# Patient Record
Sex: MALE | Race: WHITE | NOT HISPANIC OR LATINO | Employment: OTHER | URBAN - METROPOLITAN AREA
[De-identification: names, ages, dates, MRNs, and addresses within clinical notes are randomized per-mention and may not be internally consistent; named-entity substitution may affect disease eponyms.]

---

## 2017-01-16 ENCOUNTER — GENERIC CONVERSION - ENCOUNTER (OUTPATIENT)
Dept: OTHER | Facility: OTHER | Age: 62
End: 2017-01-16

## 2017-01-19 ENCOUNTER — GENERIC CONVERSION - ENCOUNTER (OUTPATIENT)
Dept: OTHER | Facility: OTHER | Age: 62
End: 2017-01-19

## 2017-01-23 ENCOUNTER — TRANSCRIBE ORDERS (OUTPATIENT)
Dept: ADMINISTRATIVE | Facility: HOSPITAL | Age: 62
End: 2017-01-23

## 2017-01-23 ENCOUNTER — GENERIC CONVERSION - ENCOUNTER (OUTPATIENT)
Dept: OTHER | Facility: OTHER | Age: 62
End: 2017-01-23

## 2017-01-23 ENCOUNTER — APPOINTMENT (OUTPATIENT)
Dept: LAB | Facility: HOSPITAL | Age: 62
End: 2017-01-23
Attending: INTERNAL MEDICINE
Payer: MEDICARE

## 2017-01-23 DIAGNOSIS — B18.2 CHRONIC VIRAL HEPATITIS C (HCC): ICD-10-CM

## 2017-01-23 LAB
ALBUMIN SERPL BCP-MCNC: 3.4 G/DL (ref 3.5–5)
ALP SERPL-CCNC: 59 U/L (ref 46–116)
ALT SERPL W P-5'-P-CCNC: 29 U/L (ref 12–78)
ANION GAP SERPL CALCULATED.3IONS-SCNC: 7 MMOL/L (ref 4–13)
AST SERPL W P-5'-P-CCNC: 35 U/L (ref 5–45)
BILIRUB SERPL-MCNC: 1.07 MG/DL (ref 0.2–1)
BUN SERPL-MCNC: 8 MG/DL (ref 5–25)
CALCIUM SERPL-MCNC: 9 MG/DL (ref 8.3–10.1)
CHLORIDE SERPL-SCNC: 100 MMOL/L (ref 100–108)
CO2 SERPL-SCNC: 28 MMOL/L (ref 21–32)
CREAT SERPL-MCNC: 1.01 MG/DL (ref 0.6–1.3)
ERYTHROCYTE [DISTWIDTH] IN BLOOD BY AUTOMATED COUNT: 14 % (ref 11.6–15.1)
GFR SERPL CREATININE-BSD FRML MDRD: >60 ML/MIN/1.73SQ M
GLUCOSE SERPL-MCNC: 268 MG/DL (ref 65–140)
HCT VFR BLD AUTO: 42.4 % (ref 36.5–49.3)
HGB BLD-MCNC: 14.5 G/DL (ref 12–17)
MCH RBC QN AUTO: 32.4 PG (ref 26.8–34.3)
MCHC RBC AUTO-ENTMCNC: 34.2 G/DL (ref 31.4–37.4)
MCV RBC AUTO: 95 FL (ref 82–98)
PLATELET # BLD AUTO: 110 THOUSANDS/UL (ref 149–390)
PMV BLD AUTO: 10.2 FL (ref 8.9–12.7)
POTASSIUM SERPL-SCNC: 4.1 MMOL/L (ref 3.5–5.3)
PROT SERPL-MCNC: 7.9 G/DL (ref 6.4–8.2)
RBC # BLD AUTO: 4.48 MILLION/UL (ref 3.88–5.62)
SODIUM SERPL-SCNC: 135 MMOL/L (ref 136–145)
WBC # BLD AUTO: 5.17 THOUSAND/UL (ref 4.31–10.16)

## 2017-01-23 PROCEDURE — 85027 COMPLETE CBC AUTOMATED: CPT

## 2017-01-23 PROCEDURE — 87522 HEPATITIS C REVRS TRNSCRPJ: CPT

## 2017-01-23 PROCEDURE — 80053 COMPREHEN METABOLIC PANEL: CPT

## 2017-01-23 PROCEDURE — 36415 COLL VENOUS BLD VENIPUNCTURE: CPT

## 2017-01-25 ENCOUNTER — ALLSCRIPTS OFFICE VISIT (OUTPATIENT)
Dept: OTHER | Facility: OTHER | Age: 62
End: 2017-01-25

## 2017-01-25 LAB
HCV RNA SERPL NAA+PROBE-ACNC: NORMAL IU/ML
TEST INFORMATION: NORMAL

## 2017-02-01 ENCOUNTER — HOSPITAL ENCOUNTER (OUTPATIENT)
Dept: CT IMAGING | Facility: HOSPITAL | Age: 62
Discharge: HOME | End: 2017-02-01
Attending: INTERNAL MEDICINE
Payer: MEDICARE

## 2017-02-01 DIAGNOSIS — K76.9 LIVER DISEASE: ICD-10-CM

## 2017-02-01 PROCEDURE — 74177 CT ABD & PELVIS W/CONTRAST: CPT

## 2017-02-01 RX ADMIN — IOHEXOL 100 ML: 350 INJECTION, SOLUTION INTRAVENOUS at 08:34

## 2017-02-02 ENCOUNTER — GENERIC CONVERSION - ENCOUNTER (OUTPATIENT)
Dept: OTHER | Facility: OTHER | Age: 62
End: 2017-02-02

## 2017-03-22 ENCOUNTER — GENERIC CONVERSION - ENCOUNTER (OUTPATIENT)
Dept: OTHER | Facility: OTHER | Age: 62
End: 2017-03-22

## 2017-04-20 ENCOUNTER — APPOINTMENT (OUTPATIENT)
Dept: LAB | Facility: HOSPITAL | Age: 62
End: 2017-04-20
Attending: INTERNAL MEDICINE
Payer: MEDICARE

## 2017-04-20 DIAGNOSIS — K76.9 LIVER DISEASE: ICD-10-CM

## 2017-04-20 LAB
ALBUMIN SERPL BCP-MCNC: 3.6 G/DL (ref 3.5–5)
ALP SERPL-CCNC: 66 U/L (ref 46–116)
ALT SERPL W P-5'-P-CCNC: 31 U/L (ref 12–78)
ANION GAP SERPL CALCULATED.3IONS-SCNC: 7 MMOL/L (ref 4–13)
AST SERPL W P-5'-P-CCNC: 34 U/L (ref 5–45)
BILIRUB SERPL-MCNC: 0.99 MG/DL (ref 0.2–1)
BUN SERPL-MCNC: 10 MG/DL (ref 5–25)
CALCIUM SERPL-MCNC: 8.9 MG/DL (ref 8.3–10.1)
CHLORIDE SERPL-SCNC: 101 MMOL/L (ref 100–108)
CO2 SERPL-SCNC: 30 MMOL/L (ref 21–32)
CREAT SERPL-MCNC: 1.04 MG/DL (ref 0.6–1.3)
ERYTHROCYTE [DISTWIDTH] IN BLOOD BY AUTOMATED COUNT: 13.1 % (ref 11.6–15.1)
GFR SERPL CREATININE-BSD FRML MDRD: >60 ML/MIN/1.73SQ M
GLUCOSE SERPL-MCNC: 232 MG/DL (ref 65–140)
HCT VFR BLD AUTO: 42.3 % (ref 36.5–49.3)
HGB BLD-MCNC: 15 G/DL (ref 12–17)
INR PPP: 1.15 (ref 0.86–1.16)
MCH RBC QN AUTO: 32.4 PG (ref 26.8–34.3)
MCHC RBC AUTO-ENTMCNC: 35.5 G/DL (ref 31.4–37.4)
MCV RBC AUTO: 91 FL (ref 82–98)
PLATELET # BLD AUTO: 112 THOUSANDS/UL (ref 149–390)
PMV BLD AUTO: 11.6 FL (ref 8.9–12.7)
POTASSIUM SERPL-SCNC: 4.6 MMOL/L (ref 3.5–5.3)
PROT SERPL-MCNC: 7.9 G/DL (ref 6.4–8.2)
PROTHROMBIN TIME: 14.7 SECONDS (ref 12–14.3)
RBC # BLD AUTO: 4.63 MILLION/UL (ref 3.88–5.62)
SODIUM SERPL-SCNC: 138 MMOL/L (ref 136–145)
WBC # BLD AUTO: 5.36 THOUSAND/UL (ref 4.31–10.16)

## 2017-04-20 PROCEDURE — 85027 COMPLETE CBC AUTOMATED: CPT

## 2017-04-20 PROCEDURE — 85610 PROTHROMBIN TIME: CPT

## 2017-04-20 PROCEDURE — 80053 COMPREHEN METABOLIC PANEL: CPT

## 2017-04-20 PROCEDURE — 36415 COLL VENOUS BLD VENIPUNCTURE: CPT

## 2017-04-20 PROCEDURE — 87522 HEPATITIS C REVRS TRNSCRPJ: CPT

## 2017-04-24 LAB
HCV RNA SERPL NAA+PROBE-ACNC: NORMAL IU/ML
TEST INFORMATION: NORMAL

## 2017-04-25 ENCOUNTER — GENERIC CONVERSION - ENCOUNTER (OUTPATIENT)
Dept: OTHER | Facility: OTHER | Age: 62
End: 2017-04-25

## 2017-04-25 DIAGNOSIS — E03.9 HYPOTHYROIDISM: ICD-10-CM

## 2017-04-25 DIAGNOSIS — K76.9 LIVER DISEASE: ICD-10-CM

## 2017-04-25 DIAGNOSIS — I10 ESSENTIAL (PRIMARY) HYPERTENSION: ICD-10-CM

## 2017-04-25 DIAGNOSIS — E11.9 TYPE 2 DIABETES MELLITUS WITHOUT COMPLICATIONS (HCC): ICD-10-CM

## 2017-04-25 DIAGNOSIS — C22.0 LIVER CELL CARCINOMA (HCC): ICD-10-CM

## 2017-04-27 ENCOUNTER — TRANSCRIBE ORDERS (OUTPATIENT)
Dept: ADMINISTRATIVE | Facility: HOSPITAL | Age: 62
End: 2017-04-27

## 2017-04-27 ENCOUNTER — ALLSCRIPTS OFFICE VISIT (OUTPATIENT)
Dept: OTHER | Facility: OTHER | Age: 62
End: 2017-04-27

## 2017-04-27 DIAGNOSIS — C22.0 CARCINOMA OF LIVER (HCC): Primary | ICD-10-CM

## 2017-05-02 ENCOUNTER — APPOINTMENT (OUTPATIENT)
Dept: LAB | Facility: HOSPITAL | Age: 62
End: 2017-05-02
Attending: INTERNAL MEDICINE
Payer: MEDICARE

## 2017-05-02 DIAGNOSIS — C22.0 LIVER CELL CARCINOMA (HCC): ICD-10-CM

## 2017-05-02 PROCEDURE — 82105 ALPHA-FETOPROTEIN SERUM: CPT

## 2017-05-02 PROCEDURE — 36415 COLL VENOUS BLD VENIPUNCTURE: CPT

## 2017-05-03 ENCOUNTER — GENERIC CONVERSION - ENCOUNTER (OUTPATIENT)
Dept: OTHER | Facility: OTHER | Age: 62
End: 2017-05-03

## 2017-05-03 LAB — AFP-TM SERPL-MCNC: 3.6 NG/ML (ref 0–8.3)

## 2017-05-06 ENCOUNTER — HOSPITAL ENCOUNTER (OUTPATIENT)
Dept: MRI IMAGING | Facility: HOSPITAL | Age: 62
Discharge: HOME | End: 2017-05-06
Attending: INTERNAL MEDICINE
Payer: MEDICARE

## 2017-05-06 DIAGNOSIS — C22.0 LIVER CELL CARCINOMA (HCC): ICD-10-CM

## 2017-05-06 PROCEDURE — A9585 GADOBUTROL INJECTION: HCPCS | Performed by: INTERNAL MEDICINE

## 2017-05-06 PROCEDURE — 74183 MRI ABD W/O CNTR FLWD CNTR: CPT

## 2017-05-06 RX ADMIN — GADOBUTROL 8 ML: 604.72 INJECTION INTRAVENOUS at 10:31

## 2017-05-18 ENCOUNTER — GENERIC CONVERSION - ENCOUNTER (OUTPATIENT)
Dept: OTHER | Facility: OTHER | Age: 62
End: 2017-05-18

## 2017-05-23 ENCOUNTER — ALLSCRIPTS OFFICE VISIT (OUTPATIENT)
Dept: OTHER | Facility: OTHER | Age: 62
End: 2017-05-23

## 2017-06-07 ENCOUNTER — TRANSCRIBE ORDERS (OUTPATIENT)
Dept: ADMINISTRATIVE | Facility: HOSPITAL | Age: 62
End: 2017-06-07

## 2017-06-07 ENCOUNTER — APPOINTMENT (OUTPATIENT)
Dept: LAB | Facility: HOSPITAL | Age: 62
End: 2017-06-07
Payer: MEDICARE

## 2017-06-07 DIAGNOSIS — E03.9 UNSPECIFIED HYPOTHYROIDISM: ICD-10-CM

## 2017-06-07 DIAGNOSIS — E13.9 OTHER SPECIFIED DIABETES MELLITUS: ICD-10-CM

## 2017-06-07 DIAGNOSIS — I10 ESSENTIAL HYPERTENSION, MALIGNANT: ICD-10-CM

## 2017-06-07 DIAGNOSIS — I10 ESSENTIAL HYPERTENSION, MALIGNANT: Primary | ICD-10-CM

## 2017-06-07 LAB
CHOLEST SERPL-MCNC: 124 MG/DL (ref 50–200)
CREAT UR-MCNC: 32.9 MG/DL
EST. AVERAGE GLUCOSE BLD GHB EST-MCNC: 134 MG/DL
HBA1C MFR BLD: 6.3 % (ref 4.2–6.3)
HDLC SERPL-MCNC: 44 MG/DL (ref 40–60)
LDLC SERPL CALC-MCNC: 63 MG/DL (ref 0–100)
MICROALBUMIN UR-MCNC: <5 MG/L (ref 0–20)
MICROALBUMIN/CREAT 24H UR: <15 MG/G CREATININE (ref 0–30)
TRIGL SERPL-MCNC: 87 MG/DL
TSH SERPL DL<=0.05 MIU/L-ACNC: 3.7 UIU/ML (ref 0.36–3.74)

## 2017-06-07 PROCEDURE — 82043 UR ALBUMIN QUANTITATIVE: CPT

## 2017-06-07 PROCEDURE — 36415 COLL VENOUS BLD VENIPUNCTURE: CPT

## 2017-06-07 PROCEDURE — 84443 ASSAY THYROID STIM HORMONE: CPT

## 2017-06-07 PROCEDURE — 80061 LIPID PANEL: CPT

## 2017-06-07 PROCEDURE — 83036 HEMOGLOBIN GLYCOSYLATED A1C: CPT

## 2017-06-07 PROCEDURE — 82570 ASSAY OF URINE CREATININE: CPT

## 2017-06-08 ENCOUNTER — GENERIC CONVERSION - ENCOUNTER (OUTPATIENT)
Dept: OTHER | Facility: OTHER | Age: 62
End: 2017-06-08

## 2017-06-28 ENCOUNTER — GENERIC CONVERSION - ENCOUNTER (OUTPATIENT)
Dept: OTHER | Facility: OTHER | Age: 62
End: 2017-06-28

## 2017-07-11 ENCOUNTER — ALLSCRIPTS OFFICE VISIT (OUTPATIENT)
Dept: OTHER | Facility: OTHER | Age: 62
End: 2017-07-11

## 2018-01-09 NOTE — RESULT NOTES
Verified Results  (1) COMPREHENSIVE METABOLIC PANEL [id number] 12:01EM Jj Juan Alberto Order Number: [id number]     Test Name Result Flag Reference   GLUCOSE,RANDM 159 mg/dL H    If the patient is fasting, the ADA then defines impaired fasting glucose as > 100 mg/dL and diabetes as > or equal to 123 mg/dL  SODIUM 138 mmol/L  136-145   POTASSIUM 4 3 mmol/L  3 5-5 3   CHLORIDE 100 mmol/L  100-108   CARBON DIOXIDE 34 mmol/L H 21-32   ANION GAP (CALC) 4 mmol/L  4-13   BLOOD UREA NITROGEN 12 mg/dL  5-25   CREATININE 1 01 mg/dL  0 60-1 30   Standardized to IDMS reference method   CALCIUM 9 1 mg/dL  8 3-10 1   BILI, TOTAL 1 16 mg/dL H 0 20-1 00   ALK PHOSPHATAS 71 U/L     ALT (SGPT) 98 U/L H 12-78   AST(SGOT) 72 U/L H 5-45   ALBUMIN 3 8 g/dL  3 5-5 0   TOTAL PROTEIN 8 8 g/dL H 6 4-8 2   eGFR Non-African American      >60 0 ml/min/1 73sq m   - Patient Instructions: This is a fasting blood test  Water,black tea or black  coffee only after 9:00pm the night before test Drink 2 glasses of water the morning of test - Patient Instructions: This bloodwork is non-fasting  Please drink two glasses of   water morning of bloodwork  National Kidney Disease Education Program recommendations are as follows:  GFR calculation is accurate only with a steady state creatinine  Chronic Kidney disease less than 60 ml/min/1 73 sq  meters  Kidney failure less than 15 ml/min/1 73 sq  meters  (1) LIPID PANEL, FASTING [id number] 08:36AM Jj Avendano Order Number: [id number]     Test Name Result Flag Reference   CHOLESTEROL 137 mg/dL     HDL,DIRECT 59 mg/dL  40-60   Specimen collection should occur prior to Metamizole administration due to the potential for falsely depressed results  LDL CHOLESTEROL CALCULATED 65 mg/dL  0-100   - Patient Instructions:  This is a fasting blood test  Water,black tea or black  coffee only after 9:00pm the night before test   Drink 2 glasses of water the morning of test     - Patient Instructions: This is a fasting blood test  Water,black tea or black  coffee only after 9:00pm the night before test Drink 2 glasses of water the morning of test - Patient Instructions: This bloodwork is non-fasting  Please drink two glasses of   water morning of bloodwork  Triglyceride:         Normal              <150 mg/dl       Borderline High    150-199 mg/dl       High               200-499 mg/dl       Very High          >499 mg/dl  Cholesterol:         Desirable        <200 mg/dl      Borderline High  200-239 mg/dl      High             >239 mg/dl  HDL Cholesterol:        High    >59 mg/dL      Low     <41 mg/dL  LDL CALCULATED:    This screening LDL is a calculated result  It does not have the accuracy of the Direct Measured LDL in the monitoring of patients with hyperlipidemia and/or statin therapy  Direct Measure LDL (NNX120) must be ordered separately in these patients  TRIGLYCERIDES 66 mg/dL  <=150   Specimen collection should occur prior to N-Acetylcysteine or Metamizole administration due to the potential for falsely depressed results  (1) MICROALBUMIN CREATININE RATIO, RANDOM URINE 29Sep2016 08:36AM Pily [place] Order Number: [id number]     Test Name Result Flag Reference   MICROALBUMIN/ CREAT R <3 mg/g creatinine  0-30   MICROALBUMIN,URINE <5 0 mg/L  0 0-20 0   CREATININE URINE 147 0 mg/dL       (1) TSH [id number] 08:36AM Pily Filipe Order Number: [id number]     Test Name Result Flag Reference   TSH 2 351 uIU/mL  0 358-3 740   - Patient Instructions: This bloodwork is non-fasting  Please drink two glasses of water morning of bloodwork  - Patient Instructions: This is a fasting blood test  Water,black tea or black  coffee only after 9:00pm the night before test Drink 2 glasses of water the morning of test - Patient Instructions: This bloodwork is non-fasting  Please drink two glasses of   water morning of bloodwork    Patients undergoing fluorescein dye angiography may retain small amounts of fluorescein in the body for 48-72 hours post procedure  Samples containing fluorescein can produce falsely depressed TSH values  If the patient had this procedure,a specimen should be resubmitted post fluorescein clearance  (1) PSA (SCREEN) (Dx V76 44 Screen for Prostate Cancer) [id number] 08:36AM Bakari Cox Order Number: [id number]     Test Name Result Flag Reference   PROSTATE SPECIFIC ANTIGEN 0 4 ng/mL  0 0-4 0   - Patient Instructions: This test is non-fasting  Please drink two glasses of water morning of bloodwork  - Patient Instructions: This test is non-fasting  Please drink two glasses of water morning of bloodwork

## 2018-01-10 ENCOUNTER — HOSPITAL ENCOUNTER (EMERGENCY)
Facility: HOSPITAL | Age: 63
Discharge: HOME | End: 2018-01-10
Admitting: EMERGENCY MEDICINE
Payer: MEDICARE

## 2018-01-10 VITALS
WEIGHT: 189.38 LBS | HEART RATE: 74 BPM | RESPIRATION RATE: 16 BRPM | DIASTOLIC BLOOD PRESSURE: 92 MMHG | TEMPERATURE: 98.2 F | OXYGEN SATURATION: 100 % | SYSTOLIC BLOOD PRESSURE: 140 MMHG

## 2018-01-10 DIAGNOSIS — H61.20 CERUMEN IMPACTION: Primary | ICD-10-CM

## 2018-01-10 PROCEDURE — 99282 EMERGENCY DEPT VISIT SF MDM: CPT

## 2018-01-10 RX ORDER — OFLOXACIN 3 MG/ML
10 SOLUTION AURICULAR (OTIC) DAILY
Qty: 5 ML | Refills: 0 | Status: SHIPPED | OUTPATIENT
Start: 2018-01-10 | End: 2018-08-21 | Stop reason: ALTCHOICE

## 2018-01-10 RX ORDER — LEVOTHYROXINE SODIUM 112 UG/1
112 TABLET ORAL DAILY
COMMUNITY
End: 2018-08-10 | Stop reason: SDUPTHER

## 2018-01-10 RX ORDER — NADOLOL 20 MG/1
40 TABLET ORAL DAILY
COMMUNITY
End: 2018-08-14

## 2018-01-10 RX ORDER — LISINOPRIL 20 MG/1
20 TABLET ORAL DAILY
COMMUNITY
End: 2018-08-27 | Stop reason: SDUPTHER

## 2018-01-10 RX ORDER — GLIMEPIRIDE 4 MG/1
4 TABLET ORAL DAILY
COMMUNITY
End: 2018-08-10 | Stop reason: SDUPTHER

## 2018-01-10 RX ORDER — SILDENAFIL 100 MG/1
1 TABLET, FILM COATED ORAL ONCE AS NEEDED
COMMUNITY
Start: 2017-05-23 | End: 2018-09-07 | Stop reason: SDUPTHER

## 2018-01-10 NOTE — MISCELLANEOUS
Message  GI Reminder Recall [facility]:   Date: 05/18/2017   Dear Caroline Reno:     Review of our records shows you are due for the following: Follow Up Visit  Please call the following office to schedule your appointment:   [address] [place] [address], [address] [place] [address] [phone]  We look forward to hearing from you!      Sincerely,     [facility]      Signatures   Electronically signed by : Layla Whitaker, ; May 18 2017 11:07AM EST                       (Author)

## 2018-01-10 NOTE — RESULT NOTES
Message  I reviewed Frank's CT of the abdomen done on 2/1/17  There is interval increase in the size of liver lesion highly suggestive of HCC  There are new lesions as well  I discussed the result with Suresh Anderson and recommended that he should see liver transplant team at [address] [place] [address] for further management and possible liver transplant evaluation  I also offered him to go back to oncology and surgical oncology team   We also discussed other tx options including TACE  As you recall he was perviously seen by Liver transplant team at [facility] and they recommended biopsy for definitive diagnosis  He refused liver biopsy and further treatment at that time  Now he wants to see someone else for second opinion  He didn't give me the name or place he would like to go for second opinion  I assured him that I will transfer all his records once he provide us with the name of the doctor he would like to see  I told him that delay in diagnosis and treatment would lead to further progression of his disease and eventually death  He verbalized understanding  He understood that if he has questions he can call my office at anytime        Signatures   Electronically signed by : Octaviano Carrillo MD; Feb 2 2017 11:11AM EST                       (Author)

## 2018-01-10 NOTE — DISCHARGE INSTRUCTIONS
Cerumen Impaction   WHAT YOU NEED TO KNOW:   Cerumen impaction is the blockage of the outer ear canal by tightly packed cerumen (earwax)  It is generally treated with procedures such as flushing or suctioning the ear canal or the use of instruments to remove the impaction  DISCHARGE INSTRUCTIONS:   Medicines:  · Ear drops: These are used to soften the wax in your ear  Wax softening ear drops may be bought without a prescription  Ask your healthcare provider how often you should use this medicine  Read the instructions carefully before you use the ear drops  Do the following when you put in ear drops:     ¨ Warm the drops by holding the bottle in your hands for a few minutes  Cold ear drops may make you dizzy  ¨ Lie down with the affected ear toward the ceiling  You may also stand with your head tilted to one side  ¨ Pull your ear lobe up and back, and place the correct number of drops into the ear  ¨ Keep your ear facing up for 5 to 10 minutes so the drops coat the outer ear canal      ¨ Gently clean the outer part of the ear with a cotton swab  Do not  place the cotton swab or anything inside your ear canal  This increases the risk of damaging your eardrum  · Take your medicine as directed  Contact your healthcare provider if you think your medicine is not helping or if you have side effects  Tell him of her if you are allergic to any medicine  Keep a list of the medicines, vitamins, and herbs you take  Include the amounts, and when and why you take them  Bring the list or the pill bottles to follow-up visits  Carry your medicine list with you in case of an emergency  Follow up with your healthcare provider as directed:  Write down your questions so you remember to ask them during your visits  Contact your healthcare provider if:   · You have a fever  · You have trouble hearing or ringing in your ear  · You have questions about your condition or care    Return to the emergency department if:   · You feel dizzy  · You have discharge or blood coming out of your ear  · Your ear pain does not go away or gets worse  © 2017 [facility] Information is for End User's use only and may not be sold, redistributed or otherwise used for commercial purposes  All illustrations and images included in CareNotes® are the copyrighted property of A D A M , Inc  or [employer]  The above information is an  only  It is not intended as medical advice for individual conditions or treatments  Talk to your doctor, nurse or pharmacist before following any medical regimen to see if it is safe and effective for you  DISCHARGE INSTRUCTIONS:    FOLLOW UP WITH YOUR PRIMARY CARE PROVIDER OR THE [address] [place] [address]  MAKE AN APPOINTMENT TO BE SEEN  APPLY OFLOXACIN DROPS TO THE LEFT EAR AS PRESCRIBED  IF RASH, SHORTNESS OF BREATH OR TROUBLE SWALLOWING, STOP TAKING THE MEDICATION AND BE SEEN  FOLLOW UP WITH THE RECOMMENDED EARS, NOSE AND THROAT SPECIALIST  IF SYMPTOMS WORSEN OR NEW SYMPTOMS ARISE, RETURN TO THE ER TO BE SEEN

## 2018-01-10 NOTE — RESULT NOTES
Message  He completed hepatitis C treatment  He achieved SVR  He is cured  We'll continue to monitor his liver function test       Verified Results  (1) HEP C RNA PCR, QUANTITATIVE 20Apr2017 08:58AM Emilykim Noe    Order Number: [id number]     Test Name Result Flag Reference   HCV PCR QUANTITATIVE      HCV Not Detected IU/mL   TEST INFORMATION Comment     The quantitative range of this assay is 15 IU/mL to 100 million IU/mL      Performed at:  [employer] [address]  [id number]  : Chris Montelongo MD, Phone:  [phone]       Signatures   Electronically signed by : Debra Breen MD; Apr 25 2017 10:16PM EST                       (Author)

## 2018-01-10 NOTE — ED PROVIDER NOTES
History  Chief Complaint   Patient presents with    Ear Problem     Since this am, cannot hear out of the left ear      62y  o male with PMH of DM and throat cancer presents to the ER for decreased hearing of the left ear for 1 day  Patient states when he woke up, it sounded as though there was water in his ear  He put a q-tip in his ear to clean his ear out and since then it feels as though his ear is blocked  He denies pain  He states his symptoms are constant  He denies fever, chills, chest pain, dyspnea, N/V/D, abdominal pain, weakness or paresthesias  History provided by:  Patient   used: No        Prior to Admission Medications   Prescriptions Last Dose Informant Patient Reported? Taking?   glimepiride (AMARYL) 4 mg tablet   Yes Yes   Sig: Take 4 mg by mouth daily   levothyroxine 112 mcg tablet   Yes Yes   Sig: Take 112 mcg by mouth daily   lisinopril (ZESTRIL) 20 mg tablet   Yes Yes   Sig: Take 20 mg by mouth daily   nadolol (CORGARD) 20 mg tablet   Yes Yes   Sig: Take 40 mg by mouth daily   sildenafil (VIAGRA) 100 mg tablet   Yes Yes   Sig: Take 1 tablet by mouth once as needed      Facility-Administered Medications: None       Past Medical History:   Diagnosis Date    [facility] ([facility] )        History reviewed  No pertinent surgical history  History reviewed  No pertinent family history  I have reviewed and agree with the history as documented  Social History   Substance Use Topics    Smoking status: Former Smoker    Smokeless tobacco: Never Used    Alcohol use No        Review of Systems   Constitutional: Negative for activity change, appetite change, chills and fever  HENT: Positive for hearing loss (decreased in left ear)  Negative for congestion, drooling, ear discharge, ear pain, facial swelling, rhinorrhea and sore throat  Respiratory: Negative for shortness of breath  Cardiovascular: Negative for chest pain     Gastrointestinal: Negative for abdominal pain, diarrhea, nausea and vomiting  Musculoskeletal: Negative for neck stiffness  Skin: Negative for rash  Allergic/Immunologic: Negative for food allergies  Neurological: Negative for weakness and numbness  Physical Exam  ED Triage Vitals [01/10/18 0846]   Temperature Pulse Respirations Blood Pressure SpO2   98 2 °F (36 8 °C) 74 16 140/92 100 %      Temp Source Heart Rate Source Patient Position - Orthostatic VS BP Location FiO2 (%)   Oral -- Sitting Right arm --      Pain Score       No Pain           Orthostatic Vital Signs  Vitals:    01/10/18 0846   BP: 140/92   Pulse: 74   Patient Position - Orthostatic VS: Sitting       Physical Exam   Constitutional:  Non-toxic appearance  No distress  HENT:   Head: Normocephalic and atraumatic  Right Ear: Tympanic membrane and external ear normal  No drainage, swelling or tenderness  A foreign body (cerumen impaction) is present  Tympanic membrane is not erythematous  No hemotympanum  Left Ear: Tympanic membrane and external ear normal  No drainage, swelling or tenderness  A foreign body (cerumen impaction) is present  Nose: Nose normal    Mouth/Throat: Uvula is midline, oropharynx is clear and moist and mucous membranes are normal  No uvula swelling  No posterior oropharyngeal edema, posterior oropharyngeal erythema or tonsillar abscesses  No tonsillar exudate  Neck: Normal range of motion and phonation normal  Neck supple  No tracheal deviation present  Cardiovascular: Normal rate, regular rhythm, S1 normal, S2 normal and normal heart sounds  Exam reveals no gallop and no friction rub  No murmur heard  Pulmonary/Chest: Effort normal and breath sounds normal  No respiratory distress  He has no decreased breath sounds  He has no wheezes  He has no rhonchi  He has no rales  He exhibits no tenderness  Neurological: He is alert  GCS eye subscore is 4  GCS verbal subscore is 5  GCS motor subscore is 6  Skin: Skin is warm and dry   No rash noted  Psychiatric: He has a normal mood and affect  Nursing note and vitals reviewed  ED Medications  Medications - No data to display    Diagnostic Studies  Results Reviewed     None                 No orders to display              Procedures  Foreign Body - Orifice  Date/Time: 1/10/2018 10:44 AM  Performed by: Sandi Wilburn by: Uriah Ivy     Patient location:  ED  Other Assisting Provider: Yes (comment) (DMITRI)    Consent:     Consent obtained:  Verbal    Consent given by:  Patient    Risks discussed:  Bleeding, infection, need for surgical removal, pain and TM perforation  Universal protocol:     Procedure explained and questions answered to patient or proxy's satisfaction: yes      Patient identity confirmed:  Arm band  Location:     Location:  Ear    Ear location:  L ear  Pre-procedure details:     Imaging:  None  Anesthesia (see MAR for exact dosages): Topical anesthetic:  None  Procedure details:     Localization method:  Direct visualization    Removal mechanism:  Irrigation    Procedure complexity:  Complex    Foreign bodies recovered:  1    Description:  Some cerumen    Intact foreign body removal: no    Post-procedure details:     Confirmation:  Residual foreign bodies remain    Patient tolerance of procedure: Tolerated well, no immediate complications           Phone Contacts  ED Phone Contact    ED Course  ED Course                                MDM  Number of Diagnoses or Management Options  Cerumen impaction: new and requires workup  Diagnosis management comments: DDX consists of but not limited to: cerumen impaction, foreign body, otitis media, otitis externa    Will flush ear to remove cerumen  A moderate amount of cerumen remains in the ear  Will send patient to ENT      At discharge, I instructed the patient to:  -follow up with pcp  -apply Ofloxacin drops to the left ear as prescribed  -follow up with the recommended ENT specialist  -return to the ER if symptoms worsened or new symptoms arose  Patient agreed to this plan and was stable at time of discharge  Patient Progress  Patient progress: stable    CritCare Time    Disposition  Final diagnoses:   Cerumen impaction     Time reflects when diagnosis was documented in both MDM as applicable and the Disposition within this note     Time User Action Codes Description Comment    1/10/2018 10:17 AM Ousmane LANGLEY Add [H61 20] Cerumen impaction       ED Disposition     ED Disposition Condition Comment    Discharge  Rashaad Brennan discharge to home/self care  Condition at discharge: Stable        Follow-up Information     Follow up With Specialties Details Why Contact Info    Lian Suh PA-C Family Medicine Schedule an appointment as soon as possible for a visit in 1 day  [address] [facility] [address] [place] [address]  Priyank Lucero 668, DO Otolaryngology Schedule an appointment as soon as possible for a visit in 1 day    Emeka Piña [address] [facility] [address]  [phone]          Discharge Medication List as of 1/10/2018 10:18 AM      START taking these medications    Details   ofloxacin (FLOXIN) 0 3 % otic solution Administer 10 drops into the left ear daily, Starting Wed 1/10/2018, Print         CONTINUE these medications which have NOT CHANGED    Details   glimepiride (AMARYL) 4 mg tablet Take 4 mg by mouth daily, Historical Med      levothyroxine 112 mcg tablet Take 112 mcg by mouth daily, Historical Med      lisinopril (ZESTRIL) 20 mg tablet Take 20 mg by mouth daily, Historical Med      nadolol (CORGARD) 20 mg tablet Take 40 mg by mouth daily, Historical Med      sildenafil (VIAGRA) 100 mg tablet Take 1 tablet by mouth once as needed, Starting Tue 5/23/2017, Historical Med           No discharge procedures on file      ED Provider  Electronically Signed by           Channing Garner PA-C  01/10/18 5723

## 2018-01-11 ENCOUNTER — GENERIC CONVERSION - ENCOUNTER (OUTPATIENT)
Dept: OTHER | Facility: OTHER | Age: 63
End: 2018-01-11

## 2018-01-11 DIAGNOSIS — K74.60 CIRRHOSIS OF LIVER (HCC): ICD-10-CM

## 2018-01-11 DIAGNOSIS — Z12.5 ENCOUNTER FOR SCREENING FOR MALIGNANT NEOPLASM OF PROSTATE: ICD-10-CM

## 2018-01-11 DIAGNOSIS — E11.9 TYPE 2 DIABETES MELLITUS WITHOUT COMPLICATIONS (HCC): ICD-10-CM

## 2018-01-11 DIAGNOSIS — C22.0 LIVER CELL CARCINOMA (HCC): ICD-10-CM

## 2018-01-11 DIAGNOSIS — E03.9 HYPOTHYROIDISM: ICD-10-CM

## 2018-01-11 DIAGNOSIS — I10 ESSENTIAL (PRIMARY) HYPERTENSION: ICD-10-CM

## 2018-01-12 NOTE — MISCELLANEOUS
July 27, 2016    Dear Pili Olivera,    As we discussed on the phone, a viral load for Hepatitis C was detected on the lab drawn 6/21/16  Enclosed you will find the script for additional blood work needed to see if you are  resistant to the medication with which you we previously treated  Please get these labs drawn at your earliest convenience so that we may try a new drug to treat your Hepatitis C infection        Please call our office once you have had this completed,  so we may submit to your insurance for approval     Thank you, and please do not hesitate to call us with any questions at [phone] (Monday - Friday 8 AM-4:30 PM)    Sincerely,      SETH Castaneda        Electronically signed by:Wendi Leos   Jul 27 2016 11:11AM EST Author

## 2018-01-12 NOTE — MISCELLANEOUS
Message  GI Reminder Recall Balaji Delatorre:   Date: 03/22/2017   Dear Tita Lujan:     Review of our records shows you are due for the following: Follow Up Visit  Please call the following office to schedule your appointment:   [address], [address], [place], [place], [address] [facility] [phone]  We look forward to hearing from you!      Sincerely,     [facility]      Signatures   Electronically signed by : Bryce Lee, ; Mar 22 2017  3:51PM EST                       (Author)

## 2018-01-12 NOTE — RESULT NOTES
Verified Results  (1) LIPID PANEL, FASTING [id number] 08:23AM Lian Suh     Test Name Result Flag Reference   CHOLESTEROL 124 mg/dL     HDL,DIRECT 44 mg/dL  40-60   Specimen collection should occur prior to Metamizole administration due to the potential for falsely depressed results  LDL CHOLESTEROL CALCULATED 63 mg/dL  0-100   This is a fasting blood test  Water,black tea or black  coffee only after 9:00pm the night before test  Drink 2 glasses of water the morning of test         Triglyceride:         Normal              <150 mg/dl       Borderline High    150-199 mg/dl       High               200-499 mg/dl       Very High          >499 mg/dl  Cholesterol:         Desirable        <200 mg/dl      Borderline High  200-239 mg/dl      High             >239 mg/dl  HDL Cholesterol:        High    >59 mg/dL      Low     <41 mg/dL  LDL CALCULATED:    This screening LDL is a calculated result  It does not have the accuracy of the Direct Measured LDL in the monitoring of patients with hyperlipidemia and/or statin therapy  Direct Measure LDL (QLM314) must be ordered separately in these patients  TRIGLYCERIDES 87 mg/dL  <=150   Specimen collection should occur prior to N-Acetylcysteine or Metamizole administration due to the potential for falsely depressed results  (1) TSH [id number] 08:23AM Lian Suh     Test Name Result Flag Reference   TSH 3 703 uIU/mL  0 358-3 740   This bloodwork is non-fasting  Please drink two glasses of water morning of  bloodwork  Patients undergoing fluorescein dye angiography may retain small amounts of fluorescein in the body for 48-72 hours post procedure  Samples containing fluorescein can produce falsely depressed TSH values  If the patient had this procedure,a specimen should be resubmitted post fluorescein clearance  (1) HEMOGLOBIN A1C [id number] 08:23AM Lian Suh     Test Name Result Flag Reference   HEMOGLOBIN A1C 6 3 %  4 2-6 3   EST  AVG   GLUCOSE 134 mg/dl       (1) MICROALBUMIN CREATININE RATIO, RANDOM URINE [id number] 08:23AM Lian Suh     Test Name Result Flag Reference   MICROALBUMIN/ CREAT R <15 mg/g creatinine  0-30   MICROALBUMIN,URINE <5 0 mg/L  0 0-20 0   CREATININE URINE 32 9 mg/dL

## 2018-01-12 NOTE — RESULT NOTES
Verified Results  (1) AFP, SERUM [id number] 08:43AM Silvia Barrera Order Number: [id number]     Test Name Result Flag Reference   AFP 3 6 ng/mL  0 0 - 8 3   Normal values apply only to males and to nonpregnant females  These results are not interpretable for pregnant females  Roche ECLIA methodology  Values obtained with different assay methods or kits cannot be  used interchangeably  Results cannot be interpreted as absolute  evidence of the presence or absence of malignant disease      Performed at:  [address] [place] [address]  [id number]  : Stephie Messer MD, Phone:  [phone]

## 2018-01-13 VITALS
HEART RATE: 72 BPM | SYSTOLIC BLOOD PRESSURE: 120 MMHG | WEIGHT: 187 LBS | BODY MASS INDEX: 29.35 KG/M2 | OXYGEN SATURATION: 98 % | DIASTOLIC BLOOD PRESSURE: 74 MMHG | HEIGHT: 67 IN | TEMPERATURE: 97.6 F

## 2018-01-13 NOTE — RESULT NOTES
Verified Results  * CT ABDOMEN PELVIS W CONTRAST [id number] 08:11AM Christina Brant Order Number: [id number]   Performing Comments: triple phase CT   - Patient Instructions: To schedule this appointment, please contact Central Scheduling at [phone]  Test Name Result Flag Reference   CT ABDOMEN PELVIS W CONTRAST (Report)     CT ABDOMEN AND PELVIS WITH IV CONTRAST     INDICATION: Follow-up liver lesion, history of hepatitis C     COMPARISON: CT from 6/28/2016 and MRI from 1/29/2015     TECHNIQUE: CT examination of the abdomen and pelvis  Contrast was injected one time intravenously without immediate complication  Scanning through the abdomen was performed in arterial, venous and delayed phases according a protocol spefically    designed to evaluate upper abdominal viscera  Axial, sagittal and coronal reformatted projections were created  This examination, like all CT scans performed in the [facility], was performed utilizing techniques to minimize radiation    dose exposure, including the use of iterative reconstruction and automated exposure control  IV Contrast: iohexol (OMNIPAQUE) 350 MG/ML injection (MULTI-DOSE) 100 mL Note: (SINGLE DOSE/MULTI DOSE) information refers to the container from which the contrast was acquired  Contrast was injected one time intravenously without immediate    complication  Enteric Contrast: Enteric contrast was not administered  FINDINGS:     ABDOMEN     LOWER CHEST: No significant abnormalities identified in the lower chest      LIVER/BILIARY TREE: The liver again demonstrates a cirrhotic configuration  There is an arterially enhancing mass in segment 7 measuring 2 8 x 3 8 cm, previously 2 5 x 3 2 cm  There is subtle washout of this lesion in the portal venous phase  Additional foci of arterial enhancement are identified which were not visible on the previous CT though were present on the MRI from 1/29/2015   Include a lesion in segment 8 on series 2, image 22 measuring 1 2 x 1 4 cm, a lesion in segment 5 on image 57   measuring 1 1 x 1 1 cm, and a lesion in segment 6 on image 53 measuring 0 7 x 1 1 cm  An ill-defined focus of arterial enhancement in segment 7 medially on series 2, image 31 is also seen  Definite washout of these lesions is not seen  GALLBLADDER: No calcified gallstones  No pericholecystic inflammatory change  SPLEEN: The spleen is mildly enlarged measuring 14 cm, stable  PANCREAS: There is a pancreatic cyst arising in the region of the pancreatic head measuring 1 7 x 1 3 cm, previously 1 5 x 1 0 cm  ADRENAL GLANDS: Unremarkable  KIDNEYS/URETERS: There is a stable right renal cyst  There are small nonobstructing left renal calculi  No hydronephrosis  STOMACH AND BOWEL: Unremarkable  APPENDIX: No findings to suggest appendicitis  ABDOMINOPELVIC CAVITY: No ascites or free intraperitoneal air  There is stable daniel hepatis lymphadenopathy consistent with hepatocellular disease  VESSELS: Unremarkable for patient's age  PELVIS     REPRODUCTIVE ORGANS: The prostate is enlarged  URINARY BLADDER: Unremarkable  ABDOMINAL WALL/INGUINAL REGIONS: Unremarkable  OSSEOUS STRUCTURES: No acute fracture or destructive osseous lesion  IMPRESSION:     1  Interval increase in size of arterially enhancing liver lesion with washout  Based on the Liver-Imaging-Reporting and Data System lexicon version 2014, this is a LI-RADS 5b (definite HCC) lesion  2  Additional arterially enhancing lesions without washout, present in 2015  Size comparison is difficult between modalities though there is no gross interval change  These are consistent with LI-RADS 3 lesions (intermediate probability for [address] )  Stability favors arterial portal shunts  3  Cirrhosis with mild splenomegaly and stable daniel hepatis lymphadenopathy       4  Slightly increased size of pancreatic cyst measuring 1 7 cm, previously 1  5 cm  No solid enhancement       ##sigslh##sigslh       Workstation performed: [id number]     Signed by:   Alcon Tavares MD   2/2/17

## 2018-01-13 NOTE — MISCELLANEOUS
Message   Recorded as Task   Date: 06/21/2017 09:38 AM, Created By: Jenna Garcia   Task Name: Follow Up   Assigned To: Duong Espitia   Regarding Patient: Byron Frost, Status: In Progress   Comment:    Duong Espitia - 21 Jun 2017 9:38 AM     TASK CREATED  Mr Monica Gaitan has liver lesions suggestive of [address]  He refused treatment and liver transplant evaluation in the past  I have been calling him to discuss MRI result from last month  During his office visit he agreed to go ahead with TACE or [facility] treatment by IR/radiation oncology  Please try calling him and let me know if you can get hold him  Thanks  Sabino Lepe - 26 Jun 2017 1:56 PM     TASK EDITED  Called patient, reached , left mssg to call back  New Sherley - 26 Jun 2017 1:57 PM     TASK IN PROGRESS   Danette Awan - 27 Jun 2017 10:06 AM     TASK EDITED  Hi Dr Jean Pierre Britton,    I spoke to Ronnie Cummins  He has an appointment with you 7/11 in the office and he prefers to discuss his options with you at that time  He reports feeling well and feels like he is improving  If you wanted to call him in the meantime, he can be reached at [phone]      Thank you,    [facility]   will discuss further plan in the office      Signatures   Electronically signed by : Belgica Meneses MD; Jun 28 2017  8:34AM EST                       (Author)

## 2018-01-14 VITALS
DIASTOLIC BLOOD PRESSURE: 52 MMHG | TEMPERATURE: 98 F | WEIGHT: 187.8 LBS | RESPIRATION RATE: 20 BRPM | OXYGEN SATURATION: 98 % | SYSTOLIC BLOOD PRESSURE: 154 MMHG | HEART RATE: 110 BPM | BODY MASS INDEX: 29.86 KG/M2

## 2018-01-14 VITALS
HEART RATE: 72 BPM | DIASTOLIC BLOOD PRESSURE: 76 MMHG | OXYGEN SATURATION: 97 % | SYSTOLIC BLOOD PRESSURE: 128 MMHG | TEMPERATURE: 97.1 F | WEIGHT: 187.2 LBS | RESPIRATION RATE: 12 BRPM | BODY MASS INDEX: 29.76 KG/M2

## 2018-01-14 VITALS
TEMPERATURE: 98.1 F | BODY MASS INDEX: 29.19 KG/M2 | HEART RATE: 80 BPM | DIASTOLIC BLOOD PRESSURE: 82 MMHG | OXYGEN SATURATION: 98 % | WEIGHT: 186 LBS | SYSTOLIC BLOOD PRESSURE: 124 MMHG | RESPIRATION RATE: 16 BRPM | HEIGHT: 67 IN

## 2018-01-14 NOTE — RESULT NOTES
Message  27-year-old male with chronic hepatitis C with cirrhosis  He has compensated cirrhosis  Failed ribavirin for sovaldi  Resistance test reviewed  Start on Epclusa and RBV x 12 weeks  Thanks      Plan  Recurrent hepatitis C    · Ribavirin 200 MG Oral Tablet; 200 mg tablets    Take 3 tablets twice daily x 28 days    Signatures   Electronically signed by : Antonette Hawkins MD; Oct 17 2016 11:28PM EST                       (Author)

## 2018-01-14 NOTE — RESULT NOTES
Verified Results  (1) HEP C RNA PCR, QUANTITATIVE 20Apr2017 08:58AM Ashley Han    Order Number: [id number]     Test Name Result Flag Reference   HCV PCR QUANTITATIVE      HCV Not Detected IU/mL   TEST INFORMATION Comment     The quantitative range of this assay is 15 IU/mL to 100 million IU/mL      Performed at:  [address] [place] [address]  [id number]  : Kael Minor MD, Phone:  [phone]

## 2018-01-15 NOTE — RESULT NOTES
Verified Results  (1) COMPREHENSIVE METABOLIC PANEL [id number] 60:34EW [facility] Order Number: [id number]      National Kidney Disease Education Program recommendations are as follows:  GFR calculation is accurate only with a steady state creatinine  Chronic Kidney disease less than 60 ml/min/1 73 sq  meters  Kidney failure less than 15 ml/min/1 73 sq  meters  Test Name Result Flag Reference   GLUCOSE,RANDM 138 mg/dL     SODIUM 138 mmol/L  136-145   POTASSIUM 4 3 mmol/L  3 5-5 3   CHLORIDE 101 mmol/L  100-108   CARBON DIOXIDE 29 mmol/L  21-32   ANION GAP (CALC) 8 mmol/L  4-13   BLOOD UREA NITROGEN 14 mg/dL  5-25   CREATININE 0 97 mg/dL  0 60-1 30   Standardized to IDMS reference method   CALCIUM 8 5 mg/dL  8 3-10 1   BILI, TOTAL 1 29 mg/dL H 0 20-1 00   ALK PHOSPHATAS 89 U/L     ALT (SGPT) 40 U/L  12-78   AST(SGOT) 31 U/L  5-45   ALBUMIN 3 6 g/dL  3 5-5 0   TOTAL PROTEIN 8 3 g/dL H 6 4-8 2   eGFR Non-African American      >60 0 ml/min/1 73sq m     (1) LIPID PANEL, FASTING 04Apr2016 08:34AM [employer] Order Number: [id number]      Triglyceride:         Normal              <150 mg/dl       Borderline High    150-199 mg/dl       High               200-499 mg/dl       Very High          >499 mg/dl  Cholesterol:         Desirable        <200 mg/dl      Borderline High  200-239 mg/dl      High             >239 mg/dl  HDL Cholesterol:        High    >59 mg/dL      Low     <41 mg/dL     Test Name Result Flag Reference   CHOLESTEROL 149 mg/dL     HDL,DIRECT 63 mg/dL H 40-60   LDL CHOLESTEROL CALCULATED 72 mg/dL  0-100   TRIGLYCERIDES 68 mg/dL  <=150   Specimen collection should occur prior to N-Acetylcysteine or Metamizole administration due to the potential for falsely depressed results       (1) MICROALBUMIN CREATININE RATIO, RANDOM URINE 04Apr2016 08:34AM [employer] Order Number: [id number]     Test Name Result Flag Reference   MICROALBUMIN/ CREAT R <8 mg/g creatinine 0-30   MICROALBUMIN,URINE <5 0 mg/L  0 0-20 0   CREATININE URINE 60 7 mg/dL       (1) TSH 04Apr2016 08:34AM Mark Suh Order Number: [id number]    Patients undergoing fluorescein dye angiography may retain small amounts of fluorescein in the body for 48-72 hours post procedure  Samples containing fluorescein can produce falsely depressed TSH values  If the patient had this procedure,a specimen should be resubmitted post fluorescein clearance       Test Name Result Flag Reference   TSH 3 050 uIU/mL  0 358-3 740

## 2018-01-15 NOTE — RESULT NOTES
Verified Results  (1) CBC/ PLT (NO DIFF) [id number] 09:04AM [employer] Order Number: [id number]     Test Name Result Flag Reference   HEMATOCRIT 40 8 %  36 5-49 3   HEMOGLOBIN 14 0 g/dL  12 0-17 0   MCHC 34 3 g/dL  31 4-37 4   MCH 31 8 pg  26 8-34 3   MCV 93 fL  82-98   PLATELET COUNT 889 Thousands/uL  149-390   RBC COUNT 4 40 Million/uL  3 88-5 62   RDW 14 2 %  11 6-15 1   WBC COUNT 6 00 Thousand/uL  4 31-10 16   MPV 10 9 fL  8 9-12 7     (1) COMPREHENSIVE METABOLIC PANEL [id number] 68:14HW [employer] Order Number: [id number]     Test Name Result Flag Reference   GLUCOSE,RANDM 234 mg/dL H    If the patient is fasting, the ADA then defines impaired fasting glucose as > 100 mg/dL and diabetes as > or equal to 123 mg/dL  SODIUM 138 mmol/L  136-145   POTASSIUM 4 3 mmol/L  3 5-5 3   CHLORIDE 99 mmol/L L 100-108   CARBON DIOXIDE 32 mmol/L  21-32   ANION GAP (CALC) 7 mmol/L  4-13   BLOOD UREA NITROGEN 11 mg/dL  5-25   CREATININE 0 99 mg/dL  0 60-1 30   Standardized to IDMS reference method   CALCIUM 9 2 mg/dL  8 3-10 1   BILI, TOTAL 1 45 mg/dL H 0 20-1 00   ALK PHOSPHATAS 64 U/L     ALT (SGPT) 24 U/L  12-78   AST(SGOT) 27 U/L  5-45   ALBUMIN 3 3 g/dL L 3 5-5 0   TOTAL PROTEIN 7 7 g/dL  6 4-8 2   eGFR Non-African American      >60 0 ml/min/1 73sq [facility] Disease Education Program recommendations are as follows:  GFR calculation is accurate only with a steady state creatinine  Chronic Kidney disease less than 60 ml/min/1 73 sq  meters  Kidney failure less than 15 ml/min/1 73 sq  meters  (1) HEP C RNA PCR, QUANTITATIVE [id number] 09:04AM [employer] Order Number: [id number]     Test Name Result Flag Reference   HCV PCR QUANTITATIVE      HCV Not Detected IU/mL   TEST INFORMATION Comment     The quantitative range of this assay is 15 IU/mL to 100 million IU/mL      Performed at:  [address] [place] [address]  [id number]  [facility] Director: Nikkie Abreu MD, Phone:  [phone]       Plan  Chronic hepatitis C virus infection    · (1) CBC/ PLT (NO DIFF); Status:Active; Requested [id number];    · (1) COMPREHENSIVE METABOLIC PANEL; Status:Active; Requested [id number];    · (1) HEP C RNA PCR, QUANTITATIVE; Status:Active;  Requested JEV:[id number];

## 2018-01-16 NOTE — RESULT NOTES
Verified Results  (1) CBC/ PLT (NO DIFF) [id number] 08:26AM Page Uribe    Order Number: [id number]   Order Number: [id number]     Test Name Result Flag Reference   HEMATOCRIT 39 0 %  36 5-49 3   HEMOGLOBIN 13 6 g/dL  12 0-17 0   MCHC 34 9 g/dL  31 4-37 4   MCH 32 2 pg  26 8-34 3   MCV 92 fL  82-98   PLATELET COUNT 199 Thousands/uL L 149-390   RBC COUNT 4 23 Million/uL  3 88-5 62   RDW 14 0 %  11 6-15 1   WBC COUNT 5 83 Thousand/uL  4 31-10 16   MPV 11 2 fL  8 9-12 7     (1) COMPREHENSIVE METABOLIC PANEL [id number] 23:55ID [facility] Order Number: [id number]   Order Number: [id number]     Test Name Result Flag Reference   GLUCOSE,RANDM 219 mg/dL H    If the patient is fasting, the ADA then defines impaired fasting glucose as > 100 mg/dL and diabetes as > or equal to 123 mg/dL  SODIUM 138 mmol/L  136-145   POTASSIUM 4 3 mmol/L  3 5-5 3   CHLORIDE 102 mmol/L  100-108   CARBON DIOXIDE 30 mmol/L  21-32   ANION GAP (CALC) 6 mmol/L  4-13   BLOOD UREA NITROGEN 8 mg/dL  5-25   CREATININE 0 93 mg/dL  0 60-1 30   Standardized to IDMS reference method   CALCIUM 8 8 mg/dL  8 3-10 1   BILI, TOTAL 0 99 mg/dL  0 20-1 00   ALK PHOSPHATAS 74 U/L     ALT (SGPT) 32 U/L  12-78   AST(SGOT) 31 U/L  5-45   ALBUMIN 3 3 g/dL L 3 5-5 0   TOTAL PROTEIN 7 5 g/dL  6 4-8 2   eGFR Non-African American      >60 0 ml/min/1 73sq m   [facility] Disease Education Program recommendations are as follows:  GFR calculation is accurate only with a steady state creatinine  Chronic Kidney disease less than 60 ml/min/1 73 sq  meters  Kidney failure less than 15 ml/min/1 73 sq  meters  Plan  Chronic hepatitis C virus infection    · (1) CBC/ PLT (NO DIFF); Status:Active; Requested for:28Nov2016;    · (1) COMPREHENSIVE METABOLIC PANEL; Status:Active; Requested for:28Nov2016;    · (1) HEP C RNA PCR, QUANTITATIVE; Status:Active;  Requested for:28Nov2016;

## 2018-01-18 ENCOUNTER — GENERIC CONVERSION - ENCOUNTER (OUTPATIENT)
Dept: OTHER | Facility: OTHER | Age: 63
End: 2018-01-18

## 2018-01-18 ENCOUNTER — TRANSCRIBE ORDERS (OUTPATIENT)
Dept: LAB | Facility: CLINIC | Age: 63
End: 2018-01-18

## 2018-01-18 ENCOUNTER — ALLSCRIPTS OFFICE VISIT (OUTPATIENT)
Dept: OTHER | Facility: OTHER | Age: 63
End: 2018-01-18

## 2018-01-18 ENCOUNTER — APPOINTMENT (OUTPATIENT)
Dept: LAB | Facility: CLINIC | Age: 63
End: 2018-01-18
Payer: MEDICARE

## 2018-01-18 DIAGNOSIS — Z12.5 ENCOUNTER FOR SCREENING FOR MALIGNANT NEOPLASM OF PROSTATE: ICD-10-CM

## 2018-01-18 DIAGNOSIS — K74.60 CIRRHOSIS OF LIVER (HCC): ICD-10-CM

## 2018-01-18 DIAGNOSIS — E11.9 TYPE 2 DIABETES MELLITUS WITHOUT COMPLICATIONS (HCC): ICD-10-CM

## 2018-01-18 DIAGNOSIS — I10 ESSENTIAL (PRIMARY) HYPERTENSION: ICD-10-CM

## 2018-01-18 DIAGNOSIS — C22.0 LIVER CELL CARCINOMA (HCC): ICD-10-CM

## 2018-01-18 DIAGNOSIS — E03.9 HYPOTHYROIDISM: ICD-10-CM

## 2018-01-18 LAB
AFP-TM SERPL-MCNC: 5.5 NG/ML (ref 0.5–8)
ALBUMIN SERPL BCP-MCNC: 4 G/DL (ref 3.5–5)
ALP SERPL-CCNC: 64 U/L (ref 46–116)
ALT SERPL W P-5'-P-CCNC: 31 U/L (ref 12–78)
ANION GAP SERPL CALCULATED.3IONS-SCNC: 5 MMOL/L (ref 4–13)
AST SERPL W P-5'-P-CCNC: 32 U/L (ref 5–45)
BASOPHILS # BLD AUTO: 0.01 THOUSANDS/ΜL (ref 0–0.1)
BASOPHILS NFR BLD AUTO: 0 % (ref 0–1)
BILIRUB SERPL-MCNC: 1.42 MG/DL (ref 0.2–1)
BUN SERPL-MCNC: 12 MG/DL (ref 5–25)
CALCIUM SERPL-MCNC: 9.7 MG/DL (ref 8.3–10.1)
CHLORIDE SERPL-SCNC: 100 MMOL/L (ref 100–108)
CHOLEST SERPL-MCNC: 134 MG/DL (ref 50–200)
CO2 SERPL-SCNC: 32 MMOL/L (ref 21–32)
CREAT SERPL-MCNC: 0.95 MG/DL (ref 0.6–1.3)
CREAT UR-MCNC: 70.3 MG/DL
EOSINOPHIL # BLD AUTO: 0.16 THOUSAND/ΜL (ref 0–0.61)
EOSINOPHIL NFR BLD AUTO: 2 % (ref 0–6)
ERYTHROCYTE [DISTWIDTH] IN BLOOD BY AUTOMATED COUNT: 13.9 % (ref 11.6–15.1)
EST. AVERAGE GLUCOSE BLD GHB EST-MCNC: 148 MG/DL
GFR SERPL CREATININE-BSD FRML MDRD: 85 ML/MIN/1.73SQ M
GLUCOSE P FAST SERPL-MCNC: 170 MG/DL (ref 65–99)
HBA1C MFR BLD: 6.8 % (ref 4.2–6.3)
HCT VFR BLD AUTO: 44.9 % (ref 36.5–49.3)
HDLC SERPL-MCNC: 57 MG/DL (ref 40–60)
HGB BLD-MCNC: 15.8 G/DL (ref 12–17)
INR PPP: 1.13 (ref 0.86–1.16)
LDLC SERPL CALC-MCNC: 63 MG/DL (ref 0–100)
LYMPHOCYTES # BLD AUTO: 1.13 THOUSANDS/ΜL (ref 0.6–4.47)
LYMPHOCYTES NFR BLD AUTO: 16 % (ref 14–44)
MCH RBC QN AUTO: 31.7 PG (ref 26.8–34.3)
MCHC RBC AUTO-ENTMCNC: 35.2 G/DL (ref 31.4–37.4)
MCV RBC AUTO: 90 FL (ref 82–98)
MICROALBUMIN UR-MCNC: <5 MG/L (ref 0–20)
MICROALBUMIN/CREAT 24H UR: <7 MG/G CREATININE (ref 0–30)
MONOCYTES # BLD AUTO: 0.77 THOUSAND/ΜL (ref 0.17–1.22)
MONOCYTES NFR BLD AUTO: 11 % (ref 4–12)
NEUTROPHILS # BLD AUTO: 4.8 THOUSANDS/ΜL (ref 1.85–7.62)
NEUTS SEG NFR BLD AUTO: 71 % (ref 43–75)
NRBC BLD AUTO-RTO: 0 /100 WBCS
PLATELET # BLD AUTO: 154 THOUSANDS/UL (ref 149–390)
PMV BLD AUTO: 11.9 FL (ref 8.9–12.7)
POTASSIUM SERPL-SCNC: 4.3 MMOL/L (ref 3.5–5.3)
PROT SERPL-MCNC: 8.7 G/DL (ref 6.4–8.2)
PROTHROMBIN TIME: 14.5 SECONDS (ref 12.1–14.4)
PSA SERPL-MCNC: 0.4 NG/ML (ref 0–4)
RBC # BLD AUTO: 4.98 MILLION/UL (ref 3.88–5.62)
SODIUM SERPL-SCNC: 137 MMOL/L (ref 136–145)
TRIGL SERPL-MCNC: 70 MG/DL
TSH SERPL DL<=0.05 MIU/L-ACNC: 4.82 UIU/ML (ref 0.36–3.74)
WBC # BLD AUTO: 6.89 THOUSAND/UL (ref 4.31–10.16)

## 2018-01-18 PROCEDURE — 85610 PROTHROMBIN TIME: CPT

## 2018-01-18 PROCEDURE — 36415 COLL VENOUS BLD VENIPUNCTURE: CPT

## 2018-01-18 PROCEDURE — G0103 PSA SCREENING: HCPCS

## 2018-01-18 PROCEDURE — 80053 COMPREHEN METABOLIC PANEL: CPT

## 2018-01-18 PROCEDURE — 82043 UR ALBUMIN QUANTITATIVE: CPT

## 2018-01-18 PROCEDURE — 82570 ASSAY OF URINE CREATININE: CPT

## 2018-01-18 PROCEDURE — 83036 HEMOGLOBIN GLYCOSYLATED A1C: CPT

## 2018-01-18 PROCEDURE — 84443 ASSAY THYROID STIM HORMONE: CPT

## 2018-01-18 PROCEDURE — 85025 COMPLETE CBC W/AUTO DIFF WBC: CPT

## 2018-01-18 PROCEDURE — 82105 ALPHA-FETOPROTEIN SERUM: CPT

## 2018-01-18 PROCEDURE — 80061 LIPID PANEL: CPT

## 2018-01-18 NOTE — RESULT NOTES
Verified Results  (1) CBC/ PLT (NO DIFF) [id number] 08:31AM Ena Blanton   TW Order Number: [id number]     Test Name Result Flag Reference   HEMATOCRIT 42 4 %  36 5-49 3   HEMOGLOBIN 14 5 g/dL  12 0-17 0   MCHC 34 2 g/dL  31 4-37 4   MCH 32 4 pg  26 8-34 3   MCV 95 fL  82-98   PLATELET COUNT 484 Thousands/uL L 149-390   RBC COUNT 4 48 Million/uL  3 88-5 62   RDW 14 0 %  11 6-15 1   WBC COUNT 5 17 Thousand/uL  4 31-10 16   MPV 10 2 fL  8 9-12 7     (1) COMPREHENSIVE METABOLIC PANEL [id number] 17:97HA Ena Blanton   TW Order Number: [id number]     Test Name Result Flag Reference   GLUCOSE,RANDM 268 mg/dL H    If the patient is fasting, the ADA then defines impaired fasting glucose as > 100 mg/dL and diabetes as > or equal to 123 mg/dL  SODIUM 135 mmol/L L 136-145   POTASSIUM 4 1 mmol/L  3 5-5 3   CHLORIDE 100 mmol/L  100-108   CARBON DIOXIDE 28 mmol/L  21-32   ANION GAP (CALC) 7 mmol/L  4-13   BLOOD UREA NITROGEN 8 mg/dL  5-25   CREATININE 1 01 mg/dL  0 60-1 30   Standardized to IDMS reference method   CALCIUM 9 0 mg/dL  8 3-10 1   BILI, TOTAL 1 07 mg/dL H 0 20-1 00   ALK PHOSPHATAS 59 U/L     ALT (SGPT) 29 U/L  12-78   AST(SGOT) 35 U/L  5-45   ALBUMIN 3 4 g/dL L 3 5-5 0   TOTAL PROTEIN 7 9 g/dL  6 4-8 2   eGFR Non-African American      >60 0 ml/min/1 73sq m   [facility] Disease Education Program recommendations are as follows:  GFR calculation is accurate only with a steady state creatinine  Chronic Kidney disease less than 60 ml/min/1 73 sq  meters  Kidney failure less than 15 ml/min/1 73 sq  meters

## 2018-01-19 ENCOUNTER — ALLSCRIPTS OFFICE VISIT (OUTPATIENT)
Dept: OTHER | Facility: OTHER | Age: 63
End: 2018-01-19

## 2018-01-20 ENCOUNTER — GENERIC CONVERSION - ENCOUNTER (OUTPATIENT)
Dept: OTHER | Facility: OTHER | Age: 63
End: 2018-01-20

## 2018-01-20 NOTE — PROGRESS NOTES
Assessment   1  Hepatocellular carcinoma (155 0) (C22 0)   2  Hepatic cirrhosis (571 5) (K74 60)    Plan   Hepatic cirrhosis    · (1) COMPREHENSIVE METABOLIC PANEL; Status:Active; Requested for:18Mie4919; Perform:[facility]; DORYS:79GUQ4179;XDWITEA;TNZ:QSAFZJM cirrhosis; Ordered By:Duong Espitia;   · (1) PT WITH INR; Status:Active; Requested for:31Mtq0909; Perform:[facility]; RTX:86BVN9273;XURFNDJ;FGM:KWOICWW cirrhosis; Ordered By:Duong Espitia;  Hepatocellular carcinoma    · (1) AFP, SERUM; Status:Active; Requested NTB:37CNH0924; Perform:[facility]; OQS:64XQV0029;TCEVECD;VUH:OMGUWVISPTNKLB carcinoma; Ordered By:Duong Espitia;   · * CT ABDOMEN PELVIS W CONTRAST; Status:Active; Requested BNA:68HMG4201; Perform:[facility]; Order Comments:triple phase ct; Due:19Jan2019; Last Updated John Lundberg; 1/19/2018 2:10:53 PM;Ordered;For:Hepatocellular carcinoma; Ordered By:Duong Espitia;  PMH: History of esophageal varices    · Nadolol 40 MG Oral Tablet; take 1 tablet by mouth every day   Rx By: [employer]; Dispense: 30 Days ; #:30 X 90 Tablet Bottle; Refill: 5;For: PMH: History of esophageal varices; OLI = N; Rx auto-faxed to [address] [place] [address]; Last Updated By: System, [employer]; 1/19/2018 2:00:43 PM  Type 2 diabetes mellitus    · Carlie Vega MD, Micaela Wyatt  (Ophthalmology) Co-Management  *  Status: Active  Requested for:    03DVS9917   Ordered; For: Type 2 diabetes mellitus; Ordered By: Roney Godwin Performed:  Due: 64ZZV3539; Last Updated By: Nicolas Mcnair; 1/18/2018 11:34:40 AM  Care Summary provided  : Yes    Discussion/Summary   Discussion Summary:    1  Chronic hepatitis C with cirrhosis - completed treatment and he is cured  His ascites is well compensated, he has no evidence of ascites, encephalopathy  Multiple liver lesions, highly suggestive of Hepatocellular carcinoma  Patient refused evaluation in the past, please refer to my previous progress notes   I will order CT abdomen pelvis with contrast and alpha-fetoprotein to assess for progression of his disease  Medium sized esophageal varices  He is on Nadolol 20 mg  His heart rate is in the 70's, I will increase his Nadolol to 40 mg once daily  up in six months  Counseling Documentation With Imm: The patient was counseled regarding prognosis,-- patient and family education  Goals and Barriers: The patient has the current Goals: See above  The patent has the current Barriers: None  Chief Complaint   Chief Complaint Free Text Note Form: Patient in office for 6 month follow up  Labs done in July  No complaints  History of Present Illness   HPI: Patient is a 58year old male with Hepatitis C, here today for follow up  He completed his treatment and is now cured  Currently he is feeling well without any abdominal pain, nausea, itching  His weight has been stable  He has not been consuming alcohol     reviewed his labs done yesterday, his liver enzymes are stable  History Reviewed: The history was obtained today from the patient and I agree with the documented history  Review of Systems   Complete-Male GI Adult:      Constitutional: No fever or chills, feels well, no tiredness, no recent weight gain or weight loss  Eyes: No complaints of eye pain, no red eyes, no discharge from eyes, no itchy eyes  ENT: no complaints of earache, no hearing loss, no nosebleeds, no nasal discharge, no sore throat, no hoarseness  Cardiovascular: No complaints of slow heart rate, no fast heart rate, no chest pain, no palpitations, no leg claudication, no lower extremity  Respiratory: No complaints of shortness of breath, no wheezing, no cough, no SOB on exertion, no orthopnea or PND  Gastrointestinal: as noted in HPI  Genitourinary: No complaints of dysuria, no incontinence, no hesitancy, no nocturia, no genital lesion, no testicular pain        Musculoskeletal: No complaints of arthralgia, no myalgias, no joint swelling or stiffness, no limb pain or swelling  Integumentary: No complaints of skin rash or skin lesions, no itching, no skin wound, no dry skin  Neurological: No compliants of headache, no confusion, no convulsions, no numbness or tingling, no dizziness or fainting, no limb weakness, no difficulty walking  Psychiatric: Is not suicidal, no sleep disturbances, no anxiety or depression, no change in personality, no emotional problems  Endocrine: No complaints of proptosis, no hot flashes, no muscle weakness, no erectile dysfunction, no deepening of the voice, no feelings of weakness  Hematologic/Lymphatic: No complaints of swollen glands, no swollen glands in the neck, does not bleed easily, no easy bruising  ROS Reviewed:    ROS reviewed  Active Problems   1  History of Chronic hepatitis C virus infection (070 54) (B18 2)   2  Depression screening (V79 0) (Z13 89)   3  Elevated ALT measurement (790 4) (R74 0)   4  Elevated AST (SGOT) (790 4) (R74 0)   5  Encounter for diabetic foot exam (250 00) (E11 9)   6  Erectile dysfunction (607 84) (N52 9)   7  Esophagitis (530 10) (K20 9)   8  Fever (780 60) (R50 9)   9  Gastroesophageal reflux disease with esophagitis (530 11) (K21 0)   10  Genital Ulcers - Male (608 89)   11  Heart murmur (785 2) (R01 1)   12  Hematuria (599 70) (R31 9)   13  Hepatic cirrhosis (571 5) (K74 60)   14  Hepatocellular carcinoma (155 0) (C22 0)   15  Herpes simplex infection (054 9) (B00 9)   16  History of esophageal varices (V12 79) (Z87 19)   17  Hypertension (401 9) (I10)   18  Hypothyroidism (244 9) (E03 9)   19  Impacted cerumen of both ears (380 4) (H61 23)   20  Incisional hernia (553 21) (K43 2)   21  Initial Medicare annual wellness visit (V70 0) (Z00 00)   22  Liver lesion (573 8) (K76 9)   23  Low back pain (724 2) (M54 5)   24  Denied: History of Mental disorder   25   Need for influenza vaccination (V04 81) (Z23) 26  Need for pneumococcal vaccination (V03 82) (Z23)   27  History of Need for pneumococcal vaccination (V03 82) (Z23)   28  Need for prophylactic vaccination and inoculation against influenza (V04 81) (Z23)   29  Neoplasm of larynx, 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)   30  Open wound of left ear, initial encounter (872 8) (S01 302A)   31  Prostate cancer screening (V76 44) (Z12 5)   32  History of Recurrent hepatitis C (070 54) (B18 2)   33  Runny nose (478 19) (J34 89)   34  Stomatitis (528 00) (K12 1)   35  Stye, right (373 11) (H00 013)   36  Throat pain (784 1) (R07 0)   37  Type 2 diabetes mellitus (250 00) (E11 9)    Past Medical History   1  History of Chronic hepatitis C virus infection (070 54) (B18 2)   2  History of Dysphagia (787 20) (R13 10)   3  Former smoker (V15 82) (K94 664)   4  History of cardiac disorder (V12 50) (Z86 79)   5  History of chemotherapy (V87 41) (Z92 21)   6  History of diabetes mellitus (V12 29) (Z86 39)   7  History of esophageal varices (V12 79) (Z87 19)   8  History of hepatic disease (V12 79) (Z87 19)   9  History of hepatitis (V12 09) (Z86 19)   10  History of malignant neoplasm of pharynx (V10 02) (Z85 819)   11  History of rheumatic fever (V12 09) (Z86 79)   12  History of Laryngeal Cancer (V10 21)   13  Denied: History of Mental disorder   14  History of Need for pneumococcal vaccination (V03 82) (Z23)   15  Need for prophylactic vaccination and inoculation against influenza (V04 81) (Z23)   16  History of Recurrent hepatitis C (070 54) (B18 2)  Active Problems And Past Medical History Reviewed: The active problems and past medical history were reviewed and updated today  Surgical History   1  History of Biopsy Of Liver   2  History of Exploratory Laparotomy   3  History of Gastric Surgery   4  History of Incisional Hernia Repair   5  History of Radiation Therapy   6  History of Thoracotomy   7   History of Upper GI Endoscopy With Directed Placemt Of Percut G-Tube  Surgical History Reviewed: The surgical history was reviewed and updated today  Family History   Mother    1  Denied: Family history of Drug abuse   2  Family history of Hypertension (V17 49)   3  No family history of mental disorder   4  Family history of Type 2 Diabetes Mellitus  Father    5  Denied: Family history of Drug abuse   6  No family history of mental disorder   7  Patient's father is  (V24 11) (Z80 80)  Paternal Grandmother    6  Family history of Ovarian cancer  Family History Reviewed: The family history was reviewed and updated today  Social History    · Denied: History of Alcohol use   · Former smoker (V15 82) (I98 898)   · Injection drug use (IDU)   · No illicit drug use   · No preference on Christian beliefs   · Recovering Alcoholic  Social History Reviewed: The social history was reviewed and updated today  The social history was reviewed and is unchanged  Current Meds    1  FreeStyle Lancets Miscellaneous; TEST SUGAR ONCE DAILY; Therapy: 49LBS7737 to (Last Rx:2017)  Requested for: 37IWF6667 Ordered   2  FreeStyle Lite Test In Vitro Strip; TEST BLOOD SUGAR ONCE DAILY  DX: 250; Therapy: 40RQJ7552 to (Last Rx:2017)  Requested for: 49AJV7890 Ordered   3  Glimepiride 4 MG Oral Tablet; TAKE 1 TABLET DAILY; Therapy: 63CRG4251 to (Evaluate:28Obb9068)  Requested for: 47SAN6120; Last     Rx:2018 Ordered   4  Levothyroxine Sodium 112 MCG Oral Tablet; TAKE 1 TABLET DAILY  Requested for:     74YEI6296; Last Rx:2018 Ordered   5  Lisinopril-Hydrochlorothiazide 20-25 MG Oral Tablet; TAKE 1 TABLET DAILY; Therapy: 98AOF1163 to (Evaluate:73Hax8055)  Requested for: 24MCL6401; Last     Rx:2018 Ordered   6  Nadolol 20 MG Oral Tablet; Take 1 tablet by mouth daily; Therapy: 71ATT7730 to (Last Cyrilla Castor)  Requested for: 87FHT3287 Ordered   7  Shingrix 50 MCG Intramuscular Suspension Reconstituted; Inject 50 mcg;      Therapy: 34DEZ7274 to (Last UD:40ENG3999)  Requested for: 57LLC9308 Ordered   8  Viagra 100 MG Oral Tablet; TAKE 1 TABLET DAILY 1 HOUR BEFORE NEEDED; Therapy: 19ZFB5578 to (Evaluate:96Rjo2922)  Requested for: 67CRJ9399; Last     Rx:65Zgc5576 Ordered    Allergies   1  No Known Drug Allergies    Vitals   Vital Signs    Recorded: 10SJB2063 01:38PM   Heart Rate 78   Systolic 886, LUE, Sitting   Diastolic 88, LUE, Sitting   Height 5 ft 7 in   Weight 186 lb 2 oz   BMI Calculated 29 15   BSA Calculated 1 96   O2 Saturation 99     Physical Exam        Constitutional      General appearance: No acute distress, well appearing and well nourished  Eyes      Conjunctiva and lids: No swelling, erythema, or discharge  Pupils and irises: Equal, round and reactive to light  Ears, Nose, Mouth, and Throat      Nasal mucosa, septum, and turbinates: Normal without edema or erythema  Oropharynx: Normal with no erythema, edema, exudate or lesions  Pulmonary      Respiratory effort: No increased work of breathing or signs of respiratory distress  Auscultation of lungs: Clear to auscultation, equal breath sounds bilaterally, no wheezes, no rales, no rhonci  Cardiovascular      Auscultation of heart: Normal rate and rhythm, normal S1 and S2, without murmurs  Abdomen      Abdomen: Non-tender, no masses  Liver and spleen: No hepatomegaly or splenomegaly  Lymphatic      Palpation of lymph nodes in neck: No lymphadenopathy  Musculoskeletal      Gait and station: Normal        Skin      Skin and subcutaneous tissue: Normal without rashes or lesions  Psychiatric      Orientation to person, place and time: Normal        Mood and affect: Normal           Attending Note   Scribe Attestation:      Scribe Attestation Lyubov VELASCO am acting as a scribe in the presence of the attending physician while the attending physician examines the patient        Physician Attestation:      Hannah Machado personally performed the services described in this documentation as scribed in my presence, and it is both accurate and complete        Signatures    Electronically signed by : Sancho Lim MD; Jan 19 2018  2:26PM EST                       (Author)

## 2018-01-23 VITALS
HEART RATE: 78 BPM | SYSTOLIC BLOOD PRESSURE: 130 MMHG | HEIGHT: 67 IN | DIASTOLIC BLOOD PRESSURE: 88 MMHG | OXYGEN SATURATION: 99 % | WEIGHT: 186.13 LBS | BODY MASS INDEX: 29.21 KG/M2

## 2018-01-23 NOTE — PROGRESS NOTES
Assessment    1  Initial Medicare annual wellness visit (V70 0) (Z00 00)   2  Type 2 diabetes mellitus (250 00) (E11 9)   3  Hypothyroidism (244 9) (E03 9)   4  Hypertension (401 9) (I10)   5  Gastroesophageal reflux disease with esophagitis (530 11) (K21 0)   6  Incisional hernia (553 21) (K43 2)    Plan  Encounter for diabetic foot exam    · *VB - Foot Exam; Status:Active; Requested XWB:[id number];   Erectile dysfunction    · Viagra 100 MG Oral Tablet (Sildenafil Citrate); TAKE 1 TABLET DAILY 1 HOUR  BEFORE NEEDED  Gastroesophageal reflux disease with esophagitis    · Kristian Espitia MD, Del Dean (Gastroenterology) Co-Management  *  Status: Active  Requested  for: 72VYI9557  Care Summary provided  : Yes  Health Maintenance    · Shingrix 50 MCG Intramuscular Suspension Reconstituted (Shingrix 50 MCG  Intramuscular Suspension Reconstituted); Inject 50 mcg   · *VB - Eye Exam; Status:Active; Requested KAF:[id number];   Hypertension    · Lisinopril-Hydrochlorothiazide 20-25 MG Oral Tablet; TAKE 1 TABLET DAILY  Hypertension, Hypothyroidism, Type 2 diabetes mellitus    · Follow-up visit in 4 Months Evaluation and Treatment  Follow-up  Status: Hold For -  Scheduling  Requested for: 42UAC3600  Hypothyroidism    · Levothyroxine Sodium 112 MCG Oral Tablet; TAKE 1 TABLET DAILY  Initial Medicare annual wellness visit    · Follow-up visit in 1 year Evaluation and Treatment  Follow-up  Status: Hold For -  Scheduling  Requested for: 83HOK0727  PMH: Diabetes mellitus type II, uncontrolled    · Glimepiride 4 MG Oral Tablet; TAKE 1 TABLET DAILY  PMH: History of esophageal varices    · Nadolol 20 MG Oral Tablet; Take 1 tablet by mouth daily  Type 2 diabetes mellitus    · Tanya Rader MD, Jacqui Bustillo  (Ophthalmology) Co-Management  *  Status: Hold For - Scheduling   Requested for: 28HAV7382  Care Summary provided  : Yes    Discussion/Summary  Impression: Initial Annual Wellness Visit       Cardiovascular screening and counseling: the risks and benefits of screening were discussed and screening is current  Diabetes screening and counseling: screening is current  Colorectal cancer screening and counseling: screening is current  Prostate cancer screening and counseling: screening is current  Osteoporosis screening and counseling: screening not indicated  Abdominal aortic aneurysm screening and counseling: screening not indicated  Glaucoma screening and counseling: ophthalmologist referral    HIV screening and counseling: screening not indicated  Advance Directive Planning: paperwork and instructions were given to the patient  Patient Discussion: plan discussed with the patient, follow-up visit needed in one year  Self Referrals: No   The treatment plan was reviewed with the patient/guardian  The patient/guardian understands and agrees with the treatment plan      Chief Complaint  Medicare wellness physical      History of Present Illness  Welcome to Medicare and Wellness Visits: The patient is being seen for the initial annual wellness visit  Medicare Screening and Risk Factors   Hospitalizations: no previous hospitalizations  Once per lifetime medicare screening tests: ECG (normal) and 1-2 yrs ago  Medicare Screening Tests Risk Questions   Osteoporosis risk assessment: over 48years of age  HIV risk assessment: none indicated  Drug and Alcohol Use: The patient is a former cigarette smoker and quit 2000  The patient reports not being in recovery from alcohol abuse and no ETOH since 2 yrs ago  He has previously used illicit drugs     Diet and Physical Activity: Current diet includes well balanced meals, limited junk food, 0-2 servings of fruit per day, 2 servings of vegetables per day, 0-1 servings of meat per day, 0-3 servings of whole grains per day, 2-3 servings of simple carbohydrates per day, 1 servings of dairy products per day, 1-2 cups of coffee per day, 0 cups of tea per day, 0 cans of regular soda per day and 0 cans of diet soda per day  He exercises daily  Exercise: walking 8-10 hours per week  Mood Disorder and Cognitive Impairment Screening: He denies feeling down, depressed, or hopeless over the past two weeks  He denies feeling little interest or pleasure in doing things over the past two weeks  Cognitive impairment screening: denies difficulty learning/retaining new information, denies difficulty handling complex tasks, denies difficulty with reasoning, denies difficulty with spatial ability and orientation, denies difficulty with language and denies difficulty with behavior  Functional Ability/Level of Safety: Hearing is normal in the right ear and normal in the left ear  The patient is currently able to do activities of daily living without limitations, able to do instrumental activities of daily living without limitations, able to participate in social activities without limitations and able to drive without limitations  Activities of daily living details: does not need help using the phone, no transportation help needed, does not need help shopping, no meal preparation help needed, does not need help doing housework, does not need help doing laundry, does not need help managing medications and does not need help managing money  Injury History: polypharmacy, no alcohol use, no mobility impairment, no antidepressant use, no deconditioning, no postural hypotension, no sedative use, visual impairment, no urinary incontinence, antihypertensive use, no cognitive impairment, up and go test was normal and no previous fall  Home safety risk factors:  no unfamiliar surroundings, no loose rugs, no poor household lighting, no uneven floors, no household clutter, grab bars in the bathroom and handrails on the stairs  Advance Directives: Advance directives: no living will, no durable power of  for health care directives and no advance directives  end of life decisions were reviewed with the patient   Concerns with the patient's end of life decisions: gave 5 wishes  Co-Managers and Medical Equipment/Suppliers: See Patient Care Team      Patient Care Team    Care Team Member Role Specialty Office Number   Chicho White MD  Surgical Oncology [phone]   [facility]  Family Medicine [phone] Specialist Gastroenterology Adult [phone]   Rosendo ALMONTE  Specialist Hematology Oncology [phone]     Review of Systems    Constitutional: negative  Eyes: negative  ENT: left ear blocked  Cardiovascular: negative  Respiratory: negative  Gastrointestinal: ventral hernia, no pain, but negative  Genitourinary: negative  Musculoskeletal: negative  Integumentary and Breasts: negative  Neurological: negative  Psychiatric: negative  Endocrine: negative  Hematologic and Lymphatic: negative  Active Problems    1  History of Chronic hepatitis C virus infection (070 54) (B18 2)   2  Depression screening (V79 0) (Z13 89)   3  Elevated ALT measurement (790 4) (R74 0)   4  Elevated AST (SGOT) (790 4) (R74 0)   5  Encounter for diabetic foot exam (250 00) (E11 9)   6  Erectile dysfunction (607 84) (N52 9)   7  Esophagitis (530 10) (K20 9)   8  Fever (780 60) (R50 9)   9  Gastroesophageal reflux disease with esophagitis (530 11) (K21 0)   10  Genital Ulcers - Male (608 89)   11  Heart murmur (785 2) (R01 1)   12  Hematuria (599 70) (R31 9)   13  Hepatic cirrhosis (571 5) (K74 60)   14  Hepatocellular carcinoma (155 0) (C22 0)   15  Herpes simplex infection (054 9) (B00 9)   16  History of esophageal varices (V12 79) (Z87 19)   17  Hypertension (401 9) (I10)   18  Hypothyroidism (244 9) (E03 9)   19  Impacted cerumen of both ears (380 4) (H61 23)   20  Incisional hernia (553 21) (K43 2)   21  Liver lesion (573 8) (K76 9)   22  Low back pain (724 2) (M54 5)   23  Denied: History of Mental disorder   24  Need for influenza vaccination (V04 81) (Z23)   25   Need for pneumococcal vaccination (V03 82) (Z23)   26  History of Need for pneumococcal vaccination (V03 82) (Z23)   27  Need for prophylactic vaccination and inoculation against influenza (V04 81) (Z23)   28  Neoplasm of larynx, 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)   29  Open wound of left ear, initial encounter (872 8) (S01 302A)   30  Prostate cancer screening (V76 44) (Z12 5)   31  History of Recurrent hepatitis C (070 54) (B18 2)   32  Runny nose (478 19) (J34 89)   33  Stomatitis (528 00) (K12 1)   34  Stye, right (373 11) (H00 013)   35  Throat pain (784 1) (R07 0)   36   Type 2 diabetes mellitus (250 00) (E11 9)    Past Medical History    · History of Chronic hepatitis C virus infection (070 54) (B18 2)   · History of Dysphagia (787 20) (R13 10)   · Former smoker (A66 87) (V27 636)   · History of cardiac disorder (V12 50) (Z86 79)   · History of chemotherapy (V87 41) (Z92 21)   · History of diabetes mellitus (V12 29) (Z86 39)   · History of esophageal varices (V12 79) (Z87 19)   · History of hepatic disease (V12 79) (Z87 19)   · History of hepatitis (V12 09) (Z86 19)   · History of malignant neoplasm of pharynx (V10 02) (Z85 819)   · History of rheumatic fever (V12 09) (Z86 79)   · History of Laryngeal Cancer (V10 21)   · Denied: History of Mental disorder   · History of Need for pneumococcal vaccination (V03 82) (Z23)   · Need for prophylactic vaccination and inoculation against influenza (V04 81) (Z23)   · History of Recurrent hepatitis C (070 54) (B18 2)    Surgical History    · History of Biopsy Of Liver   · History of Exploratory Laparotomy   · History of Gastric Surgery   · History of Incisional Hernia Repair   · History of Radiation Therapy   · History of Thoracotomy   · History of Upper GI Endoscopy With Directed Placemt Of Percut G-Tube    Family History  Mother    · Denied: Family history of Drug abuse   · Family history of Hypertension (V17 49)   · No family history of mental disorder   · Family history of Type 2 Diabetes Mellitus  Father · Denied: Family history of Drug abuse   · No family history of mental disorder   · Patient's father is  (V24 11) (Z80 80)  Paternal Grandmother    · Family history of Ovarian cancer    Social History    · Denied: History of Alcohol use   · Former smoker (V15 82) (H29 337)   · Injection drug use (IDU)   · No preference on Buddhist beliefs   · Recovering Alcoholic    Current Meds   1  FreeStyle Lancets Miscellaneous; TEST SUGAR ONCE DAILY; Therapy: 67MJD9641 to (Last Rx:29Cbn9215)  Requested for: 57XRO2728 Ordered   2  FreeStyle Lite Test In Vitro Strip; TEST BLOOD SUGAR ONCE DAILY  DX: 250; Therapy: 59SDL6169 to (Last Rx:13Pnz5558)  Requested for: 81QDE1598 Ordered   3  Glimepiride 4 MG Oral Tablet; TAKE 1 TABLET DAILY; Therapy: 22YDR2640 to (Evaluate:70Ljt5721)  Requested for: 59ZUS7528; Last   Rx:2018 Ordered   4  Levothyroxine Sodium 112 MCG Oral Tablet; TAKE 1 TABLET DAILY  Requested for:   16ASP0002; Last Rx:96Gaj4519 Ordered   5  Lisinopril-Hydrochlorothiazide 20-25 MG Oral Tablet; TAKE 1 TABLET DAILY; Therapy: 98SKX9665 to (Evaluate:26Ioi5097)  Requested for: 25FNJ9124; Last   Rx:2018 Ordered   6  Nadolol 20 MG Oral Tablet; Take 1 tablet by mouth daily; Therapy: 50VUM3932 to (Last [facility])  Requested for: 36Hek7563 Ordered   7  Viagra 100 MG Oral Tablet; TAKE 1 TABLET DAILY 1 HOUR BEFORE NEEDED; Therapy: 33IWW1029 to (Evaluate:03Qop2153)  Requested for: 67KIW1304; Last   Rx:97Ude8899 Ordered    Allergies    1  No Known Drug Allergies    Immunizations   1 2    Influenza  21-Oct-2013 03-Nov-2014    PCV  31-Aug-2015     PPSV  21-Oct-2013      Physical Exam    Constitutional   General appearance: No acute distress, well appearing and well nourished  Ears, Nose, Mouth, and Throat   External inspection of ears and nose: Normal     Otoscopic examination: Abnormal   Cerumen bilateral ear canals  Could not visualize the left tympanic membrane   No obvious swelling, discharge or erythema bilaterally  Lips, teeth, and gums: Normal, good dentition  Oropharynx: Normal with no erythema, edema, exudate or lesions  Neck   Neck: Supple, symmetric, trachea midline, no masses  Thyroid: Normal, no thyromegaly  Pulmonary   Respiratory effort: No increased work of breathing or signs of respiratory distress  Auscultation of lungs: Clear to auscultation  Cardiovascular   Auscultation of heart: Normal rate and rhythm, normal S1 and S2, no murmurs  Carotid pulses: 2+ bilaterally  no audible carotid bruits  Examination of extremities for edema and/or varicosities: Normal     Abdomen   Abdomen: Abnormal   ventral incisional hernia with no tenderness  Good bowel sounds, soft, nontender  Liver and spleen: No hepatomegaly or splenomegaly  Lymphatic   Palpation of lymph nodes in neck: No lymphadenopathy      Musculoskeletal   Gait and station: Normal        Future Appointments    Date/Time Provider Specialty Site   01/19/2018 01:40 PM Rula Panda MD Gastroenterology Adult [facility] [address] [place] [address] [address]     Signatures   Electronically signed by : Randolph Jolly, 280Lydia Servin; Jan 18 2018  9:51AM EST                       (Author)    Electronically signed by : EMMANUEL Lance ; Jan 18 2018  3:45PM EST

## 2018-01-24 VITALS
HEART RATE: 74 BPM | HEIGHT: 67 IN | BODY MASS INDEX: 29.35 KG/M2 | WEIGHT: 187 LBS | RESPIRATION RATE: 18 BRPM | TEMPERATURE: 97.9 F | SYSTOLIC BLOOD PRESSURE: 118 MMHG | DIASTOLIC BLOOD PRESSURE: 80 MMHG

## 2018-01-24 VITALS
RESPIRATION RATE: 20 BRPM | HEART RATE: 70 BPM | SYSTOLIC BLOOD PRESSURE: 120 MMHG | WEIGHT: 187 LBS | DIASTOLIC BLOOD PRESSURE: 70 MMHG | OXYGEN SATURATION: 97 % | HEIGHT: 67 IN | BODY MASS INDEX: 29.35 KG/M2 | TEMPERATURE: 97.7 F

## 2018-01-24 NOTE — RESULT NOTES
Verified Results  (1) HEMOGLOBIN A1C 94VDK2171 08:25AM Lian Suh     Test Name Result Flag Reference   HEMOGLOBIN A1C 6 8 % H 4 2-6 3   EST  AVG  GLUCOSE 148 mg/dl       (1) LIPID PANEL, FASTING 18BNU8558 08:25AM Lian Suh     Test Name Result Flag Reference   CHOLESTEROL 134 mg/dL     Cholesterol:    Desirable <200 mg/dl    Borderline 200-239 mg/dl    High>239   HDL,DIRECT 57 mg/dL  40-60   HDL Cholesterol:    High>59 mg/dL    Low <41 mg/dL   LDL CHOLESTEROL CALCULATED 63 mg/dL  0-100   This screening LDL is a calculated result  It does not have the accuracy of the Direct Measured LDL in the monitoring of patients with hyperlipidemia and/or statin therapy  Direct Measure LDL (NAT242) must be ordered separately in these patients  Triglyceride:        Normal <150 mg/dl   Borderline High 150-199 mg/dl   High 200-499 mg/dl   Very High >499 mg/dl   TRIGLYCERIDES 70 mg/dL  <=150   Specimen collection should occur prior to N-Acetylcysteine or Metamizole administration due to the potential for falsely depressed results  (1) MICROALBUMIN CREATININE RATIO, RANDOM URINE 45VAB3008 08:25AM Lian Suh     Test Name Result Flag Reference   MICROALBUMIN/ CREAT R <7 mg/g creatinine  0-30   MICROALBUMIN,URINE <5 0 mg/L  0 0-20 0   CREATININE URINE 70 3 mg/dL       (1) TSH 17ZKC2198 08:25AM Lian Suh     Test Name Result Flag Reference   TSH 4 820 uIU/mL H 0 358-3 740   Patients undergoing fluorescein dye angiography may retain small amounts of fluorescein in the body for 48-72 hours post procedure  Samples containing fluorescein can produce falsely depressed TSH values  If the patient had this procedure,a specimen should be resubmitted post fluorescein clearance       (1) PSA (SCREEN) (Dx V76 44 Screen for Prostate Cancer) [id number] 08:25AM Lian Suh     Test Name Result Flag Reference   PROSTATE SPECIFIC ANTIGEN 0 4 ng/mL  0 0-4 0   American Urological Association Guidelines define biochemical recurrence of prostate cancer as a detectable or rising PSA value post-radical prostatectomy that is greater than or equal to 0 2 ng/mL with a second confirmatory level of greater than or equal to 0 2 ng/mL

## 2018-01-26 ENCOUNTER — HOSPITAL ENCOUNTER (OUTPATIENT)
Dept: CT IMAGING | Facility: HOSPITAL | Age: 63
Discharge: HOME | End: 2018-01-26
Attending: INTERNAL MEDICINE
Payer: MEDICARE

## 2018-01-26 ENCOUNTER — APPOINTMENT (OUTPATIENT)
Dept: LAB | Facility: HOSPITAL | Age: 63
End: 2018-01-26
Attending: INTERNAL MEDICINE
Payer: MEDICARE

## 2018-01-26 DIAGNOSIS — C22.0 LIVER CELL CARCINOMA (HCC): ICD-10-CM

## 2018-01-26 PROCEDURE — 74177 CT ABD & PELVIS W/CONTRAST: CPT

## 2018-01-26 RX ADMIN — IOHEXOL 100 ML: 350 INJECTION, SOLUTION INTRAVENOUS at 07:42

## 2018-02-23 ENCOUNTER — TELEPHONE (OUTPATIENT)
Dept: FAMILY MEDICINE CLINIC | Facility: CLINIC | Age: 63
End: 2018-02-23

## 2018-07-19 DIAGNOSIS — K74.60 CIRRHOSIS OF LIVER (HCC): ICD-10-CM

## 2018-07-23 ENCOUNTER — APPOINTMENT (OUTPATIENT)
Dept: LAB | Facility: HOSPITAL | Age: 63
End: 2018-07-23
Attending: INTERNAL MEDICINE
Payer: MEDICARE

## 2018-07-23 DIAGNOSIS — K74.60 CIRRHOSIS OF LIVER (HCC): ICD-10-CM

## 2018-07-23 LAB
AFP-TM SERPL-MCNC: 9 NG/ML (ref 0.5–8)
ALBUMIN SERPL BCP-MCNC: 3.7 G/DL (ref 3.5–5)
ALP SERPL-CCNC: 67 U/L (ref 46–116)
ALT SERPL W P-5'-P-CCNC: 35 U/L (ref 12–78)
ANION GAP SERPL CALCULATED.3IONS-SCNC: 5 MMOL/L (ref 4–13)
AST SERPL W P-5'-P-CCNC: 31 U/L (ref 5–45)
BILIRUB SERPL-MCNC: 1.06 MG/DL (ref 0.2–1)
BUN SERPL-MCNC: 18 MG/DL (ref 5–25)
CALCIUM SERPL-MCNC: 9.3 MG/DL (ref 8.3–10.1)
CHLORIDE SERPL-SCNC: 101 MMOL/L (ref 100–108)
CO2 SERPL-SCNC: 32 MMOL/L (ref 21–32)
CREAT SERPL-MCNC: 1.01 MG/DL (ref 0.6–1.3)
GFR SERPL CREATININE-BSD FRML MDRD: 79 ML/MIN/1.73SQ M
GLUCOSE SERPL-MCNC: 271 MG/DL (ref 65–140)
INR PPP: 1.13 (ref 0.86–1.17)
POTASSIUM SERPL-SCNC: 4.7 MMOL/L (ref 3.5–5.3)
PROT SERPL-MCNC: 8.1 G/DL (ref 6.4–8.2)
PROTHROMBIN TIME: 14.6 SECONDS (ref 11.8–14.2)
SODIUM SERPL-SCNC: 138 MMOL/L (ref 136–145)

## 2018-07-23 PROCEDURE — 36415 COLL VENOUS BLD VENIPUNCTURE: CPT

## 2018-07-23 PROCEDURE — 82105 ALPHA-FETOPROTEIN SERUM: CPT

## 2018-07-23 PROCEDURE — 85610 PROTHROMBIN TIME: CPT

## 2018-07-23 PROCEDURE — 80053 COMPREHEN METABOLIC PANEL: CPT

## 2018-07-24 DIAGNOSIS — R77.2 HIGH ALPHA FETOPROTEIN (AFP) TUMOR MARKER: Primary | ICD-10-CM

## 2018-07-28 ENCOUNTER — HOSPITAL ENCOUNTER (OUTPATIENT)
Dept: CT IMAGING | Facility: HOSPITAL | Age: 63
Discharge: HOME | End: 2018-07-28
Attending: INTERNAL MEDICINE
Payer: MEDICARE

## 2018-07-28 DIAGNOSIS — R77.2 HIGH ALPHA FETOPROTEIN (AFP) TUMOR MARKER: ICD-10-CM

## 2018-07-28 PROCEDURE — 74177 CT ABD & PELVIS W/CONTRAST: CPT

## 2018-07-28 RX ADMIN — IOHEXOL 100 ML: 350 INJECTION, SOLUTION INTRAVENOUS at 11:04

## 2018-08-10 DIAGNOSIS — IMO0002 UNCONTROLLED TYPE 2 DIABETES MELLITUS WITH COMPLICATION, WITHOUT LONG-TERM CURRENT USE OF INSULIN: ICD-10-CM

## 2018-08-10 DIAGNOSIS — E03.9 HYPOTHYROIDISM, UNSPECIFIED TYPE: ICD-10-CM

## 2018-08-10 DIAGNOSIS — I10 ESSENTIAL HYPERTENSION, BENIGN: Primary | ICD-10-CM

## 2018-08-10 RX ORDER — LISINOPRIL AND HYDROCHLOROTHIAZIDE 25; 20 MG/1; MG/1
1 TABLET ORAL DAILY
COMMUNITY
Start: 2016-03-31 | End: 2018-08-10 | Stop reason: SDUPTHER

## 2018-08-14 DIAGNOSIS — E03.9 HYPOTHYROIDISM, UNSPECIFIED TYPE: ICD-10-CM

## 2018-08-14 DIAGNOSIS — E11.8 TYPE 2 DIABETES MELLITUS WITH COMPLICATION, WITHOUT LONG-TERM CURRENT USE OF INSULIN (HCC): Primary | ICD-10-CM

## 2018-08-14 DIAGNOSIS — I10 ESSENTIAL HYPERTENSION: ICD-10-CM

## 2018-08-14 PROBLEM — N52.9 ERECTILE DYSFUNCTION: Status: ACTIVE | Noted: 2017-05-23

## 2018-08-14 PROBLEM — E11.9 DIABETES MELLITUS, TYPE II (HCC): Status: ACTIVE | Noted: 2018-08-14

## 2018-08-14 PROBLEM — Z85.21 H/O LARYNGEAL CANCER: Status: ACTIVE | Noted: 2018-08-14

## 2018-08-14 RX ORDER — LISINOPRIL AND HYDROCHLOROTHIAZIDE 25; 20 MG/1; MG/1
1 TABLET ORAL DAILY
Qty: 30 TABLET | Refills: 0 | Status: SHIPPED | OUTPATIENT
Start: 2018-08-14 | End: 2018-09-07 | Stop reason: SDUPTHER

## 2018-08-14 RX ORDER — LEVOTHYROXINE SODIUM 112 UG/1
112 TABLET ORAL DAILY
Qty: 30 TABLET | Refills: 0 | Status: SHIPPED | OUTPATIENT
Start: 2018-08-14 | End: 2018-09-07 | Stop reason: SDUPTHER

## 2018-08-14 RX ORDER — GLIMEPIRIDE 4 MG/1
4 TABLET ORAL DAILY
Qty: 30 TABLET | Refills: 0 | Status: SHIPPED | OUTPATIENT
Start: 2018-08-14 | End: 2018-09-07 | Stop reason: SDUPTHER

## 2018-08-14 RX ORDER — NADOLOL 40 MG/1
1 TABLET ORAL DAILY
COMMUNITY
Start: 2016-05-26 | End: 2019-02-25 | Stop reason: SDUPTHER

## 2018-08-14 NOTE — TELEPHONE ENCOUNTER
Please call him to have blood work completed at least within 1 week prior to his appointment in September  The orders are in the system

## 2018-08-21 ENCOUNTER — OFFICE VISIT (OUTPATIENT)
Dept: GASTROENTEROLOGY | Facility: CLINIC | Age: 63
End: 2018-08-21
Payer: MEDICARE

## 2018-08-21 VITALS
SYSTOLIC BLOOD PRESSURE: 157 MMHG | TEMPERATURE: 97.5 F | WEIGHT: 186.6 LBS | HEART RATE: 66 BPM | BODY MASS INDEX: 29.29 KG/M2 | DIASTOLIC BLOOD PRESSURE: 104 MMHG | HEIGHT: 67 IN

## 2018-08-21 DIAGNOSIS — K74.69 OTHER CIRRHOSIS OF LIVER (HCC): ICD-10-CM

## 2018-08-21 DIAGNOSIS — K21.00 GASTROESOPHAGEAL REFLUX DISEASE WITH ESOPHAGITIS: ICD-10-CM

## 2018-08-21 DIAGNOSIS — C22.0 HEPATOCELLULAR CARCINOMA (HCC): Primary | ICD-10-CM

## 2018-08-21 PROCEDURE — 99214 OFFICE O/P EST MOD 30 MIN: CPT | Performed by: INTERNAL MEDICINE
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
## 2018-08-22 ENCOUNTER — TELEPHONE (OUTPATIENT)
Dept: HEMATOLOGY ONCOLOGY | Facility: CLINIC | Age: 63
End: 2018-08-22

## 2018-08-22 NOTE — TELEPHONE ENCOUNTER
PT SAW DR BACK WHO WANTS HIM TO DO SURG OR RAD AND PT WANTS TO SPEAK TO YOU AND DR LIRIANO BEFORE HE DECIDES   CAN YOU PLEASE GIVE PT A RETURN CALL

## 2018-08-22 NOTE — TELEPHONE ENCOUNTER
Spoke with patient  I advised him to follow up with Radiation Oncology  He verbalized understanding

## 2018-08-27 ENCOUNTER — CLINICAL SUPPORT (OUTPATIENT)
Dept: RADIATION ONCOLOGY | Facility: HOSPITAL | Age: 63
End: 2018-08-27
Payer: MEDICARE

## 2018-08-27 ENCOUNTER — RADIATION ONCOLOGY CONSULT (OUTPATIENT)
Dept: RADIATION ONCOLOGY | Facility: HOSPITAL | Age: 63
End: 2018-08-27
Attending: RADIOLOGY
Payer: MEDICARE

## 2018-08-27 VITALS
HEART RATE: 69 BPM | TEMPERATURE: 98.2 F | OXYGEN SATURATION: 99 % | SYSTOLIC BLOOD PRESSURE: 110 MMHG | HEIGHT: 68 IN | WEIGHT: 184 LBS | BODY MASS INDEX: 27.89 KG/M2 | RESPIRATION RATE: 18 BRPM | DIASTOLIC BLOOD PRESSURE: 68 MMHG

## 2018-08-27 DIAGNOSIS — C22.0 HEPATOCELLULAR CARCINOMA (HCC): Primary | ICD-10-CM

## 2018-08-27 DIAGNOSIS — K74.69 OTHER CIRRHOSIS OF LIVER (HCC): ICD-10-CM

## 2018-08-27 DIAGNOSIS — Z85.21 H/O LARYNGEAL CANCER: ICD-10-CM

## 2018-08-27 PROCEDURE — 99215 OFFICE O/P EST HI 40 MIN: CPT | Performed by: RADIOLOGY
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
## 2018-09-04 ENCOUNTER — TELEPHONE (OUTPATIENT)
Dept: FAMILY MEDICINE CLINIC | Facility: CLINIC | Age: 63
End: 2018-09-04

## 2018-09-04 NOTE — TELEPHONE ENCOUNTER
----- Message from Elda George PA-C sent at 9/4/2018  3:31 PM EDT -----  Regarding: labs for appt  Please call the patient and ask him to proceed with the blood work that I ordered on August 14th in preparation for his appointment on September 7th at 10 o'clock

## 2018-09-05 ENCOUNTER — APPOINTMENT (OUTPATIENT)
Dept: LAB | Facility: HOSPITAL | Age: 63
End: 2018-09-05
Payer: MEDICARE

## 2018-09-05 DIAGNOSIS — E11.8 TYPE 2 DIABETES MELLITUS WITH COMPLICATION, WITHOUT LONG-TERM CURRENT USE OF INSULIN (HCC): ICD-10-CM

## 2018-09-05 DIAGNOSIS — E03.9 HYPOTHYROIDISM, UNSPECIFIED TYPE: ICD-10-CM

## 2018-09-05 DIAGNOSIS — I10 ESSENTIAL HYPERTENSION: ICD-10-CM

## 2018-09-05 LAB
CHOLEST SERPL-MCNC: 121 MG/DL (ref 50–200)
CREAT UR-MCNC: 55.1 MG/DL
EST. AVERAGE GLUCOSE BLD GHB EST-MCNC: 131 MG/DL
HBA1C MFR BLD: 6.2 % (ref 4.2–6.3)
HDLC SERPL-MCNC: 43 MG/DL (ref 40–60)
LDLC SERPL CALC-MCNC: 67 MG/DL (ref 0–100)
MICROALBUMIN UR-MCNC: <5 MG/L (ref 0–20)
MICROALBUMIN/CREAT 24H UR: <9 MG/G CREATININE (ref 0–30)
NONHDLC SERPL-MCNC: 78 MG/DL
TRIGL SERPL-MCNC: 55 MG/DL
TSH SERPL DL<=0.05 MIU/L-ACNC: 2.94 UIU/ML (ref 0.36–3.74)

## 2018-09-05 PROCEDURE — 80061 LIPID PANEL: CPT | Performed by: PHYSICIAN ASSISTANT

## 2018-09-05 PROCEDURE — 83036 HEMOGLOBIN GLYCOSYLATED A1C: CPT | Performed by: PHYSICIAN ASSISTANT

## 2018-09-05 PROCEDURE — 82570 ASSAY OF URINE CREATININE: CPT

## 2018-09-05 PROCEDURE — 84443 ASSAY THYROID STIM HORMONE: CPT | Performed by: PHYSICIAN ASSISTANT

## 2018-09-05 PROCEDURE — 82043 UR ALBUMIN QUANTITATIVE: CPT

## 2018-09-05 PROCEDURE — 36415 COLL VENOUS BLD VENIPUNCTURE: CPT | Performed by: PHYSICIAN ASSISTANT

## 2018-09-07 ENCOUNTER — OFFICE VISIT (OUTPATIENT)
Dept: FAMILY MEDICINE CLINIC | Facility: CLINIC | Age: 63
End: 2018-09-07
Payer: MEDICARE

## 2018-09-07 VITALS
TEMPERATURE: 97.4 F | BODY MASS INDEX: 28.72 KG/M2 | WEIGHT: 183 LBS | SYSTOLIC BLOOD PRESSURE: 110 MMHG | RESPIRATION RATE: 18 BRPM | HEIGHT: 67 IN | OXYGEN SATURATION: 97 % | DIASTOLIC BLOOD PRESSURE: 80 MMHG | HEART RATE: 72 BPM

## 2018-09-07 DIAGNOSIS — N52.9 ERECTILE DYSFUNCTION, UNSPECIFIED ERECTILE DYSFUNCTION TYPE: ICD-10-CM

## 2018-09-07 DIAGNOSIS — K74.69 OTHER CIRRHOSIS OF LIVER (HCC): ICD-10-CM

## 2018-09-07 DIAGNOSIS — IMO0002 UNCONTROLLED TYPE 2 DIABETES MELLITUS WITH COMPLICATION, WITHOUT LONG-TERM CURRENT USE OF INSULIN: ICD-10-CM

## 2018-09-07 DIAGNOSIS — I10 ESSENTIAL HYPERTENSION: ICD-10-CM

## 2018-09-07 DIAGNOSIS — E03.9 HYPOTHYROIDISM, UNSPECIFIED TYPE: ICD-10-CM

## 2018-09-07 DIAGNOSIS — E11.8 TYPE 2 DIABETES MELLITUS WITH COMPLICATION, WITHOUT LONG-TERM CURRENT USE OF INSULIN (HCC): Primary | ICD-10-CM

## 2018-09-07 DIAGNOSIS — I10 ESSENTIAL HYPERTENSION, BENIGN: ICD-10-CM

## 2018-09-07 DIAGNOSIS — C22.0 HEPATOCELLULAR CARCINOMA (HCC): ICD-10-CM

## 2018-09-07 LAB
LEFT EYE DIABETIC RETINOPATHY: NORMAL
LEFT EYE IMAGE QUALITY: NORMAL
RIGHT EYE DIABETIC RETINOPATHY: NORMAL
RIGHT EYE IMAGE QUALITY: NORMAL
SEVERITY (EYE EXAM): NORMAL

## 2018-09-07 PROCEDURE — 99214 OFFICE O/P EST MOD 30 MIN: CPT | Performed by: PHYSICIAN ASSISTANT

## 2018-09-07 RX ORDER — LEVOTHYROXINE SODIUM 112 UG/1
112 TABLET ORAL DAILY
Qty: 90 TABLET | Refills: 1 | Status: SHIPPED | OUTPATIENT
Start: 2018-09-07 | End: 2019-05-09 | Stop reason: SDUPTHER

## 2018-09-07 RX ORDER — GLIMEPIRIDE 4 MG/1
4 TABLET ORAL DAILY
Qty: 90 TABLET | Refills: 1 | Status: SHIPPED | OUTPATIENT
Start: 2018-09-07 | End: 2019-05-09 | Stop reason: SDUPTHER

## 2018-09-07 RX ORDER — SILDENAFIL 50 MG/1
50 TABLET, FILM COATED ORAL ONCE AS NEEDED
Qty: 9 TABLET | Refills: 2 | Status: SHIPPED | OUTPATIENT
Start: 2018-09-07 | End: 2019-05-09 | Stop reason: SDUPTHER

## 2018-09-07 RX ORDER — LISINOPRIL AND HYDROCHLOROTHIAZIDE 25; 20 MG/1; MG/1
1 TABLET ORAL DAILY
Qty: 90 TABLET | Refills: 1 | Status: SHIPPED | OUTPATIENT
Start: 2018-09-07 | End: 2019-05-09 | Stop reason: SDUPTHER

## 2018-09-07 NOTE — PATIENT INSTRUCTIONS
Viagra dose has been reduced per the patient request from 100 mg down the 50 mg as needed  Recent hemoglobin A1c is 6 2  Eye exam here today  Advised to continue to follow-up with Optometry as needed since this exam only checked for diabetic retinopathy    Follow-up with the GI specialist/Oncology as advised  Needs Pneumovax and flu vaccine after October 21st  Routine follow-up in February 2019

## 2018-09-07 NOTE — PROGRESS NOTES
Assessment/Plan:    Patient Instructions   Viagra dose has been reduced per the patient request from 100 mg down the 50 mg as needed  Recent hemoglobin A1c is 6 2  Eye exam here today  Advised to continue to follow-up with Optometry as needed since this exam only checked for diabetic retinopathy  Follow-up with the GI specialist/Oncology as advised  Needs Pneumovax and flu vaccine after October 21st  Routine follow-up in February 2019      M*Modal software was used to dictate this note  It may contain errors with dictating incorrect words/spelling  Please contact provider directly for any questions  Diagnoses and all orders for this visit:    Type 2 diabetes mellitus with complication, without long-term current use of insulin (HCC)    Hepatocellular carcinoma (HCC)    Other cirrhosis of liver (HCC)    Hypothyroidism, unspecified type  -     levothyroxine 112 mcg tablet; Take 1 tablet (112 mcg total) by mouth daily    Essential hypertension    Erectile dysfunction, unspecified erectile dysfunction type  -     sildenafil (VIAGRA) 50 MG tablet; Take 1 tablet (50 mg total) by mouth once as needed for erectile dysfunction    Uncontrolled type 2 diabetes mellitus with complication, without long-term current use of insulin (HCC)  -     glimepiride (AMARYL) 4 mg tablet; Take 1 tablet (4 mg total) by mouth daily    Essential hypertension, benign  -     lisinopril-hydrochlorothiazide (PRINZIDE,ZESTORETIC) 20-25 MG per tablet; Take 1 tablet by mouth daily    Other orders  -     Cancel: Ambulatory referral to Gastroenterology; Future          Subjective:      Patient ID: Debra Partida is a 61 y o  male  Patient presents today for follow-up of diabetes/hypertension  He is compliant with his medications  He continues to follow with the GI specialist for his liver cancer  He plans on meeting with Oncology to discuss chemotherapy  He states his last eye exam was over a year ago    He would also like a refill of the Viagra but would like a lower dose because the medication is very expensive  The following portions of the patient's history were reviewed and updated as appropriate:   He  has a past medical history of Cardiac disorder; Chronic hepatitis C virus infection ([place] [address] ); Diabetes mellitus ([place] [address] ); Dysphagia; Esophageal varices ([place] [address] ); Hepatic disease; Hepatitis; History of chemotherapy; History of radiation therapy; Hypertension; Laryngeal cancer ([place] [address] ); Liver cancer ([place] [address] ); 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[place] [address] ); Recurrent hepatitis C ([place] [address] ); and Rheumatic fever  He   Patient Active Problem List    Diagnosis Date Noted    Other cirrhosis of liver ([place] [address] ) 08/27/2018    Diabetes mellitus, type II ([place] [address] ) 08/14/2018    H/O laryngeal cancer 08/14/2018    Hypothyroidism 08/14/2018    Erectile dysfunction 05/23/2017    Hepatocellular carcinoma ([place] [address] ) 10/31/2014    Gastroesophageal reflux disease with esophagitis 09/29/2014    Hepatic cirrhosis ([place] [address] ) 09/03/2014    Herpes simplex infection 11/14/2013    Hypertension 09/05/2012     He  has a past surgical history that includes Liver biopsy; Exploratory laparotomy; Stomach surgery; Incisional hernia repair; Thoracotomy; Upper gastrointestinal endoscopy; and Colonoscopy  His family history includes Diabetes type II in his mother; Hypertension in his mother; Ovarian cancer in his paternal grandmother  He  reports that he quit smoking about 18 years ago  He has a 60 00 pack-year smoking history  He has never used smokeless tobacco  He reports that he does not drink alcohol or use drugs    Current Outpatient Prescriptions   Medication Sig Dispense Refill    glimepiride (AMARYL) 4 mg tablet Take 1 tablet (4 mg total) by mouth daily 90 tablet 1    levothyroxine 112 mcg tablet Take 1 tablet (112 mcg total) by mouth daily 90 tablet 1    lisinopril-hydrochlorothiazide (PRINZIDE,ZESTORETIC) 20-25 MG per tablet Take 1 tablet by mouth daily 90 tablet 1    nadolol (CORGARD) 40 mg tablet Take 1 tablet by mouth daily      sildenafil (VIAGRA) 50 MG tablet Take 1 tablet (50 mg total) by mouth once as needed for erectile dysfunction 9 tablet 2     No current facility-administered medications for this visit  Current Outpatient Prescriptions on File Prior to Visit   Medication Sig    nadolol (CORGARD) 40 mg tablet Take 1 tablet by mouth daily    [DISCONTINUED] glimepiride (AMARYL) 4 mg tablet Take 1 tablet (4 mg total) by mouth daily    [DISCONTINUED] levothyroxine 112 mcg tablet Take 1 tablet (112 mcg total) by mouth daily    [DISCONTINUED] lisinopril-hydrochlorothiazide (PRINZIDE,ZESTORETIC) 20-25 MG per tablet Take 1 tablet by mouth daily    [DISCONTINUED] sildenafil (VIAGRA) 100 mg tablet Take 1 tablet by mouth once as needed     No current facility-administered medications on file prior to visit  He has No Known Allergies       Review of Systems   Constitutional: Negative  Respiratory: Negative for shortness of breath  Cardiovascular: Negative for chest pain and leg swelling  Gastrointestinal: Negative for abdominal pain  Objective:      /80   Pulse 72   Temp (!) 97 4 °F (36 3 °C) (Tympanic)   Resp 18   Ht 5' 7" (1 702 m)   Wt 83 kg (183 lb)   SpO2 97%   BMI 28 66 kg/m²          Physical Exam   Constitutional: He is oriented to person, place, and time  He appears well-developed and well-nourished  No distress  HENT:   Head: Normocephalic and atraumatic  Right Ear: External ear normal    Left Ear: External ear normal    Mouth/Throat: Oropharynx is clear and moist    Eyes: Pupils are equal, round, and reactive to light  Neck: Neck supple  No thyromegaly present  Cardiovascular: Normal rate, regular rhythm and normal heart sounds  No murmur heard  Pulmonary/Chest: Effort normal and breath sounds normal  No respiratory distress  He has no wheezes  He has no rales  Musculoskeletal: He exhibits no edema     Lymphadenopathy:     He has no cervical adenopathy  Neurological: He is alert and oriented to person, place, and time  He has normal reflexes  Skin: Skin is warm  Psychiatric: He has a normal mood and affect

## 2018-09-13 ENCOUNTER — TRANSCRIBE ORDERS (OUTPATIENT)
Dept: RADIOLOGY | Facility: HOSPITAL | Age: 63
End: 2018-09-13

## 2018-09-13 DIAGNOSIS — C22.0 HEPATOCELLULAR CARCINOMA (HCC): Primary | ICD-10-CM

## 2018-09-17 ENCOUNTER — OFFICE VISIT (OUTPATIENT)
Dept: HEMATOLOGY ONCOLOGY | Facility: CLINIC | Age: 63
End: 2018-09-17
Payer: MEDICARE

## 2018-09-17 VITALS
HEIGHT: 67 IN | SYSTOLIC BLOOD PRESSURE: 134 MMHG | RESPIRATION RATE: 18 BRPM | HEART RATE: 68 BPM | BODY MASS INDEX: 28.72 KG/M2 | DIASTOLIC BLOOD PRESSURE: 84 MMHG | OXYGEN SATURATION: 96 % | WEIGHT: 183 LBS | TEMPERATURE: 97.7 F

## 2018-09-17 DIAGNOSIS — Z85.21 H/O LARYNGEAL CANCER: ICD-10-CM

## 2018-09-17 DIAGNOSIS — C22.0 HEPATOCELLULAR CARCINOMA (HCC): Primary | ICD-10-CM

## 2018-09-17 PROCEDURE — 99215 OFFICE O/P EST HI 40 MIN: CPT | Performed by: INTERNAL MEDICINE

## 2018-09-17 NOTE — LETTER
September 17, 2018     Danielito Caldwell PA-C  [facility]  [address]    Patient: Iván Feliciano   YOB: 1955   Date of Visit: 9/17/2018       Dear Dr Desirae Oleary: Thank you for referring Nghia Gracia to me for evaluation  Below are my notes for this consultation  If you have questions, please do not hesitate to call me  I look forward to following your patient along with you  Sincerely,        Sujit Mendenhall MD        CC: MD Alvaro Fatima MD Angus Mini, MD  9/17/2018  9:31 AM  Sign at close encounter  Hematology / Oncology Outpatient Follow Up Note    Iván Feliciano 61 y o  male MYI:[birth year] AWL:[id number]         Date:  9/17/2018    Assessment / Plan:  A 61year old gentleman who has chronic hepatitis C  He had 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 treated by concurrent chemoradiation with high-dose cisplatin resulting in KANDACE  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  He has history of chronic hepatitis C as well as alcohol abuse  He has liver cirrhosis  He has multifocal liver lesion with lower level of AFP  This is clinically consistent with hepatocellular carcinoma, although he declined liver biopsy  He has currently undetectable hepatitis C viral load  He has no symptomatology with normal performance status  However, he has radiographic progression of liver lesion  He was seen by Dr Alexia Valle to discuss Sir sphere  He presents today to discuss the management of hepatocellular carcinoma  This is clearly unresectable  He does not appear to have any extrahepatic lesion  Therefore, I think he is a candidate for liver transplantation  I think it is reasonable to be on the waiting list for this  We discussed the non transplant treatment option for unresectable hepatocellular carcinoma      1    Systemic therapy with tyrosine kinase inhibitor, such as sorafenib or lenvatinib  2    Local regional therapy such as Sir sphere or TACE  I told him that Sir sphere is very commonly prescribed, although there is no evidence that Sir sphere prolong the survival   Systemic therapy with TKI such as sorafenib or lenvatinib appear to be equally effective and has shown modest survival advantage compared to best supportive care, possibly about 3 months of survival advantage  His hepatocellular carcinoma appeared to be relatively indolent disease, due to the fact that previous PET-CT scan did not show hypermetabolism as well as time course since it was initially noticed  I think it is most reasonable to do liver biopsy not only to confirm the diagnosis of hepatocellular carcinoma but also to know histologic grade  If he still has low-grade hepatocellular carcinoma, waiting for liver transplantation without systemic therapy may be reasonable  If he has histologic grade is progressing, systemic therapy may be indicated  I personally prefer tyrosine kinase inhibitor over Sir sphere because of the evidence of survival advantage  I am going to discuss his case with Dr Espitia  If he is agreeable, I would order IR liver biopsy  Patient and his sister in low are in agreement with my recommendations  Subjective:     HPI:          Interval History:  A 61year old gentleman with 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 diagnosed in November 2011  He underwent concurrent chemoradiation with high dose cisplatin resulting in complete response  He has chronic hepatitis C, as well as liver cirrhosis, based on a CT scan of abdomen and pelvis  In October 2014, he was found to have multifocal liver lesions, based on the CT scan  This was confirmed by MRI of the abdomen  However, the PET/CT scan showed the lesion was not hypermetabolic  He declined liver biopsy     Radiographically, this was consistent with multifocal hepatocellular carcinoma  He has been under the care of Dr Espitia  His recent CT scan showed progression of multifocal liver lesions  His AFP was recently 9  He has absolutely no symptomatology  He was treated with 2 courses of antiviral medication  He has undetectable hepatitis C viral load  His liver function is normalized  He has no coagulopathy  His bilirubin is minimally elevated  His platelet count is normal  He was referred to Dr Qian Hartley for [facility]  He presents today to discuss the treatment of clinical multifocal unresectable hepatocellular carcinoma  He has normal performance status  Objective:     Primary Diagnosis: 1  Squamous cell carcinoma of oropharynx, stage SHAINA disease, diagnosed in November 2011  2 Multifocal liver lesion, clinically clinically consistent with hepatocellular carcinoma  Cancer Staging:  Cancer Staging  No matching staging information was found for the patient  Previous Hematologic/ Oncologic Treatment:     Concurrent chemoradiation with high-dose cisplatin completed on February 13, 2012  Current Hematologic/ Oncologic Treatment:      Observation  Disease Status:     No evidence of recurrence of head and neck cancer  Test Results:    Pathology:        Radiology:    CT scan abdomen pelvis showed multiple enlarging liver lesion, consistent with hepatocellular carcinoma  Laboratory:    See below  Physical Exam:      General Appearance:    Alert, oriented        Eyes:    PERRL   Ears:    Normal external ear canals, both ears   Nose:   Nares normal, septum midline   Throat:   Mucosa moist  Pharynx without injection  Neck:   Supple       Lungs:     Clear to auscultation bilaterally   Chest Wall:    No tenderness or deformity    Heart:    Regular rate and rhythm       Abdomen:     Soft, non-tender, bowel sounds +, hepatomegaly  No palpable spleen  Extremities:   Extremities no cyanosis or edema       Skin:   no rash or icterus      Lymph nodes: Cervical, supraclavicular, and axillary nodes normal   Neurologic:   CNII-XII intact, normal strength, sensation and reflexes     Throughout          Breast exam:   Not applicable  ROS: Review of Systems   All other systems reviewed and are negative  Imaging: No results found  Labs:   Lab Results   Component Value Date    WBC 6 89 01/18/2018    HGB 15 8 01/18/2018    HCT 44 9 01/18/2018    MCV 90 01/18/2018     01/18/2018     Lab Results   Component Value Date     07/23/2018    K 4 7 07/23/2018     07/23/2018    CO2 32 07/23/2018    ANIONGAP 5 12/09/2015    BUN 18 07/23/2018    CREATININE 1 01 07/23/2018    GLUCOSE 267 (H) 12/09/2015    GLUF 170 (H) 01/18/2018    CALCIUM 9 3 07/23/2018    AST 31 07/23/2018    ALT 35 07/23/2018    ALKPHOS 67 07/23/2018    PROT 7 4 12/09/2015    BILITOT 0 97 12/09/2015    EGFR 79 07/23/2018         Lab Results   Component Value Date    PSA 0 4 01/18/2018    PSA 0 4 09/29/2016    PSA 0 5 09/08/2015         Current Medications: Reviewed  Allergies: Reviewed  PMH/FH/SH:  Reviewed      Vital Sign:    Body surface area is 1 95 meters squared      Wt Readings from Last 3 Encounters:   09/17/18 83 kg (183 lb)   09/07/18 83 kg (183 lb)   08/27/18 83 5 kg (184 lb)        Temp Readings from Last 3 Encounters:   09/17/18 97 7 °F (36 5 °C) (Tympanic)   09/07/18 (!) 97 4 °F (36 3 °C) (Tympanic)   08/27/18 98 2 °F (36 8 °C) (Temporal)        BP Readings from Last 3 Encounters:   09/17/18 134/84   09/07/18 110/80   08/27/18 110/68         Pulse Readings from Last 3 Encounters:   09/17/18 68   09/07/18 72   08/27/18 69     @LASTSAO2(3)@

## 2018-09-17 NOTE — PROGRESS NOTES
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  He has history of chronic hepatitis C as well as alcohol abuse  He has liver cirrhosis  He has multifocal liver lesion with lower level of AFP  This is clinically consistent with hepatocellular carcinoma, although he declined liver biopsy  He has currently undetectable hepatitis C viral load  He has no symptomatology with normal performance status  However, he has radiographic progression of liver lesion  He was seen by Dr Jose Magana to discuss Sir sphere  He presents today to discuss the management of hepatocellular carcinoma  This is clearly unresectable  He does not appear to have any extrahepatic lesion  Therefore, I think he is a candidate for liver transplantation  I think it is reasonable to be on the waiting list for this  We discussed the non transplant treatment option for unresectable hepatocellular carcinoma  1    Systemic therapy with tyrosine kinase inhibitor, such as sorafenib or lenvatinib  2    Local regional therapy such as Sir sphere or TACE  I told him that Sir sphere is very commonly prescribed, although there is no evidence that Sir sphere prolong the survival   Systemic therapy with TKI such as sorafenib or lenvatinib appear to be equally effective and has shown modest survival advantage compared to best supportive care, possibly about 3 months of survival advantage     His hepatocellular carcinoma appeared to be relatively indolent disease, due to the fact that previous PET-CT scan did not show hypermetabolism as well as time course since it was initially noticed  I think it is most reasonable to do liver biopsy not only to confirm the diagnosis of hepatocellular carcinoma but also to know histologic grade  If he still has low-grade hepatocellular carcinoma, waiting for liver transplantation without systemic therapy may be reasonable  If he has histologic grade is progressing, systemic therapy may be indicated  I personally prefer tyrosine kinase inhibitor over Sir sphere because of the evidence of survival advantage  I am going to discuss his case with Dr Espitia  If he is agreeable, I would order IR liver biopsy  Patient and his sister in low are in agreement with my recommendations  Subjective:     HPI:          Interval History:  A 61year old gentleman with 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 diagnosed in November 2011  He underwent concurrent chemoradiation with high dose cisplatin resulting in complete response  He has chronic hepatitis C, as well as liver cirrhosis, based on a CT scan of abdomen and pelvis  In October 2014, he was found to have multifocal liver lesions, based on the CT scan  This was confirmed by MRI of the abdomen  However, the PET/CT scan showed the lesion was not hypermetabolic  He declined liver biopsy  Radiographically, this was consistent with multifocal hepatocellular carcinoma  He has been under the care of Dr Espitia  His recent CT scan showed progression of multifocal liver lesions  His AFP was recently 9  He has absolutely no symptomatology  He was treated with 2 courses of antiviral medication  He has undetectable hepatitis C viral load  His liver function is normalized  He has no coagulopathy  His bilirubin is minimally elevated  His platelet count is normal  He was referred to Dr Phyllis Garcia for Sir sphere  He presents today to discuss the treatment of clinical multifocal unresectable hepatocellular carcinoma    He has normal performance status  Objective:     Primary Diagnosis: 1  Squamous cell carcinoma of oropharynx, stage SHAINA disease, diagnosed in November 2011  2 Multifocal liver lesion, clinically clinically consistent with hepatocellular carcinoma  Cancer Staging:  Cancer Staging  No matching staging information was found for the patient  Previous Hematologic/ Oncologic Treatment:     Concurrent chemoradiation with high-dose cisplatin completed on February 13, 2012  Current Hematologic/ Oncologic Treatment:      Observation  Disease Status:     No evidence of recurrence of head and neck cancer  Test Results:    Pathology:        Radiology:    CT scan abdomen pelvis showed multiple enlarging liver lesion, consistent with hepatocellular carcinoma  Laboratory:    See below  Physical Exam:      General Appearance:    Alert, oriented        Eyes:    PERRL   Ears:    Normal external ear canals, both ears   Nose:   Nares normal, septum midline   Throat:   Mucosa moist  Pharynx without injection  Neck:   Supple       Lungs:     Clear to auscultation bilaterally   Chest Wall:    No tenderness or deformity    Heart:    Regular rate and rhythm       Abdomen:     Soft, non-tender, bowel sounds +, hepatomegaly  No palpable spleen  Extremities:   Extremities no cyanosis or edema       Skin:   no rash or icterus  Lymph nodes:   Cervical, supraclavicular, and axillary nodes normal   Neurologic:   CNII-XII intact, normal strength, sensation and reflexes     Throughout          Breast exam:   Not applicable  ROS: Review of Systems   All other systems reviewed and are negative  Imaging: No results found        Labs:   Lab Results   Component Value Date    WBC 6 89 01/18/2018    HGB 15 8 01/18/2018    HCT 44 9 01/18/2018    MCV 90 01/18/2018     01/18/2018     Lab Results   Component Value Date     07/23/2018    K 4 7 07/23/2018     07/23/2018    CO2 32 07/23/2018 ANIONGAP 5 12/09/2015    BUN 18 07/23/2018    CREATININE 1 01 07/23/2018    GLUCOSE 267 (H) 12/09/2015    GLUF 170 (H) 01/18/2018    CALCIUM 9 3 07/23/2018    AST 31 07/23/2018    ALT 35 07/23/2018    ALKPHOS 67 07/23/2018    PROT 7 4 12/09/2015    BILITOT 0 97 12/09/2015    EGFR 79 07/23/2018         Lab Results   Component Value Date    PSA 0 4 01/18/2018    PSA 0 4 09/29/2016    PSA 0 5 09/08/2015         Current Medications: Reviewed  Allergies: Reviewed  PMH/FH/SH:  Reviewed      Vital Sign:    Body surface area is 1 95 meters squared      Wt Readings from Last 3 Encounters:   09/17/18 83 kg (183 lb)   09/07/18 83 kg (183 lb)   08/27/18 83 5 kg (184 lb)        Temp Readings from Last 3 Encounters:   09/17/18 97 7 °F (36 5 °C) (Tympanic)   09/07/18 (!) 97 4 °F (36 3 °C) (Tympanic)   08/27/18 98 2 °F (36 8 °C) (Temporal)        BP Readings from Last 3 Encounters:   09/17/18 134/84   09/07/18 110/80   08/27/18 110/68         Pulse Readings from Last 3 Encounters:   09/17/18 68   09/07/18 72   08/27/18 69     @LASTSAO2(3)@

## 2018-10-01 NOTE — NURSING NOTE
Reviewed pre- procedure instructions for procedure on 10/4/18  All questions answered and pt was reassured  NPO status enforced  Pt given IR phone number to call with any questions  Arrival time to be given per SDS

## 2018-10-03 ENCOUNTER — DOCUMENTATION (OUTPATIENT)
Dept: GASTROENTEROLOGY | Facility: CLINIC | Age: 63
End: 2018-10-03

## 2018-10-03 NOTE — PROGRESS NOTES
I got a call from IR attending Dr Tiny Grajeda Ou has multifocal lesions in the liver based on imaging consistent with hepatocellular carcinoma but he has alpha-fetoprotein is low  Dr Claude Junes recommended liver biopsy for definitive diagnosis  We discussed the risks and benefit of the procedure  Per IR attending because of the location of the lesion there is a risk bleeding and puncturing the lung  I did discuss this risk with the patient over the phone today  I gave him options of repeat imaging to assess for progression but patient insisted and wanted to proceed with liver biopsy as scheduled tomorrow  All his questions were answered    I sent a message to [facility] via TT

## 2018-10-04 ENCOUNTER — HOSPITAL ENCOUNTER (OUTPATIENT)
Dept: RADIOLOGY | Facility: HOSPITAL | Age: 63
Discharge: HOME | End: 2018-10-04
Attending: INTERNAL MEDICINE | Admitting: RADIOLOGY
Payer: MEDICARE

## 2018-10-04 VITALS
OXYGEN SATURATION: 98 % | SYSTOLIC BLOOD PRESSURE: 133 MMHG | BODY MASS INDEX: 28.25 KG/M2 | HEIGHT: 67 IN | RESPIRATION RATE: 16 BRPM | WEIGHT: 180 LBS | TEMPERATURE: 97.2 F | DIASTOLIC BLOOD PRESSURE: 84 MMHG | HEART RATE: 62 BPM

## 2018-10-04 DIAGNOSIS — C22.0 HEPATOCELLULAR CARCINOMA (HCC): ICD-10-CM

## 2018-10-04 LAB
ERYTHROCYTE [DISTWIDTH] IN BLOOD BY AUTOMATED COUNT: 13.6 % (ref 11.6–15.1)
HCT VFR BLD AUTO: 41.9 % (ref 36.5–49.3)
HGB BLD-MCNC: 14.2 G/DL (ref 12–17)
INR PPP: 1.11 (ref 0.86–1.17)
MCH RBC QN AUTO: 31.2 PG (ref 26.8–34.3)
MCHC RBC AUTO-ENTMCNC: 33.9 G/DL (ref 31.4–37.4)
MCV RBC AUTO: 92 FL (ref 82–98)
PLATELET # BLD AUTO: 113 THOUSANDS/UL (ref 149–390)
PMV BLD AUTO: 11.2 FL (ref 8.9–12.7)
PROTHROMBIN TIME: 14.4 SECONDS (ref 11.8–14.2)
RBC # BLD AUTO: 4.55 MILLION/UL (ref 3.88–5.62)
WBC # BLD AUTO: 6.66 THOUSAND/UL (ref 4.31–10.16)

## 2018-10-04 PROCEDURE — 99153 MOD SED SAME PHYS/QHP EA: CPT

## 2018-10-04 PROCEDURE — 88307 TISSUE EXAM BY PATHOLOGIST: CPT | Performed by: PATHOLOGY

## 2018-10-04 PROCEDURE — 85027 COMPLETE CBC AUTOMATED: CPT | Performed by: RADIOLOGY

## 2018-10-04 PROCEDURE — 88342 IMHCHEM/IMCYTCHM 1ST ANTB: CPT | Performed by: PATHOLOGY

## 2018-10-04 PROCEDURE — 88341 IMHCHEM/IMCYTCHM EA ADD ANTB: CPT | Performed by: PATHOLOGY

## 2018-10-04 PROCEDURE — 47000 NEEDLE BIOPSY OF LIVER PERQ: CPT | Performed by: RADIOLOGY

## 2018-10-04 PROCEDURE — 77012 CT SCAN FOR NEEDLE BIOPSY: CPT

## 2018-10-04 PROCEDURE — 77012 CT SCAN FOR NEEDLE BIOPSY: CPT | Performed by: RADIOLOGY

## 2018-10-04 PROCEDURE — 99152 MOD SED SAME PHYS/QHP 5/>YRS: CPT | Performed by: RADIOLOGY

## 2018-10-04 PROCEDURE — 99152 MOD SED SAME PHYS/QHP 5/>YRS: CPT

## 2018-10-04 PROCEDURE — 88333 PATH CONSLTJ SURG CYTO XM 1: CPT | Performed by: PATHOLOGY

## 2018-10-04 PROCEDURE — 47000 NEEDLE BIOPSY OF LIVER PERQ: CPT

## 2018-10-04 PROCEDURE — 85610 PROTHROMBIN TIME: CPT | Performed by: RADIOLOGY

## 2018-10-04 RX ORDER — MIDAZOLAM HYDROCHLORIDE 1 MG/ML
INJECTION INTRAMUSCULAR; INTRAVENOUS CODE/TRAUMA/SEDATION MEDICATION
Status: COMPLETED | OUTPATIENT
Start: 2018-10-04 | End: 2018-10-04

## 2018-10-04 RX ORDER — FENTANYL CITRATE 50 UG/ML
INJECTION, SOLUTION INTRAMUSCULAR; INTRAVENOUS CODE/TRAUMA/SEDATION MEDICATION
Status: COMPLETED | OUTPATIENT
Start: 2018-10-04 | End: 2018-10-04

## 2018-10-04 RX ORDER — SODIUM CHLORIDE 9 MG/ML
75 INJECTION, SOLUTION INTRAVENOUS CONTINUOUS
Status: DISCONTINUED | OUTPATIENT
Start: 2018-10-04 | End: 2018-10-04 | Stop reason: HOSPADM

## 2018-10-04 RX ADMIN — SODIUM CHLORIDE 75 ML/HR: 0.9 INJECTION, SOLUTION INTRAVENOUS at 07:17

## 2018-10-04 RX ADMIN — MIDAZOLAM 2 MG: 1 INJECTION INTRAMUSCULAR; INTRAVENOUS at 08:31

## 2018-10-04 RX ADMIN — FENTANYL CITRATE 50 MCG: 50 INJECTION, SOLUTION INTRAMUSCULAR; INTRAVENOUS at 08:31

## 2018-10-04 RX ADMIN — FENTANYL CITRATE 50 MCG: 50 INJECTION, SOLUTION INTRAMUSCULAR; INTRAVENOUS at 09:05

## 2018-10-04 NOTE — INTERVAL H&P NOTE
Update:     Mr Sohail Arauz presents today for liver biopsy  He has a LIRADS 5 lesion in the setting of cirrhosis, but with discordant low AFP  I have discussed his case with Dr Rosana Barnes and Dr Ross Bernheim, and the TMs plan is for systemic therapy  However Oncology feels that tumor histology and great is important for therapeutic planning  I discussed biopsying the lesion with the patient, that the risks include transgressing the pleura and therefore lung collapse requiring a chest tube, as well as bleeding and organ damage  I discussed the benefits of getting further information about this tumor, but that we can only sample 1 of several possible masses  He wishes to proceed in order to receive therapy  No other interval changes  We will proceed with moderate sedation, MP 2 ASA 3    Patient re-evaluated   Accept as history and physical     Sam Ghosh MD/October 4, 2018/8:09 AM

## 2018-10-04 NOTE — BRIEF OP NOTE (RAD/CATH)
CT NEEDLE BIOPSY LIVER  Procedure Note    PATIENT NAME: Shashank Edouard  : 1955  MRN: [id number]     Pre-op Diagnosis:   1  Hepatocellular carcinoma (HCC)      Post-op Diagnosis:   1   Hepatocellular carcinoma [facility])        Surgeon:   Ermelinda Wilcox MD  Assistants:         Estimated Blood Loss: none  Findings: needle in segment 6/7 lesion  No postprocedure hemorrhage or pneumothorax    Specimens:   Touch prep, core biopsies    Complications:  None immediate    Anesthesia: Conscious sedation    Ermelinda Wilcox MD     Date: 10/4/2018  Time: 9:24 AM

## 2018-10-04 NOTE — SEDATION DOCUMENTATION
Liver mass biopsy completed without complications  VSS  Denies pain at present  Will need 3 hour bedrest first 2 hours with right side down

## 2018-10-04 NOTE — H&P (VIEW-ONLY)
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  He has history of chronic hepatitis C as well as alcohol abuse  He has liver cirrhosis  He has multifocal liver lesion with lower level of AFP  This is clinically consistent with hepatocellular carcinoma, although he declined liver biopsy  He has currently undetectable hepatitis C viral load  He has no symptomatology with normal performance status  However, he has radiographic progression of liver lesion  He was seen by Dr Anjana Angeles to discuss Sir sphere  He presents today to discuss the management of hepatocellular carcinoma  This is clearly unresectable  He does not appear to have any extrahepatic lesion  Therefore, I think he is a candidate for liver transplantation  I think it is reasonable to be on the waiting list for this  We discussed the non transplant treatment option for unresectable hepatocellular carcinoma  1    Systemic therapy with tyrosine kinase inhibitor, such as sorafenib or lenvatinib  2    Local regional therapy such as Sir sphere or TACE  I told him that Sir sphere is very commonly prescribed, although there is no evidence that Sir sphere prolong the survival   Systemic therapy with TKI such as sorafenib or lenvatinib appear to be equally effective and has shown modest survival advantage compared to best supportive care, possibly about 3 months of survival advantage     His hepatocellular carcinoma appeared to be relatively indolent disease, due to the fact that previous PET-CT scan did not show hypermetabolism as well as time course since it was initially noticed  I think it is most reasonable to do liver biopsy not only to confirm the diagnosis of hepatocellular carcinoma but also to know histologic grade  If he still has low-grade hepatocellular carcinoma, waiting for liver transplantation without systemic therapy may be reasonable  If he has histologic grade is progressing, systemic therapy may be indicated  I personally prefer tyrosine kinase inhibitor over Sir sphere because of the evidence of survival advantage  I am going to discuss his case with Dr Espitia  If he is agreeable, I would order IR liver biopsy  Patient and his sister in low are in agreement with my recommendations  Subjective:     HPI:          Interval History:  A 61year old gentleman with 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 diagnosed in November 2011  He underwent concurrent chemoradiation with high dose cisplatin resulting in complete response  He has chronic hepatitis C, as well as liver cirrhosis, based on a CT scan of abdomen and pelvis  In October 2014, he was found to have multifocal liver lesions, based on the CT scan  This was confirmed by MRI of the abdomen  However, the PET/CT scan showed the lesion was not hypermetabolic  He declined liver biopsy  Radiographically, this was consistent with multifocal hepatocellular carcinoma  He has been under the care of Dr Espitia  His recent CT scan showed progression of multifocal liver lesions  His AFP was recently 9  He has absolutely no symptomatology  He was treated with 2 courses of antiviral medication  He has undetectable hepatitis C viral load  His liver function is normalized  He has no coagulopathy  His bilirubin is minimally elevated  His platelet count is normal  He was referred to Dr Betty Mast for Sir sphere  He presents today to discuss the treatment of clinical multifocal unresectable hepatocellular carcinoma    He has normal performance status  Objective:     Primary Diagnosis: 1  Squamous cell carcinoma of oropharynx, stage SHAINA disease, diagnosed in November 2011  2 Multifocal liver lesion, clinically clinically consistent with hepatocellular carcinoma  Cancer Staging:  Cancer Staging  No matching staging information was found for the patient  Previous Hematologic/ Oncologic Treatment:     Concurrent chemoradiation with high-dose cisplatin completed on February 13, 2012  Current Hematologic/ Oncologic Treatment:      Observation  Disease Status:     No evidence of recurrence of head and neck cancer  Test Results:    Pathology:        Radiology:    CT scan abdomen pelvis showed multiple enlarging liver lesion, consistent with hepatocellular carcinoma  Laboratory:    See below  Physical Exam:      General Appearance:    Alert, oriented        Eyes:    PERRL   Ears:    Normal external ear canals, both ears   Nose:   Nares normal, septum midline   Throat:   Mucosa moist  Pharynx without injection  Neck:   Supple       Lungs:     Clear to auscultation bilaterally   Chest Wall:    No tenderness or deformity    Heart:    Regular rate and rhythm       Abdomen:     Soft, non-tender, bowel sounds +, hepatomegaly  No palpable spleen  Extremities:   Extremities no cyanosis or edema       Skin:   no rash or icterus  Lymph nodes:   Cervical, supraclavicular, and axillary nodes normal   Neurologic:   CNII-XII intact, normal strength, sensation and reflexes     Throughout          Breast exam:   Not applicable  ROS: Review of Systems   All other systems reviewed and are negative  Imaging: No results found        Labs:   Lab Results   Component Value Date    WBC 6 89 01/18/2018    HGB 15 8 01/18/2018    HCT 44 9 01/18/2018    MCV 90 01/18/2018     01/18/2018     Lab Results   Component Value Date     07/23/2018    K 4 7 07/23/2018     07/23/2018    CO2 32 07/23/2018 ANIONGAP 5 12/09/2015    BUN 18 07/23/2018    CREATININE 1 01 07/23/2018    GLUCOSE 267 (H) 12/09/2015    GLUF 170 (H) 01/18/2018    CALCIUM 9 3 07/23/2018    AST 31 07/23/2018    ALT 35 07/23/2018    ALKPHOS 67 07/23/2018    PROT 7 4 12/09/2015    BILITOT 0 97 12/09/2015    EGFR 79 07/23/2018         Lab Results   Component Value Date    PSA 0 4 01/18/2018    PSA 0 4 09/29/2016    PSA 0 5 09/08/2015         Current Medications: Reviewed  Allergies: Reviewed  PMH/FH/SH:  Reviewed      Vital Sign:    Body surface area is 1 95 meters squared      Wt Readings from Last 3 Encounters:   09/17/18 83 kg (183 lb)   09/07/18 83 kg (183 lb)   08/27/18 83 5 kg (184 lb)        Temp Readings from Last 3 Encounters:   09/17/18 97 7 °F (36 5 °C) (Tympanic)   09/07/18 (!) 97 4 °F (36 3 °C) (Tympanic)   08/27/18 98 2 °F (36 8 °C) (Temporal)        BP Readings from Last 3 Encounters:   09/17/18 134/84   09/07/18 110/80   08/27/18 110/68         Pulse Readings from Last 3 Encounters:   09/17/18 68   09/07/18 72   08/27/18 69     @LASTSAO2(3)@

## 2018-10-04 NOTE — DISCHARGE INSTRUCTIONS
Percutaneous Liver Biopsy   WHAT YOU NEED TO KNOW:   A PLB is a procedure to remove a sample of tissue from your liver  The sample can be sent to a lab and tested for liver disease, cancer, or infection  After the procedure you may have pain and bruising at the biopsy site  You may also have pain in your right shoulder  These symptoms should get better in 48 to 72 hours  DISCHARGE INSTRUCTIONS:     [address] and [facility] [place] patients,  Contact Interventional Radiology at [phone] PATIENTS: Contact Interventional Radiology at [phone]   [facility] PATIENTS: Contact Interventional Radiology at [phone] if:    · Fever greater than 101 or chills  · You have severe pain in your abdomen  · Your abdomen is larger than usual and feels hard  · Your neck is more swollen and you have trouble swallowing  · You feel weak or dizzy  · Your heart is beating faster than usual    · Your pain does not get better after you take pain medicine  · Your wound is red, swollen, or draining pus  · You have nausea or are vomiting  · Your skin is itchy, swollen, or you have a rash  · You have questions or concerns about your condition or care  Medicines:   · Acetaminophen decreases pain and fever  It is available without a doctor's order  Acetaminophen can cause liver damage if not taken correctly  · Take your home medicine as directed  · Resume your normal diet  Small sips of flat soda will help with mild nausea  Self-care:   · Rest as directed  Do not play sports, exercise, or lift anything heavier than 5 pounds for up to 1 week  · Apply firm, steady pressure if bleeding occurs  A small amount of bleeding from your wound is possible  Apply pressure with a clean gauze or towel for 5 to 10 minutes  Call 911 if bleeding becomes heavy or does not stop  · Ask your healthcare provider when to take your blood thinner or antiplatelet medicine   You may need to wait 24 to 72 hours to take your medicine  This will prevent bleeding  Follow up with your healthcare provider as directed: Write down your questions so you remember to ask them during your visits

## 2018-10-08 ENCOUNTER — TELEPHONE (OUTPATIENT)
Dept: HEMATOLOGY ONCOLOGY | Facility: CLINIC | Age: 63
End: 2018-10-08

## 2018-10-08 NOTE — TELEPHONE ENCOUNTER
FYI: DR Hosea Durant CALLED REGARDING A PATHOLOGY REPORT FOR PATIENT: HEPATOCELLULAR CARCINOMA  REPORT WILL BE IN EPIC REVIEW SHORTLY

## 2018-10-12 ENCOUNTER — OFFICE VISIT (OUTPATIENT)
Dept: HEMATOLOGY ONCOLOGY | Facility: CLINIC | Age: 63
End: 2018-10-12
Payer: MEDICARE

## 2018-10-12 ENCOUNTER — APPOINTMENT (OUTPATIENT)
Dept: LAB | Facility: HOSPITAL | Age: 63
End: 2018-10-12
Attending: INTERNAL MEDICINE
Payer: MEDICARE

## 2018-10-12 VITALS
TEMPERATURE: 98 F | BODY MASS INDEX: 28.88 KG/M2 | HEART RATE: 80 BPM | RESPIRATION RATE: 16 BRPM | HEIGHT: 67 IN | SYSTOLIC BLOOD PRESSURE: 148 MMHG | OXYGEN SATURATION: 98 % | DIASTOLIC BLOOD PRESSURE: 82 MMHG | WEIGHT: 184 LBS

## 2018-10-12 DIAGNOSIS — C22.0 HEPATOCELLULAR CARCINOMA (HCC): Primary | ICD-10-CM

## 2018-10-12 DIAGNOSIS — C22.0 HEPATOCELLULAR CARCINOMA (HCC): ICD-10-CM

## 2018-10-12 LAB
AFP-TM SERPL-MCNC: 10.7 NG/ML (ref 0.5–8)
ALBUMIN SERPL BCP-MCNC: 4.1 G/DL (ref 3.5–5)
ALP SERPL-CCNC: 70 U/L (ref 46–116)
ALT SERPL W P-5'-P-CCNC: 31 U/L (ref 12–78)
ANION GAP SERPL CALCULATED.3IONS-SCNC: 2 MMOL/L (ref 4–13)
AST SERPL W P-5'-P-CCNC: 36 U/L (ref 5–45)
BASOPHILS # BLD AUTO: 0.04 THOUSANDS/ΜL (ref 0–0.1)
BASOPHILS NFR BLD AUTO: 1 % (ref 0–1)
BILIRUB SERPL-MCNC: 1.04 MG/DL (ref 0.2–1)
BUN SERPL-MCNC: 14 MG/DL (ref 5–25)
CALCIUM SERPL-MCNC: 9.8 MG/DL (ref 8.3–10.1)
CHLORIDE SERPL-SCNC: 101 MMOL/L (ref 100–108)
CO2 SERPL-SCNC: 32 MMOL/L (ref 21–32)
CREAT SERPL-MCNC: 0.93 MG/DL (ref 0.6–1.3)
EOSINOPHIL # BLD AUTO: 0.2 THOUSAND/ΜL (ref 0–0.61)
EOSINOPHIL NFR BLD AUTO: 3 % (ref 0–6)
ERYTHROCYTE [DISTWIDTH] IN BLOOD BY AUTOMATED COUNT: 13.3 % (ref 11.6–15.1)
GFR SERPL CREATININE-BSD FRML MDRD: 87 ML/MIN/1.73SQ M
GLUCOSE P FAST SERPL-MCNC: 133 MG/DL (ref 65–99)
HCT VFR BLD AUTO: 43.4 % (ref 36.5–49.3)
HGB BLD-MCNC: 14.8 G/DL (ref 12–17)
IMM GRANULOCYTES # BLD AUTO: 0.03 THOUSAND/UL (ref 0–0.2)
IMM GRANULOCYTES NFR BLD AUTO: 0 % (ref 0–2)
LYMPHOCYTES # BLD AUTO: 0.98 THOUSANDS/ΜL (ref 0.6–4.47)
LYMPHOCYTES NFR BLD AUTO: 14 % (ref 14–44)
MCH RBC QN AUTO: 32 PG (ref 26.8–34.3)
MCHC RBC AUTO-ENTMCNC: 34.1 G/DL (ref 31.4–37.4)
MCV RBC AUTO: 94 FL (ref 82–98)
MONOCYTES # BLD AUTO: 0.81 THOUSAND/ΜL (ref 0.17–1.22)
MONOCYTES NFR BLD AUTO: 12 % (ref 4–12)
NEUTROPHILS # BLD AUTO: 4.92 THOUSANDS/ΜL (ref 1.85–7.62)
NEUTS SEG NFR BLD AUTO: 70 % (ref 43–75)
NRBC BLD AUTO-RTO: 0 /100 WBCS
PLATELET # BLD AUTO: 127 THOUSANDS/UL (ref 149–390)
PMV BLD AUTO: 11.5 FL (ref 8.9–12.7)
POTASSIUM SERPL-SCNC: 4.9 MMOL/L (ref 3.5–5.3)
PROT SERPL-MCNC: 8.7 G/DL (ref 6.4–8.2)
RBC # BLD AUTO: 4.63 MILLION/UL (ref 3.88–5.62)
SODIUM SERPL-SCNC: 135 MMOL/L (ref 136–145)
WBC # BLD AUTO: 6.98 THOUSAND/UL (ref 4.31–10.16)

## 2018-10-12 PROCEDURE — 36415 COLL VENOUS BLD VENIPUNCTURE: CPT | Performed by: INTERNAL MEDICINE

## 2018-10-12 PROCEDURE — 80053 COMPREHEN METABOLIC PANEL: CPT | Performed by: INTERNAL MEDICINE

## 2018-10-12 PROCEDURE — 85025 COMPLETE CBC W/AUTO DIFF WBC: CPT | Performed by: INTERNAL MEDICINE

## 2018-10-12 PROCEDURE — 99215 OFFICE O/P EST HI 40 MIN: CPT | Performed by: INTERNAL MEDICINE

## 2018-10-12 PROCEDURE — 82105 ALPHA-FETOPROTEIN SERUM: CPT

## 2018-10-12 RX ORDER — SORAFENIB 200 MG/1
400 TABLET, FILM COATED ORAL 2 TIMES DAILY
Qty: 120 TABLET | Refills: 2 | Status: SHIPPED | OUTPATIENT
Start: 2018-10-12 | End: 2019-01-07 | Stop reason: SDUPTHER

## 2018-10-12 NOTE — LETTER
2018     William Malcolm PA-C  [facility]  [address]    Patient: Carmen Ward   YOB: 1955   Date of Visit: 10/12/2018       Dear Dr Mitra Carbajal: Thank you for referring Rosalva Kirby to me for evaluation  Below are my notes for this consultation  If you have questions, please do not hesitate to call me  I look forward to following your patient along with you  Sincerely,        Merced Price MD        CC: MD Jayleen Edmonds MD Simona Solian, MD  10/12/2018 10:37 AM  Sign at close encounter  Hematology / Oncology Outpatient Follow Up Note    Carmen Ward 61 y o  male VDB:[birth year] JO         Date:  10/12/2018    Assessment / Plan:  A 61year old gentleman who has chronic hepatitis C  He had 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 treated by concurrent chemoradiation with high-dose cisplatin resulting in KANDACE  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  He has history of chronic hepatitis C as well as alcohol abuse  He has liver cirrhosis  He has multifocal liver lesion with low level of AFP  This is clinically consistent with hepatocellular carcinoma, although he declined liver biopsy  He has currently undetectable hepatitis C viral load  He has no symptomatology with normal performance status  However, he has radiographic progression of liver lesion  He was seen by Dr Olya Landeros to discuss Sir sphere  He has strong reservation on NEXGRID  Therefore, he came to see me to discuss further management  He subsequently underwent liver biopsy which established the definitive diagnosis of hepatocellular carcinoma which is low grade  I had extensive discussion with him regarding treatment options  I also discussed his prognosis which is likely to be a few years  Following 3 options were thoroughly discussed  1    No further therapy    2  Sir sphere as Dr Beau Teague recommended  3    Systemic therapy with sorafenib  Process and cons of above 3 options were thoroughly discussed  We discussed the benefit of sorafenib which is modest survival benefit  Survival benefit of Sir sphere has not been proven, although response rate may be higher with Sir sphere then sorafenib  After the lengthy discussion, he wished to be treated with sorafenib  Side effects of sorafenib was thoroughly discussed, including but not limited to hand-foot reaction, hypertension, fatigue, minor GI toxicity  Rare liver toxicity could occur  He understood and wish to proceed  I will obtain baseline CBC, CMP and AFP  I will see him again in repeated CBC and CMP  All the patient questions were answered to his satisfaction                                                                                                                  Subjective:      HPI:             Interval History:  A 61year old gentleman with 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 diagnosed in November 2011  He underwent concurrent chemoradiation with high dose cisplatin resulting in complete response  He has chronic hepatitis C, as well as liver cirrhosis, based on a CT scan of abdomen and pelvis  In October 2014, he was found to have multifocal liver lesions, based on the CT scan  This was confirmed by MRI of the abdomen  However, the PET/CT scan showed the lesion was not hypermetabolic  He declined liver biopsy  Radiographically, this was consistent with multifocal hepatocellular carcinoma  He has been under the care of Dr Espitia  His recent CT scan showed progression of multifocal liver lesions  His AFP was recently 9  He has absolutely no symptomatology  He was treated with 2 courses of antiviral medication  He has undetectable hepatitis C viral load  His liver function is normalized  He has no coagulopathy    His bilirubin is minimally elevated  His platelet count is normal  He was referred to Dr Jean Pierre Loredo for Sir sphere  Subsequently, he underwent liver biopsy which showed well-differentiated hepatocellular carcinoma  He presents today for follow-up  He remain asymptomatic  His weight is stable  He has no complaint of right upper quadrant pain  He has no bleeding symptoms  He has no respiratory symptoms  His performance status is normal       Objective:      Primary Diagnosis:     1  Squamous cell carcinoma of oropharynx, stage SHAINA disease, diagnosed in November 2011  2 Multifocal liver lesion, clinically clinically consistent with hepatocellular carcinoma      Cancer Staging:  Cancer Staging  No matching staging information was found for the patient         Previous Hematologic/ Oncologic Treatment:      Concurrent chemoradiation with high-dose cisplatin completed on February 13, 2012       Current Hematologic/ Oncologic Treatment:       Observation      Disease Status:      No evidence of recurrence of head and neck cancer      Test Results:     Pathology:      Liver biopsy showed well-differentiated hepatocellular carcinoma      Radiology:     CT scan abdomen pelvis showed multiple enlarging liver lesion, consistent with hepatocellular carcinoma      Laboratory:     See below      Physical Exam:        General Appearance:    Alert, oriented          Eyes:    PERRL   Ears:    Normal external ear canals, both ears   Nose:   Nares normal, septum midline   Throat:   Mucosa moist  Pharynx without injection  Neck:   Supple         Lungs:     Clear to auscultation bilaterally   Chest Wall:    No tenderness or deformity    Heart:    Regular rate and rhythm         Abdomen:     Soft, non-tender, bowel sounds +, hepatomegaly  No palpable spleen                Extremities:   Extremities no cyanosis or edema         Skin:   no rash or icterus      Lymph nodes:   Cervical, supraclavicular, and axillary nodes normal   Neurologic:   CNII-XII intact, normal strength, sensation and reflexes     Throughout             Breast exam:   Not applicable  ROS: Review of Systems        Imaging: Ct Needle Biopsy Liver    Result Date: 10/4/2018  Narrative: CT-guided liver biopsy Clinical History: Patient with cirrhosis and a LIRADS 5 enlarging liver lesion  Plan for biopsy to evaluate for histologic grade to direct chemotherapy  Sedation time: 49 minutes Images: 75 Procedure/findings: After explaining the risks and benefits of the procedure to the patient, informed consent was obtained  CT was used to localize the posterior right hepatic mass in segment 7   Radiation dose length product (DLP) for this visit:  2198 98 mGy-cm   This examination, like all CT scans performed in the [facility], was performed utilizing techniques to minimize radiation dose exposure, including the use of iterative reconstruction and automated exposure control  The overlying skin was prepped and draped in the usual sterile fashion  Local anesthesia was obtained with a 1% lidocaine solution  Using CT guidance, a 17-gauge coaxial needle was advance  6 passes with a BioPince gauge core biopsy needle were performed  Intraprocedural images demonstrate needle within the lesion  Postprocedure imaging demonstrates no hemorrhage and no pneumothorax  The tissue was given to pathology, who reported adequacy and atypical hepatocytes  D-Stat was used in the tract for hemostasis  A sterile dressing was applied  The patient tolerated the procedure well and left the department in stable condition  Specimens: Touch prep, core biopsy  Impression: Impression: Core biopsy of a suspicious right hepatic mass   Workstation performed: [id number]         Labs:   Lab Results   Component Value Date    WBC 6 66 10/04/2018    HGB 14 2 10/04/2018    HCT 41 9 10/04/2018    MCV 92 10/04/2018     (L) 10/04/2018     Lab Results   Component Value Date     07/23/2018    K 4 7 07/23/2018     07/23/2018    CO2 32 07/23/2018    ANIONGAP 5 12/09/2015    BUN 18 07/23/2018    CREATININE 1 01 07/23/2018    GLUCOSE 267 (H) 12/09/2015    GLUF 170 (H) 01/18/2018    CALCIUM 9 3 07/23/2018    AST 31 07/23/2018    ALT 35 07/23/2018    ALKPHOS 67 07/23/2018    PROT 7 4 12/09/2015    BILITOT 0 97 12/09/2015    EGFR 79 07/23/2018         Lab Results   Component Value Date    PSA 0 4 01/18/2018    PSA 0 4 09/29/2016    PSA 0 5 09/08/2015         Current Medications: Reviewed  Allergies: Reviewed  PMH/FH/SH:  Reviewed      Vital Sign:    Body surface area is 1 95 meters squared      Wt Readings from Last 3 Encounters:   10/12/18 83 5 kg (184 lb)   10/04/18 81 6 kg (180 lb)   09/17/18 83 kg (183 lb)        Temp Readings from Last 3 Encounters:   10/12/18 98 °F (36 7 °C) (Tympanic)   10/04/18 (!) 97 2 °F (36 2 °C) (Oral)   09/17/18 97 7 °F (36 5 °C) (Tympanic)        BP Readings from Last 3 Encounters:   10/12/18 148/82   10/04/18 133/84   09/17/18 134/84         Pulse Readings from Last 3 Encounters:   10/12/18 80   10/04/18 62   09/17/18 68     @LASTSAO2(3)@

## 2018-10-12 NOTE — PROGRESS NOTES
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  He has history of chronic hepatitis C as well as alcohol abuse  He has liver cirrhosis  He has multifocal liver lesion with low level of AFP  This is clinically consistent with hepatocellular carcinoma, although he declined liver biopsy  He has currently undetectable hepatitis C viral load  He has no symptomatology with normal performance status  However, he has radiographic progression of liver lesion  He was seen by Dr Mila Hyde to discuss Sir sphere  He has strong reservation on [employer]  Therefore, he came to see me to discuss further management  He subsequently underwent liver biopsy which established the definitive diagnosis of hepatocellular carcinoma which is low grade  I had extensive discussion with him regarding treatment options  I also discussed his prognosis which is likely to be a few years  Following 3 options were thoroughly discussed  1    No further therapy  2    Sir sphere as Dr Mila Hyde recommended  3    Systemic therapy with sorafenib  Process and cons of above 3 options were thoroughly discussed  We discussed the benefit of sorafenib which is modest survival benefit  Survival benefit of Sir sphere has not been proven, although response rate may be higher with Sir sphere then sorafenib  After the lengthy discussion, he wished to be treated with sorafenib  Side effects of sorafenib was thoroughly discussed, including but not limited to hand-foot reaction, hypertension, fatigue, minor GI toxicity    Rare liver toxicity could occur  He understood and wish to proceed  I will obtain baseline CBC, CMP and AFP  I will see him again in repeated CBC and CMP  All the patient questions were answered to his satisfaction                                                                                                                  Subjective:      HPI:             Interval History:  A 61year old gentleman with 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 diagnosed in November 2011  He underwent concurrent chemoradiation with high dose cisplatin resulting in complete response  He has chronic hepatitis C, as well as liver cirrhosis, based on a CT scan of abdomen and pelvis  In October 2014, he was found to have multifocal liver lesions, based on the CT scan  This was confirmed by MRI of the abdomen  However, the PET/CT scan showed the lesion was not hypermetabolic  He declined liver biopsy  Radiographically, this was consistent with multifocal hepatocellular carcinoma  He has been under the care of Dr Espitia  His recent CT scan showed progression of multifocal liver lesions  His AFP was recently 9  He has absolutely no symptomatology  He was treated with 2 courses of antiviral medication  He has undetectable hepatitis C viral load  His liver function is normalized  He has no coagulopathy  His bilirubin is minimally elevated  His platelet count is normal  He was referred to Dr Esteban Yanez for Sir sphere  Subsequently, he underwent liver biopsy which showed well-differentiated hepatocellular carcinoma  He presents today for follow-up  He remain asymptomatic  His weight is stable  He has no complaint of right upper quadrant pain  He has no bleeding symptoms  He has no respiratory symptoms  His performance status is normal       Objective:      Primary Diagnosis:     1  Squamous cell carcinoma of oropharynx, stage SHAINA disease, diagnosed in November 2011     2 Multifocal liver lesion, clinically clinically consistent with hepatocellular carcinoma      Cancer Staging:  Cancer Staging  No matching staging information was found for the patient         Previous Hematologic/ Oncologic Treatment:      Concurrent chemoradiation with high-dose cisplatin completed on February 13, 2012       Current Hematologic/ Oncologic Treatment:       Observation      Disease Status:      No evidence of recurrence of head and neck cancer      Test Results:     Pathology:      Liver biopsy showed well-differentiated hepatocellular carcinoma      Radiology:     CT scan abdomen pelvis showed multiple enlarging liver lesion, consistent with hepatocellular carcinoma      Laboratory:     See below      Physical Exam:        General Appearance:    Alert, oriented          Eyes:    PERRL   Ears:    Normal external ear canals, both ears   Nose:   Nares normal, septum midline   Throat:   Mucosa moist  Pharynx without injection  Neck:   Supple         Lungs:     Clear to auscultation bilaterally   Chest Wall:    No tenderness or deformity    Heart:    Regular rate and rhythm         Abdomen:     Soft, non-tender, bowel sounds +, hepatomegaly  No palpable spleen                Extremities:   Extremities no cyanosis or edema         Skin:   no rash or icterus  Lymph nodes:   Cervical, supraclavicular, and axillary nodes normal   Neurologic:   CNII-XII intact, normal strength, sensation and reflexes     Throughout             Breast exam:   Not applicable  ROS: Review of Systems        Imaging: Ct Needle Biopsy Liver    Result Date: 10/4/2018  Narrative: CT-guided liver biopsy Clinical History: Patient with cirrhosis and a LIRADS 5 enlarging liver lesion  Plan for biopsy to evaluate for histologic grade to direct chemotherapy  Sedation time: 49 minutes Images: 75 Procedure/findings: After explaining the risks and benefits of the procedure to the patient, informed consent was obtained   CT was used to localize the posterior right hepatic mass in segment 7   Radiation dose length product (DLP) for this visit:  2198 98 mGy-cm   This examination, like all CT scans performed in the [facility], was performed utilizing techniques to minimize radiation dose exposure, including the use of iterative reconstruction and automated exposure control  The overlying skin was prepped and draped in the usual sterile fashion  Local anesthesia was obtained with a 1% lidocaine solution  Using CT guidance, a 17-gauge coaxial needle was advance  6 passes with a BioPince gauge core biopsy needle were performed  Intraprocedural images demonstrate needle within the lesion  Postprocedure imaging demonstrates no hemorrhage and no pneumothorax  The tissue was given to pathology, who reported adequacy and atypical hepatocytes  D-Stat was used in the tract for hemostasis  A sterile dressing was applied  The patient tolerated the procedure well and left the department in stable condition  Specimens: Touch prep, core biopsy  Impression: Impression: Core biopsy of a suspicious right hepatic mass   Workstation performed: [id number]         Labs:   Lab Results   Component Value Date    WBC 6 66 10/04/2018    HGB 14 2 10/04/2018    HCT 41 9 10/04/2018    MCV 92 10/04/2018     (L) 10/04/2018     Lab Results   Component Value Date     07/23/2018    K 4 7 07/23/2018     07/23/2018    CO2 32 07/23/2018    ANIONGAP 5 12/09/2015    BUN 18 07/23/2018    CREATININE 1 01 07/23/2018    GLUCOSE 267 (H) 12/09/2015    GLUF 170 (H) 01/18/2018    CALCIUM 9 3 07/23/2018    AST 31 07/23/2018    ALT 35 07/23/2018    ALKPHOS 67 07/23/2018    PROT 7 4 12/09/2015    BILITOT 0 97 12/09/2015    EGFR 79 07/23/2018         Lab Results   Component Value Date    PSA 0 4 01/18/2018    PSA 0 4 09/29/2016    PSA 0 5 09/08/2015         Current Medications: Reviewed  Allergies: Reviewed  PMH/FH/SH:  Reviewed      Vital Sign:    Body surface area is 1 95 meters squared      Wt Readings from Last 3 Encounters:   10/12/18 83 5 kg (184 lb)   10/04/18 81 6 kg (180 lb)   09/17/18 83 kg (183 lb)        Temp Readings from Last 3 Encounters:   10/12/18 98 °F (36 7 °C) (Tympanic)   10/04/18 (!) 97 2 °F (36 2 °C) (Oral)   09/17/18 97 7 °F (36 5 °C) (Tympanic)        BP Readings from Last 3 Encounters:   10/12/18 148/82   10/04/18 133/84   09/17/18 134/84         Pulse Readings from Last 3 Encounters:   10/12/18 80   10/04/18 62   09/17/18 68     @LASTSAO2(3)@

## 2018-10-15 ENCOUNTER — DOCUMENTATION (OUTPATIENT)
Dept: HEMATOLOGY ONCOLOGY | Facility: CLINIC | Age: 63
End: 2018-10-15

## 2018-10-15 NOTE — PROGRESS NOTES
10/12/2018 received notification from clinical pt will be starting nexavar  Pt has [id number] [facility] for rx  ID # Kian Sondra # [id number]  PCN # [id number]  GRP # [id number]    Sent auth form to Dr Fidel Newberry for his signature, received it back, and submitted for auth  10/15/2018 received approval letters from Abhinav Olivera Ax # [id number] is valid from 12/30/2017 through 12/31/2018  AUTH # [id number] is valid from 12/30/2018 through 12/31/2019  Notified clinical, financial, and [facility]  Requested [facility] to notify us of copay    Per [facility], they can supply the drug and the copay is $3 90

## 2018-11-12 ENCOUNTER — APPOINTMENT (OUTPATIENT)
Dept: LAB | Facility: HOSPITAL | Age: 63
End: 2018-11-12
Attending: INTERNAL MEDICINE
Payer: MEDICARE

## 2018-11-12 DIAGNOSIS — C22.0 HEPATOCELLULAR CARCINOMA (HCC): ICD-10-CM

## 2018-11-12 LAB
AFP-TM SERPL-MCNC: 4.6 NG/ML (ref 0.5–8)
INR PPP: 1.13 (ref 0.86–1.17)
PROTHROMBIN TIME: 14.6 SECONDS (ref 11.8–14.2)

## 2018-11-12 PROCEDURE — 85610 PROTHROMBIN TIME: CPT

## 2018-11-12 PROCEDURE — 82105 ALPHA-FETOPROTEIN SERUM: CPT

## 2018-11-14 ENCOUNTER — OFFICE VISIT (OUTPATIENT)
Dept: GASTROENTEROLOGY | Facility: CLINIC | Age: 63
End: 2018-11-14
Payer: MEDICARE

## 2018-11-14 VITALS
WEIGHT: 178.4 LBS | HEIGHT: 67 IN | TEMPERATURE: 96.6 F | HEART RATE: 59 BPM | BODY MASS INDEX: 28 KG/M2 | SYSTOLIC BLOOD PRESSURE: 179 MMHG | DIASTOLIC BLOOD PRESSURE: 109 MMHG

## 2018-11-14 DIAGNOSIS — K74.69 OTHER CIRRHOSIS OF LIVER (HCC): ICD-10-CM

## 2018-11-14 DIAGNOSIS — C22.0 HEPATOCELLULAR CARCINOMA (HCC): Primary | ICD-10-CM

## 2018-11-14 PROBLEM — I85.10 SECONDARY ESOPHAGEAL VARICES WITHOUT BLEEDING (HCC): Status: ACTIVE | Noted: 2018-11-14

## 2018-11-14 PROCEDURE — 99214 OFFICE O/P EST MOD 30 MIN: CPT | Performed by: INTERNAL MEDICINE
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
## 2018-11-21 ENCOUNTER — CLINICAL SUPPORT (OUTPATIENT)
Dept: FAMILY MEDICINE CLINIC | Facility: CLINIC | Age: 63
End: 2018-11-21
Payer: MEDICARE

## 2018-11-21 DIAGNOSIS — E11.9 DIABETIC EYE EXAM (HCC): Primary | ICD-10-CM

## 2018-11-21 DIAGNOSIS — Z01.00 DIABETIC EYE EXAM (HCC): Primary | ICD-10-CM

## 2018-11-21 PROCEDURE — 92250 FUNDUS PHOTOGRAPHY W/I&R: CPT

## 2018-12-03 ENCOUNTER — OFFICE VISIT (OUTPATIENT)
Dept: HEMATOLOGY ONCOLOGY | Facility: CLINIC | Age: 63
End: 2018-12-03
Payer: MEDICARE

## 2018-12-03 VITALS
WEIGHT: 182 LBS | HEIGHT: 67 IN | BODY MASS INDEX: 28.56 KG/M2 | DIASTOLIC BLOOD PRESSURE: 78 MMHG | TEMPERATURE: 98 F | OXYGEN SATURATION: 98 % | SYSTOLIC BLOOD PRESSURE: 130 MMHG | HEART RATE: 69 BPM | RESPIRATION RATE: 16 BRPM

## 2018-12-03 DIAGNOSIS — C22.0 HEPATOCELLULAR CARCINOMA (HCC): Primary | ICD-10-CM

## 2018-12-03 PROCEDURE — 99214 OFFICE O/P EST MOD 30 MIN: CPT | Performed by: INTERNAL MEDICINE

## 2018-12-03 NOTE — PROGRESS NOTES
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    He has history of chronic hepatitis C as well as alcohol abuse  Carlos Manuel Shell has liver cirrhosis   He has multifocal liver lesion with low level of AFP  This was histologically confirmed to be well-differentiated hepatocellular carcinoma  He started sorafenib in October 2018  He has very minimal toxicity  He has biochemical response with decreased AFP  Therefore, I recommended him to continue with sorafenib 400 mg daily  He is in agreement with my recommendation  I will see him again in mid January 2019, with CBC, CMP and AFP  He is in agreement with my recommendations                                                                                                                                         Subjective:      HPI:             Interval History:  A 61year old gentleman with 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 diagnosed in November 2011  He underwent concurrent chemoradiation with high dose cisplatin resulting in complete response  He has chronic hepatitis C, as well as liver cirrhosis, based on a CT scan of abdomen and pelvis  In October 2014, he was found to have multifocal liver lesions, based on the CT scan  This was confirmed by MRI of the abdomen  However, the PET/CT scan showed the lesion was not hypermetabolic  He declined liver biopsy    Radiographically, this was consistent with multifocal hepatocellular carcinoma   He has been under the care of Dr Espitia  In summer of 2018, CT scan showed progression  He was suggested to have Sir sphere which he declined  Instead, he accepted sorafenib, which she started in October 2018  He came in today for routine follow-up  He has no complaint of hand-foot reaction  His blood pressure is under control  However, he has some burning sensation in the oral cavity  His weight is stable  He has no respiratory symptoms  He has AFP has significantly decreased  He has loose stool for which he occasionally takes Imodium  His performance status is normal                                                 Objective:      Primary Diagnosis:     1  Squamous cell carcinoma of oropharynx, stage SHAINA disease, diagnosed in November 2011  2 Multifocal liver lesion, clinically clinically consistent with hepatocellular carcinoma      Cancer Staging:  Cancer Staging  No matching staging information was found for the patient         Previous Hematologic/ Oncologic Treatment:      Concurrent chemoradiation with high-dose cisplatin completed on February 13, 2012       Current Hematologic/ Oncologic Treatment:      Sorafenib of since October 2018        Disease Status:      Biochemically responding disease      Test Results:     Pathology:      Liver biopsy showed well-differentiated hepatocellular carcinoma      Radiology:     CT scan abdomen pelvis showed multiple enlarging liver lesion, consistent with hepatocellular carcinoma      Laboratory:     See below  AFP is 4 6 in November 2018 which was 10 6 in early October 2018      Physical Exam:        General Appearance:    Alert, oriented          Eyes:    PERRL   Ears:    Normal external ear canals, both ears   Nose:   Nares normal, septum midline   Throat:   Mucosa moist  Pharynx without injection      Neck:   Supple         Lungs:     Clear to auscultation bilaterally   Chest Wall:    No tenderness or deformity    Heart:    Regular rate and rhythm         Abdomen:     Soft, non-tender, bowel sounds +, hepatomegaly   No palpable spleen              Extremities:   Extremities no cyanosis or edema         Skin:   no rash or icterus  Lymph nodes:   Cervical, supraclavicular, and axillary nodes normal   Neurologic:   CNII-XII intact, normal strength, sensation and reflexes     Throughout             Breast exam:   Not applicable  ROS: Review of Systems   Gastrointestinal:        Loose stool   All other systems reviewed and are negative  Imaging: No results found  Labs:   Lab Results   Component Value Date    WBC 5 72 11/12/2018    HGB 14 3 11/12/2018    HCT 41 5 11/12/2018    MCV 89 11/12/2018     (L) 11/12/2018     Lab Results   Component Value Date     12/09/2015    K 4 2 11/12/2018     11/12/2018    CO2 32 11/12/2018    ANIONGAP 5 12/09/2015    BUN 16 11/12/2018    CREATININE 0 92 11/12/2018    GLUCOSE 267 (H) 12/09/2015    GLUF 226 (H) 11/12/2018    CALCIUM 9 2 11/12/2018    AST 41 11/12/2018    ALT 33 11/12/2018    ALKPHOS 87 11/12/2018    PROT 7 4 12/09/2015    BILITOT 0 97 12/09/2015    EGFR 88 11/12/2018       Lab Results   Component Value Date    PSA 0 4 01/18/2018    PSA 0 4 09/29/2016    PSA 0 5 09/08/2015       Current Medications: Reviewed  Allergies: Reviewed  PMH/FH/SH:  Reviewed      Vital Sign:    Body surface area is 1 94 meters squared      Wt Readings from Last 3 Encounters:   12/03/18 82 6 kg (182 lb)   11/14/18 80 9 kg (178 lb 6 4 oz)   10/12/18 83 5 kg (184 lb)        Temp Readings from Last 3 Encounters:   12/03/18 98 °F (36 7 °C) (Tympanic)   11/14/18 (!) 96 6 °F (35 9 °C) (Tympanic)   10/12/18 98 °F (36 7 °C) (Tympanic)        BP Readings from Last 3 Encounters:   12/03/18 130/78   11/14/18 (!) 179/109   10/12/18 148/82         Pulse Readings from Last 3 Encounters:   12/03/18 69   11/14/18 59   10/12/18 80     @LASTSAO2(3)@

## 2019-01-07 DIAGNOSIS — C22.0 HEPATOCELLULAR CARCINOMA (HCC): ICD-10-CM

## 2019-01-07 RX ORDER — SORAFENIB 200 MG/1
TABLET, FILM COATED ORAL
Qty: 120 TABLET | Refills: 4 | Status: SHIPPED | OUTPATIENT
Start: 2019-01-07 | End: 2019-09-03 | Stop reason: SDUPTHER

## 2019-01-10 ENCOUNTER — APPOINTMENT (OUTPATIENT)
Dept: LAB | Facility: HOSPITAL | Age: 64
End: 2019-01-10
Attending: INTERNAL MEDICINE
Payer: MEDICARE

## 2019-01-10 DIAGNOSIS — C22.0 HEPATOCELLULAR CARCINOMA (HCC): ICD-10-CM

## 2019-01-10 LAB
AFP-TM SERPL-MCNC: 4.2 NG/ML (ref 0.5–8)
ALBUMIN SERPL BCP-MCNC: 3.5 G/DL (ref 3.5–5)
ALP SERPL-CCNC: 75 U/L (ref 46–116)
ALT SERPL W P-5'-P-CCNC: 29 U/L (ref 12–78)
ANION GAP SERPL CALCULATED.3IONS-SCNC: 7 MMOL/L (ref 4–13)
AST SERPL W P-5'-P-CCNC: 35 U/L (ref 5–45)
BASOPHILS # BLD AUTO: 0.02 THOUSANDS/ΜL (ref 0–0.1)
BASOPHILS NFR BLD AUTO: 0 % (ref 0–1)
BILIRUB SERPL-MCNC: 1.16 MG/DL (ref 0.2–1)
BUN SERPL-MCNC: 12 MG/DL (ref 5–25)
CALCIUM SERPL-MCNC: 8.8 MG/DL (ref 8.3–10.1)
CHLORIDE SERPL-SCNC: 98 MMOL/L (ref 100–108)
CO2 SERPL-SCNC: 29 MMOL/L (ref 21–32)
CREAT SERPL-MCNC: 0.9 MG/DL (ref 0.6–1.3)
EOSINOPHIL # BLD AUTO: 0.17 THOUSAND/ΜL (ref 0–0.61)
EOSINOPHIL NFR BLD AUTO: 2 % (ref 0–6)
ERYTHROCYTE [DISTWIDTH] IN BLOOD BY AUTOMATED COUNT: 14 % (ref 11.6–15.1)
GFR SERPL CREATININE-BSD FRML MDRD: 91 ML/MIN/1.73SQ M
GLUCOSE SERPL-MCNC: 274 MG/DL (ref 65–140)
HCT VFR BLD AUTO: 41.3 % (ref 36.5–49.3)
HGB BLD-MCNC: 14 G/DL (ref 12–17)
IMM GRANULOCYTES # BLD AUTO: 0.03 THOUSAND/UL (ref 0–0.2)
IMM GRANULOCYTES NFR BLD AUTO: 0 % (ref 0–2)
LYMPHOCYTES # BLD AUTO: 0.82 THOUSANDS/ΜL (ref 0.6–4.47)
LYMPHOCYTES NFR BLD AUTO: 11 % (ref 14–44)
MCH RBC QN AUTO: 31.9 PG (ref 26.8–34.3)
MCHC RBC AUTO-ENTMCNC: 33.9 G/DL (ref 31.4–37.4)
MCV RBC AUTO: 94 FL (ref 82–98)
MONOCYTES # BLD AUTO: 0.7 THOUSAND/ΜL (ref 0.17–1.22)
MONOCYTES NFR BLD AUTO: 10 % (ref 4–12)
NEUTROPHILS # BLD AUTO: 5.59 THOUSANDS/ΜL (ref 1.85–7.62)
NEUTS SEG NFR BLD AUTO: 77 % (ref 43–75)
NRBC BLD AUTO-RTO: 0 /100 WBCS
PLATELET # BLD AUTO: 218 THOUSANDS/UL (ref 149–390)
PMV BLD AUTO: 10 FL (ref 8.9–12.7)
POTASSIUM SERPL-SCNC: 4 MMOL/L (ref 3.5–5.3)
PROT SERPL-MCNC: 8.4 G/DL (ref 6.4–8.2)
RBC # BLD AUTO: 4.39 MILLION/UL (ref 3.88–5.62)
SODIUM SERPL-SCNC: 134 MMOL/L (ref 136–145)
WBC # BLD AUTO: 7.33 THOUSAND/UL (ref 4.31–10.16)

## 2019-01-10 PROCEDURE — 82105 ALPHA-FETOPROTEIN SERUM: CPT

## 2019-01-10 PROCEDURE — 80053 COMPREHEN METABOLIC PANEL: CPT

## 2019-01-10 PROCEDURE — 36415 COLL VENOUS BLD VENIPUNCTURE: CPT

## 2019-01-10 PROCEDURE — 85025 COMPLETE CBC W/AUTO DIFF WBC: CPT

## 2019-01-14 ENCOUNTER — OFFICE VISIT (OUTPATIENT)
Dept: HEMATOLOGY ONCOLOGY | Facility: CLINIC | Age: 64
End: 2019-01-14
Payer: MEDICARE

## 2019-01-14 VITALS
BODY MASS INDEX: 27.31 KG/M2 | WEIGHT: 174 LBS | HEART RATE: 74 BPM | SYSTOLIC BLOOD PRESSURE: 122 MMHG | HEIGHT: 67 IN | DIASTOLIC BLOOD PRESSURE: 80 MMHG | OXYGEN SATURATION: 98 % | RESPIRATION RATE: 16 BRPM | TEMPERATURE: 97.5 F

## 2019-01-14 DIAGNOSIS — C22.0 HEPATOCELLULAR CARCINOMA (HCC): Primary | ICD-10-CM

## 2019-01-14 PROCEDURE — 99214 OFFICE O/P EST MOD 30 MIN: CPT | Performed by: INTERNAL MEDICINE

## 2019-01-14 NOTE — PROGRESS NOTES
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    He has history of chronic hepatitis C as well as alcohol abuse  Monse Irene has liver cirrhosis   He has multifocal liver lesion with low level of AFP  This was histologically confirmed to be well-differentiated hepatocellular carcinoma  He started sorafenib in October 2018  He has typical side effect with mild hand-foot reaction as well as diarrhea  He has biochemical response  He is tolerating sorafenib well  I recommended him to continue with sorafenib 400 mg twice a day  I will see him again with CT scan abdomen and pelvis as well as AFP in a month  He is in agreement with my recommendation                                                                                                                                                               Subjective:      HPI:             Interval History:  A 61year old gentleman with 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 diagnosed in November 2011  He underwent concurrent chemoradiation with high dose cisplatin resulting in complete response  He has chronic hepatitis C, as well as liver cirrhosis, based on a CT scan of abdomen and pelvis  In October 2014, he was found to have multifocal liver lesions, based on the CT scan  This was confirmed by MRI of the abdomen  However, the PET/CT scan showed the lesion was not hypermetabolic  He declined liver biopsy    Radiographically, this was consistent with multifocal hepatocellular carcinoma   He has been under the care of Dr Espitia  In summer of 2018, CT scan showed progression  He was suggested to have Sir sphere which he declined  Instead, he accepted sorafenib, which she started in October 2018  He came in today for routine follow-up  in the last 4 weeks, he had moderate hand-foot reaction  With conservative management, his skin change has improved  He also had diarrhea which has been resolving  Otherwise, he is doing well  He has no nausea, vomiting  His weight is stable  He has no complaint abdominal pain  He denied any respiratory symptoms  His performance status is normal  His AFP has declined already                                                                                    Objective:      Primary Diagnosis:     1  Squamous cell carcinoma of oropharynx, stage SHAIAN disease, diagnosed in November 2011  2 Multifocal liver lesion, clinically clinically consistent with hepatocellular carcinoma      Cancer Staging:  Cancer Staging  No matching staging information was found for the patient         Previous Hematologic/ Oncologic Treatment:      Concurrent chemoradiation with high-dose cisplatin completed on February 13, 2012       Current Hematologic/ Oncologic Treatment:       Sorafenib of since October 2018         Disease Status:      Biochemically responding disease      Test Results:     Pathology:      Liver biopsy showed well-differentiated hepatocellular carcinoma      Radiology:     CT scan abdomen pelvis showed multiple enlarging liver lesion, consistent with hepatocellular carcinoma      Laboratory:     See below  AFP is 4 2 in January 2019 which was 10 6 in early October 2018      Physical Exam:        General Appearance:    Alert, oriented          Eyes:    PERRL   Ears:    Normal external ear canals, both ears   Nose:   Nares normal, septum midline   Throat:   Mucosa moist  Pharynx without injection      Neck:   Supple         Lungs:     Clear to auscultation bilaterally   Chest Wall:    No tenderness or deformity    Heart:    Regular rate and rhythm         Abdomen:     Soft, non-tender, bowel sounds +, hepatomegaly   No palpable spleen                Extremities:   Extremities no cyanosis or edema         Skin:   no rash or icterus  Lymph nodes:   Cervical, supraclavicular, and axillary nodes normal   Neurologic:   CNII-XII intact, normal strength, sensation and reflexes     Throughout             Breast exam:   Not applicable  ROS: Review of Systems   Gastrointestinal:        Diarrhea   Skin:        Grade 1 hand-foot reaction  All other systems reviewed and are negative  Imaging: No results found  Labs:   Lab Results   Component Value Date    WBC 7 33 01/10/2019    HGB 14 0 01/10/2019    HCT 41 3 01/10/2019    MCV 94 01/10/2019     01/10/2019     Lab Results   Component Value Date     12/09/2015    K 4 0 01/10/2019    CL 98 (L) 01/10/2019    CO2 29 01/10/2019    ANIONGAP 5 12/09/2015    BUN 12 01/10/2019    CREATININE 0 90 01/10/2019    GLUCOSE 267 (H) 12/09/2015    GLUF 226 (H) 11/12/2018    CALCIUM 8 8 01/10/2019    AST 35 01/10/2019    ALT 29 01/10/2019    ALKPHOS 75 01/10/2019    PROT 7 4 12/09/2015    BILITOT 0 97 12/09/2015    EGFR 91 01/10/2019         Lab Results   Component Value Date    PSA 0 4 01/18/2018    PSA 0 4 09/29/2016    PSA 0 5 09/08/2015         Current Medications: Reviewed  Allergies: Reviewed  PMH/FH/SH:  Reviewed      Vital Sign:    Body surface area is 1 91 meters squared      Wt Readings from Last 3 Encounters:   01/14/19 78 9 kg (174 lb)   12/03/18 82 6 kg (182 lb)   11/14/18 80 9 kg (178 lb 6 4 oz)        Temp Readings from Last 3 Encounters:   01/14/19 97 5 °F (36 4 °C) (Tympanic)   12/03/18 98 °F (36 7 °C) (Tympanic)   11/14/18 (!) 96 6 °F (35 9 °C) (Tympanic)        BP Readings from Last 3 Encounters:   01/14/19 122/80   12/03/18 130/78   11/14/18 (!) 179/109         Pulse Readings from Last 3 Encounters:   01/14/19 74   12/03/18 69 11/14/18 59     @LASTSAO2(3)@

## 2019-02-11 ENCOUNTER — APPOINTMENT (OUTPATIENT)
Dept: LAB | Facility: HOSPITAL | Age: 64
End: 2019-02-11
Attending: INTERNAL MEDICINE
Payer: MEDICARE

## 2019-02-11 DIAGNOSIS — C22.0 HEPATOCELLULAR CARCINOMA (HCC): ICD-10-CM

## 2019-02-11 LAB
AFP-TM SERPL-MCNC: 4.5 NG/ML (ref 0.5–8)
ALBUMIN SERPL BCP-MCNC: 3.4 G/DL (ref 3.5–5)
ALP SERPL-CCNC: 92 U/L (ref 46–116)
ALT SERPL W P-5'-P-CCNC: 32 U/L (ref 12–78)
ANION GAP SERPL CALCULATED.3IONS-SCNC: 3 MMOL/L (ref 4–13)
AST SERPL W P-5'-P-CCNC: 38 U/L (ref 5–45)
BASOPHILS # BLD AUTO: 0.04 THOUSANDS/ΜL (ref 0–0.1)
BASOPHILS NFR BLD AUTO: 1 % (ref 0–1)
BILIRUB SERPL-MCNC: 0.97 MG/DL (ref 0.2–1)
BUN SERPL-MCNC: 16 MG/DL (ref 5–25)
CALCIUM SERPL-MCNC: 8.7 MG/DL (ref 8.3–10.1)
CHLORIDE SERPL-SCNC: 103 MMOL/L (ref 100–108)
CO2 SERPL-SCNC: 32 MMOL/L (ref 21–32)
CREAT SERPL-MCNC: 0.95 MG/DL (ref 0.6–1.3)
EOSINOPHIL # BLD AUTO: 0.37 THOUSAND/ΜL (ref 0–0.61)
EOSINOPHIL NFR BLD AUTO: 6 % (ref 0–6)
ERYTHROCYTE [DISTWIDTH] IN BLOOD BY AUTOMATED COUNT: 13.7 % (ref 11.6–15.1)
GFR SERPL CREATININE-BSD FRML MDRD: 85 ML/MIN/1.73SQ M
GLUCOSE SERPL-MCNC: 175 MG/DL (ref 65–140)
HCT VFR BLD AUTO: 41.7 % (ref 36.5–49.3)
HGB BLD-MCNC: 14.1 G/DL (ref 12–17)
IMM GRANULOCYTES # BLD AUTO: 0.02 THOUSAND/UL (ref 0–0.2)
IMM GRANULOCYTES NFR BLD AUTO: 0 % (ref 0–2)
LYMPHOCYTES # BLD AUTO: 0.86 THOUSANDS/ΜL (ref 0.6–4.47)
LYMPHOCYTES NFR BLD AUTO: 15 % (ref 14–44)
MCH RBC QN AUTO: 32 PG (ref 26.8–34.3)
MCHC RBC AUTO-ENTMCNC: 33.8 G/DL (ref 31.4–37.4)
MCV RBC AUTO: 95 FL (ref 82–98)
MONOCYTES # BLD AUTO: 0.75 THOUSAND/ΜL (ref 0.17–1.22)
MONOCYTES NFR BLD AUTO: 13 % (ref 4–12)
NEUTROPHILS # BLD AUTO: 3.88 THOUSANDS/ΜL (ref 1.85–7.62)
NEUTS SEG NFR BLD AUTO: 65 % (ref 43–75)
NRBC BLD AUTO-RTO: 0 /100 WBCS
PLATELET # BLD AUTO: 147 THOUSANDS/UL (ref 149–390)
PMV BLD AUTO: 10.5 FL (ref 8.9–12.7)
POTASSIUM SERPL-SCNC: 4.3 MMOL/L (ref 3.5–5.3)
PROT SERPL-MCNC: 7.7 G/DL (ref 6.4–8.2)
RBC # BLD AUTO: 4.41 MILLION/UL (ref 3.88–5.62)
SODIUM SERPL-SCNC: 138 MMOL/L (ref 136–145)
WBC # BLD AUTO: 5.92 THOUSAND/UL (ref 4.31–10.16)

## 2019-02-11 PROCEDURE — 85025 COMPLETE CBC W/AUTO DIFF WBC: CPT

## 2019-02-11 PROCEDURE — 82105 ALPHA-FETOPROTEIN SERUM: CPT

## 2019-02-11 PROCEDURE — 36415 COLL VENOUS BLD VENIPUNCTURE: CPT

## 2019-02-11 PROCEDURE — 80053 COMPREHEN METABOLIC PANEL: CPT

## 2019-02-14 ENCOUNTER — HOSPITAL ENCOUNTER (OUTPATIENT)
Dept: CT IMAGING | Facility: HOSPITAL | Age: 64
Discharge: HOME | End: 2019-02-14
Attending: INTERNAL MEDICINE
Payer: MEDICARE

## 2019-02-14 DIAGNOSIS — C22.0 HEPATOCELLULAR CARCINOMA (HCC): ICD-10-CM

## 2019-02-14 PROCEDURE — 71260 CT THORAX DX C+: CPT

## 2019-02-14 PROCEDURE — 74177 CT ABD & PELVIS W/CONTRAST: CPT

## 2019-02-14 RX ADMIN — IOHEXOL 100 ML: 350 INJECTION, SOLUTION INTRAVENOUS at 08:48

## 2019-02-22 ENCOUNTER — OFFICE VISIT (OUTPATIENT)
Dept: HEMATOLOGY ONCOLOGY | Facility: CLINIC | Age: 64
End: 2019-02-22
Payer: MEDICARE

## 2019-02-22 VITALS
TEMPERATURE: 98 F | BODY MASS INDEX: 28.09 KG/M2 | WEIGHT: 179 LBS | HEART RATE: 66 BPM | RESPIRATION RATE: 16 BRPM | OXYGEN SATURATION: 98 % | HEIGHT: 67 IN | SYSTOLIC BLOOD PRESSURE: 140 MMHG | DIASTOLIC BLOOD PRESSURE: 100 MMHG

## 2019-02-22 DIAGNOSIS — C22.0 HEPATOCELLULAR CARCINOMA (HCC): Primary | ICD-10-CM

## 2019-02-22 DIAGNOSIS — Z85.21 H/O LARYNGEAL CANCER: ICD-10-CM

## 2019-02-22 PROCEDURE — 99214 OFFICE O/P EST MOD 30 MIN: CPT | Performed by: INTERNAL MEDICINE

## 2019-02-22 NOTE — PROGRESS NOTES
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    He has history of chronic hepatitis C as well as alcohol abuse  [facility] has liver cirrhosis   He has multifocal liver lesion with low level of AFP    This was histologically confirmed to be well-differentiated hepatocellular carcinoma   He started sorafenib in October 2018  Radiographically as well as biochemically, he has responding disease  He has minimal toxicity from sorafenib  I recommended him to continue with sorafenib 400 mg twice a day  He is in agreement with my recommendation  I will see him again in 3 months with CBC, CMP and AFP                                                                                                                                         Subjective:      HPI:             Interval History:  A 61year old gentleman with 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 diagnosed in November 2011  He underwent concurrent chemoradiation with high dose cisplatin resulting in complete response  He has chronic hepatitis C, as well as liver cirrhosis, based on a CT scan of abdomen and pelvis  In October 2014, he was found to have multifocal liver lesions, based on the CT scan  This was confirmed by MRI of the abdomen  However, the PET/CT scan showed the lesion was not hypermetabolic   He declined liver biopsy    Radiographically, this was consistent with multifocal hepatocellular carcinoma   He has been under the care of Clark Grady summer of 2018, CT scan showed progression  [facility] was suggested to have Sir sphere which he declined  Marcelle Chaudhry, he accepted sorafenib, which he started in October 2018  He has no hand-foot reaction  His AFP has declined  He recently underwent CT scan of chest abdomen pelvis which showed decreased visibility of liver mass, suggesting responding disease  He is tolerating thrive finish very well  He has mild diarrhea for which she takes Imodium  Has no nausea vomiting  He is maintaining his weight  His performance status is normal                                                                                                    Objective:      Primary Diagnosis:     1  Squamous cell carcinoma of oropharynx, stage SHAINA disease, diagnosed in November 2011  2 Multifocal liver lesion, clinically clinically consistent with hepatocellular carcinoma      Cancer Staging:  Cancer Staging  No matching staging information was found for the patient         Previous Hematologic/ Oncologic Treatment:      Concurrent chemoradiation with high-dose cisplatin completed on February 13, 2012       Current Hematologic/ Oncologic Treatment:       Sorafenib of since October 2018         Disease Status:      Partial response as well as biochemical response      Test Results:     Pathology:      Liver biopsy showed well-differentiated hepatocellular carcinoma      Radiology:     CT scan of chest abdomen pelvis in February 2019 showed diminished visibility of liver mass  I personally reviewed this films      Laboratory:      AFP is 4 5 in February 2019, see below otherwise        Physical Exam:        General Appearance:    Alert, oriented          Eyes:    PERRL   Ears:    Normal external ear canals, both ears   Nose:   Nares normal, septum midline   Throat:   Mucosa moist  Pharynx without injection      Neck:   Supple         Lungs:     Clear to auscultation bilaterally   Chest Wall:    No tenderness or deformity    Heart:    Regular rate and rhythm         Abdomen:     Soft, non-tender, bowel sounds +, hepatomegaly   No palpable spleen                Extremities:   Extremities no cyanosis or edema         Skin:   no rash or icterus  Lymph nodes:   Cervical, supraclavicular, and axillary nodes normal   Neurologic:   CNII-XII intact, normal strength, sensation and reflexes     Throughout             Breast exam:   Not applicable  ROS: Review of Systems   HENT:        Dry mouth   All other systems reviewed and are negative  Imaging: Ct Chest Abdomen Pelvis W Contrast    Result Date: 2/15/2019  Narrative: CT CHEST, ABDOMEN AND PELVIS WITH IV CONTRAST INDICATION:   C22 0: Liver cell carcinoma  COMPARISON:  10/4/2018 biopsy procedure imaging as well as 7/28/2018 abdominopelvic CT and CT chest abdomen and pelvis from August 4, 2014 TECHNIQUE: CT examination of the chest, abdomen and pelvis was performed  Scanning through the abdomen was performed in arterial, venous and delayed phases according a protocol spefically designed to evaluate upper abdominal viscera  Axial, sagittal, and coronal 2D reformatted images were created from the source data and submitted for interpretation  Radiation dose length product (DLP) for this visit:  1363 mGy-cm   This examination, like all CT scans performed in the [facility], was performed utilizing techniques to minimize radiation dose exposure, including the use of iterative reconstruction and automated exposure control  IV Contrast:  100 mL of iohexol (OMNIPAQUE) Enteric Contrast: Enteric contrast was administered  FINDINGS: CHEST LUNGS:  A tiny subpleural left upper lobe nodule anteriorly on image 32 series 4 is noncalcified and round in shape and measures 4 mm diameter  This is unchanged when compared with the study from 2014 in keeping with a benign finding  No other nodules are appreciated  There is a thin band of scarring or atelectasis at the left base which is stable  No evidence of pneumonia or CHF    The airways are clear  PLEURA:  Unremarkable  HEART/GREAT VESSELS:  Unremarkable for patient's age  MEDIASTINUM AND ERINN:  Unremarkable  CHEST WALL AND LOWER NECK:   No suspicious findings  Questionable postop changes in the left axillary region  ABDOMEN LIVER/BILIARY TREE:  The liver is cirrhotic  The previously biopsied dominant right hepatic lobe mass which apparently represents a hepatic cell carcinoma is less prominently enhanced on the arterial phase portion of today's exam than on the prior study  The lesion is still visible, however  It has a slightly lobulated contour and is somewhat peripherally enhanced and centrally low attenuation  Another small arterial phase enhancing lesion near the dome of the liver on image 20 series 3 is 1 cm  Questionable nearby lesion anteriorly at the dome of the liver on image 21 measuring about 6 mm  This seems slightly smaller than on the prior study  Another lesion more posterior medially within the right lobe which was visible on the prior study  is now barely perceptible is a thin linear density on image 30 series 3  Similarly, the lesion which was visible in the right lobe on the prior study on image 26 is only faintly visible as a patchy density on image 35 series 3 measuring 9 mm  A laterally located peripheral right lobe lesion which was present on the prior study on image 39 is also less well seen today  Overall, there seems to be at least mild improvement of the multiple hepatic lesions since the previous exam   On the portal venous phase portion of the study, the dominant lesion is more uniformly low density with a hazy ill-defined margin without any significant internal or peripheral enhancement appreciated  The other lesions become essentially inapparent  The dominant lesion is estimated to be 4 cm diameter currently  GALLBLADDER:  No calcified gallstones  No pericholecystic inflammatory change   SPLEEN:  The spleen is enlarged, measuring 13 6 x 6 8 x 14 8 cm  PANCREAS:  Small cystic-appearing lesion at the pancreatic neck is stable from prior  It might be a side branch IPMN appearing probably contiguous with the main duct  It is 9 x 14 mm on image 59 series 4  ADRENAL GLANDS:  Unremarkable  KIDNEYS/URETERS:  Right upper pole cyst is stable  No hydronephrosis or solid-appearing kidney masses or kidney stones are seen on either side  STOMACH AND BOWEL:  There is mild sigmoid diverticulosis without diverticulitis  No obstruction of the bowel  No acute findings  APPENDIX:  No findings to suggest appendicitis  ABDOMINOPELVIC CAVITY:  There are stable small lymph nodes adjacent to the pancreas  There is no ascites or free air  VESSELS:  Unremarkable for patient's age  PELVIS REPRODUCTIVE ORGANS:  Unremarkable for patient's age  URINARY BLADDER:  Unremarkable  ABDOMINAL WALL/INGUINAL REGIONS:  Postop changes in the midline of the abdomen  No acute findings  OSSEOUS STRUCTURES:  There are degenerative changes of the lumbar facet joints and also mild degenerative arthritis of the hips  There is disc degeneration at the lumbosacral junction  No destructive bone lesions are seen  Impression: Overall, the visibility of the hepatic lesions has diminished on today's study is compared with the prior study  The dominant lesion is estimated to be 4 cm size  No new lesions are appreciated at this time  Otherwise, stable exam   Small benign left lung nodule  Cystic-appearing lesion of the pancreatic neck  Small lymph nodes in the pancreas  Cirrhotic liver  Splenomegaly  Right renal cyst   Mild colonic diverticulosis   Workstation performed: [id number]         Labs:   Lab Results   Component Value Date    WBC 5 92 02/11/2019    HGB 14 1 02/11/2019    HCT 41 7 02/11/2019    MCV 95 02/11/2019     (L) 02/11/2019     Lab Results   Component Value Date     12/09/2015    K 4 3 02/11/2019     02/11/2019    CO2 32 02/11/2019    ANIONGAP 5 12/09/2015 BUN 16 02/11/2019    CREATININE 0 95 02/11/2019    GLUCOSE 267 (H) 12/09/2015    GLUF 226 (H) 11/12/2018    CALCIUM 8 7 02/11/2019    AST 38 02/11/2019    ALT 32 02/11/2019    ALKPHOS 92 02/11/2019    PROT 7 4 12/09/2015    BILITOT 0 97 12/09/2015    EGFR 85 02/11/2019         Lab Results   Component Value Date    PSA 0 4 01/18/2018    PSA 0 4 09/29/2016    PSA 0 5 09/08/2015       Current Medications: Reviewed  Allergies: Reviewed  PMH/FH/SH:  Reviewed      Vital Sign:    Body surface area is 1 93 meters squared      Wt Readings from Last 3 Encounters:   02/22/19 81 2 kg (179 lb)   01/14/19 78 9 kg (174 lb)   12/03/18 82 6 kg (182 lb)        Temp Readings from Last 3 Encounters:   02/22/19 98 °F (36 7 °C) (Tympanic)   01/14/19 97 5 °F (36 4 °C) (Tympanic)   12/03/18 98 °F (36 7 °C) (Tympanic)        BP Readings from Last 3 Encounters:   02/22/19 140/100   01/14/19 122/80   12/03/18 130/78         Pulse Readings from Last 3 Encounters:   02/22/19 66   01/14/19 74   12/03/18 69     @LASTSAO2(3)@

## 2019-02-25 ENCOUNTER — OFFICE VISIT (OUTPATIENT)
Dept: GASTROENTEROLOGY | Facility: CLINIC | Age: 64
End: 2019-02-25
Payer: MEDICARE

## 2019-02-25 VITALS
WEIGHT: 178.4 LBS | SYSTOLIC BLOOD PRESSURE: 164 MMHG | HEART RATE: 72 BPM | TEMPERATURE: 96.3 F | DIASTOLIC BLOOD PRESSURE: 100 MMHG | HEIGHT: 67 IN | BODY MASS INDEX: 28 KG/M2

## 2019-02-25 DIAGNOSIS — C22.0 HEPATOCELLULAR CARCINOMA (HCC): ICD-10-CM

## 2019-02-25 DIAGNOSIS — I85.10 SECONDARY ESOPHAGEAL VARICES WITHOUT BLEEDING (HCC): ICD-10-CM

## 2019-02-25 DIAGNOSIS — K74.69 OTHER CIRRHOSIS OF LIVER (HCC): Primary | ICD-10-CM

## 2019-02-25 PROCEDURE — 99214 OFFICE O/P EST MOD 30 MIN: CPT | Performed by: PHYSICIAN ASSISTANT

## 2019-02-25 RX ORDER — NADOLOL 20 MG/1
40 TABLET ORAL DAILY
COMMUNITY
End: 2019-02-25

## 2019-02-25 RX ORDER — NADOLOL 40 MG/1
40 TABLET ORAL DAILY
Qty: 90 TABLET | Refills: 3 | Status: SHIPPED | OUTPATIENT
Start: 2019-02-25 | End: 2020-05-05 | Stop reason: SDUPTHER
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
## 2019-03-19 ENCOUNTER — TELEPHONE (OUTPATIENT)
Dept: HEMATOLOGY ONCOLOGY | Facility: CLINIC | Age: 64
End: 2019-03-19

## 2019-03-19 NOTE — TELEPHONE ENCOUNTER
Called the patient to make him aware of his new appt time  Please give him the new time of his appt

## 2019-05-08 ENCOUNTER — TELEPHONE (OUTPATIENT)
Dept: FAMILY MEDICINE CLINIC | Facility: CLINIC | Age: 64
End: 2019-05-08

## 2019-05-09 DIAGNOSIS — IMO0002 UNCONTROLLED TYPE 2 DIABETES MELLITUS WITH COMPLICATION, WITHOUT LONG-TERM CURRENT USE OF INSULIN: ICD-10-CM

## 2019-05-09 DIAGNOSIS — N52.9 ERECTILE DYSFUNCTION, UNSPECIFIED ERECTILE DYSFUNCTION TYPE: ICD-10-CM

## 2019-05-09 DIAGNOSIS — I10 ESSENTIAL HYPERTENSION, BENIGN: ICD-10-CM

## 2019-05-09 DIAGNOSIS — E03.9 HYPOTHYROIDISM, UNSPECIFIED TYPE: ICD-10-CM

## 2019-05-09 RX ORDER — LISINOPRIL AND HYDROCHLOROTHIAZIDE 25; 20 MG/1; MG/1
1 TABLET ORAL DAILY
Qty: 30 TABLET | Refills: 0 | Status: SHIPPED | OUTPATIENT
Start: 2019-05-09 | End: 2019-05-15 | Stop reason: SDUPTHER

## 2019-05-09 RX ORDER — LEVOTHYROXINE SODIUM 112 UG/1
112 TABLET ORAL DAILY
Qty: 30 TABLET | Refills: 0 | Status: SHIPPED | OUTPATIENT
Start: 2019-05-09 | End: 2019-05-15 | Stop reason: SDUPTHER

## 2019-05-09 RX ORDER — SILDENAFIL 50 MG/1
50 TABLET, FILM COATED ORAL ONCE AS NEEDED
Qty: 9 TABLET | Refills: 0 | Status: SHIPPED | OUTPATIENT
Start: 2019-05-09 | End: 2019-07-30 | Stop reason: SDUPTHER

## 2019-05-09 RX ORDER — GLIMEPIRIDE 4 MG/1
4 TABLET ORAL DAILY
Qty: 30 TABLET | Refills: 0 | Status: SHIPPED | OUTPATIENT
Start: 2019-05-09 | End: 2019-05-15 | Stop reason: SDUPTHER

## 2019-05-15 ENCOUNTER — OFFICE VISIT (OUTPATIENT)
Dept: FAMILY MEDICINE CLINIC | Facility: CLINIC | Age: 64
End: 2019-05-15

## 2019-05-15 VITALS
BODY MASS INDEX: 27.31 KG/M2 | HEIGHT: 67 IN | SYSTOLIC BLOOD PRESSURE: 120 MMHG | RESPIRATION RATE: 18 BRPM | TEMPERATURE: 96.4 F | HEART RATE: 88 BPM | OXYGEN SATURATION: 98 % | WEIGHT: 174 LBS | DIASTOLIC BLOOD PRESSURE: 80 MMHG

## 2019-05-15 DIAGNOSIS — I10 ESSENTIAL HYPERTENSION, BENIGN: ICD-10-CM

## 2019-05-15 DIAGNOSIS — E11.8 TYPE 2 DIABETES MELLITUS WITH COMPLICATION, WITHOUT LONG-TERM CURRENT USE OF INSULIN (HCC): ICD-10-CM

## 2019-05-15 DIAGNOSIS — I10 ESSENTIAL HYPERTENSION: ICD-10-CM

## 2019-05-15 DIAGNOSIS — IMO0002 UNCONTROLLED TYPE 2 DIABETES MELLITUS WITH COMPLICATION, WITHOUT LONG-TERM CURRENT USE OF INSULIN: ICD-10-CM

## 2019-05-15 DIAGNOSIS — E03.9 HYPOTHYROIDISM, UNSPECIFIED TYPE: Primary | ICD-10-CM

## 2019-05-15 DIAGNOSIS — Z12.5 PROSTATE CANCER SCREENING: ICD-10-CM

## 2019-05-15 PROCEDURE — 3074F SYST BP LT 130 MM HG: CPT | Performed by: PHYSICIAN ASSISTANT

## 2019-05-15 PROCEDURE — 99214 OFFICE O/P EST MOD 30 MIN: CPT | Performed by: PHYSICIAN ASSISTANT

## 2019-05-15 PROCEDURE — 3079F DIAST BP 80-89 MM HG: CPT | Performed by: PHYSICIAN ASSISTANT

## 2019-05-15 RX ORDER — LISINOPRIL AND HYDROCHLOROTHIAZIDE 25; 20 MG/1; MG/1
1 TABLET ORAL DAILY
Qty: 90 TABLET | Refills: 1 | Status: SHIPPED | OUTPATIENT
Start: 2019-05-15 | End: 2019-11-15 | Stop reason: SDUPTHER

## 2019-05-15 RX ORDER — LEVOTHYROXINE SODIUM 112 UG/1
112 TABLET ORAL DAILY
Qty: 90 TABLET | Refills: 1 | Status: SHIPPED | OUTPATIENT
Start: 2019-05-15 | End: 2019-11-15 | Stop reason: SDUPTHER

## 2019-05-15 RX ORDER — GLIMEPIRIDE 4 MG/1
4 TABLET ORAL DAILY
Qty: 90 TABLET | Refills: 1 | Status: SHIPPED | OUTPATIENT
Start: 2019-05-15 | End: 2019-11-15 | Stop reason: SDUPTHER

## 2019-05-22 ENCOUNTER — APPOINTMENT (OUTPATIENT)
Dept: LAB | Facility: HOSPITAL | Age: 64
End: 2019-05-22
Attending: INTERNAL MEDICINE
Payer: COMMERCIAL

## 2019-05-22 DIAGNOSIS — E11.8 TYPE 2 DIABETES MELLITUS WITH COMPLICATION, WITHOUT LONG-TERM CURRENT USE OF INSULIN (HCC): ICD-10-CM

## 2019-05-22 DIAGNOSIS — C22.0 HEPATOCELLULAR CARCINOMA (HCC): ICD-10-CM

## 2019-05-22 DIAGNOSIS — Z85.21 H/O LARYNGEAL CANCER: ICD-10-CM

## 2019-05-22 DIAGNOSIS — E03.9 HYPOTHYROIDISM, UNSPECIFIED TYPE: ICD-10-CM

## 2019-05-22 DIAGNOSIS — K74.69 OTHER CIRRHOSIS OF LIVER (HCC): ICD-10-CM

## 2019-05-22 LAB
AFP-TM SERPL-MCNC: 4.9 NG/ML (ref 0.5–8)
ALBUMIN SERPL BCP-MCNC: 3.7 G/DL (ref 3.5–5)
ALP SERPL-CCNC: 78 U/L (ref 46–116)
ALT SERPL W P-5'-P-CCNC: 35 U/L (ref 12–78)
ANION GAP SERPL CALCULATED.3IONS-SCNC: 7 MMOL/L (ref 4–13)
AST SERPL W P-5'-P-CCNC: 40 U/L (ref 5–45)
BASOPHILS # BLD AUTO: 0.02 THOUSANDS/ΜL (ref 0–0.1)
BASOPHILS NFR BLD AUTO: 0 % (ref 0–1)
BILIRUB SERPL-MCNC: 1.27 MG/DL (ref 0.2–1)
BUN SERPL-MCNC: 14 MG/DL (ref 5–25)
CALCIUM SERPL-MCNC: 9.9 MG/DL (ref 8.3–10.1)
CHLORIDE SERPL-SCNC: 101 MMOL/L (ref 100–108)
CHOLEST SERPL-MCNC: 139 MG/DL (ref 50–200)
CO2 SERPL-SCNC: 30 MMOL/L (ref 21–32)
CREAT SERPL-MCNC: 0.92 MG/DL (ref 0.6–1.3)
EOSINOPHIL # BLD AUTO: 0.21 THOUSAND/ΜL (ref 0–0.61)
EOSINOPHIL NFR BLD AUTO: 3 % (ref 0–6)
ERYTHROCYTE [DISTWIDTH] IN BLOOD BY AUTOMATED COUNT: 13.9 % (ref 11.6–15.1)
GFR SERPL CREATININE-BSD FRML MDRD: 88 ML/MIN/1.73SQ M
GLUCOSE SERPL-MCNC: 157 MG/DL (ref 65–140)
HCT VFR BLD AUTO: 42.6 % (ref 36.5–49.3)
HDLC SERPL-MCNC: 41 MG/DL (ref 40–60)
HGB BLD-MCNC: 14.3 G/DL (ref 12–17)
IMM GRANULOCYTES # BLD AUTO: 0.01 THOUSAND/UL (ref 0–0.2)
IMM GRANULOCYTES NFR BLD AUTO: 0 % (ref 0–2)
INR PPP: 1.11 (ref 0.86–1.17)
LDLC SERPL CALC-MCNC: 82 MG/DL (ref 0–100)
LYMPHOCYTES # BLD AUTO: 0.85 THOUSANDS/ΜL (ref 0.6–4.47)
LYMPHOCYTES NFR BLD AUTO: 13 % (ref 14–44)
MCH RBC QN AUTO: 31.6 PG (ref 26.8–34.3)
MCHC RBC AUTO-ENTMCNC: 33.6 G/DL (ref 31.4–37.4)
MCV RBC AUTO: 94 FL (ref 82–98)
MONOCYTES # BLD AUTO: 0.67 THOUSAND/ΜL (ref 0.17–1.22)
MONOCYTES NFR BLD AUTO: 11 % (ref 4–12)
NEUTROPHILS # BLD AUTO: 4.62 THOUSANDS/ΜL (ref 1.85–7.62)
NEUTS SEG NFR BLD AUTO: 73 % (ref 43–75)
NONHDLC SERPL-MCNC: 98 MG/DL
NRBC BLD AUTO-RTO: 0 /100 WBCS
PLATELET # BLD AUTO: 155 THOUSANDS/UL (ref 149–390)
PMV BLD AUTO: 11.3 FL (ref 8.9–12.7)
POTASSIUM SERPL-SCNC: 3.8 MMOL/L (ref 3.5–5.3)
PROT SERPL-MCNC: 8.4 G/DL (ref 6.4–8.2)
PROTHROMBIN TIME: 14.4 SECONDS (ref 11.8–14.2)
RBC # BLD AUTO: 4.53 MILLION/UL (ref 3.88–5.62)
SODIUM SERPL-SCNC: 138 MMOL/L (ref 136–145)
T4 FREE SERPL-MCNC: 1.6 NG/DL (ref 0.76–1.46)
TRIGL SERPL-MCNC: 81 MG/DL
TSH SERPL DL<=0.05 MIU/L-ACNC: 7.34 UIU/ML (ref 0.36–3.74)
WBC # BLD AUTO: 6.38 THOUSAND/UL (ref 4.31–10.16)

## 2019-05-22 PROCEDURE — 80061 LIPID PANEL: CPT

## 2019-05-22 PROCEDURE — 80053 COMPREHEN METABOLIC PANEL: CPT

## 2019-05-22 PROCEDURE — 85025 COMPLETE CBC W/AUTO DIFF WBC: CPT

## 2019-05-22 PROCEDURE — 84439 ASSAY OF FREE THYROXINE: CPT

## 2019-05-22 PROCEDURE — 84443 ASSAY THYROID STIM HORMONE: CPT

## 2019-05-22 PROCEDURE — 82105 ALPHA-FETOPROTEIN SERUM: CPT

## 2019-05-22 PROCEDURE — 36415 COLL VENOUS BLD VENIPUNCTURE: CPT

## 2019-05-22 PROCEDURE — 85610 PROTHROMBIN TIME: CPT

## 2019-05-24 ENCOUNTER — OFFICE VISIT (OUTPATIENT)
Dept: HEMATOLOGY ONCOLOGY | Facility: CLINIC | Age: 64
End: 2019-05-24
Payer: COMMERCIAL

## 2019-05-24 VITALS
WEIGHT: 174.2 LBS | SYSTOLIC BLOOD PRESSURE: 122 MMHG | RESPIRATION RATE: 18 BRPM | TEMPERATURE: 97.6 F | BODY MASS INDEX: 27.34 KG/M2 | HEART RATE: 75 BPM | HEIGHT: 67 IN | DIASTOLIC BLOOD PRESSURE: 70 MMHG | OXYGEN SATURATION: 96 %

## 2019-05-24 DIAGNOSIS — C22.0 HEPATOCELLULAR CARCINOMA (HCC): Primary | ICD-10-CM

## 2019-05-24 PROCEDURE — 99214 OFFICE O/P EST MOD 30 MIN: CPT | Performed by: INTERNAL MEDICINE

## 2019-05-29 ENCOUNTER — OFFICE VISIT (OUTPATIENT)
Dept: GASTROENTEROLOGY | Facility: CLINIC | Age: 64
End: 2019-05-29
Payer: COMMERCIAL

## 2019-05-29 VITALS
TEMPERATURE: 97.3 F | SYSTOLIC BLOOD PRESSURE: 152 MMHG | HEIGHT: 67 IN | HEART RATE: 67 BPM | DIASTOLIC BLOOD PRESSURE: 97 MMHG | BODY MASS INDEX: 26.78 KG/M2 | WEIGHT: 170.6 LBS

## 2019-05-29 DIAGNOSIS — I85.10 SECONDARY ESOPHAGEAL VARICES WITHOUT BLEEDING (HCC): ICD-10-CM

## 2019-05-29 DIAGNOSIS — C22.0 HEPATOCELLULAR CARCINOMA (HCC): ICD-10-CM

## 2019-05-29 DIAGNOSIS — K70.30 ALCOHOLIC CIRRHOSIS OF LIVER WITHOUT ASCITES (HCC): Primary | ICD-10-CM

## 2019-05-29 PROCEDURE — 99214 OFFICE O/P EST MOD 30 MIN: CPT | Performed by: INTERNAL MEDICINE

## 2019-07-30 ENCOUNTER — TELEPHONE (OUTPATIENT)
Dept: FAMILY MEDICINE CLINIC | Facility: CLINIC | Age: 64
End: 2019-07-30

## 2019-07-30 DIAGNOSIS — N52.9 ERECTILE DYSFUNCTION, UNSPECIFIED ERECTILE DYSFUNCTION TYPE: ICD-10-CM

## 2019-07-30 RX ORDER — SILDENAFIL 50 MG/1
50 TABLET, FILM COATED ORAL ONCE AS NEEDED
Qty: 9 TABLET | Refills: 0 | Status: SHIPPED | OUTPATIENT
Start: 2019-07-30 | End: 2019-10-10 | Stop reason: SDUPTHER

## 2019-07-30 NOTE — TELEPHONE ENCOUNTER
Pt called requesting refill on sildenafil (VIAGRA)  Stated he wanted a call back if medication was approved or not   Was just calling pt to inform him medication was sent to the pharmacy

## 2019-09-03 DIAGNOSIS — C22.0 HEPATOCELLULAR CARCINOMA (HCC): ICD-10-CM

## 2019-09-03 RX ORDER — SORAFENIB 200 MG/1
TABLET, FILM COATED ORAL
Qty: 120 TABLET | Refills: 11 | Status: SHIPPED | OUTPATIENT
Start: 2019-09-03 | End: 2020-09-10

## 2019-09-13 ENCOUNTER — APPOINTMENT (OUTPATIENT)
Dept: LAB | Facility: HOSPITAL | Age: 64
End: 2019-09-13
Attending: INTERNAL MEDICINE
Payer: COMMERCIAL

## 2019-09-13 DIAGNOSIS — C22.0 HEPATOCELLULAR CARCINOMA (HCC): ICD-10-CM

## 2019-09-13 LAB
AFP-TM SERPL-MCNC: 6.6 NG/ML (ref 0.5–8)
ALBUMIN SERPL BCP-MCNC: 3.5 G/DL (ref 3.5–5)
ALP SERPL-CCNC: 73 U/L (ref 46–116)
ALT SERPL W P-5'-P-CCNC: 23 U/L (ref 12–78)
ANION GAP SERPL CALCULATED.3IONS-SCNC: 5 MMOL/L (ref 4–13)
AST SERPL W P-5'-P-CCNC: 31 U/L (ref 5–45)
BASOPHILS # BLD AUTO: 0.03 THOUSANDS/ΜL (ref 0–0.1)
BASOPHILS NFR BLD AUTO: 1 % (ref 0–1)
BILIRUB SERPL-MCNC: 0.86 MG/DL (ref 0.2–1)
BUN SERPL-MCNC: 8 MG/DL (ref 5–25)
CALCIUM SERPL-MCNC: 9.3 MG/DL (ref 8.3–10.1)
CHLORIDE SERPL-SCNC: 102 MMOL/L (ref 100–108)
CO2 SERPL-SCNC: 31 MMOL/L (ref 21–32)
CREAT SERPL-MCNC: 1.01 MG/DL (ref 0.6–1.3)
EOSINOPHIL # BLD AUTO: 0.14 THOUSAND/ΜL (ref 0–0.61)
EOSINOPHIL NFR BLD AUTO: 3 % (ref 0–6)
ERYTHROCYTE [DISTWIDTH] IN BLOOD BY AUTOMATED COUNT: 13.2 % (ref 11.6–15.1)
GFR SERPL CREATININE-BSD FRML MDRD: 78 ML/MIN/1.73SQ M
GLUCOSE SERPL-MCNC: 178 MG/DL (ref 65–140)
HCT VFR BLD AUTO: 40 % (ref 36.5–49.3)
HGB BLD-MCNC: 13.3 G/DL (ref 12–17)
IMM GRANULOCYTES # BLD AUTO: 0.02 THOUSAND/UL (ref 0–0.2)
IMM GRANULOCYTES NFR BLD AUTO: 1 % (ref 0–2)
LYMPHOCYTES # BLD AUTO: 0.66 THOUSANDS/ΜL (ref 0.6–4.47)
LYMPHOCYTES NFR BLD AUTO: 15 % (ref 14–44)
MCH RBC QN AUTO: 31.4 PG (ref 26.8–34.3)
MCHC RBC AUTO-ENTMCNC: 33.3 G/DL (ref 31.4–37.4)
MCV RBC AUTO: 95 FL (ref 82–98)
MONOCYTES # BLD AUTO: 0.64 THOUSAND/ΜL (ref 0.17–1.22)
MONOCYTES NFR BLD AUTO: 15 % (ref 4–12)
NEUTROPHILS # BLD AUTO: 2.85 THOUSANDS/ΜL (ref 1.85–7.62)
NEUTS SEG NFR BLD AUTO: 65 % (ref 43–75)
NRBC BLD AUTO-RTO: 0 /100 WBCS
PLATELET # BLD AUTO: 116 THOUSANDS/UL (ref 149–390)
PMV BLD AUTO: 11.2 FL (ref 8.9–12.7)
POTASSIUM SERPL-SCNC: 4.1 MMOL/L (ref 3.5–5.3)
PROT SERPL-MCNC: 7.9 G/DL (ref 6.4–8.2)
RBC # BLD AUTO: 4.23 MILLION/UL (ref 3.88–5.62)
SODIUM SERPL-SCNC: 138 MMOL/L (ref 136–145)
WBC # BLD AUTO: 4.34 THOUSAND/UL (ref 4.31–10.16)

## 2019-09-13 PROCEDURE — 85025 COMPLETE CBC W/AUTO DIFF WBC: CPT

## 2019-09-13 PROCEDURE — 82105 ALPHA-FETOPROTEIN SERUM: CPT

## 2019-09-13 PROCEDURE — 36415 COLL VENOUS BLD VENIPUNCTURE: CPT

## 2019-09-13 PROCEDURE — 80053 COMPREHEN METABOLIC PANEL: CPT

## 2019-09-18 ENCOUNTER — HOSPITAL ENCOUNTER (OUTPATIENT)
Dept: CT IMAGING | Facility: HOSPITAL | Age: 64
Discharge: HOME | End: 2019-09-18
Attending: INTERNAL MEDICINE
Payer: COMMERCIAL

## 2019-09-18 DIAGNOSIS — C22.0 HEPATOCELLULAR CARCINOMA (HCC): ICD-10-CM

## 2019-09-18 PROCEDURE — 74177 CT ABD & PELVIS W/CONTRAST: CPT

## 2019-09-18 PROCEDURE — 71260 CT THORAX DX C+: CPT

## 2019-09-18 RX ADMIN — IOHEXOL 100 ML: 350 INJECTION, SOLUTION INTRAVENOUS at 07:34

## 2019-09-27 ENCOUNTER — OFFICE VISIT (OUTPATIENT)
Dept: HEMATOLOGY ONCOLOGY | Facility: CLINIC | Age: 64
End: 2019-09-27
Payer: COMMERCIAL

## 2019-09-27 VITALS
HEART RATE: 70 BPM | SYSTOLIC BLOOD PRESSURE: 124 MMHG | BODY MASS INDEX: 27.31 KG/M2 | DIASTOLIC BLOOD PRESSURE: 82 MMHG | OXYGEN SATURATION: 98 % | TEMPERATURE: 98.6 F | HEIGHT: 67 IN | WEIGHT: 174 LBS | RESPIRATION RATE: 18 BRPM

## 2019-09-27 DIAGNOSIS — C22.0 HEPATOCELLULAR CARCINOMA (HCC): Primary | ICD-10-CM

## 2019-09-27 PROCEDURE — 99214 OFFICE O/P EST MOD 30 MIN: CPT | Performed by: INTERNAL MEDICINE

## 2019-09-27 NOTE — PROGRESS NOTES
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    He has history of chronic hepatitis C as well as alcohol abuse  Marie Bill has liver cirrhosis   He has multifocal liver lesion with low level of AFP    This was histologically confirmed to be well-differentiated hepatocellular carcinoma   He started sorafenib in October 2018   Radiographically as well as biochemically, he has partial response  Clinically, as well as radiographically, he has no evidence of progressive disease  I recommended him to continue with sorafenib  He is in agreement with my recommendation  I will see him again in 4 months with CBC, CMP and AFP                                                                                             Subjective:      HPI:             Interval History:  A 62 year old gentleman with 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 diagnosed in November 2011  He underwent concurrent chemoradiation with high dose cisplatin resulting in complete response  He has chronic hepatitis C, as well as liver cirrhosis, based on a CT scan of abdomen and pelvis  In October 2014, he was found to have multifocal liver lesions, based on the CT scan  This was confirmed by MRI of the abdomen  However, the PET/CT scan showed the lesion was not hypermetabolic   He declined liver biopsy    Radiographically, this was consistent with multifocal hepatocellular carcinoma   He has been under the care of Larry Law summer of 2018, CT scan showed progression   He was suggested to have Sir sphere which he declined  Georgi Paredes, he accepted sorafenib, which he started in October 2018  He had partial response on sorafenib, based on CT scan as well as AFP decline  He presents today for routine follow-up  He continued to do well  He has no complaint of pain  He has stable weight  He denied diarrhea  His hand-foot reaction is minimal   His performance status is normal                   Objective:      Primary Diagnosis:     1  Squamous cell carcinoma of oropharynx, stage SHAINA disease, diagnosed in November 2011  2 Multifocal liver lesion, clinically clinically consistent with hepatocellular carcinoma      Cancer Staging:  Cancer Staging  No matching staging information was found for the patient         Previous Hematologic/ Oncologic Treatment:      Concurrent chemoradiation with high-dose cisplatin completed on February 13, 2012       Current Hematologic/ Oncologic Treatment:       Sorafenib of since October 2018         Disease Status:      Partial response as well as biochemical response      Test Results:     Pathology:      Liver biopsy showed well-differentiated hepatocellular carcinoma      Radiology:     CT scan of chest abdomen pelvis in September 2019 shows stable arterial enhancement in liver  No new metastatic disease       Laboratory:      AFP is 6 6 in   September 2019, see below otherwise        Physical Exam:        General Appearance:    Alert, oriented          Eyes:    PERRL   Ears:    Normal external ear canals, both ears   Nose:   Nares normal, septum midline   Throat:   Mucosa moist  Pharynx without injection  Neck:   Supple         Lungs:     Clear to auscultation bilaterally   Chest Wall:    No tenderness or deformity    Heart:    Regular rate and rhythm         Abdomen:     Soft, non-tender, bowel sounds +, hepatomegaly   No palpable spleen                Extremities:   Extremities no cyanosis or edema         Skin:   no rash or icterus      Lymph nodes:   Cervical, supraclavicular, and axillary nodes normal Neurologic:   CNII-XII intact, normal strength, sensation and reflexes     Throughout             Breast exam:   Not applicable  ROS: Review of Systems   All other systems reviewed and are negative  Imaging: Ct Chest Abdomen Pelvis W Contrast    Result Date: 9/20/2019  Narrative: CT CHEST, ABDOMEN AND PELVIS WITH IV CONTRAST INDICATION:   C22 0: Liver cell carcinoma  COMPARISON:  2/14/2019, 10/4/2018 TECHNIQUE: CT examination of the chest, abdomen and pelvis was performed  Axial, sagittal, and coronal 2D reformatted images were created from the source data and submitted for interpretation  Radiation dose length product (DLP) for this visit:  329 3803 mGy-cm   This examination, like all CT scans performed in the [facility], was performed utilizing techniques to minimize radiation dose exposure, including the use of iterative reconstruction and automated exposure control  IV Contrast:  100 mL of iohexol (OMNIPAQUE) Enteric Contrast: Enteric contrast was administered  FINDINGS: CHEST LUNGS:  There is a stable 4 mm nodule in the lingula on series 5, image 61  There is a new groundglass nodule in the left apex measuring 3 mm on series 5, image 17  There is no tracheal or endobronchial lesion  PLEURA:  Unremarkable  HEART/GREAT VESSELS:  Unremarkable for patient's age  MEDIASTINUM AND ERINN:  Unremarkable  CHEST WALL AND LOWER NECK:   Unremarkable  ABDOMEN LIVER/BILIARY TREE:  Again seen is a cirrhotic liver with multiple lesions  The largest lesion is best seen on arterial phase images within segment 7 measuring 2 9 x 4 4 cm  This was less well visualized on the previous study where it measured approximately 2 8 x 4 1 cm, likely unchanged allowing for differences in measurement technique  There is mild washout   Multiple additional arterially enhancing lesions are seen including a 1 0 cm lesion at the dome on series 3, image 16, previously 1 cm,  a 6 mm lesion in segment 8/4a on image 20, previously 6 mm, an 8 mm nodule in segment 8 on image 36, previously 9 mm  GALLBLADDER:  No calcified gallstones  No pericholecystic inflammatory change  SPLEEN:  There is splenomegaly  PANCREAS:  There is a 1 1 x 1 5 cm pancreatic neck cyst, previously 1 0 x 1 5 cm  ADRENAL GLANDS:  Unremarkable  KIDNEYS/URETERS:  There is a small right renal cyst  There are small nonobstructing left renal calculi  No hydronephrosis  STOMACH AND BOWEL:  Unremarkable  APPENDIX:  No findings to suggest appendicitis  ABDOMINOPELVIC CAVITY:  No ascites or free intraperitoneal air  There is stable daniel hepatis lymphadenopathy related to liver disease  VESSELS:  Unremarkable for patient's age  PELVIS REPRODUCTIVE ORGANS:  Unremarkable for patient's age  URINARY BLADDER:  Unremarkable  ABDOMINAL WALL/INGUINAL REGIONS:  Unremarkable  OSSEOUS STRUCTURES:  No acute fracture or destructive osseous lesion  Impression: 1  Stable arterially enhancing liver lesion measuring approximately 4 cm with washout  Based on the Liver-Imaging-Reporting and Data System lexicon version 2018, this is a LI-RADS 4 (probably [address] ) lesion  Additional small arterially enhancing lesions without additional suspicious features are also stable and consistent with LIRADS 3 lesions  2   New small groundglass nodule in the left apex, nonspecific  Three-month follow-up chest CT recommended to exclude early metastasis  Additional lingular lung nodule is stable  3   No evidence of metastatic disease in the abdomen or pelvis  4   Cirrhosis with splenomegaly and daniel hepatis lymphadenopathy  5   Stable pancreatic cyst  The study was marked in EPIC for significant notification   Workstation performed: [id number]         Labs:   Lab Results   Component Value Date    WBC 4 34 09/13/2019    HGB 13 3 09/13/2019    HCT 40 0 09/13/2019    MCV 95 09/13/2019     (L) 09/13/2019     Lab Results   Component Value Date     12/09/2015    K 4 1 09/13/2019     09/13/2019    CO2 31 09/13/2019    ANIONGAP 5 12/09/2015    BUN 8 09/13/2019    CREATININE 1 01 09/13/2019    GLUCOSE 267 (H) 12/09/2015    GLUF 226 (H) 11/12/2018    CALCIUM 9 3 09/13/2019    AST 31 09/13/2019    ALT 23 09/13/2019    ALKPHOS 73 09/13/2019    PROT 7 4 12/09/2015    BILITOT 0 97 12/09/2015    EGFR 78 09/13/2019         Lab Results   Component Value Date    PSA 0 4 01/18/2018    PSA 0 4 09/29/2016    PSA 0 5 09/08/2015         Current Medications: Reviewed  Allergies: Reviewed  PMH/FH/SH:  Reviewed      Vital Sign:    Body surface area is 1 91 meters squared      Wt Readings from Last 3 Encounters:   09/27/19 78 9 kg (174 lb)   05/29/19 77 4 kg (170 lb 9 6 oz)   05/24/19 79 kg (174 lb 3 2 oz)        Temp Readings from Last 3 Encounters:   09/27/19 98 6 °F (37 °C) (Tympanic Core)   05/29/19 (!) 97 3 °F (36 3 °C) (Tympanic)   05/24/19 97 6 °F (36 4 °C) (Tympanic Core)        BP Readings from Last 3 Encounters:   09/27/19 124/82   05/29/19 152/97   05/24/19 122/70         Pulse Readings from Last 3 Encounters:   09/27/19 70   05/29/19 67   05/24/19 75     @LASTSAO2(3)@

## 2019-10-10 DIAGNOSIS — N52.9 ERECTILE DYSFUNCTION, UNSPECIFIED ERECTILE DYSFUNCTION TYPE: ICD-10-CM

## 2019-10-10 RX ORDER — SILDENAFIL 50 MG/1
TABLET, FILM COATED ORAL
Qty: 9 TABLET | Refills: 0 | Status: SHIPPED | OUTPATIENT
Start: 2019-10-10 | End: 2019-11-15 | Stop reason: SDUPTHER

## 2019-11-15 ENCOUNTER — OFFICE VISIT (OUTPATIENT)
Dept: FAMILY MEDICINE CLINIC | Facility: CLINIC | Age: 64
End: 2019-11-15

## 2019-11-15 VITALS
SYSTOLIC BLOOD PRESSURE: 146 MMHG | HEART RATE: 86 BPM | RESPIRATION RATE: 16 BRPM | DIASTOLIC BLOOD PRESSURE: 86 MMHG | BODY MASS INDEX: 27.41 KG/M2 | TEMPERATURE: 98 F | WEIGHT: 175 LBS | OXYGEN SATURATION: 98 %

## 2019-11-15 DIAGNOSIS — E11.9 TYPE 2 DIABETES MELLITUS WITHOUT COMPLICATION, WITHOUT LONG-TERM CURRENT USE OF INSULIN (HCC): ICD-10-CM

## 2019-11-15 DIAGNOSIS — I10 ESSENTIAL HYPERTENSION, BENIGN: ICD-10-CM

## 2019-11-15 DIAGNOSIS — C22.0 HEPATOCELLULAR CARCINOMA (HCC): ICD-10-CM

## 2019-11-15 DIAGNOSIS — Z12.5 SCREENING FOR PROSTATE CANCER: ICD-10-CM

## 2019-11-15 DIAGNOSIS — K70.30 ALCOHOLIC CIRRHOSIS OF LIVER WITHOUT ASCITES (HCC): ICD-10-CM

## 2019-11-15 DIAGNOSIS — I10 ESSENTIAL HYPERTENSION: Primary | ICD-10-CM

## 2019-11-15 DIAGNOSIS — E03.9 HYPOTHYROIDISM, UNSPECIFIED TYPE: ICD-10-CM

## 2019-11-15 DIAGNOSIS — IMO0002 UNCONTROLLED TYPE 2 DIABETES MELLITUS WITH COMPLICATION, WITHOUT LONG-TERM CURRENT USE OF INSULIN: ICD-10-CM

## 2019-11-15 DIAGNOSIS — N52.9 ERECTILE DYSFUNCTION, UNSPECIFIED ERECTILE DYSFUNCTION TYPE: ICD-10-CM

## 2019-11-15 LAB — SL AMB POCT HEMOGLOBIN AIC: 5.7 (ref ?–6.5)

## 2019-11-15 PROCEDURE — 3044F HG A1C LEVEL LT 7.0%: CPT | Performed by: PHYSICIAN ASSISTANT

## 2019-11-15 PROCEDURE — 83036 HEMOGLOBIN GLYCOSYLATED A1C: CPT | Performed by: PHYSICIAN ASSISTANT

## 2019-11-15 PROCEDURE — 3725F SCREEN DEPRESSION PERFORMED: CPT | Performed by: PHYSICIAN ASSISTANT

## 2019-11-15 PROCEDURE — 99214 OFFICE O/P EST MOD 30 MIN: CPT | Performed by: PHYSICIAN ASSISTANT

## 2019-11-15 RX ORDER — LEVOTHYROXINE SODIUM 112 UG/1
112 TABLET ORAL DAILY
Qty: 90 TABLET | Refills: 1 | Status: SHIPPED | OUTPATIENT
Start: 2019-11-15 | End: 2020-05-19 | Stop reason: SDUPTHER

## 2019-11-15 RX ORDER — SILDENAFIL 50 MG/1
50 TABLET, FILM COATED ORAL AS NEEDED
Qty: 9 TABLET | Refills: 5 | Status: SHIPPED | OUTPATIENT
Start: 2019-11-15 | End: 2020-03-12 | Stop reason: SDUPTHER

## 2019-11-15 RX ORDER — LISINOPRIL AND HYDROCHLOROTHIAZIDE 25; 20 MG/1; MG/1
1 TABLET ORAL DAILY
Qty: 90 TABLET | Refills: 1 | Status: SHIPPED | OUTPATIENT
Start: 2019-11-15 | End: 2020-05-19 | Stop reason: SDUPTHER

## 2019-11-15 RX ORDER — GLIMEPIRIDE 4 MG/1
4 TABLET ORAL DAILY
Qty: 90 TABLET | Refills: 1 | Status: SHIPPED | OUTPATIENT
Start: 2019-11-15 | End: 2020-05-19 | Stop reason: SDUPTHER

## 2019-11-15 NOTE — ASSESSMENT & PLAN NOTE
Lab Results   Component Value Date    HGBA1C 6 2 09/05/2018    A1c today was at 5 7  Will continue with glimepiride, however if it does appear that he is having any hypoglycemia symptoms, contact the office will make adjustments to the medication  Continue monitoring blood sugar

## 2019-11-15 NOTE — PROGRESS NOTES
Assessment/Plan:    Hypertension  Stable  Continue with lisinopril-HCTZ 20-25 mg daily  Hypothyroidism  Currently on levothyroxine 112 mcg daily  Denies any symptoms  Will get TSH checked  Diabetes mellitus, type II ([facility] )    Lab Results   Component Value Date    HGBA1C 6 2 09/05/2018    A1c today was at 5 7  Will continue with glimepiride, however if it does appear that he is having any hypoglycemia symptoms, contact the office will make adjustments to the medication  Continue monitoring blood sugar  Continue follow-up with heme/Onc and GI for hepatocellular carcinoma  BMI Counseling: Body mass index is 27 41 kg/m²  The BMI is above normal  Nutrition recommendations include reducing portion sizes, 3-5 servings of fruits/vegetables daily, moderation in carbohydrate intake, increasing intake of lean protein, reducing intake of saturated fat and trans fat and reducing intake of cholesterol  Exercise recommendations include moderate aerobic physical activity for 150 minutes/week and exercising 3-5 times per week  Diagnoses and all orders for this visit:    Essential hypertension  -     CBC and differential; Future  -     Lipid panel; Future  -     Microalbumin / creatinine urine ratio; Future    Hypothyroidism, unspecified type  -     TSH, 3rd generation with Free T4 reflex; Future  -     levothyroxine 112 mcg tablet; Take 1 tablet (112 mcg total) by mouth daily    Screening for prostate cancer  -     PSA Total, Diagnostic; Future    Uncontrolled type 2 diabetes mellitus with complication, without long-term current use of insulin (HCC)  -     glimepiride (AMARYL) 4 mg tablet; Take 1 tablet (4 mg total) by mouth daily    Essential hypertension, benign  -     lisinopril-hydrochlorothiazide (PRINZIDE,ZESTORETIC) 20-25 MG per tablet; Take 1 tablet by mouth daily    Erectile dysfunction, unspecified erectile dysfunction type  -     sildenafil (VIAGRA) 50 MG tablet;  Take 1 tablet (50 mg total) by mouth as needed for erectile dysfunction    Alcoholic cirrhosis of liver without ascites ([address] )    Hepatocellular carcinoma (HCC)    Type 2 diabetes mellitus without complication, without long-term current use of insulin (HCC)          Subjective:      Patient ID: Darren Roberts is a 59 y o  male  70-year-old male presenting for routine follow-up of hypertension and diabetes  Patient is currently on glimepiride 4 mg daily for his diabetes  He is on lisinopril-HCTZ 20-25 mg daily for his blood pressure  Patient states that he has not taken his medications this morning  Does check his blood sugar on a routine basis  States that typically fasting his blood sugar is elevated, but after taking his medication becomes within normal range  Denies any headaches, dizziness, chest pain, palpitations, shortness of breath, abdominal pain, numbness tingling hands or feet  Patient is currently following up with heme Onc and GI for hepatocellular carcinoma  He is currently on Nexavar it has not had any complications  Has appointment with GI in December, and heme Onc in January  Is scheduled for lab work in January  The following portions of the patient's history were reviewed and updated as appropriate:   He  has a past medical history of Cardiac disorder, Chronic hepatitis C virus infection ([address] ), Diabetes mellitus ([address] ), Dysphagia, Esophageal varices ([address] ), Hepatic disease, Hepatitis, History of chemotherapy, History of radiation therapy, Hypertension, Laryngeal cancer ([address] ), Liver cancer ([address] ), 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[facility] ), Recurrent hepatitis C ([facility] ), and Rheumatic fever    He   Patient Active Problem List    Diagnosis Date Noted    Secondary esophageal varices without bleeding ([facility] ) 11/14/2018    Diabetes mellitus, type II ([address] ) 08/14/2018    H/O laryngeal cancer 08/14/2018    Hypothyroidism 08/14/2018    Erectile dysfunction 05/23/2017    Hepatocellular carcinoma ([address] ) 10/31/2014    Hepatic cirrhosis [facility] ) 09/03/2014    Herpes simplex infection 11/14/2013    Hypertension 09/05/2012     He  has a past surgical history that includes Liver biopsy; Exploratory laparotomy; Stomach surgery; Incisional hernia repair; Thoracotomy; Upper gastrointestinal endoscopy; Colonoscopy; and CT needle biopsy liver (10/4/2018)  His family history includes Diabetes type II in his mother; Hypertension in his mother; Ovarian cancer in his paternal grandmother  He  reports that he quit smoking about 19 years ago  He has a 60 00 pack-year smoking history  He has never used smokeless tobacco  He reports that he does not drink alcohol or use drugs  Current Outpatient Medications   Medication Sig Dispense Refill    glimepiride (AMARYL) 4 mg tablet Take 1 tablet (4 mg total) by mouth daily 90 tablet 1    levothyroxine 112 mcg tablet Take 1 tablet (112 mcg total) by mouth daily 90 tablet 1    lisinopril-hydrochlorothiazide (PRINZIDE,ZESTORETIC) 20-25 MG per tablet Take 1 tablet by mouth daily 90 tablet 1    nadolol (CORGARD) 40 mg tablet Take 1 tablet (40 mg total) by mouth daily 90 tablet 3    NEXAVAR 200 MG tablet Take 2 tablets (400 mg total) by mouth 2 times a day  120 tablet 11    sildenafil (VIAGRA) 50 MG tablet Take 1 tablet (50 mg total) by mouth as needed for erectile dysfunction 9 tablet 5     No current facility-administered medications for this visit  He has No Known Allergies       Review of Systems   Constitutional: Negative for activity change, appetite change, chills, diaphoresis, fatigue, fever and unexpected weight change  Eyes: Negative for pain and visual disturbance  Respiratory: Negative for cough, chest tightness and shortness of breath  Cardiovascular: Negative for chest pain, palpitations and leg swelling  Gastrointestinal: Negative for abdominal pain, blood in stool, constipation, diarrhea, nausea and vomiting  Endocrine: Negative for polydipsia, polyphagia and polyuria     Genitourinary: Negative for frequency, hematuria and urgency  Skin: Negative for rash and wound  Neurological: Negative for dizziness, syncope, speech difficulty, weakness, numbness and headaches  Psychiatric/Behavioral: Negative for dysphoric mood and sleep disturbance  The patient is not nervous/anxious  Objective:      /86 (BP Location: Right arm, Patient Position: Sitting, Cuff Size: Standard)   Pulse 86   Temp 98 °F (36 7 °C) (Temporal)   Resp 16   Wt 79 4 kg (175 lb)   SpO2 98%   BMI 27 41 kg/m²          Physical Exam   Constitutional: He is oriented to person, place, and time  He appears well-developed and well-nourished  No distress  Eyes: Pupils are equal, round, and reactive to light  Conjunctivae and EOM are normal    Neck: Normal range of motion  Neck supple  No JVD present  No thyromegaly present  Cardiovascular: Normal rate, regular rhythm and normal heart sounds  Exam reveals no gallop and no friction rub  Pulses are no weak pulses  No murmur heard  Pulses:       Dorsalis pedis pulses are 2+ on the right side, and 2+ on the left side  Posterior tibial pulses are 2+ on the right side, and 2+ on the left side  Pulmonary/Chest: Effort normal and breath sounds normal  No respiratory distress  He has no wheezes  He has no rales  Abdominal: Soft  Bowel sounds are normal  He exhibits no distension  There is no tenderness  There is no rebound and no guarding  Musculoskeletal: Normal range of motion  He exhibits no edema  Feet:   Right Foot:   Skin Integrity: Negative for ulcer, skin breakdown, erythema, warmth, callus or dry skin  Left Foot:   Skin Integrity: Negative for ulcer, skin breakdown, erythema, warmth, callus or dry skin  Lymphadenopathy:     He has no cervical adenopathy  Neurological: He is alert and oriented to person, place, and time  He displays normal reflexes  No cranial nerve deficit  He exhibits normal muscle tone   Coordination normal    Skin: He is not diaphoretic  Psychiatric: He has a normal mood and affect  His behavior is normal  Thought content normal    Nursing note and vitals reviewed  Patient's shoes and socks removed  Right Foot/Ankle   Right Foot Inspection  Skin Exam: skin normal and skin intact no dry skin, no warmth, no callus, no erythema, no maceration, no abnormal color, no pre-ulcer, no ulcer and no callus                          Toe Exam: ROM and strength within normal limitsno tenderness, erythema and  no right toe deformity  Sensory     Proprioception: intact   Monofilament testing: intact  Vascular  Capillary refills: < 3 seconds  The right DP pulse is 2+  The right PT pulse is 2+  Left Foot/Ankle  Left Foot Inspection  Skin Exam: skin normal and skin intactno dry skin, no warmth, no erythema, no maceration, normal color, no pre-ulcer, no ulcer and no callus                         Toe Exam: ROM and strength within normal limitsno tenderness, no erythema and no left toe deformity                   Sensory     Proprioception: intact  Monofilament: intact  Vascular  Capillary refills: < 3 seconds  The left DP pulse is 2+  The left PT pulse is 2+  Assign Risk Category:  No deformity present; No loss of protective sensation;  No weak pulses       Risk: 0

## 2019-11-21 ENCOUNTER — OFFICE VISIT (OUTPATIENT)
Dept: FAMILY MEDICINE CLINIC | Facility: CLINIC | Age: 64
End: 2019-11-21

## 2019-11-21 VITALS
DIASTOLIC BLOOD PRESSURE: 82 MMHG | WEIGHT: 174 LBS | TEMPERATURE: 97.2 F | HEIGHT: 67 IN | SYSTOLIC BLOOD PRESSURE: 128 MMHG | OXYGEN SATURATION: 99 % | BODY MASS INDEX: 27.31 KG/M2 | HEART RATE: 69 BPM | RESPIRATION RATE: 18 BRPM

## 2019-11-21 DIAGNOSIS — H61.22 HEARING LOSS OF LEFT EAR DUE TO CERUMEN IMPACTION: Primary | ICD-10-CM

## 2019-11-21 PROCEDURE — 1036F TOBACCO NON-USER: CPT | Performed by: PHYSICIAN ASSISTANT

## 2019-11-21 PROCEDURE — 99051 MED SERV EVE/WKEND/HOLIDAY: CPT | Performed by: PHYSICIAN ASSISTANT

## 2019-11-21 PROCEDURE — 99214 OFFICE O/P EST MOD 30 MIN: CPT | Performed by: PHYSICIAN ASSISTANT

## 2019-11-21 PROCEDURE — 69210 REMOVE IMPACTED EAR WAX UNI: CPT | Performed by: PHYSICIAN ASSISTANT

## 2019-11-21 PROCEDURE — 3008F BODY MASS INDEX DOCD: CPT | Performed by: PHYSICIAN ASSISTANT

## 2019-11-22 NOTE — PROGRESS NOTES
Assessment/Plan:    Irrigation and forceps were used to remove cerumen from the ear  Would advise against using Q-tips  If [place] hearing protection in the casino, would recommend also using hydrogen peroxide or over-the-counter Debrox to help minimize cerumen collection in the ear  Diagnoses and all orders for this visit:    Hearing loss of left ear due to cerumen impaction    Other orders  -     Ear cerumen removal          Subjective:      Patient ID: Adrienne Fothergill is a 59 y o  male  57-year-old male presenting with hearing loss in left ear pain x2 days  Patient states that he had a similar problem 1 year ago and had a cerumen impaction in his left ear  Followed up with ENT and had the cerumen removed  Denies any fevers, chills, nasal congestion, rhinorrhea, sore throat  Patient states that he does stick cotton in his ear when he goes to casino  Occasionally uses Q-tips  The following portions of the patient's history were reviewed and updated as appropriate:   He  has a past medical history of Cardiac disorder, Chronic hepatitis C virus infection ([address] ), Diabetes mellitus ([address] ), Dysphagia, Esophageal varices ([address] ), Hepatic disease, Hepatitis, History of chemotherapy, History of radiation therapy, Hypertension, Laryngeal cancer ([address] ), Liver cancer ([address] ), 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[address] ), Recurrent hepatitis C ([address] ), and Rheumatic fever  He   Patient Active Problem List    Diagnosis Date Noted    Secondary esophageal varices without bleeding ([facility] ) 11/14/2018    Diabetes mellitus, type II ([address] ) 08/14/2018    H/O laryngeal cancer 08/14/2018    Hypothyroidism 08/14/2018    Erectile dysfunction 05/23/2017    Hepatocellular carcinoma ([address] ) 10/31/2014    Hepatic cirrhosis ([facility] ) 09/03/2014    Herpes simplex infection 11/14/2013    Hypertension 09/05/2012     He  has a past surgical history that includes Liver biopsy; Exploratory laparotomy; Stomach surgery;  Incisional hernia repair; Thoracotomy; Upper gastrointestinal endoscopy; Colonoscopy; and CT needle biopsy liver (10/4/2018)  His family history includes Diabetes type II in his mother; Hypertension in his mother; Ovarian cancer in his paternal grandmother  He  reports that he quit smoking about 19 years ago  He has a 60 00 pack-year smoking history  He has never used smokeless tobacco  He reports that he does not drink alcohol or use drugs  Current Outpatient Medications   Medication Sig Dispense Refill    glimepiride (AMARYL) 4 mg tablet Take 1 tablet (4 mg total) by mouth daily 90 tablet 1    levothyroxine 112 mcg tablet Take 1 tablet (112 mcg total) by mouth daily 90 tablet 1    lisinopril-hydrochlorothiazide (PRINZIDE,ZESTORETIC) 20-25 MG per tablet Take 1 tablet by mouth daily 90 tablet 1    nadolol (CORGARD) 40 mg tablet Take 1 tablet (40 mg total) by mouth daily 90 tablet 3    NEXAVAR 200 MG tablet Take 2 tablets (400 mg total) by mouth 2 times a day  120 tablet 11    sildenafil (VIAGRA) 50 MG tablet Take 1 tablet (50 mg total) by mouth as needed for erectile dysfunction 9 tablet 5     No current facility-administered medications for this visit  He has No Known Allergies       Review of Systems   Constitutional: Negative for chills and fever  HENT: Positive for ear pain and hearing loss  Negative for congestion, ear discharge, postnasal drip, rhinorrhea and sore throat  Eyes: Negative for visual disturbance  Respiratory: Negative for cough, chest tightness, shortness of breath and wheezing  Neurological: Negative for dizziness, light-headedness and headaches  Objective:      /82 (BP Location: Left arm, Patient Position: Sitting, Cuff Size: Standard)   Pulse 69   Temp (!) 97 2 °F (36 2 °C) (Temporal)   Resp 18   Ht 5' 7" (1 702 m)   Wt 78 9 kg (174 lb)   SpO2 99%   BMI 27 25 kg/m²          Physical Exam   Constitutional: He appears well-developed and well-nourished     HENT:   Right Ear: Tympanic membrane, external ear and ear canal normal    Left Ear: External ear normal    Nose: Nose normal    Mouth/Throat: Oropharynx is clear and moist and mucous membranes are normal    Cerumen impaction left ear   Neck: Normal range of motion  Neck supple  Lymphadenopathy:     He has no cervical adenopathy  Ear cerumen removal  Date/Time: 11/21/2019 7:05 PM  Performed by: Dianna Novoa PA-C  Authorized by: Dianna Novoa PA-C     Patient location:  Clinic  Indications / Diagnosis:  Left ear cerumen impaction  Consent:     Consent obtained:  Verbal    Consent given by:  Patient    Risks discussed:  Bleeding, infection, pain, incomplete removal, TM perforation and dizziness    Alternatives discussed:  No treatment and referral  Universal protocol:     Procedure explained and questions answered to patient or proxy's satisfaction: yes      Patient identity confirmed:  Verbally with patient  Procedure details:     Local anesthetic:  None    Location:  L ear    Procedure type: irrigation with insturmentation      Insturmentation: forceps      Approach:  External  Post-procedure details:     Complication:  None    Hearing quality:  Improved    Patient tolerance of procedure:   Tolerated well, no immediate complications

## 2019-12-24 ENCOUNTER — OFFICE VISIT (OUTPATIENT)
Dept: GASTROENTEROLOGY | Facility: CLINIC | Age: 64
End: 2019-12-24
Payer: COMMERCIAL

## 2019-12-24 VITALS
WEIGHT: 172 LBS | HEIGHT: 67 IN | TEMPERATURE: 97.5 F | HEART RATE: 70 BPM | DIASTOLIC BLOOD PRESSURE: 80 MMHG | BODY MASS INDEX: 27 KG/M2 | SYSTOLIC BLOOD PRESSURE: 118 MMHG

## 2019-12-24 DIAGNOSIS — I85.10 SECONDARY ESOPHAGEAL VARICES WITHOUT BLEEDING (HCC): ICD-10-CM

## 2019-12-24 DIAGNOSIS — C22.0 HEPATOCELLULAR CARCINOMA (HCC): ICD-10-CM

## 2019-12-24 DIAGNOSIS — K70.30 ALCOHOLIC CIRRHOSIS OF LIVER WITHOUT ASCITES (HCC): Primary | ICD-10-CM

## 2019-12-24 PROCEDURE — 99214 OFFICE O/P EST MOD 30 MIN: CPT | Performed by: INTERNAL MEDICINE
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
## 2020-01-20 ENCOUNTER — APPOINTMENT (OUTPATIENT)
Dept: LAB | Facility: HOSPITAL | Age: 65
End: 2020-01-20
Attending: INTERNAL MEDICINE
Payer: COMMERCIAL

## 2020-01-20 DIAGNOSIS — E03.9 HYPOTHYROIDISM, UNSPECIFIED TYPE: ICD-10-CM

## 2020-01-20 DIAGNOSIS — Z12.5 SCREENING FOR PROSTATE CANCER: ICD-10-CM

## 2020-01-20 DIAGNOSIS — I10 ESSENTIAL HYPERTENSION: ICD-10-CM

## 2020-01-20 DIAGNOSIS — C22.0 HEPATOCELLULAR CARCINOMA (HCC): ICD-10-CM

## 2020-01-20 LAB
AFP-TM SERPL-MCNC: 9.9 NG/ML (ref 0.5–8)
ALBUMIN SERPL BCP-MCNC: 4 G/DL (ref 3.5–5)
ALP SERPL-CCNC: 75 U/L (ref 46–116)
ALT SERPL W P-5'-P-CCNC: 41 U/L (ref 12–78)
ANION GAP SERPL CALCULATED.3IONS-SCNC: 7 MMOL/L (ref 4–13)
AST SERPL W P-5'-P-CCNC: 36 U/L (ref 5–45)
BASOPHILS # BLD AUTO: 0.04 THOUSANDS/ΜL (ref 0–0.1)
BASOPHILS NFR BLD AUTO: 1 % (ref 0–1)
BILIRUB SERPL-MCNC: 1.33 MG/DL (ref 0.2–1)
BUN SERPL-MCNC: 12 MG/DL (ref 5–25)
CALCIUM SERPL-MCNC: 9.1 MG/DL (ref 8.3–10.1)
CHLORIDE SERPL-SCNC: 100 MMOL/L (ref 100–108)
CHOLEST SERPL-MCNC: 153 MG/DL (ref 50–200)
CO2 SERPL-SCNC: 31 MMOL/L (ref 21–32)
CREAT SERPL-MCNC: 0.91 MG/DL (ref 0.6–1.3)
CREAT UR-MCNC: 221 MG/DL
EOSINOPHIL # BLD AUTO: 0.16 THOUSAND/ΜL (ref 0–0.61)
EOSINOPHIL NFR BLD AUTO: 3 % (ref 0–6)
ERYTHROCYTE [DISTWIDTH] IN BLOOD BY AUTOMATED COUNT: 13.8 % (ref 11.6–15.1)
GFR SERPL CREATININE-BSD FRML MDRD: 89 ML/MIN/1.73SQ M
GLUCOSE P FAST SERPL-MCNC: 150 MG/DL (ref 65–99)
HCT VFR BLD AUTO: 45.1 % (ref 36.5–49.3)
HDLC SERPL-MCNC: 51 MG/DL
HGB BLD-MCNC: 14.9 G/DL (ref 12–17)
IMM GRANULOCYTES # BLD AUTO: 0.03 THOUSAND/UL (ref 0–0.2)
IMM GRANULOCYTES NFR BLD AUTO: 1 % (ref 0–2)
LDLC SERPL CALC-MCNC: 86 MG/DL (ref 0–100)
LYMPHOCYTES # BLD AUTO: 0.75 THOUSANDS/ΜL (ref 0.6–4.47)
LYMPHOCYTES NFR BLD AUTO: 13 % (ref 14–44)
MCH RBC QN AUTO: 30.8 PG (ref 26.8–34.3)
MCHC RBC AUTO-ENTMCNC: 33 G/DL (ref 31.4–37.4)
MCV RBC AUTO: 93 FL (ref 82–98)
MICROALBUMIN UR-MCNC: 32.7 MG/L (ref 0–20)
MICROALBUMIN/CREAT 24H UR: 15 MG/G CREATININE (ref 0–30)
MONOCYTES # BLD AUTO: 0.63 THOUSAND/ΜL (ref 0.17–1.22)
MONOCYTES NFR BLD AUTO: 11 % (ref 4–12)
NEUTROPHILS # BLD AUTO: 4.1 THOUSANDS/ΜL (ref 1.85–7.62)
NEUTS SEG NFR BLD AUTO: 71 % (ref 43–75)
NONHDLC SERPL-MCNC: 102 MG/DL
NRBC BLD AUTO-RTO: 0 /100 WBCS
PLATELET # BLD AUTO: 141 THOUSANDS/UL (ref 149–390)
PMV BLD AUTO: 11.3 FL (ref 8.9–12.7)
POTASSIUM SERPL-SCNC: 4.8 MMOL/L (ref 3.5–5.3)
PROT SERPL-MCNC: 8.7 G/DL (ref 6.4–8.2)
PSA SERPL-MCNC: 0.6 NG/ML (ref 0–4)
RBC # BLD AUTO: 4.83 MILLION/UL (ref 3.88–5.62)
SODIUM SERPL-SCNC: 138 MMOL/L (ref 136–145)
T4 FREE SERPL-MCNC: 1.34 NG/DL (ref 0.76–1.46)
TRIGL SERPL-MCNC: 79 MG/DL
TSH SERPL DL<=0.05 MIU/L-ACNC: 5.88 UIU/ML (ref 0.36–3.74)
WBC # BLD AUTO: 5.71 THOUSAND/UL (ref 4.31–10.16)

## 2020-01-20 PROCEDURE — 80053 COMPREHEN METABOLIC PANEL: CPT

## 2020-01-20 PROCEDURE — 84443 ASSAY THYROID STIM HORMONE: CPT

## 2020-01-20 PROCEDURE — 84153 ASSAY OF PSA TOTAL: CPT

## 2020-01-20 PROCEDURE — 82043 UR ALBUMIN QUANTITATIVE: CPT

## 2020-01-20 PROCEDURE — 80061 LIPID PANEL: CPT

## 2020-01-20 PROCEDURE — 85025 COMPLETE CBC W/AUTO DIFF WBC: CPT

## 2020-01-20 PROCEDURE — 84439 ASSAY OF FREE THYROXINE: CPT

## 2020-01-20 PROCEDURE — 36415 COLL VENOUS BLD VENIPUNCTURE: CPT

## 2020-01-20 PROCEDURE — 82570 ASSAY OF URINE CREATININE: CPT

## 2020-01-20 PROCEDURE — 82105 ALPHA-FETOPROTEIN SERUM: CPT

## 2020-01-21 ENCOUNTER — TELEPHONE (OUTPATIENT)
Dept: FAMILY MEDICINE CLINIC | Facility: CLINIC | Age: 65
End: 2020-01-21

## 2020-01-27 ENCOUNTER — OFFICE VISIT (OUTPATIENT)
Dept: HEMATOLOGY ONCOLOGY | Facility: CLINIC | Age: 65
End: 2020-01-27
Payer: COMMERCIAL

## 2020-01-27 VITALS
RESPIRATION RATE: 18 BRPM | WEIGHT: 179 LBS | SYSTOLIC BLOOD PRESSURE: 128 MMHG | OXYGEN SATURATION: 96 % | DIASTOLIC BLOOD PRESSURE: 84 MMHG | HEART RATE: 81 BPM | BODY MASS INDEX: 28.09 KG/M2 | TEMPERATURE: 97.7 F | HEIGHT: 67 IN

## 2020-01-27 DIAGNOSIS — C22.0 HEPATOCELLULAR CARCINOMA (HCC): Primary | ICD-10-CM

## 2020-01-27 PROCEDURE — 99214 OFFICE O/P EST MOD 30 MIN: CPT | Performed by: INTERNAL MEDICINE

## 2020-01-27 NOTE — PROGRESS NOTES
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 treated by concurrent chemoradiation with high-dose cisplatin resulting in KANDACE in 2012  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    He has history of chronic hepatitis C as well as alcohol abuse  [facility] has liver cirrhosis   He has multifocal liver lesion with low level of AFP    This was histologically confirmed to be well-differentiated hepatocellular carcinoma   He started sorafenib in October 2018  He had radiographical as well as biochemical response  He is tolerating sorafenib very well with minimal toxicity  He has minor elevation of AFP recently  However, he is absolutely stable, clinically  Therefore, I recommended him to continue with sorafenib  I recommended him to have CT scan of chest abdomen pelvis, CBC, CMP and AFP in 3 months before his next visit  He is in agreement with my recommendations                           Subjective:      HPI:             Interval History:  A 62 year old gentleman with 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 diagnosed in November 2011  He underwent concurrent chemoradiation with high dose cisplatin resulting in complete response  He has chronic hepatitis C, as well as liver cirrhosis, based on a CT scan of abdomen and pelvis  In October 2014, he was found to have multifocal liver lesions, based on the CT scan  This was confirmed by MRI of the abdomen  However, the PET/CT scan showed the lesion was not hypermetabolic   He declined liver biopsy    Radiographically, this was consistent with multifocal hepatocellular carcinoma   He has been under the care of Catherine Gates summer of 2018, CT scan showed progression   He was suggested to have Siro sphere which he declined  Linh Wyman, he accepted sorafenib, which he started in October 2018   He had partial response on sorafenib, based on CT scan as well as AFP decline  Vishal Villarreal presents today for routine follow-up  He has absolutely no new complaint  He denied any pain  He has a few lb of interval weight gain  He has no respiratory symptoms  He has dryness in his hand  However, he does not have skin redness or peeling  He has no nausea, vomiting or diarrhea  His performance status is normal         Objective:      Primary Diagnosis:     1  Squamous cell carcinoma of oropharynx, stage SHAINA disease, diagnosed in November 2011  2 Multifocal liver lesion, clinically clinically consistent with hepatocellular carcinoma      Cancer Staging:  Cancer Staging  No matching staging information was found for the patient         Previous Hematologic/ Oncologic Treatment:      Concurrent chemoradiation with high-dose cisplatin completed on February 13, 2012       Current Hematologic/ Oncologic Treatment:       Sorafenib of since October 2018         Disease Status:      Partial response as well as biochemical response      Test Results:     Pathology:      Liver biopsy showed well-differentiated hepatocellular carcinoma      Radiology:     CT scan of chest abdomen pelvis in September 2019 shows stable arterial enhancement in liver  No new metastatic disease       Laboratory:      AFP is 9 0 in January 2020, see below otherwise        Physical Exam:        General Appearance:    Alert, oriented          Eyes:    PERRL   Ears:    Normal external ear canals, both ears   Nose:   Nares normal, septum midline   Throat:   Mucosa moist  Pharynx without injection      Neck:   Supple         Lungs:     Clear to auscultation bilaterally   Chest Wall:    No tenderness or deformity    Heart:    Regular rate and rhythm         Abdomen:     Soft, non-tender, bowel sounds +, hepatomegaly   No palpable spleen              Extremities:   Extremities no cyanosis or edema         Skin:   no rash or icterus  Lymph nodes:   Cervical, supraclavicular, and axillary nodes normal   Neurologic:   CNII-XII intact, normal strength, sensation and reflexes     Throughout             Breast exam:   Not applicable  ROS: Review of Systems   All other systems reviewed and are negative  Imaging: No results found  Labs:   Lab Results   Component Value Date    WBC 5 71 01/20/2020    HGB 14 9 01/20/2020    HCT 45 1 01/20/2020    MCV 93 01/20/2020     (L) 01/20/2020     Lab Results   Component Value Date     12/09/2015    K 4 8 01/20/2020     01/20/2020    CO2 31 01/20/2020    ANIONGAP 5 12/09/2015    BUN 12 01/20/2020    CREATININE 0 91 01/20/2020    GLUCOSE 267 (H) 12/09/2015    GLUF 150 (H) 01/20/2020    CALCIUM 9 1 01/20/2020    AST 36 01/20/2020    ALT 41 01/20/2020    ALKPHOS 75 01/20/2020    PROT 7 4 12/09/2015    BILITOT 0 97 12/09/2015    EGFR 89 01/20/2020         Lab Results   Component Value Date    PSA 0 6 01/20/2020    PSA 0 4 01/18/2018    PSA 0 4 09/29/2016         Current Medications: Reviewed  Allergies: Reviewed  PMH/FH/SH:  Reviewed      Vital Sign:    Body surface area is 1 93 meters squared      Wt Readings from Last 3 Encounters:   01/27/20 81 2 kg (179 lb)   12/24/19 78 kg (172 lb)   11/21/19 78 9 kg (174 lb)        Temp Readings from Last 3 Encounters:   01/27/20 97 7 °F (36 5 °C) (Tympanic Core)   12/24/19 97 5 °F (36 4 °C) (Tympanic)   11/21/19 (!) 97 2 °F (36 2 °C) (Temporal)        BP Readings from Last 3 Encounters:   01/27/20 128/84   12/24/19 118/80   11/21/19 128/82         Pulse Readings from Last 3 Encounters:   01/27/20 81   12/24/19 70   11/21/19 69     @LASTSAO2(3)@

## 2020-03-05 ENCOUNTER — TELEPHONE (OUTPATIENT)
Dept: FAMILY MEDICINE CLINIC | Facility: CLINIC | Age: 65
End: 2020-03-05

## 2020-03-12 ENCOUNTER — OFFICE VISIT (OUTPATIENT)
Dept: FAMILY MEDICINE CLINIC | Facility: CLINIC | Age: 65
End: 2020-03-12

## 2020-03-12 VITALS
DIASTOLIC BLOOD PRESSURE: 94 MMHG | SYSTOLIC BLOOD PRESSURE: 148 MMHG | WEIGHT: 179 LBS | TEMPERATURE: 97.1 F | BODY MASS INDEX: 28.09 KG/M2 | OXYGEN SATURATION: 99 % | HEIGHT: 67 IN | HEART RATE: 80 BPM | RESPIRATION RATE: 16 BRPM

## 2020-03-12 DIAGNOSIS — N52.9 ERECTILE DYSFUNCTION, UNSPECIFIED ERECTILE DYSFUNCTION TYPE: ICD-10-CM

## 2020-03-12 DIAGNOSIS — I10 ESSENTIAL HYPERTENSION: ICD-10-CM

## 2020-03-12 DIAGNOSIS — R30.0 DYSURIA: Primary | ICD-10-CM

## 2020-03-12 LAB
SL AMB  POCT GLUCOSE, UA: NEGATIVE
SL AMB LEUKOCYTE ESTERASE,UA: NEGATIVE
SL AMB POCT BILIRUBIN,UA: NEGATIVE
SL AMB POCT BLOOD,UA: ABNORMAL
SL AMB POCT CLARITY,UA: CLEAR
SL AMB POCT COLOR,UA: YELLOW
SL AMB POCT KETONES,UA: NEGATIVE
SL AMB POCT NITRITE,UA: NEGATIVE
SL AMB POCT PH,UA: 5
SL AMB POCT SPECIFIC GRAVITY,UA: 1.01
SL AMB POCT URINE PROTEIN: NEGATIVE
SL AMB POCT UROBILINOGEN: 0.2

## 2020-03-12 PROCEDURE — 99213 OFFICE O/P EST LOW 20 MIN: CPT | Performed by: PHYSICIAN ASSISTANT

## 2020-03-12 PROCEDURE — 87491 CHLMYD TRACH DNA AMP PROBE: CPT | Performed by: PHYSICIAN ASSISTANT

## 2020-03-12 PROCEDURE — 1036F TOBACCO NON-USER: CPT | Performed by: PHYSICIAN ASSISTANT

## 2020-03-12 PROCEDURE — 81002 URINALYSIS NONAUTO W/O SCOPE: CPT | Performed by: PHYSICIAN ASSISTANT

## 2020-03-12 PROCEDURE — 3060F POS MICROALBUMINURIA REV: CPT | Performed by: PHYSICIAN ASSISTANT

## 2020-03-12 PROCEDURE — 87086 URINE CULTURE/COLONY COUNT: CPT | Performed by: PHYSICIAN ASSISTANT

## 2020-03-12 PROCEDURE — 3061F NEG MICROALBUMINURIA REV: CPT | Performed by: PHYSICIAN ASSISTANT

## 2020-03-12 PROCEDURE — 99051 MED SERV EVE/WKEND/HOLIDAY: CPT | Performed by: PHYSICIAN ASSISTANT

## 2020-03-12 PROCEDURE — 3008F BODY MASS INDEX DOCD: CPT | Performed by: PHYSICIAN ASSISTANT

## 2020-03-12 PROCEDURE — 87591 N.GONORRHOEAE DNA AMP PROB: CPT | Performed by: PHYSICIAN ASSISTANT

## 2020-03-12 PROCEDURE — 3061F NEG MICROALBUMINURIA REV: CPT | Performed by: INTERNAL MEDICINE

## 2020-03-12 PROCEDURE — 3080F DIAST BP >= 90 MM HG: CPT | Performed by: PHYSICIAN ASSISTANT

## 2020-03-12 PROCEDURE — 3077F SYST BP >= 140 MM HG: CPT | Performed by: PHYSICIAN ASSISTANT

## 2020-03-12 RX ORDER — SILDENAFIL 100 MG/1
100 TABLET, FILM COATED ORAL AS NEEDED
Qty: 9 TABLET | Refills: 5 | Status: SHIPPED | OUTPATIENT
Start: 2020-03-12 | End: 2020-12-08 | Stop reason: SDUPTHER

## 2020-03-12 NOTE — PROGRESS NOTES
Assessment/Plan:    Informed patient that there was trace amount of blood in his urine  Due to this concern will send urine out for culture, and also check for GC and chlamydia  Would also recommend checking for syphilis  Did discuss other possibilities of hematuria to patient  Pain still could be due to trauma to the penis  Would recommend against any intercourse for the next week  Pending results of lab work, and if symptoms do not resolve, may refer to Urology for further evaluation  Blood pressure was normal at last 3 office visits  Did start to trend down words while in the office  At this time will continue with current dose of lisinopril-HCTZ  If it does appear that blood pressure is still elevated at next office visit, may have to make adjustments to medication  Diagnoses and all orders for this visit:    Dysuria  -     POCT urine dip  -     Urine culture  -     Chlamydia/GC amplified DNA by PCR  -     RPR; Future    Erectile dysfunction, unspecified erectile dysfunction type  -     sildenafil (VIAGRA) 100 mg tablet; Take 1 tablet (100 mg total) by mouth as needed for erectile dysfunction    Essential hypertension          Subjective:      Patient ID: Laura Hitchcock is a 59 y o  male  31-year-old male presenting with concerns of dysuria, and pain around the meatus of the penis  Patient states that he was trying to have intercourse about a week ago, took a Viagra 50 mg tablet, but was unable to achieve an erection, so took a 2nd tablet  States during intercourse that the condom broke  He did sustain a small abrasion on the left side of the penis  States that the dysuria at did not start until about 2 days ago  Only occurs when he initiates voiding  Has not noticed any abnormal discharge, hematuria, flank pain, abdominal pain         The following portions of the patient's history were reviewed and updated as appropriate:   He  has a past medical history of Cardiac disorder, Chronic hepatitis C virus infection ([place] [address] ), Diabetes mellitus ([place] [address] ), Dysphagia, Esophageal varices ([place] [address] ), Hepatic disease, Hepatitis, History of chemotherapy, History of radiation therapy, Hypertension, Laryngeal cancer ([place] [address] ), Liver cancer ([place] [address] ), 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[place] [address] ), Recurrent hepatitis C ([place] [address] ), and Rheumatic fever  He   Patient Active Problem List    Diagnosis Date Noted    Secondary esophageal varices without bleeding ([place] [address] ) 11/14/2018    Diabetes mellitus, type II ([place] [address] ) 08/14/2018    H/O laryngeal cancer 08/14/2018    Hypothyroidism 08/14/2018    Erectile dysfunction 05/23/2017    Hepatocellular carcinoma ([place] [address] ) 10/31/2014    Hepatic cirrhosis ([place] [address] ) 09/03/2014    Herpes simplex infection 11/14/2013    Hypertension 09/05/2012     He  has a past surgical history that includes Liver biopsy; Exploratory laparotomy; Stomach surgery; Incisional hernia repair; Thoracotomy; Upper gastrointestinal endoscopy; Colonoscopy; and CT needle biopsy liver (10/4/2018)  His family history includes Diabetes type II in his mother; Hypertension in his mother; Ovarian cancer in his paternal grandmother  He  reports that he quit smoking about 19 years ago  He has a 60 00 pack-year smoking history  He has never used smokeless tobacco  He reports that he does not drink alcohol or use drugs  Current Outpatient Medications   Medication Sig Dispense Refill    glimepiride (AMARYL) 4 mg tablet Take 1 tablet (4 mg total) by mouth daily 90 tablet 1    levothyroxine 112 mcg tablet Take 1 tablet (112 mcg total) by mouth daily 90 tablet 1    lisinopril-hydrochlorothiazide (PRINZIDE,ZESTORETIC) 20-25 MG per tablet Take 1 tablet by mouth daily 90 tablet 1    nadolol (CORGARD) 40 mg tablet Take 1 tablet (40 mg total) by mouth daily 90 tablet 3    NEXAVAR 200 MG tablet Take 2 tablets (400 mg total) by mouth 2 times a day   120 tablet 11    sildenafil (VIAGRA) 100 mg tablet Take 1 tablet (100 mg total) by mouth as needed for erectile dysfunction 9 tablet 5     No current facility-administered medications for this visit  He has No Known Allergies       Review of Systems   Constitutional: Negative for chills, diaphoresis, fatigue and fever  Respiratory: Negative for chest tightness and shortness of breath  Cardiovascular: Negative for chest pain and palpitations  Gastrointestinal: Negative for abdominal pain, constipation, diarrhea, nausea and vomiting  Genitourinary: Positive for dysuria and penile pain  Negative for difficulty urinating, discharge, flank pain, frequency, genital sores, hematuria, penile swelling, scrotal swelling, testicular pain and urgency  Objective:      /94 (BP Location: Right arm, Patient Position: Sitting, Cuff Size: Standard)   Pulse 80   Temp (!) 97 1 °F (36 2 °C) (Temporal)   Resp 16   Ht 5' 7" (1 702 m)   Wt 81 2 kg (179 lb)   SpO2 99%   BMI 28 04 kg/m²          Physical Exam   Constitutional: He is oriented to person, place, and time  He appears well-developed and well-nourished  No distress  Cardiovascular: Normal rate, regular rhythm and normal heart sounds  Exam reveals no gallop and no friction rub  No murmur heard  Pulmonary/Chest: Effort normal and breath sounds normal  No respiratory distress  He has no wheezes  Genitourinary: Testes normal  Circumcised  No penile erythema  No discharge found  Neurological: He is alert and oriented to person, place, and time  Skin: He is not diaphoretic  Psychiatric: He has a normal mood and affect  His behavior is normal    Nursing note and vitals reviewed

## 2020-03-12 NOTE — ASSESSMENT & PLAN NOTE
Blood pressure was normal at last 3 office visits  Did start to trend down words while in the office  At this time will continue with current dose of lisinopril-HCTZ  If it does appear that blood pressure is still elevated at next office visit, may have to make adjustments to medication

## 2020-03-13 ENCOUNTER — HOSPITAL ENCOUNTER (EMERGENCY)
Facility: HOSPITAL | Age: 65
Discharge: HOME | End: 2020-03-13
Admitting: EMERGENCY MEDICINE
Payer: COMMERCIAL

## 2020-03-13 VITALS
BODY MASS INDEX: 27.97 KG/M2 | RESPIRATION RATE: 16 BRPM | HEART RATE: 63 BPM | DIASTOLIC BLOOD PRESSURE: 108 MMHG | SYSTOLIC BLOOD PRESSURE: 180 MMHG | OXYGEN SATURATION: 97 % | WEIGHT: 178.57 LBS | TEMPERATURE: 97.5 F

## 2020-03-13 DIAGNOSIS — A60.01 HERPES SIMPLEX INFECTION OF PENIS: Primary | ICD-10-CM

## 2020-03-13 PROCEDURE — 99283 EMERGENCY DEPT VISIT LOW MDM: CPT

## 2020-03-13 PROCEDURE — 99283 EMERGENCY DEPT VISIT LOW MDM: CPT | Performed by: EMERGENCY MEDICINE

## 2020-03-13 RX ORDER — ACYCLOVIR 400 MG/1
400 TABLET ORAL 3 TIMES DAILY
Qty: 30 TABLET | Refills: 0 | Status: ON HOLD | OUTPATIENT
Start: 2020-03-13 | End: 2022-01-01 | Stop reason: CLARIF
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
## 2020-03-13 NOTE — DISCHARGE INSTRUCTIONS
Close follow up care with PCP is needed to discuss urine culture and recent labs drawn with PCP yesterday  Blood Pressure was elevated in ER today, but there was no need for further testing based on evaluation  Continue to take medication as directed and follow-up with PCP to optimize blood pressure management  Take all medication as directed and complete the entire course of mediation  Return to ER if symptoms worsen  You begin to feel any shortness of breath, chest pain, headaches, vision changes, or urinary changes

## 2020-03-14 LAB — BACTERIA UR CULT: NORMAL

## 2020-03-15 LAB
C TRACH DNA SPEC QL NAA+PROBE: NEGATIVE
N GONORRHOEA DNA SPEC QL NAA+PROBE: NEGATIVE

## 2020-03-16 ENCOUNTER — TELEPHONE (OUTPATIENT)
Dept: FAMILY MEDICINE CLINIC | Facility: CLINIC | Age: 65
End: 2020-03-16

## 2020-04-06 ENCOUNTER — TELEMEDICINE (OUTPATIENT)
Dept: FAMILY MEDICINE CLINIC | Facility: CLINIC | Age: 65
End: 2020-04-06

## 2020-04-06 DIAGNOSIS — B00.9 HERPES SIMPLEX INFECTION: Primary | ICD-10-CM

## 2020-04-06 PROCEDURE — 99213 OFFICE O/P EST LOW 20 MIN: CPT | Performed by: PHYSICIAN ASSISTANT

## 2020-04-06 RX ORDER — VALACYCLOVIR HYDROCHLORIDE 500 MG/1
500 TABLET, FILM COATED ORAL 2 TIMES DAILY
Qty: 6 TABLET | Refills: 2 | Status: ON HOLD | OUTPATIENT
Start: 2020-04-06 | End: 2022-01-01 | Stop reason: CLARIF

## 2020-04-13 ENCOUNTER — TELEPHONE (OUTPATIENT)
Dept: SURGICAL ONCOLOGY | Facility: CLINIC | Age: 65
End: 2020-04-13

## 2020-05-05 DIAGNOSIS — K74.69 OTHER CIRRHOSIS OF LIVER (HCC): ICD-10-CM

## 2020-05-05 RX ORDER — NADOLOL 40 MG/1
40 TABLET ORAL DAILY
Qty: 90 TABLET | Refills: 3 | Status: SHIPPED | OUTPATIENT
Start: 2020-05-05 | End: 2021-01-01 | Stop reason: SDUPTHER

## 2020-05-19 ENCOUNTER — OFFICE VISIT (OUTPATIENT)
Dept: FAMILY MEDICINE CLINIC | Facility: CLINIC | Age: 65
End: 2020-05-19

## 2020-05-19 VITALS
HEIGHT: 67 IN | SYSTOLIC BLOOD PRESSURE: 136 MMHG | RESPIRATION RATE: 18 BRPM | OXYGEN SATURATION: 98 % | WEIGHT: 180 LBS | BODY MASS INDEX: 28.25 KG/M2 | DIASTOLIC BLOOD PRESSURE: 78 MMHG | TEMPERATURE: 97.5 F | HEART RATE: 79 BPM

## 2020-05-19 DIAGNOSIS — I10 ESSENTIAL HYPERTENSION, BENIGN: ICD-10-CM

## 2020-05-19 DIAGNOSIS — Z00.00 MEDICARE ANNUAL WELLNESS VISIT, SUBSEQUENT: Primary | ICD-10-CM

## 2020-05-19 DIAGNOSIS — E11.9 TYPE 2 DIABETES MELLITUS WITHOUT COMPLICATION, WITHOUT LONG-TERM CURRENT USE OF INSULIN (HCC): ICD-10-CM

## 2020-05-19 DIAGNOSIS — E03.9 HYPOTHYROIDISM, UNSPECIFIED TYPE: ICD-10-CM

## 2020-05-19 PROCEDURE — 3078F DIAST BP <80 MM HG: CPT | Performed by: PHYSICIAN ASSISTANT

## 2020-05-19 PROCEDURE — 3008F BODY MASS INDEX DOCD: CPT | Performed by: PHYSICIAN ASSISTANT

## 2020-05-19 PROCEDURE — 3075F SYST BP GE 130 - 139MM HG: CPT | Performed by: PHYSICIAN ASSISTANT

## 2020-05-19 PROCEDURE — 4040F PNEUMOC VAC/ADMIN/RCVD: CPT | Performed by: PHYSICIAN ASSISTANT

## 2020-05-19 PROCEDURE — 3008F BODY MASS INDEX DOCD: CPT | Performed by: INTERNAL MEDICINE

## 2020-05-19 PROCEDURE — 3288F FALL RISK ASSESSMENT DOCD: CPT | Performed by: PHYSICIAN ASSISTANT

## 2020-05-19 PROCEDURE — 3060F POS MICROALBUMINURIA REV: CPT | Performed by: PHYSICIAN ASSISTANT

## 2020-05-19 PROCEDURE — 1036F TOBACCO NON-USER: CPT | Performed by: PHYSICIAN ASSISTANT

## 2020-05-19 PROCEDURE — 4010F ACE/ARB THERAPY RXD/TAKEN: CPT | Performed by: PHYSICIAN ASSISTANT

## 2020-05-19 PROCEDURE — G0439 PPPS, SUBSEQ VISIT: HCPCS | Performed by: PHYSICIAN ASSISTANT

## 2020-05-19 PROCEDURE — 1101F PT FALLS ASSESS-DOCD LE1/YR: CPT | Performed by: PHYSICIAN ASSISTANT

## 2020-05-19 RX ORDER — LEVOTHYROXINE SODIUM 112 UG/1
112 TABLET ORAL DAILY
Qty: 90 TABLET | Refills: 1 | Status: SHIPPED | OUTPATIENT
Start: 2020-05-19 | End: 2020-12-08 | Stop reason: SDUPTHER

## 2020-05-19 RX ORDER — LISINOPRIL AND HYDROCHLOROTHIAZIDE 25; 20 MG/1; MG/1
1 TABLET ORAL DAILY
Qty: 90 TABLET | Refills: 1 | Status: SHIPPED | OUTPATIENT
Start: 2020-05-19 | End: 2020-12-08 | Stop reason: SDUPTHER

## 2020-05-19 RX ORDER — GLIMEPIRIDE 4 MG/1
4 TABLET ORAL DAILY
Qty: 90 TABLET | Refills: 1 | Status: SHIPPED | OUTPATIENT
Start: 2020-05-19 | End: 2020-12-08 | Stop reason: SDUPTHER

## 2020-05-28 ENCOUNTER — APPOINTMENT (OUTPATIENT)
Dept: LAB | Facility: HOSPITAL | Age: 65
End: 2020-05-28
Attending: INTERNAL MEDICINE
Payer: COMMERCIAL

## 2020-05-28 DIAGNOSIS — E11.9 TYPE 2 DIABETES MELLITUS WITHOUT COMPLICATION, WITHOUT LONG-TERM CURRENT USE OF INSULIN (HCC): ICD-10-CM

## 2020-05-28 DIAGNOSIS — R30.0 DYSURIA: ICD-10-CM

## 2020-05-28 DIAGNOSIS — C22.0 HEPATOCELLULAR CARCINOMA (HCC): ICD-10-CM

## 2020-05-28 LAB
AFP-TM SERPL-MCNC: 7.7 NG/ML (ref 0.5–8)
ALBUMIN SERPL BCP-MCNC: 3.7 G/DL (ref 3.5–5)
ALP SERPL-CCNC: 76 U/L (ref 46–116)
ALT SERPL W P-5'-P-CCNC: 52 U/L (ref 12–78)
ANION GAP SERPL CALCULATED.3IONS-SCNC: 5 MMOL/L (ref 4–13)
AST SERPL W P-5'-P-CCNC: 46 U/L (ref 5–45)
BASOPHILS # BLD AUTO: 0.02 THOUSANDS/ΜL (ref 0–0.1)
BASOPHILS NFR BLD AUTO: 0 % (ref 0–1)
BILIRUB SERPL-MCNC: 0.97 MG/DL (ref 0.2–1)
BUN SERPL-MCNC: 12 MG/DL (ref 5–25)
CALCIUM SERPL-MCNC: 9.1 MG/DL (ref 8.3–10.1)
CHLORIDE SERPL-SCNC: 101 MMOL/L (ref 100–108)
CO2 SERPL-SCNC: 31 MMOL/L (ref 21–32)
CREAT SERPL-MCNC: 1.08 MG/DL (ref 0.6–1.3)
EOSINOPHIL # BLD AUTO: 0.16 THOUSAND/ΜL (ref 0–0.61)
EOSINOPHIL NFR BLD AUTO: 3 % (ref 0–6)
ERYTHROCYTE [DISTWIDTH] IN BLOOD BY AUTOMATED COUNT: 13.7 % (ref 11.6–15.1)
EST. AVERAGE GLUCOSE BLD GHB EST-MCNC: 134 MG/DL
GFR SERPL CREATININE-BSD FRML MDRD: 72 ML/MIN/1.73SQ M
GLUCOSE SERPL-MCNC: 251 MG/DL (ref 65–140)
HBA1C MFR BLD: 6.3 %
HCT VFR BLD AUTO: 41.9 % (ref 36.5–49.3)
HGB BLD-MCNC: 13.6 G/DL (ref 12–17)
IMM GRANULOCYTES # BLD AUTO: 0.01 THOUSAND/UL (ref 0–0.2)
IMM GRANULOCYTES NFR BLD AUTO: 0 % (ref 0–2)
LYMPHOCYTES # BLD AUTO: 0.71 THOUSANDS/ΜL (ref 0.6–4.47)
LYMPHOCYTES NFR BLD AUTO: 15 % (ref 14–44)
MCH RBC QN AUTO: 30.4 PG (ref 26.8–34.3)
MCHC RBC AUTO-ENTMCNC: 32.5 G/DL (ref 31.4–37.4)
MCV RBC AUTO: 94 FL (ref 82–98)
MONOCYTES # BLD AUTO: 0.6 THOUSAND/ΜL (ref 0.17–1.22)
MONOCYTES NFR BLD AUTO: 12 % (ref 4–12)
NEUTROPHILS # BLD AUTO: 3.38 THOUSANDS/ΜL (ref 1.85–7.62)
NEUTS SEG NFR BLD AUTO: 70 % (ref 43–75)
NRBC BLD AUTO-RTO: 0 /100 WBCS
PLATELET # BLD AUTO: 124 THOUSANDS/UL (ref 149–390)
PMV BLD AUTO: 11.2 FL (ref 8.9–12.7)
POTASSIUM SERPL-SCNC: 4.5 MMOL/L (ref 3.5–5.3)
PROT SERPL-MCNC: 7.9 G/DL (ref 6.4–8.2)
RBC # BLD AUTO: 4.47 MILLION/UL (ref 3.88–5.62)
SODIUM SERPL-SCNC: 137 MMOL/L (ref 136–145)
WBC # BLD AUTO: 4.88 THOUSAND/UL (ref 4.31–10.16)

## 2020-05-28 PROCEDURE — 3044F HG A1C LEVEL LT 7.0%: CPT | Performed by: PHYSICIAN ASSISTANT

## 2020-05-28 PROCEDURE — 82105 ALPHA-FETOPROTEIN SERUM: CPT

## 2020-05-28 PROCEDURE — 85025 COMPLETE CBC W/AUTO DIFF WBC: CPT

## 2020-05-28 PROCEDURE — 83036 HEMOGLOBIN GLYCOSYLATED A1C: CPT

## 2020-05-28 PROCEDURE — 86592 SYPHILIS TEST NON-TREP QUAL: CPT

## 2020-05-28 PROCEDURE — 3044F HG A1C LEVEL LT 7.0%: CPT | Performed by: INTERNAL MEDICINE

## 2020-05-28 PROCEDURE — 80053 COMPREHEN METABOLIC PANEL: CPT

## 2020-05-28 PROCEDURE — 36415 COLL VENOUS BLD VENIPUNCTURE: CPT

## 2020-05-29 LAB — RPR SER QL: NORMAL

## 2020-06-06 ENCOUNTER — HOSPITAL ENCOUNTER (OUTPATIENT)
Dept: CT IMAGING | Facility: HOSPITAL | Age: 65
Discharge: HOME | End: 2020-06-06
Attending: INTERNAL MEDICINE
Payer: COMMERCIAL

## 2020-06-06 DIAGNOSIS — C22.0 HEPATOCELLULAR CARCINOMA (HCC): ICD-10-CM

## 2020-06-06 PROCEDURE — 71260 CT THORAX DX C+: CPT

## 2020-06-06 PROCEDURE — 74177 CT ABD & PELVIS W/CONTRAST: CPT

## 2020-06-06 RX ADMIN — IOHEXOL 100 ML: 350 INJECTION, SOLUTION INTRAVENOUS at 08:35

## 2020-06-12 ENCOUNTER — OFFICE VISIT (OUTPATIENT)
Dept: HEMATOLOGY ONCOLOGY | Facility: CLINIC | Age: 65
End: 2020-06-12
Payer: COMMERCIAL

## 2020-06-12 VITALS
WEIGHT: 180 LBS | SYSTOLIC BLOOD PRESSURE: 142 MMHG | RESPIRATION RATE: 18 BRPM | HEART RATE: 80 BPM | TEMPERATURE: 98.8 F | DIASTOLIC BLOOD PRESSURE: 94 MMHG | BODY MASS INDEX: 28.19 KG/M2 | OXYGEN SATURATION: 98 %

## 2020-06-12 DIAGNOSIS — C22.0 HEPATOCELLULAR CARCINOMA (HCC): Primary | ICD-10-CM

## 2020-06-12 PROCEDURE — 3060F POS MICROALBUMINURIA REV: CPT | Performed by: INTERNAL MEDICINE

## 2020-06-12 PROCEDURE — 3077F SYST BP >= 140 MM HG: CPT | Performed by: INTERNAL MEDICINE

## 2020-06-12 PROCEDURE — 4040F PNEUMOC VAC/ADMIN/RCVD: CPT | Performed by: INTERNAL MEDICINE

## 2020-06-12 PROCEDURE — 99214 OFFICE O/P EST MOD 30 MIN: CPT | Performed by: INTERNAL MEDICINE

## 2020-06-12 PROCEDURE — 3080F DIAST BP >= 90 MM HG: CPT | Performed by: INTERNAL MEDICINE

## 2020-06-12 PROCEDURE — 1036F TOBACCO NON-USER: CPT | Performed by: INTERNAL MEDICINE

## 2020-06-12 PROCEDURE — 3044F HG A1C LEVEL LT 7.0%: CPT | Performed by: INTERNAL MEDICINE

## 2020-08-18 ENCOUNTER — TELEPHONE (OUTPATIENT)
Dept: FAMILY MEDICINE CLINIC | Facility: CLINIC | Age: 65
End: 2020-08-18

## 2020-08-18 NOTE — TELEPHONE ENCOUNTER
SIGNATURES NEEDED FOR [facility] CARE FORM RECEIVED VIA FAX  WILL BE PLACED IN YOUR BIN AT ASSIGNED DELIVERY TIMES

## 2020-09-10 DIAGNOSIS — C22.0 HEPATOCELLULAR CARCINOMA (HCC): ICD-10-CM

## 2020-09-10 RX ORDER — SORAFENIB 200 MG/1
TABLET, FILM COATED ORAL
Qty: 120 TABLET | Refills: 10 | Status: SHIPPED | OUTPATIENT
Start: 2020-09-10 | End: 2021-01-01

## 2020-12-04 ENCOUNTER — LAB (OUTPATIENT)
Dept: LAB | Facility: HOSPITAL | Age: 65
End: 2020-12-04
Attending: INTERNAL MEDICINE
Payer: COMMERCIAL

## 2020-12-04 DIAGNOSIS — C22.0 HEPATOCELLULAR CARCINOMA (HCC): ICD-10-CM

## 2020-12-04 LAB
AFP-TM SERPL-MCNC: 10.5 NG/ML (ref 0.5–8)
ALBUMIN SERPL BCP-MCNC: 3.9 G/DL (ref 3.5–5)
ALP SERPL-CCNC: 76 U/L (ref 46–116)
ALT SERPL W P-5'-P-CCNC: 35 U/L (ref 12–78)
ANION GAP SERPL CALCULATED.3IONS-SCNC: 4 MMOL/L (ref 4–13)
AST SERPL W P-5'-P-CCNC: 31 U/L (ref 5–45)
BASOPHILS # BLD AUTO: 0.03 THOUSANDS/ΜL (ref 0–0.1)
BASOPHILS NFR BLD AUTO: 1 % (ref 0–1)
BILIRUB SERPL-MCNC: 1.28 MG/DL (ref 0.2–1)
BUN SERPL-MCNC: 13 MG/DL (ref 5–25)
CALCIUM SERPL-MCNC: 9.3 MG/DL (ref 8.3–10.1)
CHLORIDE SERPL-SCNC: 100 MMOL/L (ref 100–108)
CO2 SERPL-SCNC: 33 MMOL/L (ref 21–32)
CREAT SERPL-MCNC: 1 MG/DL (ref 0.6–1.3)
EOSINOPHIL # BLD AUTO: 0.18 THOUSAND/ΜL (ref 0–0.61)
EOSINOPHIL NFR BLD AUTO: 4 % (ref 0–6)
ERYTHROCYTE [DISTWIDTH] IN BLOOD BY AUTOMATED COUNT: 14.1 % (ref 11.6–15.1)
GFR SERPL CREATININE-BSD FRML MDRD: 79 ML/MIN/1.73SQ M
GLUCOSE SERPL-MCNC: 147 MG/DL (ref 65–140)
HCT VFR BLD AUTO: 40.8 % (ref 36.5–49.3)
HGB BLD-MCNC: 13.4 G/DL (ref 12–17)
IMM GRANULOCYTES # BLD AUTO: 0.02 THOUSAND/UL (ref 0–0.2)
IMM GRANULOCYTES NFR BLD AUTO: 0 % (ref 0–2)
LYMPHOCYTES # BLD AUTO: 0.66 THOUSANDS/ΜL (ref 0.6–4.47)
LYMPHOCYTES NFR BLD AUTO: 13 % (ref 14–44)
MCH RBC QN AUTO: 30 PG (ref 26.8–34.3)
MCHC RBC AUTO-ENTMCNC: 32.8 G/DL (ref 31.4–37.4)
MCV RBC AUTO: 91 FL (ref 82–98)
MONOCYTES # BLD AUTO: 0.71 THOUSAND/ΜL (ref 0.17–1.22)
MONOCYTES NFR BLD AUTO: 14 % (ref 4–12)
NEUTROPHILS # BLD AUTO: 3.39 THOUSANDS/ΜL (ref 1.85–7.62)
NEUTS SEG NFR BLD AUTO: 68 % (ref 43–75)
NRBC BLD AUTO-RTO: 0 /100 WBCS
PLATELET # BLD AUTO: 120 THOUSANDS/UL (ref 149–390)
PMV BLD AUTO: 11.1 FL (ref 8.9–12.7)
POTASSIUM SERPL-SCNC: 4.1 MMOL/L (ref 3.5–5.3)
PROT SERPL-MCNC: 8.4 G/DL (ref 6.4–8.2)
RBC # BLD AUTO: 4.47 MILLION/UL (ref 3.88–5.62)
SODIUM SERPL-SCNC: 137 MMOL/L (ref 136–145)
WBC # BLD AUTO: 4.99 THOUSAND/UL (ref 4.31–10.16)

## 2020-12-04 PROCEDURE — 36415 COLL VENOUS BLD VENIPUNCTURE: CPT

## 2020-12-04 PROCEDURE — 82105 ALPHA-FETOPROTEIN SERUM: CPT

## 2020-12-04 PROCEDURE — 80053 COMPREHEN METABOLIC PANEL: CPT

## 2020-12-04 PROCEDURE — 85025 COMPLETE CBC W/AUTO DIFF WBC: CPT

## 2020-12-08 ENCOUNTER — OFFICE VISIT (OUTPATIENT)
Dept: FAMILY MEDICINE CLINIC | Facility: CLINIC | Age: 65
End: 2020-12-08

## 2020-12-08 VITALS
SYSTOLIC BLOOD PRESSURE: 168 MMHG | HEART RATE: 78 BPM | RESPIRATION RATE: 17 BRPM | OXYGEN SATURATION: 97 % | TEMPERATURE: 97.8 F | DIASTOLIC BLOOD PRESSURE: 96 MMHG | BODY MASS INDEX: 28.51 KG/M2 | WEIGHT: 182 LBS

## 2020-12-08 DIAGNOSIS — I10 ESSENTIAL HYPERTENSION, BENIGN: ICD-10-CM

## 2020-12-08 DIAGNOSIS — E03.9 HYPOTHYROIDISM, UNSPECIFIED TYPE: ICD-10-CM

## 2020-12-08 DIAGNOSIS — N52.9 ERECTILE DYSFUNCTION, UNSPECIFIED ERECTILE DYSFUNCTION TYPE: ICD-10-CM

## 2020-12-08 DIAGNOSIS — Z23 ENCOUNTER FOR IMMUNIZATION: ICD-10-CM

## 2020-12-08 DIAGNOSIS — E11.9 TYPE 2 DIABETES MELLITUS WITHOUT COMPLICATION, WITHOUT LONG-TERM CURRENT USE OF INSULIN (HCC): Primary | ICD-10-CM

## 2020-12-08 DIAGNOSIS — Z28.21 INFLUENZA VACCINATION DECLINED: ICD-10-CM

## 2020-12-08 DIAGNOSIS — I10 ESSENTIAL HYPERTENSION: ICD-10-CM

## 2020-12-08 DIAGNOSIS — Z12.5 PROSTATE CANCER SCREENING: ICD-10-CM

## 2020-12-08 LAB — SL AMB POCT HEMOGLOBIN AIC: 6.3 (ref ?–6.5)

## 2020-12-08 PROCEDURE — 1160F RVW MEDS BY RX/DR IN RCRD: CPT | Performed by: NURSE PRACTITIONER

## 2020-12-08 PROCEDURE — 3077F SYST BP >= 140 MM HG: CPT | Performed by: NURSE PRACTITIONER

## 2020-12-08 PROCEDURE — 3080F DIAST BP >= 90 MM HG: CPT | Performed by: NURSE PRACTITIONER

## 2020-12-08 PROCEDURE — 3044F HG A1C LEVEL LT 7.0%: CPT | Performed by: NURSE PRACTITIONER

## 2020-12-08 PROCEDURE — 83036 HEMOGLOBIN GLYCOSYLATED A1C: CPT | Performed by: NURSE PRACTITIONER

## 2020-12-08 PROCEDURE — 1036F TOBACCO NON-USER: CPT | Performed by: NURSE PRACTITIONER

## 2020-12-08 PROCEDURE — 99214 OFFICE O/P EST MOD 30 MIN: CPT | Performed by: NURSE PRACTITIONER

## 2020-12-08 RX ORDER — GLIMEPIRIDE 4 MG/1
4 TABLET ORAL DAILY
Qty: 90 TABLET | Refills: 1 | Status: SHIPPED | OUTPATIENT
Start: 2020-12-08 | End: 2021-03-08

## 2020-12-08 RX ORDER — LEVOTHYROXINE SODIUM 112 UG/1
112 TABLET ORAL DAILY
Qty: 90 TABLET | Refills: 1 | Status: SHIPPED | OUTPATIENT
Start: 2020-12-08 | End: 2021-01-01 | Stop reason: SDUPTHER

## 2020-12-08 RX ORDER — SILDENAFIL 100 MG/1
100 TABLET, FILM COATED ORAL AS NEEDED
Qty: 9 TABLET | Refills: 5 | Status: SHIPPED | OUTPATIENT
Start: 2020-12-08 | End: 2022-01-01

## 2020-12-08 RX ORDER — LISINOPRIL AND HYDROCHLOROTHIAZIDE 25; 20 MG/1; MG/1
1 TABLET ORAL DAILY
Qty: 90 TABLET | Refills: 1 | Status: SHIPPED | OUTPATIENT
Start: 2020-12-08 | End: 2021-01-01

## 2020-12-15 ENCOUNTER — TELEPHONE (OUTPATIENT)
Dept: FAMILY MEDICINE CLINIC | Facility: CLINIC | Age: 65
End: 2020-12-15

## 2020-12-15 ENCOUNTER — CLINICAL SUPPORT (OUTPATIENT)
Dept: FAMILY MEDICINE CLINIC | Facility: CLINIC | Age: 65
End: 2020-12-15

## 2020-12-15 ENCOUNTER — TELEPHONE (OUTPATIENT)
Dept: HEMATOLOGY ONCOLOGY | Facility: CLINIC | Age: 65
End: 2020-12-15

## 2020-12-15 VITALS
OXYGEN SATURATION: 97 % | DIASTOLIC BLOOD PRESSURE: 80 MMHG | HEART RATE: 62 BPM | TEMPERATURE: 97.8 F | SYSTOLIC BLOOD PRESSURE: 138 MMHG

## 2020-12-15 DIAGNOSIS — E11.9 TYPE 2 DIABETES MELLITUS WITHOUT COMPLICATION, WITHOUT LONG-TERM CURRENT USE OF INSULIN (HCC): Primary | ICD-10-CM

## 2020-12-16 LAB
LEFT EYE DIABETIC RETINOPATHY: NORMAL
LEFT EYE IMAGE QUALITY: NORMAL
LEFT EYE MACULAR EDEMA: NORMAL
LEFT EYE OTHER RETINOPATHY: NORMAL
RIGHT EYE DIABETIC RETINOPATHY: NORMAL
RIGHT EYE IMAGE QUALITY: NORMAL
RIGHT EYE MACULAR EDEMA: NORMAL
RIGHT EYE OTHER RETINOPATHY: NORMAL
SEVERITY (EYE EXAM): NORMAL

## 2020-12-16 PROCEDURE — 2025F 7 FLD RTA PHOTO W/O RTNOPTHY: CPT | Performed by: NURSE PRACTITIONER

## 2020-12-23 ENCOUNTER — TELEPHONE (OUTPATIENT)
Dept: FAMILY MEDICINE CLINIC | Facility: CLINIC | Age: 65
End: 2020-12-23

## 2021-01-01 ENCOUNTER — HOSPITAL ENCOUNTER (OUTPATIENT)
Dept: RADIOLOGY | Facility: HOSPITAL | Age: 66
Discharge: HOME | End: 2021-10-14
Payer: COMMERCIAL

## 2021-01-01 ENCOUNTER — TELEPHONE (OUTPATIENT)
Dept: PHYSICAL THERAPY | Facility: OTHER | Age: 66
End: 2021-01-01

## 2021-01-01 ENCOUNTER — OFFICE VISIT (OUTPATIENT)
Dept: FAMILY MEDICINE CLINIC | Facility: CLINIC | Age: 66
End: 2021-01-01

## 2021-01-01 ENCOUNTER — OFFICE VISIT (OUTPATIENT)
Dept: PHYSICAL THERAPY | Facility: REHABILITATION | Age: 66
End: 2021-01-01
Payer: COMMERCIAL

## 2021-01-01 ENCOUNTER — CLINICAL SUPPORT (OUTPATIENT)
Dept: FAMILY MEDICINE CLINIC | Facility: CLINIC | Age: 66
End: 2021-01-01

## 2021-01-01 ENCOUNTER — HOSPITAL ENCOUNTER (OUTPATIENT)
Dept: CT IMAGING | Facility: HOSPITAL | Age: 66
Discharge: HOME | End: 2021-07-15
Attending: INTERNAL MEDICINE
Payer: COMMERCIAL

## 2021-01-01 ENCOUNTER — EVALUATION (OUTPATIENT)
Dept: PHYSICAL THERAPY | Facility: REHABILITATION | Age: 66
End: 2021-01-01
Payer: COMMERCIAL

## 2021-01-01 ENCOUNTER — TELEMEDICINE (OUTPATIENT)
Dept: FAMILY MEDICINE CLINIC | Facility: CLINIC | Age: 66
End: 2021-01-01

## 2021-01-01 ENCOUNTER — APPOINTMENT (EMERGENCY)
Dept: CT IMAGING | Facility: HOSPITAL | Age: 66
End: 2021-01-01
Payer: COMMERCIAL

## 2021-01-01 ENCOUNTER — TELEPHONE (OUTPATIENT)
Dept: FAMILY MEDICINE CLINIC | Facility: CLINIC | Age: 66
End: 2021-01-01

## 2021-01-01 ENCOUNTER — HOSPITAL ENCOUNTER (EMERGENCY)
Facility: HOSPITAL | Age: 66
Discharge: HOME | End: 2021-09-25
Attending: EMERGENCY MEDICINE | Admitting: EMERGENCY MEDICINE
Payer: COMMERCIAL

## 2021-01-01 ENCOUNTER — NURSE TRIAGE (OUTPATIENT)
Dept: OTHER | Facility: OTHER | Age: 66
End: 2021-01-01

## 2021-01-01 ENCOUNTER — APPOINTMENT (OUTPATIENT)
Dept: LAB | Facility: HOSPITAL | Age: 66
End: 2021-01-01
Payer: COMMERCIAL

## 2021-01-01 ENCOUNTER — APPOINTMENT (EMERGENCY)
Dept: RADIOLOGY | Facility: HOSPITAL | Age: 66
End: 2021-01-01
Payer: COMMERCIAL

## 2021-01-01 ENCOUNTER — HOSPITAL ENCOUNTER (EMERGENCY)
Facility: HOSPITAL | Age: 66
Discharge: HOME | End: 2021-10-27
Attending: EMERGENCY MEDICINE
Payer: COMMERCIAL

## 2021-01-01 ENCOUNTER — OFFICE VISIT (OUTPATIENT)
Dept: HEMATOLOGY ONCOLOGY | Facility: CLINIC | Age: 66
End: 2021-01-01
Payer: COMMERCIAL

## 2021-01-01 VITALS
BODY MASS INDEX: 27.1 KG/M2 | RESPIRATION RATE: 20 BRPM | DIASTOLIC BLOOD PRESSURE: 91 MMHG | OXYGEN SATURATION: 97 % | HEART RATE: 107 BPM | TEMPERATURE: 99.1 F | WEIGHT: 173 LBS | SYSTOLIC BLOOD PRESSURE: 174 MMHG

## 2021-01-01 VITALS
SYSTOLIC BLOOD PRESSURE: 132 MMHG | WEIGHT: 174 LBS | TEMPERATURE: 98.1 F | HEART RATE: 77 BPM | RESPIRATION RATE: 18 BRPM | DIASTOLIC BLOOD PRESSURE: 80 MMHG | BODY MASS INDEX: 27.31 KG/M2 | HEIGHT: 67 IN | OXYGEN SATURATION: 99 %

## 2021-01-01 VITALS
SYSTOLIC BLOOD PRESSURE: 164 MMHG | WEIGHT: 173.8 LBS | BODY MASS INDEX: 27.28 KG/M2 | TEMPERATURE: 97.8 F | DIASTOLIC BLOOD PRESSURE: 92 MMHG | RESPIRATION RATE: 18 BRPM | OXYGEN SATURATION: 98 % | HEART RATE: 71 BPM | HEIGHT: 67 IN

## 2021-01-01 VITALS
RESPIRATION RATE: 17 BRPM | DIASTOLIC BLOOD PRESSURE: 82 MMHG | WEIGHT: 179 LBS | BODY MASS INDEX: 28.04 KG/M2 | SYSTOLIC BLOOD PRESSURE: 148 MMHG | OXYGEN SATURATION: 98 % | TEMPERATURE: 97.7 F | HEART RATE: 67 BPM

## 2021-01-01 VITALS
RESPIRATION RATE: 18 BRPM | HEART RATE: 87 BPM | WEIGHT: 170 LBS | SYSTOLIC BLOOD PRESSURE: 148 MMHG | BODY MASS INDEX: 26.68 KG/M2 | DIASTOLIC BLOOD PRESSURE: 90 MMHG | HEIGHT: 67 IN | TEMPERATURE: 97.7 F | OXYGEN SATURATION: 98 %

## 2021-01-01 VITALS
DIASTOLIC BLOOD PRESSURE: 104 MMHG | RESPIRATION RATE: 22 BRPM | SYSTOLIC BLOOD PRESSURE: 190 MMHG | OXYGEN SATURATION: 97 % | BODY MASS INDEX: 27.35 KG/M2 | HEART RATE: 86 BPM | TEMPERATURE: 99.3 F | WEIGHT: 174.6 LBS

## 2021-01-01 VITALS — SYSTOLIC BLOOD PRESSURE: 102 MMHG | DIASTOLIC BLOOD PRESSURE: 60 MMHG | HEART RATE: 65 BPM

## 2021-01-01 VITALS
HEIGHT: 67 IN | HEART RATE: 68 BPM | SYSTOLIC BLOOD PRESSURE: 128 MMHG | RESPIRATION RATE: 18 BRPM | OXYGEN SATURATION: 97 % | WEIGHT: 173.2 LBS | TEMPERATURE: 97.4 F | DIASTOLIC BLOOD PRESSURE: 88 MMHG | BODY MASS INDEX: 27.18 KG/M2

## 2021-01-01 VITALS — SYSTOLIC BLOOD PRESSURE: 136 MMHG | DIASTOLIC BLOOD PRESSURE: 80 MMHG

## 2021-01-01 VITALS — DIASTOLIC BLOOD PRESSURE: 108 MMHG | SYSTOLIC BLOOD PRESSURE: 162 MMHG

## 2021-01-01 DIAGNOSIS — Z20.822 CLOSE EXPOSURE TO COVID-19 VIRUS: Primary | ICD-10-CM

## 2021-01-01 DIAGNOSIS — Z53.20 COLON CANCER SCREENING DECLINED: ICD-10-CM

## 2021-01-01 DIAGNOSIS — M54.41 ACUTE BILATERAL LOW BACK PAIN WITH BILATERAL SCIATICA: Primary | ICD-10-CM

## 2021-01-01 DIAGNOSIS — I10 ESSENTIAL HYPERTENSION: ICD-10-CM

## 2021-01-01 DIAGNOSIS — I10 ESSENTIAL HYPERTENSION: Primary | ICD-10-CM

## 2021-01-01 DIAGNOSIS — C22.0 HEPATOCELLULAR CARCINOMA (HCC): ICD-10-CM

## 2021-01-01 DIAGNOSIS — I10 PRIMARY HYPERTENSION: ICD-10-CM

## 2021-01-01 DIAGNOSIS — I10 ESSENTIAL HYPERTENSION, BENIGN: ICD-10-CM

## 2021-01-01 DIAGNOSIS — E11.9 TYPE 2 DIABETES MELLITUS WITHOUT COMPLICATION, WITHOUT LONG-TERM CURRENT USE OF INSULIN (HCC): ICD-10-CM

## 2021-01-01 DIAGNOSIS — U07.1 COVID-19: Primary | ICD-10-CM

## 2021-01-01 DIAGNOSIS — J18.9 PNEUMONIA: Primary | ICD-10-CM

## 2021-01-01 DIAGNOSIS — C22.0 HEPATOCELLULAR CARCINOMA (HCC): Primary | ICD-10-CM

## 2021-01-01 DIAGNOSIS — I10 ESSENTIAL HYPERTENSION, BENIGN: Primary | ICD-10-CM

## 2021-01-01 DIAGNOSIS — IMO0002 UNCONTROLLED TYPE 2 DIABETES MELLITUS WITH COMPLICATION, WITHOUT LONG-TERM CURRENT USE OF INSULIN: ICD-10-CM

## 2021-01-01 DIAGNOSIS — I85.10 SECONDARY ESOPHAGEAL VARICES WITHOUT BLEEDING (HCC): ICD-10-CM

## 2021-01-01 DIAGNOSIS — E11.9 TYPE 2 DIABETES MELLITUS WITHOUT COMPLICATION, WITHOUT LONG-TERM CURRENT USE OF INSULIN (HCC): Primary | ICD-10-CM

## 2021-01-01 DIAGNOSIS — M54.32 SCIATICA OF LEFT SIDE: ICD-10-CM

## 2021-01-01 DIAGNOSIS — J18.9 PNEUMONIA OF RIGHT LOWER LOBE DUE TO INFECTIOUS ORGANISM: Primary | ICD-10-CM

## 2021-01-01 DIAGNOSIS — M54.42 ACUTE BILATERAL LOW BACK PAIN WITH BILATERAL SCIATICA: Primary | ICD-10-CM

## 2021-01-01 DIAGNOSIS — Z85.21 H/O LARYNGEAL CANCER: ICD-10-CM

## 2021-01-01 DIAGNOSIS — M54.32 SCIATICA OF LEFT SIDE: Primary | ICD-10-CM

## 2021-01-01 DIAGNOSIS — E03.9 HYPOTHYROIDISM, UNSPECIFIED TYPE: ICD-10-CM

## 2021-01-01 DIAGNOSIS — Z12.5 PROSTATE CANCER SCREENING: ICD-10-CM

## 2021-01-01 DIAGNOSIS — K74.69 OTHER CIRRHOSIS OF LIVER (HCC): ICD-10-CM

## 2021-01-01 DIAGNOSIS — D69.6 PLATELETS DECREASED (HCC): ICD-10-CM

## 2021-01-01 DIAGNOSIS — U07.1 COVID-19 VIRUS INFECTION: Primary | ICD-10-CM

## 2021-01-01 LAB
AFP-TM SERPL-MCNC: 11.1 NG/ML (ref 0.5–8)
ALBUMIN SERPL BCP-MCNC: 3.6 G/DL (ref 3.5–5)
ALBUMIN SERPL BCP-MCNC: 4.2 G/DL (ref 3.5–5)
ALP SERPL-CCNC: 67 U/L (ref 46–116)
ALP SERPL-CCNC: 88 U/L (ref 46–116)
ALT SERPL W P-5'-P-CCNC: 25 U/L (ref 12–78)
ALT SERPL W P-5'-P-CCNC: 52 U/L (ref 12–78)
ANION GAP SERPL CALCULATED.3IONS-SCNC: 6 MMOL/L (ref 4–13)
ANION GAP SERPL CALCULATED.3IONS-SCNC: 7 MMOL/L (ref 4–13)
AST SERPL W P-5'-P-CCNC: 19 U/L (ref 5–45)
AST SERPL W P-5'-P-CCNC: 41 U/L (ref 5–45)
BASOPHILS # BLD AUTO: 0.03 THOUSANDS/ΜL (ref 0–0.1)
BASOPHILS # BLD AUTO: 0.03 THOUSANDS/ΜL (ref 0–0.1)
BASOPHILS NFR BLD AUTO: 0 % (ref 0–1)
BASOPHILS NFR BLD AUTO: 1 % (ref 0–1)
BILIRUB SERPL-MCNC: 0.86 MG/DL (ref 0.2–1)
BILIRUB SERPL-MCNC: 0.97 MG/DL (ref 0.2–1)
BUN SERPL-MCNC: 12 MG/DL (ref 5–25)
BUN SERPL-MCNC: 23 MG/DL (ref 5–25)
CALCIUM SERPL-MCNC: 9.6 MG/DL (ref 8.3–10.1)
CALCIUM SERPL-MCNC: 9.7 MG/DL (ref 8.3–10.1)
CHLORIDE SERPL-SCNC: 102 MMOL/L (ref 100–108)
CHLORIDE SERPL-SCNC: 105 MMOL/L (ref 100–108)
CHOLEST SERPL-MCNC: 154 MG/DL (ref 50–200)
CO2 SERPL-SCNC: 31 MMOL/L (ref 21–32)
CO2 SERPL-SCNC: 32 MMOL/L (ref 21–32)
CREAT SERPL-MCNC: 1 MG/DL (ref 0.6–1.3)
CREAT SERPL-MCNC: 1.02 MG/DL (ref 0.6–1.3)
CREAT UR-MCNC: 35.8 MG/DL
D DIMER PPP FEU-MCNC: <0.27 UG/ML FEU
EOSINOPHIL # BLD AUTO: 0.13 THOUSAND/ΜL (ref 0–0.61)
EOSINOPHIL # BLD AUTO: 0.21 THOUSAND/ΜL (ref 0–0.61)
EOSINOPHIL NFR BLD AUTO: 2 % (ref 0–6)
EOSINOPHIL NFR BLD AUTO: 4 % (ref 0–6)
ERYTHROCYTE [DISTWIDTH] IN BLOOD BY AUTOMATED COUNT: 13.7 % (ref 11.6–15.1)
ERYTHROCYTE [DISTWIDTH] IN BLOOD BY AUTOMATED COUNT: 14 % (ref 11.6–15.1)
GFR SERPL CREATININE-BSD FRML MDRD: 76 ML/MIN/1.73SQ M
GFR SERPL CREATININE-BSD FRML MDRD: 78 ML/MIN/1.73SQ M
GLUCOSE P FAST SERPL-MCNC: 133 MG/DL (ref 65–99)
GLUCOSE SERPL-MCNC: 178 MG/DL (ref 65–140)
HCT VFR BLD AUTO: 39.4 % (ref 36.5–49.3)
HCT VFR BLD AUTO: 41.3 % (ref 36.5–49.3)
HDLC SERPL-MCNC: 52 MG/DL
HGB BLD-MCNC: 13.3 G/DL (ref 12–17)
HGB BLD-MCNC: 13.8 G/DL (ref 12–17)
IMM GRANULOCYTES # BLD AUTO: 0.04 THOUSAND/UL (ref 0–0.2)
IMM GRANULOCYTES # BLD AUTO: 0.05 THOUSAND/UL (ref 0–0.2)
IMM GRANULOCYTES NFR BLD AUTO: 1 % (ref 0–2)
IMM GRANULOCYTES NFR BLD AUTO: 1 % (ref 0–2)
LDLC SERPL CALC-MCNC: 88 MG/DL (ref 0–100)
LYMPHOCYTES # BLD AUTO: 0.82 THOUSANDS/ΜL (ref 0.6–4.47)
LYMPHOCYTES # BLD AUTO: 0.83 THOUSANDS/ΜL (ref 0.6–4.47)
LYMPHOCYTES NFR BLD AUTO: 12 % (ref 14–44)
LYMPHOCYTES NFR BLD AUTO: 14 % (ref 14–44)
MCH RBC QN AUTO: 30.7 PG (ref 26.8–34.3)
MCH RBC QN AUTO: 30.8 PG (ref 26.8–34.3)
MCHC RBC AUTO-ENTMCNC: 33.4 G/DL (ref 31.4–37.4)
MCHC RBC AUTO-ENTMCNC: 33.8 G/DL (ref 31.4–37.4)
MCV RBC AUTO: 91 FL (ref 82–98)
MCV RBC AUTO: 92 FL (ref 82–98)
MICROALBUMIN UR-MCNC: <5 MG/L (ref 0–20)
MICROALBUMIN/CREAT 24H UR: <14 MG/G CREATININE (ref 0–30)
MONOCYTES # BLD AUTO: 0.69 THOUSAND/ΜL (ref 0.17–1.22)
MONOCYTES # BLD AUTO: 0.85 THOUSAND/ΜL (ref 0.17–1.22)
MONOCYTES NFR BLD AUTO: 12 % (ref 4–12)
MONOCYTES NFR BLD AUTO: 12 % (ref 4–12)
NEUTROPHILS # BLD AUTO: 4.04 THOUSANDS/ΜL (ref 1.85–7.62)
NEUTROPHILS # BLD AUTO: 5.13 THOUSANDS/ΜL (ref 1.85–7.62)
NEUTS SEG NFR BLD AUTO: 68 % (ref 43–75)
NEUTS SEG NFR BLD AUTO: 73 % (ref 43–75)
NONHDLC SERPL-MCNC: 102 MG/DL
NRBC BLD AUTO-RTO: 0 /100 WBCS
NRBC BLD AUTO-RTO: 0 /100 WBCS
PLATELET # BLD AUTO: 141 THOUSANDS/UL (ref 149–390)
PLATELET # BLD AUTO: 146 THOUSANDS/UL (ref 149–390)
PMV BLD AUTO: 10.6 FL (ref 8.9–12.7)
PMV BLD AUTO: 11.6 FL (ref 8.9–12.7)
POTASSIUM SERPL-SCNC: 3.6 MMOL/L (ref 3.5–5.3)
POTASSIUM SERPL-SCNC: 4.2 MMOL/L (ref 3.5–5.3)
PROT SERPL-MCNC: 8.2 G/DL (ref 6.4–8.2)
PROT SERPL-MCNC: 8.5 G/DL (ref 6.4–8.2)
PSA SERPL-MCNC: 0.6 NG/ML (ref 0–4)
RBC # BLD AUTO: 4.32 MILLION/UL (ref 3.88–5.62)
RBC # BLD AUTO: 4.49 MILLION/UL (ref 3.88–5.62)
SARS-COV-2 RNA RESP QL NAA+PROBE: NEGATIVE
SARS-COV-2 RNA RESP QL NAA+PROBE: POSITIVE
SL AMB  POCT GLUCOSE, UA: NEGATIVE
SL AMB LEUKOCYTE ESTERASE,UA: NEGATIVE
SL AMB POCT BILIRUBIN,UA: NEGATIVE
SL AMB POCT BLOOD,UA: NEGATIVE
SL AMB POCT CLARITY,UA: CLEAR
SL AMB POCT COLOR,UA: YELLOW
SL AMB POCT HEMOGLOBIN AIC: 6.3 (ref ?–6.5)
SL AMB POCT HEMOGLOBIN AIC: 6.4 (ref ?–6.5)
SL AMB POCT KETONES,UA: NEGATIVE
SL AMB POCT NITRITE,UA: NEGATIVE
SL AMB POCT PH,UA: 5
SL AMB POCT SPECIFIC GRAVITY,UA: 1.02
SL AMB POCT URINE PROTEIN: NEGATIVE
SL AMB POCT UROBILINOGEN: NEGATIVE
SODIUM SERPL-SCNC: 140 MMOL/L (ref 136–145)
SODIUM SERPL-SCNC: 143 MMOL/L (ref 136–145)
TRIGL SERPL-MCNC: 68 MG/DL
TSH SERPL DL<=0.05 MIU/L-ACNC: 1.97 UIU/ML (ref 0.36–3.74)
WBC # BLD AUTO: 5.84 THOUSAND/UL (ref 4.31–10.16)
WBC # BLD AUTO: 7.01 THOUSAND/UL (ref 4.31–10.16)

## 2021-01-01 PROCEDURE — 99285 EMERGENCY DEPT VISIT HI MDM: CPT | Performed by: EMERGENCY MEDICINE

## 2021-01-01 PROCEDURE — 97110 THERAPEUTIC EXERCISES: CPT

## 2021-01-01 PROCEDURE — 1036F TOBACCO NON-USER: CPT | Performed by: NURSE PRACTITIONER

## 2021-01-01 PROCEDURE — 1036F TOBACCO NON-USER: CPT | Performed by: INTERNAL MEDICINE

## 2021-01-01 PROCEDURE — 84443 ASSAY THYROID STIM HORMONE: CPT

## 2021-01-01 PROCEDURE — 99214 OFFICE O/P EST MOD 30 MIN: CPT | Performed by: NURSE PRACTITIONER

## 2021-01-01 PROCEDURE — 1160F RVW MEDS BY RX/DR IN RCRD: CPT | Performed by: INTERNAL MEDICINE

## 2021-01-01 PROCEDURE — 3061F NEG MICROALBUMINURIA REV: CPT | Performed by: INTERNAL MEDICINE

## 2021-01-01 PROCEDURE — 72110 X-RAY EXAM L-2 SPINE 4/>VWS: CPT

## 2021-01-01 PROCEDURE — 3077F SYST BP >= 140 MM HG: CPT | Performed by: NURSE PRACTITIONER

## 2021-01-01 PROCEDURE — 85379 FIBRIN DEGRADATION QUANT: CPT

## 2021-01-01 PROCEDURE — 3074F SYST BP LT 130 MM HG: CPT | Performed by: INTERNAL MEDICINE

## 2021-01-01 PROCEDURE — 82105 ALPHA-FETOPROTEIN SERUM: CPT

## 2021-01-01 PROCEDURE — G0103 PSA SCREENING: HCPCS

## 2021-01-01 PROCEDURE — 80053 COMPREHEN METABOLIC PANEL: CPT

## 2021-01-01 PROCEDURE — U0003 INFECTIOUS AGENT DETECTION BY NUCLEIC ACID (DNA OR RNA); SEVERE ACUTE RESPIRATORY SYNDROME CORONAVIRUS 2 (SARS-COV-2) (CORONAVIRUS DISEASE [COVID-19]), AMPLIFIED PROBE TECHNIQUE, MAKING USE OF HIGH THROUGHPUT TECHNOLOGIES AS DESCRIBED BY CMS-2020-01-R: HCPCS | Performed by: STUDENT IN AN ORGANIZED HEALTH CARE EDUCATION/TRAINING PROGRAM

## 2021-01-01 PROCEDURE — 1125F AMNT PAIN NOTED PAIN PRSNT: CPT | Performed by: NURSE PRACTITIONER

## 2021-01-01 PROCEDURE — 3008F BODY MASS INDEX DOCD: CPT | Performed by: NURSE PRACTITIONER

## 2021-01-01 PROCEDURE — 71260 CT THORAX DX C+: CPT

## 2021-01-01 PROCEDURE — 3725F SCREEN DEPRESSION PERFORMED: CPT | Performed by: NURSE PRACTITIONER

## 2021-01-01 PROCEDURE — 3079F DIAST BP 80-89 MM HG: CPT | Performed by: NURSE PRACTITIONER

## 2021-01-01 PROCEDURE — 3288F FALL RISK ASSESSMENT DOCD: CPT | Performed by: NURSE PRACTITIONER

## 2021-01-01 PROCEDURE — 3008F BODY MASS INDEX DOCD: CPT | Performed by: INTERNAL MEDICINE

## 2021-01-01 PROCEDURE — 82570 ASSAY OF URINE CREATININE: CPT | Performed by: NURSE PRACTITIONER

## 2021-01-01 PROCEDURE — 3075F SYST BP GE 130 - 139MM HG: CPT | Performed by: NURSE PRACTITIONER

## 2021-01-01 PROCEDURE — 36415 COLL VENOUS BLD VENIPUNCTURE: CPT

## 2021-01-01 PROCEDURE — 4010F ACE/ARB THERAPY RXD/TAKEN: CPT | Performed by: NURSE PRACTITIONER

## 2021-01-01 PROCEDURE — 99213 OFFICE O/P EST LOW 20 MIN: CPT | Performed by: NURSE PRACTITIONER

## 2021-01-01 PROCEDURE — 99284 EMERGENCY DEPT VISIT MOD MDM: CPT

## 2021-01-01 PROCEDURE — 3044F HG A1C LEVEL LT 7.0%: CPT | Performed by: NURSE PRACTITIONER

## 2021-01-01 PROCEDURE — 97162 PT EVAL MOD COMPLEX 30 MIN: CPT

## 2021-01-01 PROCEDURE — 1160F RVW MEDS BY RX/DR IN RCRD: CPT | Performed by: NURSE PRACTITIONER

## 2021-01-01 PROCEDURE — 85025 COMPLETE CBC W/AUTO DIFF WBC: CPT

## 2021-01-01 PROCEDURE — 99213 OFFICE O/P EST LOW 20 MIN: CPT | Performed by: FAMILY MEDICINE

## 2021-01-01 PROCEDURE — 99283 EMERGENCY DEPT VISIT LOW MDM: CPT

## 2021-01-01 PROCEDURE — 83036 HEMOGLOBIN GLYCOSYLATED A1C: CPT | Performed by: NURSE PRACTITIONER

## 2021-01-01 PROCEDURE — 97112 NEUROMUSCULAR REEDUCATION: CPT

## 2021-01-01 PROCEDURE — G0071 COMM SVCS BY RHC/FQHC 5 MIN: HCPCS | Performed by: FAMILY MEDICINE

## 2021-01-01 PROCEDURE — 99214 OFFICE O/P EST MOD 30 MIN: CPT | Performed by: INTERNAL MEDICINE

## 2021-01-01 PROCEDURE — U0005 INFEC AGEN DETEC AMPLI PROBE: HCPCS | Performed by: STUDENT IN AN ORGANIZED HEALTH CARE EDUCATION/TRAINING PROGRAM

## 2021-01-01 PROCEDURE — 74177 CT ABD & PELVIS W/CONTRAST: CPT

## 2021-01-01 PROCEDURE — U0005 INFEC AGEN DETEC AMPLI PROBE: HCPCS | Performed by: EMERGENCY MEDICINE

## 2021-01-01 PROCEDURE — U0003 INFECTIOUS AGENT DETECTION BY NUCLEIC ACID (DNA OR RNA); SEVERE ACUTE RESPIRATORY SYNDROME CORONAVIRUS 2 (SARS-COV-2) (CORONAVIRUS DISEASE [COVID-19]), AMPLIFIED PROBE TECHNIQUE, MAKING USE OF HIGH THROUGHPUT TECHNOLOGIES AS DESCRIBED BY CMS-2020-01-R: HCPCS | Performed by: EMERGENCY MEDICINE

## 2021-01-01 PROCEDURE — 1101F PT FALLS ASSESS-DOCD LE1/YR: CPT | Performed by: NURSE PRACTITIONER

## 2021-01-01 PROCEDURE — 96374 THER/PROPH/DIAG INJ IV PUSH: CPT

## 2021-01-01 PROCEDURE — 82043 UR ALBUMIN QUANTITATIVE: CPT | Performed by: NURSE PRACTITIONER

## 2021-01-01 PROCEDURE — 3080F DIAST BP >= 90 MM HG: CPT | Performed by: NURSE PRACTITIONER

## 2021-01-01 PROCEDURE — 1170F FXNL STATUS ASSESSED: CPT | Performed by: NURSE PRACTITIONER

## 2021-01-01 PROCEDURE — 71045 X-RAY EXAM CHEST 1 VIEW: CPT

## 2021-01-01 PROCEDURE — 72131 CT LUMBAR SPINE W/O DYE: CPT

## 2021-01-01 PROCEDURE — G1004 CDSM NDSC: HCPCS

## 2021-01-01 PROCEDURE — 3061F NEG MICROALBUMINURIA REV: CPT | Performed by: NURSE PRACTITIONER

## 2021-01-01 PROCEDURE — G0439 PPPS, SUBSEQ VISIT: HCPCS | Performed by: NURSE PRACTITIONER

## 2021-01-01 PROCEDURE — 81002 URINALYSIS NONAUTO W/O SCOPE: CPT | Performed by: NURSE PRACTITIONER

## 2021-01-01 PROCEDURE — 96372 THER/PROPH/DIAG INJ SC/IM: CPT | Performed by: NURSE PRACTITIONER

## 2021-01-01 PROCEDURE — 3079F DIAST BP 80-89 MM HG: CPT | Performed by: INTERNAL MEDICINE

## 2021-01-01 PROCEDURE — 80061 LIPID PANEL: CPT

## 2021-01-01 RX ORDER — KETOROLAC TROMETHAMINE 30 MG/ML
15 INJECTION, SOLUTION INTRAMUSCULAR; INTRAVENOUS ONCE
Status: COMPLETED | OUTPATIENT
Start: 2021-01-01 | End: 2021-01-01

## 2021-01-01 RX ORDER — TIZANIDINE 4 MG/1
4 TABLET ORAL EVERY 8 HOURS PRN
Qty: 30 TABLET | Refills: 0 | Status: SHIPPED | OUTPATIENT
Start: 2021-01-01 | End: 2021-01-01

## 2021-01-01 RX ORDER — DOXYCYCLINE HYCLATE 100 MG/1
100 CAPSULE ORAL 2 TIMES DAILY
Qty: 14 CAPSULE | Refills: 0 | Status: SHIPPED | OUTPATIENT
Start: 2021-01-01 | End: 2021-01-01

## 2021-01-01 RX ORDER — IBUPROFEN 600 MG/1
600 TABLET ORAL EVERY 6 HOURS PRN
Qty: 30 TABLET | Refills: 0 | Status: ON HOLD | OUTPATIENT
Start: 2021-01-01 | End: 2022-01-01 | Stop reason: CLARIF

## 2021-01-01 RX ORDER — NADOLOL 40 MG/1
40 TABLET ORAL DAILY
Qty: 90 TABLET | Refills: 3 | Status: SHIPPED | OUTPATIENT
Start: 2021-01-01 | End: 2021-01-01 | Stop reason: SDUPTHER

## 2021-01-01 RX ORDER — GLIMEPIRIDE 2 MG/1
2 TABLET ORAL DAILY
Qty: 90 TABLET | Refills: 1 | Status: SHIPPED | OUTPATIENT
Start: 2021-01-01 | End: 2022-01-01 | Stop reason: SDUPTHER

## 2021-01-01 RX ORDER — METHYLPREDNISOLONE 4 MG/1
TABLET ORAL
Qty: 21 EACH | Refills: 0 | Status: SHIPPED | OUTPATIENT
Start: 2021-01-01 | End: 2021-01-01 | Stop reason: SDUPTHER

## 2021-01-01 RX ORDER — SORAFENIB 200 MG/1
TABLET, FILM COATED ORAL
Qty: 120 TABLET | Refills: 10 | Status: SHIPPED | OUTPATIENT
Start: 2021-01-01 | End: 2022-01-01 | Stop reason: ALTCHOICE

## 2021-01-01 RX ORDER — LISINOPRIL 20 MG/1
20 TABLET ORAL DAILY
Qty: 90 TABLET | Refills: 1 | Status: SHIPPED | OUTPATIENT
Start: 2021-01-01 | End: 2021-01-01

## 2021-01-01 RX ORDER — LISINOPRIL 20 MG/1
20 TABLET ORAL DAILY
COMMUNITY
End: 2021-01-01 | Stop reason: SDUPTHER

## 2021-01-01 RX ORDER — LIDOCAINE 50 MG/G
1 PATCH TOPICAL DAILY
Qty: 15 PATCH | Refills: 0 | Status: ON HOLD | OUTPATIENT
Start: 2021-01-01 | End: 2022-01-01 | Stop reason: CLARIF

## 2021-01-01 RX ORDER — METHYLPREDNISOLONE 4 MG/1
TABLET ORAL
Qty: 21 EACH | Refills: 0 | Status: SHIPPED | OUTPATIENT
Start: 2021-01-01 | End: 2022-01-01

## 2021-01-01 RX ORDER — TIZANIDINE 4 MG/1
4 TABLET ORAL EVERY 8 HOURS PRN
Qty: 30 TABLET | Refills: 0 | Status: SHIPPED | OUTPATIENT
Start: 2021-01-01 | End: 2021-01-01 | Stop reason: SDUPTHER

## 2021-01-01 RX ORDER — KETOROLAC TROMETHAMINE 30 MG/ML
30 INJECTION, SOLUTION INTRAMUSCULAR; INTRAVENOUS ONCE
Status: COMPLETED | OUTPATIENT
Start: 2021-01-01 | End: 2021-01-01

## 2021-01-01 RX ORDER — LIDOCAINE 50 MG/G
2 PATCH TOPICAL ONCE
Status: DISCONTINUED | OUTPATIENT
Start: 2021-01-01 | End: 2021-01-01 | Stop reason: HOSPADM

## 2021-01-01 RX ORDER — DOXYCYCLINE HYCLATE 100 MG/1
100 CAPSULE ORAL ONCE
Status: COMPLETED | OUTPATIENT
Start: 2021-01-01 | End: 2021-01-01

## 2021-01-01 RX ORDER — LEVOTHYROXINE SODIUM 112 UG/1
112 TABLET ORAL DAILY
Qty: 90 TABLET | Refills: 1 | Status: SHIPPED | OUTPATIENT
Start: 2021-01-01 | End: 2022-01-01 | Stop reason: SDUPTHER

## 2021-01-01 RX ORDER — LISINOPRIL AND HYDROCHLOROTHIAZIDE 25; 20 MG/1; MG/1
1 TABLET ORAL DAILY
Qty: 90 TABLET | Refills: 1 | Status: CANCELLED | OUTPATIENT
Start: 2021-01-01

## 2021-01-01 RX ORDER — NADOLOL 40 MG/1
40 TABLET ORAL DAILY
Qty: 90 TABLET | Refills: 3 | Status: SHIPPED | OUTPATIENT
Start: 2021-01-01 | End: 2022-01-01 | Stop reason: SDUPTHER

## 2021-01-01 RX ORDER — AMOXICILLIN AND CLAVULANATE POTASSIUM 875; 125 MG/1; MG/1
1 TABLET, FILM COATED ORAL EVERY 12 HOURS SCHEDULED
Qty: 14 TABLET | Refills: 0 | Status: SHIPPED | OUTPATIENT
Start: 2021-01-01 | End: 2021-01-01

## 2021-01-01 RX ADMIN — LIDOCAINE 2 PATCH: 50 PATCH CUTANEOUS at 21:59

## 2021-01-01 RX ADMIN — KETOROLAC TROMETHAMINE 15 MG: 30 INJECTION, SOLUTION INTRAMUSCULAR at 21:59

## 2021-01-01 RX ADMIN — DOXYCYCLINE 100 MG: 100 CAPSULE ORAL at 10:36

## 2021-01-01 RX ADMIN — KETOROLAC TROMETHAMINE 30 MG: 30 INJECTION, SOLUTION INTRAMUSCULAR; INTRAVENOUS at 15:12

## 2021-01-01 RX ADMIN — IOHEXOL 100 ML: 350 INJECTION, SOLUTION INTRAVENOUS at 09:31

## 2021-01-01 RX ADMIN — KETOROLAC TROMETHAMINE 30 MG: 30 INJECTION, SOLUTION INTRAMUSCULAR; INTRAVENOUS at 15:07

## 2021-01-08 ENCOUNTER — TELEPHONE (OUTPATIENT)
Dept: HEMATOLOGY ONCOLOGY | Facility: CLINIC | Age: 66
End: 2021-01-08

## 2021-01-11 ENCOUNTER — OFFICE VISIT (OUTPATIENT)
Dept: HEMATOLOGY ONCOLOGY | Facility: CLINIC | Age: 66
End: 2021-01-11
Payer: COMMERCIAL

## 2021-01-11 VITALS
SYSTOLIC BLOOD PRESSURE: 126 MMHG | RESPIRATION RATE: 18 BRPM | HEART RATE: 70 BPM | OXYGEN SATURATION: 98 % | TEMPERATURE: 97.2 F | DIASTOLIC BLOOD PRESSURE: 78 MMHG | BODY MASS INDEX: 28.51 KG/M2 | WEIGHT: 182 LBS

## 2021-01-11 DIAGNOSIS — C22.0 HEPATOCELLULAR CARCINOMA (HCC): Primary | ICD-10-CM

## 2021-01-11 PROCEDURE — 3074F SYST BP LT 130 MM HG: CPT | Performed by: INTERNAL MEDICINE

## 2021-01-11 PROCEDURE — 3078F DIAST BP <80 MM HG: CPT | Performed by: INTERNAL MEDICINE

## 2021-01-11 PROCEDURE — 99214 OFFICE O/P EST MOD 30 MIN: CPT | Performed by: INTERNAL MEDICINE

## 2021-01-11 PROCEDURE — 1160F RVW MEDS BY RX/DR IN RCRD: CPT | Performed by: INTERNAL MEDICINE

## 2021-01-11 NOTE — PROGRESS NOTES
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    He has history of chronic hepatitis C as well as alcohol abuse  Alena Hameed has liver cirrhosis   He has multifocal liver lesion with low level of AFP    This was histologically confirmed to be well-differentiated hepatocellular carcinoma   He started sorafenib in October 2018  He had radiographical as well as biochemical response  This time, he had minor biochemical progression with AFP 10 5  However, he is asymptomatic  I recommended him to continue with sorafenib  He is in agreement with my recommendation  I am going to obtain CT scan of chest abdomen pelvis and AFP in 6 months followed by office visit  He is in agreement with my recommendations           Subjective:      HPI:             Interval History:  A 72year old gentleman with 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 diagnosed in November 2011  He underwent concurrent chemoradiation with high dose cisplatin resulting in complete response  He has chronic hepatitis C, as well as liver cirrhosis, based on a CT scan of abdomen and pelvis  In October 2014, he was found to have multifocal liver lesions, based on the CT scan  This was confirmed by MRI of the abdomen  However, the PET/CT scan showed the lesion was not hypermetabolic  He declined liver biopsy    Radiographically, this was consistent with multifocal hepatocellular carcinoma   He has been under the care of Jennifer Gonzales summer of 2018, CT scan showed progression   He was suggested to have Siro sphere which he declined   Instead, he accepted sorafenib, which he started in October 2018   He had partial response on sorafenib, based on CT scan as well as AFP decline  Yoon Dallas presents today for routine follow-up  He has absolutely no new complaints  He denied any abdominal pain  He has no nausea, vomiting, diarrhea constipation  He has very minimal hand-foot reaction  He denied any skin rash  His weight is stable  His performance status is normal        Objective:      Primary Diagnosis:     1  Squamous cell carcinoma of oropharynx, stage SHAINA disease, diagnosed in November 2011  2 Multifocal liver lesion, clinically clinically consistent with hepatocellular carcinoma      Cancer Staging:  Cancer Staging  No matching staging information was found for the patient         Previous Hematologic/ Oncologic Treatment:      Concurrent chemoradiation with high-dose cisplatin completed on February 13, 2012       Current Hematologic/ Oncologic Treatment:       Sorafenib of since October 2018         Disease Status:      Partial response as well as biochemical response      Test Results:     Pathology:      Liver biopsy showed well-differentiated hepatocellular carcinoma      Radiology:     CT scan of chest abdomen pelvis in June 2020 showed stable enhanced liver mass with no evidence of progression      Laboratory:      AFP is 10 5 in December 2020  See below otherwise  v       Physical Exam:        General Appearance:    Alert, oriented          Eyes:    PERRL   Ears:    Normal external ear canals, both ears   Nose:   Nares normal, septum midline   Throat:   Mucosa moist  Pharynx without injection  Neck:   Supple         Lungs:     Clear to auscultation bilaterally   Chest Wall:    No tenderness or deformity    Heart:    Regular rate and rhythm         Abdomen:     Soft, non-tender, bowel sounds +, hepatomegaly   No palpable spleen                Extremities:   Extremities no cyanosis or edema         Skin:   no rash or icterus      Lymph nodes:   Cervical, supraclavicular, and axillary nodes normal   Neurologic:   CNII-XII intact, normal strength, sensation and reflexes     Throughout             Breast exam:   Not applicable             ROS: Review of Systems   All other systems reviewed and are negative  Imaging: No results found  Labs:   Lab Results   Component Value Date    WBC 4 99 12/04/2020    HGB 13 4 12/04/2020    HCT 40 8 12/04/2020    MCV 91 12/04/2020     (L) 12/04/2020     Lab Results   Component Value Date     12/09/2015    K 4 1 12/04/2020     12/04/2020    CO2 33 (H) 12/04/2020    ANIONGAP 5 12/09/2015    BUN 13 12/04/2020    CREATININE 1 00 12/04/2020    GLUCOSE 267 (H) 12/09/2015    GLUF 150 (H) 01/20/2020    CALCIUM 9 3 12/04/2020    AST 31 12/04/2020    ALT 35 12/04/2020    ALKPHOS 76 12/04/2020    PROT 7 4 12/09/2015    BILITOT 0 97 12/09/2015    EGFR 79 12/04/2020         Lab Results   Component Value Date    PSA 0 6 01/20/2020    PSA 0 4 01/18/2018    PSA 0 4 09/29/2016         Current Medications: Reviewed  Allergies: Reviewed  PMH/FH/SH:  Reviewed      Vital Sign:    Body surface area is 1 94 meters squared      Wt Readings from Last 3 Encounters:   01/11/21 82 6 kg (182 lb)   12/08/20 82 6 kg (182 lb)   06/12/20 81 6 kg (180 lb)        Temp Readings from Last 3 Encounters:   01/11/21 (!) 97 2 °F (36 2 °C) (Tympanic Core)   12/15/20 97 8 °F (36 6 °C) (Temporal)   12/08/20 97 8 °F (36 6 °C) (Temporal)        BP Readings from Last 3 Encounters:   01/11/21 126/78   12/15/20 138/80   12/08/20 168/96         Pulse Readings from Last 3 Encounters:   01/11/21 70   12/15/20 62   12/08/20 78     @LASTSAO2(3)@

## 2021-03-08 ENCOUNTER — OFFICE VISIT (OUTPATIENT)
Dept: FAMILY MEDICINE CLINIC | Facility: CLINIC | Age: 66
End: 2021-03-08

## 2021-03-08 VITALS
TEMPERATURE: 97.1 F | OXYGEN SATURATION: 96 % | BODY MASS INDEX: 28.19 KG/M2 | RESPIRATION RATE: 18 BRPM | SYSTOLIC BLOOD PRESSURE: 134 MMHG | WEIGHT: 179.6 LBS | HEIGHT: 67 IN | HEART RATE: 72 BPM | DIASTOLIC BLOOD PRESSURE: 82 MMHG

## 2021-03-08 DIAGNOSIS — E11.9 TYPE 2 DIABETES MELLITUS WITHOUT COMPLICATION, WITHOUT LONG-TERM CURRENT USE OF INSULIN (HCC): Primary | ICD-10-CM

## 2021-03-08 DIAGNOSIS — E03.9 HYPOTHYROIDISM, UNSPECIFIED TYPE: ICD-10-CM

## 2021-03-08 DIAGNOSIS — I10 ESSENTIAL HYPERTENSION, BENIGN: ICD-10-CM

## 2021-03-08 LAB — SL AMB POCT HEMOGLOBIN AIC: 6.1 (ref ?–6.5)

## 2021-03-08 PROCEDURE — 83036 HEMOGLOBIN GLYCOSYLATED A1C: CPT | Performed by: NURSE PRACTITIONER

## 2021-03-08 PROCEDURE — 3008F BODY MASS INDEX DOCD: CPT | Performed by: INTERNAL MEDICINE

## 2021-03-08 PROCEDURE — 3044F HG A1C LEVEL LT 7.0%: CPT | Performed by: NURSE PRACTITIONER

## 2021-03-08 PROCEDURE — 99214 OFFICE O/P EST MOD 30 MIN: CPT | Performed by: NURSE PRACTITIONER

## 2021-03-08 PROCEDURE — 1036F TOBACCO NON-USER: CPT | Performed by: NURSE PRACTITIONER

## 2021-03-08 PROCEDURE — 92250 FUNDUS PHOTOGRAPHY W/I&R: CPT | Performed by: NURSE PRACTITIONER

## 2021-03-08 PROCEDURE — 3044F HG A1C LEVEL LT 7.0%: CPT | Performed by: INTERNAL MEDICINE

## 2021-03-08 PROCEDURE — 3008F BODY MASS INDEX DOCD: CPT | Performed by: NURSE PRACTITIONER

## 2021-03-08 PROCEDURE — 3075F SYST BP GE 130 - 139MM HG: CPT | Performed by: NURSE PRACTITIONER

## 2021-03-08 PROCEDURE — 3079F DIAST BP 80-89 MM HG: CPT | Performed by: NURSE PRACTITIONER

## 2021-03-08 RX ORDER — GLIMEPIRIDE 2 MG/1
2 TABLET ORAL DAILY
Qty: 90 TABLET | Refills: 1 | Status: SHIPPED | OUTPATIENT
Start: 2021-03-08 | End: 2021-01-01 | Stop reason: SDUPTHER

## 2021-03-08 NOTE — ASSESSMENT & PLAN NOTE
Dr Deepthi Parkinson is primary oncologist   Squamous cell carcinoma of oropharynx, stage SHAINA disease, diagnosed in November 2011  Received chemoradiation with high dose cisplatin resulting in complete response

## 2021-03-08 NOTE — PROGRESS NOTES
Assessment/Plan:    Hepatocellular carcinoma ([facility] )  Dr Tammy Erickson is primary oncologist   started sorafenib in October 2018 and continues with 400 mg bid   Has been noted to have a partial response (stable CT and declining AFP level) 12/17/2020  Next FU with Dr Tammy Erickson 6/2021    Hepatic cirrhosis ([facility] )  Continue with nadolol daily     Secondary esophageal varices without bleeding ([place] [address] )  Discussed that he is due for GI follow up   Continue with nadolol as prescribed by GI    Diabetes mellitus, type II ([facility] )    Lab Results   Component Value Date    HGBA1C 6 1 03/08/2021     Will decrease Amaryl to 2 mg daily as patient expressed interest at last visit in discontinuing medication   Continue with diet and lifestyle changes     Hypothyroidism  Continue with levothyroxine 112 mcg daily   Encouraged to complete previously ordered TSH level     H/O laryngeal cancer  Dr Tammy Erickson is primary oncologist   Squamous cell carcinoma of oropharynx, stage SHAINA disease, diagnosed in November 2011  Received chemoradiation with high dose cisplatin resulting in complete response    Hypertension  BP controlled in the office today   Continue with current regimen lisinopril/ hctz 20-25 mg daily     Erectile dysfunction  Continue with sildenafil as needed     Tonio Esteves was seen today for follow-up  Diagnoses and all orders for this visit:    Type 2 diabetes mellitus without complication, without long-term current use of insulin (HCC)  -     IRIS Diabetic eye exam  -     Cancel: Hemoglobin A1C; Future  -     POCT hemoglobin A1c  -     glimepiride (AMARYL) 2 mg tablet; Take 1 tablet (2 mg total) by mouth daily    Essential hypertension, benign    Hypothyroidism, unspecified type          Return in about 3 months (around 6/8/2021) for Recheck        Patient Instructions     [id number] option 7 to schedule COVID vaccine         Heart Healthy Diet   WHAT YOU NEED TO KNOW:   A heart healthy diet is an eating plan low in unhealthy fats and sodium (salt)  The plan is high in healthy fats and fiber  A heart healthy diet helps improve your cholesterol levels and lowers your risk for heart disease and stroke  A dietitian will teach you how to read and understand food labels  DISCHARGE INSTRUCTIONS:   Heart healthy diet guidelines to follow:   · Choose foods that contain healthy fats  ? Unsaturated fats  include monounsaturated and polyunsaturated fats  Unsaturated fat is found in foods such as soybean, canola, olive, corn, and safflower oils  It is also found in soft tub margarine that is made with liquid vegetable oil  ? Omega-3 fat  is found in certain fish, such as salmon, tuna, and trout, and in walnuts and flaxseed  Eat fish high in omega-3 fats at least 2 times a week  · Get 20 to 30 grams of fiber each day  Fruits, vegetables, whole-grain foods, and legumes (cooked beans) are good sources of fiber  · Limit or do not have unhealthy fats  ? Cholesterol  is found in animal foods, such as eggs and lobster, and in dairy products made from whole milk  Limit cholesterol to less than 200 mg each day  ? Saturated fat  is found in meats, such as browning and hamburger  It is also found in chicken or turkey skin, whole milk, and butter  Limit saturated fat to less than 7% of your total daily calories  ? Trans fat  is found in packaged foods, such as potato chips and cookies  It is also in hard margarine, some fried foods, and shortening  Do not eat foods that contain trans fats  · Limit sodium as directed  You may be told to limit sodium to 2,000 to 2,300 mg each day  Choose low-sodium or no-salt-added foods  Add little or no salt to food you prepare  Use herbs and spices in place of salt  Include the following in your heart healthy plan:  Ask your dietitian or healthcare provider how many servings to have from each of the following food groups:  · Grains:      ?  Whole-wheat breads, cereals, and pastas, and Vincent Palmira rice    ? Low-fat, low-sodium crackers and chips    · Vegetables:      ? Broccoli, green beans, green peas, and spinach    ? Collards, kale, and lima beans    ? Carrots, sweet potatoes, tomatoes, and peppers    ? Canned vegetables with no salt added    · Fruits:      ? Bananas, peaches, pears, and pineapple    ? Grapes, raisins, and dates    ? Oranges, tangerines, grapefruit, orange juice, and grapefruit juice    ? Apricots, mangoes, melons, and papaya    ? Raspberries and strawberries    ? Canned fruit with no added sugar    · Low-fat dairy:      ? Nonfat (skim) milk, 1% milk, and low-fat almond, cashew, or soy milks fortified with calcium    ? Low-fat cheese, regular or frozen yogurt, and cottage cheese    · Meats and proteins:      ? Lean cuts of beef and pork (loin, leg, round), skinless chicken and turkey    ? Legumes, soy products, egg whites, or nuts    Limit or do not include the following in your heart healthy plan:   · Unhealthy fats and oils:      ? Whole or 2% milk, cream cheese, sour cream, or cheese    ? High-fat cuts of beef (T-bone steaks, ribs), chicken or turkey with skin, and organ meats such as liver    ? Butter, stick margarine, shortening, and cooking oils such as coconut or palm oil    · Foods and liquids high in sodium:      ? Packaged foods, such as frozen dinners, cookies, macaroni and cheese, and cereals with more than 300 mg of sodium per serving    ? Vegetables with added sodium, such as instant potatoes, vegetables with added sauces, or regular canned vegetables    ? Cured or smoked meats, such as hot dogs, browning, and sausage    ? High-sodium ketchup, barbecue sauce, salad dressing, pickles, olives, soy sauce, or miso    · Foods and liquids high in sugar:      ? Candy, cake, cookies, pies, or doughnuts    ? Soft drinks (soda), sports drinks, or sweetened tea    ? Canned or dry mixes for cakes, soups, sauces, or gravies    Other healthy heart guidelines:   · Do not smoke    Nicotine and other chemicals in cigarettes and cigars can cause lung and heart damage  Ask your healthcare provider for information if you currently smoke and need help to quit  E-cigarettes or smokeless tobacco still contain nicotine  Talk to your healthcare provider before you use these products  · Limit or do not drink alcohol as directed  Alcohol can damage your heart and raise your blood pressure  Your healthcare provider may give you specific daily and weekly limits  The general recommended limit is 1 drink a day for women 21 or older and for men 72 or older  Do not have more than 3 drinks in a day or 7 in a week  The recommended limit is 2 drinks a day for men 24to 59years of age  Do not have more than 4 drinks in a day or 14 in a week  A drink of alcohol is 12 ounces of beer, 5 ounces of wine, or 1½ ounces of liquor  · Exercise regularly  Exercise can help you maintain a healthy weight and improve your blood pressure and cholesterol levels  Regular exercise can also decrease your risk for heart problems  Ask your healthcare provider about the best exercise plan for you  Do not start an exercise program without asking your healthcare provider  Follow up with your doctor or cardiologist as directed:  Write down your questions so you remember to ask them during your visits  © Copyright [address] Information is for End User's use only and may not be sold, redistributed or otherwise used for commercial purposes  All illustrations and images included in CareNotes® are the copyrighted property of [employer]  or [facility] Nimisha Rucker   The above information is an  only  It is not intended as medical advice for individual conditions or treatments  Talk to your doctor, nurse or pharmacist before following any medical regimen to see if it is safe and effective for you          Subjective:     Luis Enrique Martini is a 72 y o  male who  has a past medical history of Cardiac disorder, Chronic hepatitis C virus infection ([facility] ), Diabetes mellitus ([facility] ), Dysphagia, Esophageal varices ([facility] ), Hepatic disease, Hepatitis, History of chemotherapy, History of radiation therapy, Hypertension, Laryngeal cancer ([facility] ), Liver cancer ([facility] ), 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[facility] ), Recurrent hepatitis C ([facility] ), and Rheumatic fever  He also has no past medical history of Mental disorder  who presented to the office today for follow up  Patient reports that he feels well  He is in the process of buying a home with his significant other  He is following with Dr Zeinab Ghosh, last visit was 1/2021  He will follow up with heme/ onc in 6 months and have labs/ CT repeated  The following portions of the patient's history were reviewed and updated as appropriate: allergies, current medications, past family history, past medical history, past social history, past surgical history and problem list     Current Outpatient Medications on File Prior to Visit   Medication Sig Dispense Refill    levothyroxine 112 mcg tablet Take 1 tablet (112 mcg total) by mouth daily 90 tablet 1    lisinopril-hydrochlorothiazide (PRINZIDE,ZESTORETIC) 20-25 MG per tablet Take 1 tablet by mouth daily 90 tablet 1    nadolol (CORGARD) 40 mg tablet Take 1 tablet (40 mg total) by mouth daily 90 tablet 3    NexAVAR 200 MG tablet Take 2 tablets (400 mg total) by mouth 2 times a day  120 tablet 10    [DISCONTINUED] glimepiride (AMARYL) 4 mg tablet Take 1 tablet (4 mg total) by mouth daily 90 tablet 1    acyclovir (ZOVIRAX) 400 MG tablet Take 1 tablet (400 mg total) by mouth 3 (three) times a day for 10 days 30 tablet 0    sildenafil (VIAGRA) 100 mg tablet Take 1 tablet (100 mg total) by mouth as needed for erectile dysfunction 9 tablet 5    valACYclovir (VALTREX) 500 mg tablet Take 1 tablet (500 mg total) by mouth 2 (two) times a day for 3 days 6 tablet 2     No current facility-administered medications on file prior to visit          Review of Systems Constitutional: Negative for chills and fever  HENT: Negative for ear pain and sore throat  Eyes: Negative for pain and visual disturbance  Respiratory: Negative for apnea, cough and shortness of breath  Cardiovascular: Negative for chest pain and palpitations  Gastrointestinal: Negative for abdominal pain and vomiting  Genitourinary: Negative for difficulty urinating, dysuria and hematuria  Musculoskeletal: Negative for arthralgias and back pain  Skin: Negative for color change and rash  Neurological: Negative for dizziness, seizures, syncope and headaches  All other systems reviewed and are negative  Objective:    /82 (BP Location: Left arm, Patient Position: Sitting, Cuff Size: Standard)   Pulse 72   Temp (!) 97 1 °F (36 2 °C) (Temporal)   Resp 18   Ht 5' 7" (1 702 m)   Wt 81 5 kg (179 lb 9 6 oz)   SpO2 96%   BMI 28 13 kg/m²     Physical Exam  Vitals signs and nursing note reviewed  Constitutional:       General: He is not in acute distress  Appearance: He is well-developed  He is not diaphoretic  HENT:      Head: Normocephalic and atraumatic  Right Ear: External ear normal       Left Ear: External ear normal    Eyes:      General:         Right eye: No discharge  Left eye: No discharge  Conjunctiva/sclera: Conjunctivae normal       Pupils: Pupils are equal, round, and reactive to light  Neck:      Musculoskeletal: Normal range of motion and neck supple  Cardiovascular:      Rate and Rhythm: Normal rate and regular rhythm  Pulses: Normal pulses  Heart sounds: Normal heart sounds  Pulmonary:      Effort: Pulmonary effort is normal  No respiratory distress  Breath sounds: Normal breath sounds  No wheezing  Abdominal:      General: Bowel sounds are normal  There is no distension  Palpations: Abdomen is soft  Tenderness: There is no abdominal tenderness  There is no guarding or rebound     Musculoskeletal: Normal range of motion  General: No deformity  Right lower leg: No edema  Left lower leg: No edema  Lymphadenopathy:      Cervical: No cervical adenopathy  Skin:     General: Skin is warm and dry  Capillary Refill: Capillary refill takes less than 2 seconds  Findings: No rash  Neurological:      General: No focal deficit present  Mental Status: He is alert and oriented to person, place, and time        Gait: Gait normal    Psychiatric:         Behavior: Behavior normal          PRINCE Gudino  03/08/21  11:16 AM

## 2021-03-08 NOTE — PATIENT INSTRUCTIONS
[id number] option 7 to schedule COVID vaccine         Heart Healthy Diet   WHAT YOU NEED TO KNOW:   A heart healthy diet is an eating plan low in unhealthy fats and sodium (salt)  The plan is high in healthy fats and fiber  A heart healthy diet helps improve your cholesterol levels and lowers your risk for heart disease and stroke  A dietitian will teach you how to read and understand food labels  DISCHARGE INSTRUCTIONS:   Heart healthy diet guidelines to follow:   · Choose foods that contain healthy fats  ? Unsaturated fats  include monounsaturated and polyunsaturated fats  Unsaturated fat is found in foods such as soybean, canola, olive, corn, and safflower oils  It is also found in soft tub margarine that is made with liquid vegetable oil  ? Omega-3 fat  is found in certain fish, such as salmon, tuna, and trout, and in walnuts and flaxseed  Eat fish high in omega-3 fats at least 2 times a week  · Get 20 to 30 grams of fiber each day  Fruits, vegetables, whole-grain foods, and legumes (cooked beans) are good sources of fiber  · Limit or do not have unhealthy fats  ? Cholesterol  is found in animal foods, such as eggs and lobster, and in dairy products made from whole milk  Limit cholesterol to less than 200 mg each day  ? Saturated fat  is found in meats, such as browning and hamburger  It is also found in chicken or turkey skin, whole milk, and butter  Limit saturated fat to less than 7% of your total daily calories  ? Trans fat  is found in packaged foods, such as potato chips and cookies  It is also in hard margarine, some fried foods, and shortening  Do not eat foods that contain trans fats  · Limit sodium as directed  You may be told to limit sodium to 2,000 to 2,300 mg each day  Choose low-sodium or no-salt-added foods  Add little or no salt to food you prepare  Use herbs and spices in place of salt         Include the following in your heart healthy plan:  Ask your dietitian or healthcare provider how many servings to have from each of the following food groups:  · Grains:      ? Whole-wheat breads, cereals, and pastas, and brown rice    ? Low-fat, low-sodium crackers and chips    · Vegetables:      ? Broccoli, green beans, green peas, and spinach    ? Collards, kale, and lima beans    ? Carrots, sweet potatoes, tomatoes, and peppers    ? Canned vegetables with no salt added    · Fruits:      ? Bananas, peaches, pears, and pineapple    ? Grapes, raisins, and dates    ? Oranges, tangerines, grapefruit, orange juice, and grapefruit juice    ? Apricots, mangoes, melons, and papaya    ? Raspberries and strawberries    ? Canned fruit with no added sugar    · Low-fat dairy:      ? Nonfat (skim) milk, 1% milk, and low-fat almond, cashew, or soy milks fortified with calcium    ? Low-fat cheese, regular or frozen yogurt, and cottage cheese    · Meats and proteins:      ? Lean cuts of beef and pork (loin, leg, round), skinless chicken and turkey    ? Legumes, soy products, egg whites, or nuts    Limit or do not include the following in your heart healthy plan:   · Unhealthy fats and oils:      ? Whole or 2% milk, cream cheese, sour cream, or cheese    ? High-fat cuts of beef (T-bone steaks, ribs), chicken or turkey with skin, and organ meats such as liver    ? Butter, stick margarine, shortening, and cooking oils such as coconut or palm oil    · Foods and liquids high in sodium:      ? Packaged foods, such as frozen dinners, cookies, macaroni and cheese, and cereals with more than 300 mg of sodium per serving    ? Vegetables with added sodium, such as instant potatoes, vegetables with added sauces, or regular canned vegetables    ? Cured or smoked meats, such as hot dogs, browning, and sausage    ? High-sodium ketchup, barbecue sauce, salad dressing, pickles, olives, soy sauce, or miso    · Foods and liquids high in sugar:      ? Candy, cake, cookies, pies, or doughnuts    ?  Soft drinks (soda), sports drinks, or sweetened tea    ? Canned or dry mixes for cakes, soups, sauces, or gravies    Other healthy heart guidelines:   · Do not smoke  Nicotine and other chemicals in cigarettes and cigars can cause lung and heart damage  Ask your healthcare provider for information if you currently smoke and need help to quit  E-cigarettes or smokeless tobacco still contain nicotine  Talk to your healthcare provider before you use these products  · Limit or do not drink alcohol as directed  Alcohol can damage your heart and raise your blood pressure  Your healthcare provider may give you specific daily and weekly limits  The general recommended limit is 1 drink a day for women 21 or older and for men 72 or older  Do not have more than 3 drinks in a day or 7 in a week  The recommended limit is 2 drinks a day for men 24to 59years of age  Do not have more than 4 drinks in a day or 14 in a week  A drink of alcohol is 12 ounces of beer, 5 ounces of wine, or 1½ ounces of liquor  · Exercise regularly  Exercise can help you maintain a healthy weight and improve your blood pressure and cholesterol levels  Regular exercise can also decrease your risk for heart problems  Ask your healthcare provider about the best exercise plan for you  Do not start an exercise program without asking your healthcare provider  Follow up with your doctor or cardiologist as directed:  Write down your questions so you remember to ask them during your visits  © Copyright [address] Information is for End User's use only and may not be sold, redistributed or otherwise used for commercial purposes  All illustrations and images included in CareNotes® are the copyrighted property of [employer]  or [address] [place], [place] [address]   The above information is an  only  It is not intended as medical advice for individual conditions or treatments   Talk to your doctor, nurse or pharmacist before following any medical regimen to see if it is safe and effective for you

## 2021-03-08 NOTE — ASSESSMENT & PLAN NOTE
Dr Rashel Chandra is primary oncologist   started sorafenib in October 2018 and continues with 400 mg bid   Has been noted to have a partial response (stable CT and declining AFP level) 12/17/2020  Next FU with Dr Rashel Chandra 6/2021

## 2021-03-08 NOTE — ASSESSMENT & PLAN NOTE
Lab Results   Component Value Date    HGBA1C 6 1 03/08/2021     Will decrease Amaryl to 2 mg daily as patient expressed interest at last visit in discontinuing medication   Continue with diet and lifestyle changes

## 2021-03-09 PROCEDURE — 2025F 7 FLD RTA PHOTO W/O RTNOPTHY: CPT | Performed by: NURSE PRACTITIONER

## 2021-03-09 PROCEDURE — 2025F 7 FLD RTA PHOTO W/O RTNOPTHY: CPT | Performed by: INTERNAL MEDICINE

## 2021-04-21 ENCOUNTER — IMMUNIZATIONS (OUTPATIENT)
Dept: FAMILY MEDICINE CLINIC | Facility: HOSPITAL | Age: 66
End: 2021-04-21

## 2021-04-21 DIAGNOSIS — Z23 ENCOUNTER FOR IMMUNIZATION: Primary | ICD-10-CM

## 2021-04-21 PROCEDURE — 91301 SARS-COV-2 / COVID-19 MRNA VACCINE (MODERNA) 100 MCG: CPT

## 2021-04-21 PROCEDURE — 0011A SARS-COV-2 / COVID-19 MRNA VACCINE (MODERNA) 100 MCG: CPT

## 2021-05-20 ENCOUNTER — IMMUNIZATIONS (OUTPATIENT)
Dept: FAMILY MEDICINE CLINIC | Facility: HOSPITAL | Age: 66
End: 2021-05-20

## 2021-05-20 DIAGNOSIS — Z23 ENCOUNTER FOR IMMUNIZATION: Primary | ICD-10-CM

## 2021-05-20 PROCEDURE — 91301 SARS-COV-2 / COVID-19 MRNA VACCINE (MODERNA) 100 MCG: CPT

## 2021-05-20 PROCEDURE — 0012A SARS-COV-2 / COVID-19 MRNA VACCINE (MODERNA) 100 MCG: CPT

## 2021-06-07 NOTE — PROGRESS NOTES
Assessment/Plan:    Diabetes mellitus, type II ([facility] [address] )    Lab Results   Component Value Date    HGBA1C 6 4 06/08/2021     A1c slightly increased today   Discussed diabetic diet, will continue with current regimen of Amaryl 2 mg daily   Discussed if at next visit A1c is the same or better we will d/c medication     Hypothyroidism  Continue with levothyroxine 112 mcg daily   Encouraged to complete previously ordered TSH level     Hypertension  BP initially 160/100 on recheck 148/82   He will continue with current regimen, discussed decreased sodium, heart healthy diet  He will rtc in 2 weeks for BP recheck, if BP not at goal patient is agreeable to medication adjustment     Colon cancer screening declined  Risks and benefits of testing and refusing testing discussed - patient states that he understands, does not want testing at this time     Andre Valdivia was seen today for diabetes and hypertension  Diagnoses and all orders for this visit:    Type 2 diabetes mellitus without complication, without long-term current use of insulin (HCC)  -     POCT hemoglobin A1c    Essential hypertension, benign    Colon cancer screening declined      Return for RTC in 1-2 weeks for BP check and in 3 months w/ me for AWV  Patient Instructions     Hypertension and Diabetes   AMBULATORY CARE:   Hypertension  is high blood pressure (BP)  Hypertension is common in persons with diabetes  This type of hypertension is called secondary hypertension  A normal BP is 119/79 or lower  You can control hypertension and diabetes with a healthy lifestyle, or a combination of lifestyle and medicine  Controlled BP and blood sugar levels help prevent certain complications from diabetes  Examples include retinopathy (eye damage) and kidney damage    Common signs and symptoms include the following:   · Headache     · Blurred vision     · Chest pain     · Dizziness or weakness     · Trouble breathing    · Nosebleeds    Call or have someone call your local emergency number (911 in the [address]) for any of the following: You have any of the following signs of a heart attack:   · Squeezing, pressure, or pain in your chest    · You may  also have any of the following:     ? Discomfort or pain in your back, neck, jaw, stomach, or arm    ? Shortness of breath    ? Nausea or vomiting    ? Lightheadedness or a sudden cold sweat  You have any of the following signs of a stroke:   · Numbness or drooping on one side of your face     · Weakness in an arm or leg    · Confusion or difficulty speaking    · Dizziness, a severe headache, or vision loss    Call your doctor or diabetes care team if:   · You feel faint, dizzy, confused, or drowsy  · You have been taking your BP medicine and your BP is still higher than your healthcare provider says it should be  · You have questions or concerns about your condition or care  Treatment for hypertension and diabetes  may include lifestyle changes  You may also need medicines to lower your BP, blood sugar, and cholesterol levels  A low cholesterol level helps prevent heart disease and makes it easier to control your BP  Take your medicine exactly as directed  Manage hypertension and diabetes:  Talk with your healthcare provider about these and other ways to manage hypertension and diabetes:  · Check your BP at home  Avoid smoking, caffeine, and exercise at least 30 minutes before checking your BP  Sit and rest for 5 minutes before you take your blood pressure  Extend your arm and support it on a flat surface  Your arm should be at the same level as your heart  Follow the directions that came with your BP monitor  Check your BP 2 times, 1 minute apart, before you take your medicine in the morning  Also check your BP before your evening meal  Keep a record of your readings and bring it to your follow-up visits  Ask your healthcare provider what your BP should be  · Check your blood sugar level at home    Follow your healthcare provider's instructions and check your blood sugar level as directed  Keep a record of your blood sugar level readings and bring it to your follow-up visits  Ask your healthcare provider what your blood sugar levels should be  · Manage any other health conditions you have  Health conditions such as kidney disease, thyroid disease, or adrenal gland disorder can increase your BP and blood sugar levels  Follow your healthcare provider's instructions and take all your medicines as directed  Lifestyle changes you can make:  Talk with your healthcare provider about these and other lifestyle changes for hypertension and diabetes:  · Limit sodium (salt) as directed  Too much sodium can affect your fluid balance  Check labels to find low-sodium or no-salt-added foods  Some low-sodium foods use potassium salts for flavor  Too much potassium can also cause health problems  Your healthcare provider will tell you how much sodium and potassium are safe for you to have in a day  He or she may recommend that you limit sodium to 2,300 mg a day  · Follow the meal plan recommended by your healthcare provider  A dietitian or your provider can help you create healthy meal plans  The plans will help you control sodium, carbohydrates, and fats in your meals  This can help you control both your blood sugar and BP levels  The plans usually include eating more fruits, vegetables, and low-fat dairy products  Your provider may talk to you about a Mediterranean style and Dietary Approaches to Stop Hypertension (DASH) eating plans  These eating plans can help you with weight loss and lowering your cholesterol  · Get regular physical activity  Physical activity can help decrease your blood sugar level  It can also help to decrease your risk for heart disease and help you lose weight  Adults should have moderate intensity physical activity for at least 150 minutes every week   Spread the amount of activity over at least 3 days a week  Do not skip more than 2 days in a row  Children should get at least 60 minutes of moderate physical activity on most days of the week  Examples of moderate physical activity include brisk walking, running, and swimming  Do not sit for longer than 30 minutes  Work with your healthcare provider to create a plan for physical activity  · Decrease stress  This may help lower your BP  Learn ways to relax, such as deep breathing or listening to music  Yoga and meditation may also help  Talk to your healthcare provider about ways to decrease stress  · Know the risks if you choose to drink alcohol  Alcohol can cause your blood sugar levels to be low if you use insulin  Alcohol can cause high blood sugar and BP levels, and weight gain if you drink too much  Women 21 years or older and men 72 years or older should limit alcohol to 1 drink a day  Men aged 24 to 59 years should limit alcohol to 2 drinks a day  A drink of alcohol is 12 ounces of beer, 5 ounces of wine, or 1½ ounces of liquor  · Do not smoke  Nicotine and other chemicals in cigarettes and cigars can increase your BP and make your blood sugar levels harder to control  Ask your healthcare provider for information if you currently smoke and need help to quit  E-cigarettes or smokeless tobacco still contain nicotine  Talk to your healthcare provider before you use these products  Follow up with your healthcare provider as directed: You will need to return to have your BP checked  You will also need other lab tests done, including an A1C to monitor your overall blood sugar control  Write down your questions so you remember to ask them during your visits  © Copyright [address] Information is for End User's use only and may not be sold, redistributed or otherwise used for commercial purposes   All illustrations and images included in CareNotes® are the copyrighted property of A D A M [employer]  or [employer] Sahni  The above information is an  only  It is not intended as medical advice for individual conditions or treatments  Talk to your doctor, nurse or pharmacist before following any medical regimen to see if it is safe and effective for you  Subjective:     Lindie Eisenmenger is a 77 y o  male who  has a past medical history of Cardiac disorder, Chronic hepatitis C virus infection ([facility] [address] ), Diabetes mellitus ([facility] [address] ), Dysphagia, Esophageal varices ([facility] [address] ), Hepatic disease, Hepatitis, History of chemotherapy, History of radiation therapy, Hypertension, Laryngeal cancer ([facility] [address] ), Liver cancer ([facility] [address] ), 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[facility] [address] ), Recurrent hepatitis C ([facility] [address] ), and Rheumatic fever  He also has no past medical history of Mental disorder  who presented to the office today for follow up  He reports that he is feeling well, no new issues or concerns  He is following up with oncology next month and will have labs and CT done prior to visit  The following portions of the patient's history were reviewed and updated as appropriate: allergies, current medications, past family history, past medical history, past social history, past surgical history and problem list     Current Outpatient Medications on File Prior to Visit   Medication Sig Dispense Refill    glimepiride (AMARYL) 2 mg tablet Take 1 tablet (2 mg total) by mouth daily 90 tablet 1    levothyroxine 112 mcg tablet Take 1 tablet (112 mcg total) by mouth daily 90 tablet 1    nadolol (CORGARD) 40 mg tablet Take 1 tablet (40 mg total) by mouth daily 90 tablet 3    NexAVAR 200 MG tablet Take 2 tablets (400 mg total) by mouth 2 times a day   120 tablet 10    sildenafil (VIAGRA) 100 mg tablet Take 1 tablet (100 mg total) by mouth as needed for erectile dysfunction 9 tablet 5    [DISCONTINUED] lisinopril-hydrochlorothiazide (PRINZIDE,ZESTORETIC) 20-25 MG per tablet Take 1 tablet by mouth daily 90 tablet 1    acyclovir (ZOVIRAX) 400 MG tablet Take 1 tablet (400 mg total) by mouth 3 (three) times a day for 10 days 30 tablet 0    valACYclovir (VALTREX) 500 mg tablet Take 1 tablet (500 mg total) by mouth 2 (two) times a day for 3 days 6 tablet 2     No current facility-administered medications on file prior to visit  Review of Systems   Constitutional: Negative for chills and fever  HENT: Negative for ear pain and sore throat  Eyes: Negative for pain and visual disturbance  Respiratory: Negative for cough and shortness of breath  Cardiovascular: Negative for chest pain and palpitations  Gastrointestinal: Negative for abdominal pain and vomiting  Genitourinary: Negative for dysuria and hematuria  Musculoskeletal: Negative for arthralgias and back pain  Skin: Negative for color change and rash  Neurological: Negative for seizures and syncope  All other systems reviewed and are negative  BMI Counseling: Body mass index is 28 04 kg/m²  The BMI is above normal  Nutrition recommendations include decreasing portion sizes and limiting drinks that contain sugar  Exercise recommendations include exercising 3-5 times per week  Objective:    /82 (BP Location: Left arm, Patient Position: Sitting, Cuff Size: Adult)   Pulse 67   Temp 97 7 °F (36 5 °C) (Temporal)   Resp 17   Wt 81 2 kg (179 lb)   SpO2 98%   BMI 28 04 kg/m²     Physical Exam  Vitals signs and nursing note reviewed  Constitutional:       General: He is not in acute distress  Appearance: He is well-developed  He is not diaphoretic  HENT:      Head: Normocephalic and atraumatic  Right Ear: External ear normal       Left Ear: External ear normal    Eyes:      Pupils: Pupils are equal, round, and reactive to light  Neck:      Musculoskeletal: Normal range of motion and neck supple  Cardiovascular:      Rate and Rhythm: Normal rate and regular rhythm  Pulses: Normal pulses   no weak pulses          Dorsalis pedis pulses are 2+ on the right side and 2+ on the left side  Posterior tibial pulses are 2+ on the right side and 2+ on the left side  Heart sounds: Normal heart sounds  Pulmonary:      Effort: Pulmonary effort is normal  No respiratory distress  Breath sounds: Normal breath sounds  No wheezing  Abdominal:      General: Bowel sounds are normal  There is distension  Palpations: Abdomen is soft  Tenderness: There is no abdominal tenderness  Comments: Mid-abdominal post-surgical scar  Abdomen is firm, no TTP    Musculoskeletal: Normal range of motion  General: No deformity  Feet:      Right foot:      Skin integrity: No ulcer, skin breakdown, erythema, warmth, callus or dry skin  Left foot:      Skin integrity: No ulcer, skin breakdown, erythema, warmth, callus or dry skin  Lymphadenopathy:      Cervical: No cervical adenopathy  Skin:     General: Skin is warm and dry  Capillary Refill: Capillary refill takes less than 2 seconds  Findings: No rash  Neurological:      Mental Status: He is alert and oriented to person, place, and time  Sensory: No sensory deficit  Coordination: Coordination normal       Deep Tendon Reflexes: Reflexes normal    Psychiatric:         Behavior: Behavior normal        Patient's shoes and socks removed  Right Foot/Ankle   Right Foot Inspection  Skin Exam: skin normal and skin intact no dry skin, no warmth, no callus, no erythema, no maceration, no abnormal color, no pre-ulcer, no ulcer and no callus                          Toe Exam: ROM and strength within normal limits  Sensory   Vibration: intact  Proprioception: intact   Monofilament testing: intact  Vascular  Capillary refills: < 3 seconds  The right DP pulse is 2+  The right PT pulse is 2+       Left Foot/Ankle  Left Foot Inspection  Skin Exam: skin normal and skin intactno dry skin, no warmth, no erythema, no maceration, normal color, no pre-ulcer, no ulcer and no callus                         Toe Exam: ROM and strength within normal limits                   Sensory   Vibration: intact  Proprioception: intact  Monofilament: intact  Vascular  Capillary refills: < 3 seconds  The left DP pulse is 2+  The left PT pulse is 2+  Assign Risk Category:  No deformity present; No loss of protective sensation;  No weak pulses       Risk: 0        PRINCE Childress  06/08/21  11:36 AM

## 2021-06-08 PROBLEM — Z53.20 COLON CANCER SCREENING DECLINED: Status: ACTIVE | Noted: 2021-01-01

## 2021-06-08 NOTE — ASSESSMENT & PLAN NOTE
BP initially 160/100 on recheck 148/82   He will continue with current regimen, discussed decreased sodium, heart healthy diet  He will rtc in 2 weeks for BP recheck, if BP not at goal patient is agreeable to medication adjustment

## 2021-06-08 NOTE — PATIENT INSTRUCTIONS
Hypertension and Diabetes   AMBULATORY CARE:   Hypertension  is high blood pressure (BP)  Hypertension is common in persons with diabetes  This type of hypertension is called secondary hypertension  A normal BP is 119/79 or lower  You can control hypertension and diabetes with a healthy lifestyle, or a combination of lifestyle and medicine  Controlled BP and blood sugar levels help prevent certain complications from diabetes  Examples include retinopathy (eye damage) and kidney damage  Common signs and symptoms include the following:   · Headache     · Blurred vision     · Chest pain     · Dizziness or weakness     · Trouble breathing    · Nosebleeds    Call or have someone call your local emergency number (911 in the [address]) for any of the following: You have any of the following signs of a heart attack:   · Squeezing, pressure, or pain in your chest    · You may  also have any of the following:     ? Discomfort or pain in your back, neck, jaw, stomach, or arm    ? Shortness of breath    ? Nausea or vomiting    ? Lightheadedness or a sudden cold sweat  You have any of the following signs of a stroke:   · Numbness or drooping on one side of your face     · Weakness in an arm or leg    · Confusion or difficulty speaking    · Dizziness, a severe headache, or vision loss    Call your doctor or diabetes care team if:   · You feel faint, dizzy, confused, or drowsy  · You have been taking your BP medicine and your BP is still higher than your healthcare provider says it should be  · You have questions or concerns about your condition or care  Treatment for hypertension and diabetes  may include lifestyle changes  You may also need medicines to lower your BP, blood sugar, and cholesterol levels  A low cholesterol level helps prevent heart disease and makes it easier to control your BP  Take your medicine exactly as directed     Manage hypertension and diabetes:  Talk with your healthcare provider about these and other ways to manage hypertension and diabetes:  · Check your BP at home  Avoid smoking, caffeine, and exercise at least 30 minutes before checking your BP  Sit and rest for 5 minutes before you take your blood pressure  Extend your arm and support it on a flat surface  Your arm should be at the same level as your heart  Follow the directions that came with your BP monitor  Check your BP 2 times, 1 minute apart, before you take your medicine in the morning  Also check your BP before your evening meal  Keep a record of your readings and bring it to your follow-up visits  Ask your healthcare provider what your BP should be  · Check your blood sugar level at home  Follow your healthcare provider's instructions and check your blood sugar level as directed  Keep a record of your blood sugar level readings and bring it to your follow-up visits  Ask your healthcare provider what your blood sugar levels should be  · Manage any other health conditions you have  Health conditions such as kidney disease, thyroid disease, or adrenal gland disorder can increase your BP and blood sugar levels  Follow your healthcare provider's instructions and take all your medicines as directed  Lifestyle changes you can make:  Talk with your healthcare provider about these and other lifestyle changes for hypertension and diabetes:  · Limit sodium (salt) as directed  Too much sodium can affect your fluid balance  Check labels to find low-sodium or no-salt-added foods  Some low-sodium foods use potassium salts for flavor  Too much potassium can also cause health problems  Your healthcare provider will tell you how much sodium and potassium are safe for you to have in a day  He or she may recommend that you limit sodium to 2,300 mg a day  · Follow the meal plan recommended by your healthcare provider  A dietitian or your provider can help you create healthy meal plans   The plans will help you control sodium, carbohydrates, and fats in your meals  This can help you control both your blood sugar and BP levels  The plans usually include eating more fruits, vegetables, and low-fat dairy products  Your provider may talk to you about a Mediterranean style and Dietary Approaches to Stop Hypertension (DASH) eating plans  These eating plans can help you with weight loss and lowering your cholesterol  · Get regular physical activity  Physical activity can help decrease your blood sugar level  It can also help to decrease your risk for heart disease and help you lose weight  Adults should have moderate intensity physical activity for at least 150 minutes every week  Spread the amount of activity over at least 3 days a week  Do not skip more than 2 days in a row  Children should get at least 60 minutes of moderate physical activity on most days of the week  Examples of moderate physical activity include brisk walking, running, and swimming  Do not sit for longer than 30 minutes  Work with your healthcare provider to create a plan for physical activity  · Decrease stress  This may help lower your BP  Learn ways to relax, such as deep breathing or listening to music  Yoga and meditation may also help  Talk to your healthcare provider about ways to decrease stress  · Know the risks if you choose to drink alcohol  Alcohol can cause your blood sugar levels to be low if you use insulin  Alcohol can cause high blood sugar and BP levels, and weight gain if you drink too much  Women 21 years or older and men 72 years or older should limit alcohol to 1 drink a day  Men aged 24 to 59 years should limit alcohol to 2 drinks a day  A drink of alcohol is 12 ounces of beer, 5 ounces of wine, or 1½ ounces of liquor  · Do not smoke  Nicotine and other chemicals in cigarettes and cigars can increase your BP and make your blood sugar levels harder to control   Ask your healthcare provider for information if you currently smoke and need help to quit  E-cigarettes or smokeless tobacco still contain nicotine  Talk to your healthcare provider before you use these products  Follow up with your healthcare provider as directed: You will need to return to have your BP checked  You will also need other lab tests done, including an A1C to monitor your overall blood sugar control  Write down your questions so you remember to ask them during your visits  © Copyright [address] Information is for End User's use only and may not be sold, redistributed or otherwise used for commercial purposes  All illustrations and images included in CareNotes® are the copyrighted property of A D A M , Inc  or [address] [place] [address]  The above information is an  only  It is not intended as medical advice for individual conditions or treatments  Talk to your doctor, nurse or pharmacist before following any medical regimen to see if it is safe and effective for you

## 2021-06-08 NOTE — ASSESSMENT & PLAN NOTE
Lab Results   Component Value Date    HGBA1C 6 4 06/08/2021     A1c slightly increased today   Discussed diabetic diet, will continue with current regimen of Amaryl 2 mg daily   Discussed if at next visit A1c is the same or better we will d/c medication

## 2021-06-08 NOTE — ASSESSMENT & PLAN NOTE
Risks and benefits of testing and refusing testing discussed - patient states that he understands, does not want testing at this time

## 2021-07-22 NOTE — PROGRESS NOTES
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    He has history of chronic hepatitis C as well as alcohol abuse  Richard Lamb has liver cirrhosis   He has multifocal liver lesion with low level of AFP    This was histologically confirmed to be well-differentiated hepatocellular carcinoma   He started sorafenib in October 2018  He had radiographical as well as biochemical response  He is tolerating sorafenib very well  Clinically as well as radiographically, he has no evidence of progressive disease  I recommended him to continue with sorafenib  He is in agreement with my recommendation  I will see him again in 6 months with CBC, CMP and AFP        Subjective:      HPI:             Interval History:  A 77year old gentleman with 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 diagnosed in November 2011  He underwent concurrent chemoradiation with high dose cisplatin resulting in complete response  He has chronic hepatitis C, as well as liver cirrhosis, based on a CT scan of abdomen and pelvis  In October 2014, he was found to have multifocal liver lesions, based on the CT scan  This was confirmed by MRI of the abdomen  However, the PET/CT scan showed the lesion was not hypermetabolic   He declined liver biopsy    Radiographically, this was consistent with multifocal hepatocellular carcinoma   He has been under the care of Bryant Choe summer of 2018, CT scan showed progression   He was suggested to have Siro sphere which he declined  Orvil Sauger, he accepted sorafenib, which he started in October 2018   He had partial response on sorafenib, based on CT scan as well as AFP decline  Fatmata Silvestre presents today for routine follow-up  He continued to do well with no new complaint  He has no hand-foot reaction  He denied any pain  He is maintaining his weight  He has no respiratory symptoms  His performance status is normal        Objective:      Primary Diagnosis:     1  Squamous cell carcinoma of oropharynx, stage SHAINA disease, diagnosed in November 2011  2 Multifocal liver lesion, clinically clinically consistent with hepatocellular carcinoma      Cancer Staging:  Cancer Staging  No matching staging information was found for the patient         Previous Hematologic/ Oncologic Treatment:      Concurrent chemoradiation with high-dose cisplatin completed on February 13, 2012       Current Hematologic/ Oncologic Treatment:       Sorafenib of since October 2018         Disease Status:      Partial response as well as biochemical response      Test Results:     Pathology:      Liver biopsy showed well-differentiated hepatocellular carcinoma      Radiology:     CT scan of chest abdomen pelvis in  July 2021 showed stable enhanced liver mass with no evidence of progression      Laboratory:      AFP is 11 1 in  July 2021  See below otherwise        Physical Exam:        General Appearance:    Alert, oriented          Eyes:    PERRL   Ears:    Normal external ear canals, both ears   Nose:   Nares normal, septum midline   Throat:   Mucosa moist  Pharynx without injection  Neck:   Supple         Lungs:     Clear to auscultation bilaterally   Chest Wall:    No tenderness or deformity    Heart:    Regular rate and rhythm         Abdomen:     Soft, non-tender, bowel sounds +, hepatomegaly   No palpable spleen                Extremities:   Extremities no cyanosis or edema         Skin:   no rash or icterus      Lymph nodes:   Cervical, supraclavicular, and axillary nodes normal   Neurologic:   CNII-XII intact, normal strength, sensation and reflexes     Throughout             Breast exam:   Not applicable  ROS: Review of Systems   All other systems reviewed and are negative  Imaging: CT chest abdomen pelvis w contrast    Result Date: 7/19/2021  Narrative: CT CHEST, ABDOMEN AND PELVIS WITH IV CONTRAST INDICATION:   C22 0: Liver cell carcinoma  COMPARISON:  6/6/2020 TECHNIQUE: CT examination of the chest, abdomen and pelvis was performed  Scanning through the abdomen was performed in arterial, venous and delayed phases according a protocol spefically designed to evaluate upper abdominal viscera  Axial, sagittal, and coronal 2D reformatted images were created from the source data and submitted for interpretation  Radiation dose length product (DLP) for this visit:  1250 95 mGy-cm   This examination, like all CT scans performed in the [facility], was performed utilizing techniques to minimize radiation dose exposure, including the use of iterative reconstruction and automated exposure control  IV Contrast:  100 mL of iohexol (OMNIPAQUE) Enteric Contrast: Enteric contrast was administered  FINDINGS: CHEST LUNGS:  Stable 4 mm nodule in the lingula (series 5 image 62)  No tracheal or endobronchial lesion  No new suspicious appearing pulmonary nodule  PLEURA:  Unremarkable  HEART/GREAT VESSELS:  Atherosclerotic changes are noted in thoracic aorta and coronary arteries  MEDIASTINUM AND ERINN:  Unremarkable  CHEST WALL AND LOWER NECK:   Unremarkable  ABDOMEN LIVER/BILIARY TREE:  The appearance of the cirrhotic liver is not significantly changed  There is redemonstration of an irregularly enhancing mass within the posterior segment of the right lobe of the liver  Today this measures 5 0 x 3 4 cm, not significantly changed  Small hypodensity in the right lobe (series 4 image 44) continues to measure 7 mm in size    On the arterial weighted images, there is a stable 1 1 cm enhancing lesion near the diaphragm (series 3 image 11)  More inferiorly, there  is a subtle hyperenhancing lesion subcapsular in location in the right lobe (series 3 image 39)  This measures 8 mm in size  In retrospect this was present previously and is not significantly changed  GALLBLADDER:  No calcified gallstones  No pericholecystic inflammatory change  SPLEEN:  Unremarkable  PANCREAS:  Stable 1 5 x 1 0 cm cystic focus at the pancreatic (series 4 image 58)  No pancreatic duct dilatation  ADRENAL GLANDS:  Unremarkable  KIDNEYS/URETERS:  3 mm nonobstructing calculus left kidney (series 4 image 72)  Subcentimeter hypodensities likely representing tiny cysts  No hydronephrosis  STOMACH AND BOWEL:  There is colonic diverticulosis without evidence of acute diverticulitis  APPENDIX:  No findings to suggest appendicitis  ABDOMINOPELVIC CAVITY:  No ascites  No pneumoperitoneum  No lymphadenopathy  VESSELS:  Unremarkable for patient's age  PELVIS REPRODUCTIVE ORGANS:  Unremarkable for patient's age  URINARY BLADDER:  Unremarkable  ABDOMINAL WALL/INGUINAL REGIONS:  Unremarkable  OSSEOUS STRUCTURES:  Grade 1 anterolisthesis of L4 upon L5  Impression: The CT scan of the chest, abdomen and pelvis is not significantly changed since last year  The appearance of the liver is unchanged with cirrhotic findings and multiple enhancing lesions as described above  No new enhancing hepatic lesion is seen   Workstation performed: [id number]         Labs:   Lab Results   Component Value Date    WBC 5 84 07/06/2021    HGB 13 8 07/06/2021    HCT 41 3 07/06/2021    MCV 92 07/06/2021     (L) 07/06/2021     Lab Results   Component Value Date     12/09/2015    K 4 2 07/06/2021     07/06/2021    CO2 32 07/06/2021    ANIONGAP 5 12/09/2015    BUN 23 07/06/2021    CREATININE 1 00 07/06/2021    GLUCOSE 267 (H) 12/09/2015    GLUF 133 (H) 07/06/2021    CALCIUM 9 7 07/06/2021    AST 41 07/06/2021    ALT 52 07/06/2021    ALKPHOS 67 07/06/2021    PROT 7 4 12/09/2015    BILITOT 0 97 12/09/2015    EGFR 78 07/06/2021         Lab Results   Component Value Date    PSA 0 6 07/06/2021    PSA 0 6 01/20/2020    PSA 0 4 01/18/2018         Current Medications: Reviewed  Allergies: Reviewed  PMH/FH/SH:  Reviewed      Vital Sign:    Body surface area is 1 9 meters squared      Wt Readings from Last 3 Encounters:   07/22/21 78 6 kg (173 lb 3 2 oz)   06/08/21 81 2 kg (179 lb)   03/08/21 81 5 kg (179 lb 9 6 oz)        Temp Readings from Last 3 Encounters:   07/22/21 (!) 97 4 °F (36 3 °C) (Tympanic)   06/08/21 97 7 °F (36 5 °C) (Temporal)   03/08/21 (!) 97 1 °F (36 2 °C) (Temporal)        BP Readings from Last 3 Encounters:   07/22/21 128/88   06/21/21 136/80   06/08/21 148/82         Pulse Readings from Last 3 Encounters:   07/22/21 68   06/08/21 67   03/08/21 72     @LASTSAO2(3)@

## 2021-08-09 NOTE — PROGRESS NOTES
Pt taking both BP meds on his current med list     Continue both unless directed otherwise by PCP   Keep appt in Sept

## 2021-08-26 NOTE — PROGRESS NOTES
Assessment/Plan:    Chiara Rooney was seen today for pinch nerve  Diagnoses and all orders for this visit:    Sciatica of left side  -     ketorolac (TORADOL) injection 30 mg  -     methylPREDNISolone 4 MG tablet therapy pack; Use as directed on package  -     XR spine lumbar minimum 4 views non injury; Future  -     tiZANidine (ZANAFLEX) 4 mg tablet; Take 1 tablet (4 mg total) by mouth every 8 (eight) hours as needed for muscle spasms  -     Diclofenac Sodium (VOLTAREN) 1 %; Apply 2 g topically 4 (four) times a day  -     POCT urine dip        Return for Keep previously elkin appt   Patient Instructions   Sciatica   WHAT YOU NEED TO KNOW:   What is sciatica? Sciatica is a condition that causes pain along your sciatic nerve  The sciatic nerve runs from your spine through both sides of your buttocks  It then runs down the back of your thigh, into your lower leg and foot  Any place along your sciatic nerve may be compressed, inflamed, irritated, or stretched and cause symptoms  What causes sciatica? Sciatica may be related to certain activities, poor posture, and physical or psychological stress  Any of the following may cause or increase your risk of sciatica:  · Disc problems:  A slipped disc (soft cushion in between the bones of the spine) is the most common cause of sciatica  The disc may press on the sciatic nerve  One bone in your spine may slip over another, or you may have narrowing of the spinal column  · Muscle injury: This may happen after you twist or lift a heavy object  Swelling from sprained or irritated muscles in the buttocks, thighs, or legs press on the sciatic nerve  · Obesity or pregnancy:  Extra weight increases pressure on your back and legs  · Trauma:  Direct blows on the buttocks, thighs, or legs, car accidents, or falls may injure the sciatic nerve  · Diseases of the spine:  Arthritis, osteoporosis, cancer, or infection of the spine may also affect the sciatic nerve      What are the signs and symptoms of sciatica? The symptoms of sciatic may be short-term or long-term:  · Pain that goes from the lower back into your buttocks and down the back of your thigh    · Numbness or tingling in your buttocks and legs    · Muscle weakness, difficulty moving or controlling your leg or foot    · Leg pain that increases with standing, sitting, or squatting    How is sciatica diagnosed? Your healthcare provider will ask about other health conditions you may have  He may ask you about your job, history of back pain, diseases, or surgeries you have had  He will examine you and move your legs to see what increases pain  You may also need any of the following:  · X-rays: This is a picture of the bones and tissues in your back, hip, thigh, or leg  This test may show other problems, such as fractures (broken bones)  · CT scan: This test is also called a CAT scan  An x-ray machine uses a computer to take pictures of your hips, thighs, and legs  The pictures may show your sciatic nerve, muscles, and blood vessels  You may be given a dye before the pictures are taken to help healthcare providers see the pictures better  Tell the healthcare provider if you have ever had an allergic reaction to contrast dye  · MRI:  This scan uses powerful magnets and a computer to take pictures of your hips, thighs, and legs  An MRI may show damaged nerves, muscles, bones, and blood vessels  You may be given dye to help the pictures show up better  Tell the healthcare provider if you have ever had an allergic reaction to contrast dye  Do not enter the MRI room with anything metal  Metal can cause serious injury  Tell the healthcare provider if you have any metal in or on your body  · An electromyography (EMG)  test measures the electrical activity of your muscles at rest and with movement  · Nerve conduction tests: These tests check how surface nerves and related muscles respond to stimulation   Electrodes with wires or tiny needles are placed on certain areas, such as the buttocks and legs  How is sciatica treated? · NSAIDs:  These medicines decrease swelling and pain  NSAIDs are available without a doctor's order  Ask your healthcare provider which medicine is right for you  Ask how much to take and when to take it  Take as directed  NSAIDs can cause stomach bleeding or kidney problems if not taken correctly  · Acetaminophen: This medicine decreases pain  Acetaminophen is available without a doctor's order  Ask how much to take and how often to take it  Follow directions  Acetaminophen can cause liver damage if not taken correctly  · Muscle relaxers  help decrease pain and muscle spasms  · Epidural steroid medicine: This may include both an anesthetic (numbing medicine) and a steroid, which may decrease swelling and relieve pain  It is given as a shot close to the spine in the area where you have pain  · Chemonucleolysis: This is an injection given into the damaged disc to soften or shrink the disc  · Surgery: This may be done to correct problems such as a damaged disc, or a tumor in your spine  It may be done to decrease the pressure on the sciatic nerve  Healthcare providers may also release the muscle that may be pressing into your sciatic nerve  How can I help manage sciatica? · Ultrasound therapy: This is a machine that uses sound waves to decrease pain  Topical medicines may be added to help decrease pain and inflammation  · Physical therapy:  A physical therapist teaches you exercises to help improve movement and strength, and to decrease pain  An occupational therapist teaches you skills to help with your daily activities  · Assistive devices: You may need to wear back support, such as a back brace  You may need crutches, a cane, or a walker to decrease stress on your lower back and leg muscles   Ask your healthcare provider for more information about assistive devices and how to use them correctly  How can sciatica be prevented? · Avoid pressure on your back and legs:  Do not  lift heavy objects, or stand or sit for long periods of time  · Lift objects safely:  Keep your back straight and bend your knees when you  an object  Do not bend or twist your back when you lift  · Maintain a healthy weight:  Ask your healthcare provider how much you should weigh  Ask him to help you create a weight loss plan if you are overweight  · Exercise:  Ask your healthcare provider about the best stretching, warmup, and exercise plan for you  What are the risks of sciatica? An epidural steroid injection can lead to pain disorders or paralysis if it is placed incorrectly  It may also cause headaches, leg pain, and blockage of blood flow to the spinal cord  Surgery may cause you to bleed or get an infection  If not treated, your muscles and nerves may become damaged permanently  You may have decreased strength  You may not be able to move your leg or control when you urinate or have bowel movements  When should I contact my healthcare provider? · You have pain in your lower back at night or when resting  · You have pain in your lower back with numbness below the knee  · You have weakness in one leg only  · You have questions or concerns about your condition or care  When should I seek immediate care or call 911? · You have trouble holding back your urine or bowel movements  · You have weakness in both legs  · You have numbness in your groin or buttocks  CARE AGREEMENT:   You have the right to help plan your care  Learn about your health condition and how it may be treated  Discuss treatment options with your healthcare providers to decide what care you want to receive  You always have the right to refuse treatment  The above information is an  only  It is not intended as medical advice for individual conditions or treatments   Talk to your doctor, nurse or pharmacist before following any medical regimen to see if it is safe and effective for you  © Copyright [employer] 2021 Information is for End User's use only and may not be sold, redistributed or otherwise used for commercial purposes  All illustrations and images included in CareNotes® are the copyrighted property of [employer] M [employer]  or [employer]            Subjective:     Wally Mojica is a 77 y o  male who  has a past medical history of Cardiac disorder, Chronic hepatitis C virus infection ([address] ), Diabetes mellitus ([address] ), Dysphagia, Esophageal varices ([address] ), Hepatic disease, Hepatitis, History of chemotherapy, History of radiation therapy, Hypertension, Laryngeal cancer ([address] ), Liver cancer ([address] ), 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[address] ), Recurrent hepatitis C ([address] ), and Rheumatic fever  He also has no past medical history of Mental disorder  who presented to the office today for same day acute visit  Back Pain  Patient presents for evaluation of low back problems  Symptoms have been present for 1 week and include pain in low back with radiation to the left leg (sharp in character; 6/10 in severity)  Initial inciting event: moving boxes from the basement to the 3rd floor   Symptoms are worse in the evening  Alleviating factors identifiable by the patient are recumbency  Aggravating factors identifiable by the patient are bending backwards, bending forwards, bending sideways, sitting, standing and walking  Treatments initiated by the patient: Aleve, ineffective  Previous lower back problems: has had similar sx intermittently in the past   Previous work up: none  Previous treatments: none  Denies any fever, bowel or bladder issues, numbness, weakness, falls or saddle anesthesia       The following portions of the patient's history were reviewed and updated as appropriate: allergies, current medications, past family history, past medical history, past social history, past surgical history and problem list     Current Outpatient Medications on File Prior to Visit   Medication Sig Dispense Refill    acyclovir (ZOVIRAX) 400 MG tablet Take 1 tablet (400 mg total) by mouth 3 (three) times a day for 10 days 30 tablet 0    glimepiride (AMARYL) 2 mg tablet Take 1 tablet (2 mg total) by mouth daily 90 tablet 1    levothyroxine 112 mcg tablet Take 1 tablet (112 mcg total) by mouth daily 90 tablet 1    lisinopril (ZESTRIL) 20 mg tablet Take 20 mg by mouth daily      nadolol (CORGARD) 40 mg tablet Take 1 tablet (40 mg total) by mouth daily 90 tablet 3    NexAVAR 200 MG tablet Take 2 tablets (400 mg total) by mouth 2 times a day  (Patient taking differently: As needed) 120 tablet 10    sildenafil (VIAGRA) 100 mg tablet Take 1 tablet (100 mg total) by mouth as needed for erectile dysfunction (Patient not taking: Reported on 7/22/2021) 9 tablet 5    valACYclovir (VALTREX) 500 mg tablet Take 1 tablet (500 mg total) by mouth 2 (two) times a day for 3 days 6 tablet 2     No current facility-administered medications on file prior to visit  Review of Systems   Constitutional: Negative for chills and fever  HENT: Negative for ear pain and sore throat  Eyes: Negative for pain and visual disturbance  Respiratory: Negative for cough and shortness of breath  Cardiovascular: Negative for chest pain and palpitations  Gastrointestinal: Negative for abdominal pain and vomiting  Genitourinary: Negative for dysuria and hematuria  Musculoskeletal: Positive for arthralgias, back pain and myalgias  Skin: Negative for color change and rash  Neurological: Negative for seizures and syncope  All other systems reviewed and are negative              Objective:    /80 (BP Location: Left arm, Patient Position: Sitting, Cuff Size: Standard)   Pulse 77   Temp 98 1 °F (36 7 °C) (Temporal)   Resp 18   Ht 5' 7" (1 702 m)   Wt 78 9 kg (174 lb)   SpO2 99%   BMI 27 25 kg/m²     Physical Exam  Vitals and nursing note reviewed  Constitutional:       General: He is not in acute distress  Appearance: He is well-developed  He is not diaphoretic  HENT:      Head: Normocephalic and atraumatic  Right Ear: External ear normal       Left Ear: External ear normal    Eyes:      General:         Right eye: No discharge  Left eye: No discharge  Conjunctiva/sclera: Conjunctivae normal    Cardiovascular:      Rate and Rhythm: Normal rate and regular rhythm  Heart sounds: Normal heart sounds  Pulmonary:      Effort: Pulmonary effort is normal  No respiratory distress  Breath sounds: Normal breath sounds  No wheezing  Abdominal:      General: Bowel sounds are normal  There is no distension  Palpations: Abdomen is soft  Tenderness: There is no abdominal tenderness  Musculoskeletal:         General: No deformity  Cervical back: Normal range of motion and neck supple  Lumbar back: Tenderness present  Positive left straight leg raise test         Back:       Right lower leg: No edema  Left lower leg: No edema  Comments: +TTP with +SLR test   Strength, sensation intact to blle    Lymphadenopathy:      Cervical: No cervical adenopathy  Skin:     General: Skin is warm and dry  Capillary Refill: Capillary refill takes less than 2 seconds  Findings: No rash  Neurological:      Mental Status: He is alert and oriented to person, place, and time  Cranial Nerves: No cranial nerve deficit  Sensory: No sensory deficit  Motor: No weakness        Coordination: Coordination normal       Gait: Gait normal       Deep Tendon Reflexes: Reflexes normal    Psychiatric:         Mood and Affect: Mood normal          Behavior: Behavior normal          PRINCE Cantrell  08/26/21  3:33 PM

## 2021-08-26 NOTE — PATIENT INSTRUCTIONS
Sciatica   WHAT YOU NEED TO KNOW:   What is sciatica? Sciatica is a condition that causes pain along your sciatic nerve  The sciatic nerve runs from your spine through both sides of your buttocks  It then runs down the back of your thigh, into your lower leg and foot  Any place along your sciatic nerve may be compressed, inflamed, irritated, or stretched and cause symptoms  What causes sciatica? Sciatica may be related to certain activities, poor posture, and physical or psychological stress  Any of the following may cause or increase your risk of sciatica:  · Disc problems:  A slipped disc (soft cushion in between the bones of the spine) is the most common cause of sciatica  The disc may press on the sciatic nerve  One bone in your spine may slip over another, or you may have narrowing of the spinal column  · Muscle injury: This may happen after you twist or lift a heavy object  Swelling from sprained or irritated muscles in the buttocks, thighs, or legs press on the sciatic nerve  · Obesity or pregnancy:  Extra weight increases pressure on your back and legs  · Trauma:  Direct blows on the buttocks, thighs, or legs, car accidents, or falls may injure the sciatic nerve  · Diseases of the spine:  Arthritis, osteoporosis, cancer, or infection of the spine may also affect the sciatic nerve  What are the signs and symptoms of sciatica? The symptoms of sciatic may be short-term or long-term:  · Pain that goes from the lower back into your buttocks and down the back of your thigh    · Numbness or tingling in your buttocks and legs    · Muscle weakness, difficulty moving or controlling your leg or foot    · Leg pain that increases with standing, sitting, or squatting    How is sciatica diagnosed? Your healthcare provider will ask about other health conditions you may have  He may ask you about your job, history of back pain, diseases, or surgeries you have had   He will examine you and move your legs to see what increases pain  You may also need any of the following:  · X-rays: This is a picture of the bones and tissues in your back, hip, thigh, or leg  This test may show other problems, such as fractures (broken bones)  · CT scan: This test is also called a CAT scan  An x-ray machine uses a computer to take pictures of your hips, thighs, and legs  The pictures may show your sciatic nerve, muscles, and blood vessels  You may be given a dye before the pictures are taken to help healthcare providers see the pictures better  Tell the healthcare provider if you have ever had an allergic reaction to contrast dye  · MRI:  This scan uses powerful magnets and a computer to take pictures of your hips, thighs, and legs  An MRI may show damaged nerves, muscles, bones, and blood vessels  You may be given dye to help the pictures show up better  Tell the healthcare provider if you have ever had an allergic reaction to contrast dye  Do not enter the MRI room with anything metal  Metal can cause serious injury  Tell the healthcare provider if you have any metal in or on your body  · An electromyography (EMG)  test measures the electrical activity of your muscles at rest and with movement  · Nerve conduction tests: These tests check how surface nerves and related muscles respond to stimulation  Electrodes with wires or tiny needles are placed on certain areas, such as the buttocks and legs  How is sciatica treated? · NSAIDs:  These medicines decrease swelling and pain  NSAIDs are available without a doctor's order  Ask your healthcare provider which medicine is right for you  Ask how much to take and when to take it  Take as directed  NSAIDs can cause stomach bleeding or kidney problems if not taken correctly  · Acetaminophen: This medicine decreases pain  Acetaminophen is available without a doctor's order  Ask how much to take and how often to take it  Follow directions   Acetaminophen can cause liver damage if not taken correctly  · Muscle relaxers  help decrease pain and muscle spasms  · Epidural steroid medicine: This may include both an anesthetic (numbing medicine) and a steroid, which may decrease swelling and relieve pain  It is given as a shot close to the spine in the area where you have pain  · Chemonucleolysis: This is an injection given into the damaged disc to soften or shrink the disc  · Surgery: This may be done to correct problems such as a damaged disc, or a tumor in your spine  It may be done to decrease the pressure on the sciatic nerve  Healthcare providers may also release the muscle that may be pressing into your sciatic nerve  How can I help manage sciatica? · Ultrasound therapy: This is a machine that uses sound waves to decrease pain  Topical medicines may be added to help decrease pain and inflammation  · Physical therapy:  A physical therapist teaches you exercises to help improve movement and strength, and to decrease pain  An occupational therapist teaches you skills to help with your daily activities  · Assistive devices: You may need to wear back support, such as a back brace  You may need crutches, a cane, or a walker to decrease stress on your lower back and leg muscles  Ask your healthcare provider for more information about assistive devices and how to use them correctly  How can sciatica be prevented? · Avoid pressure on your back and legs:  Do not  lift heavy objects, or stand or sit for long periods of time  · Lift objects safely:  Keep your back straight and bend your knees when you  an object  Do not bend or twist your back when you lift  · Maintain a healthy weight:  Ask your healthcare provider how much you should weigh  Ask him to help you create a weight loss plan if you are overweight  · Exercise:  Ask your healthcare provider about the best stretching, warmup, and exercise plan for you      What are the risks of sciatica? An epidural steroid injection can lead to pain disorders or paralysis if it is placed incorrectly  It may also cause headaches, leg pain, and blockage of blood flow to the spinal cord  Surgery may cause you to bleed or get an infection  If not treated, your muscles and nerves may become damaged permanently  You may have decreased strength  You may not be able to move your leg or control when you urinate or have bowel movements  When should I contact my healthcare provider? · You have pain in your lower back at night or when resting  · You have pain in your lower back with numbness below the knee  · You have weakness in one leg only  · You have questions or concerns about your condition or care  When should I seek immediate care or call 911? · You have trouble holding back your urine or bowel movements  · You have weakness in both legs  · You have numbness in your groin or buttocks  CARE AGREEMENT:   You have the right to help plan your care  Learn about your health condition and how it may be treated  Discuss treatment options with your healthcare providers to decide what care you want to receive  You always have the right to refuse treatment  The above information is an  only  It is not intended as medical advice for individual conditions or treatments  Talk to your doctor, nurse or pharmacist before following any medical regimen to see if it is safe and effective for you  © Copyright [address] 2021 Information is for End User's use only and may not be sold, redistributed or otherwise used for commercial purposes   All illustrations and images included in CareNotes® are the copyrighted property of [employer]  or [address] [place] [address]

## 2021-09-10 PROBLEM — D69.6 PLATELETS DECREASED (HCC): Status: ACTIVE | Noted: 2021-01-01

## 2021-09-10 PROBLEM — IMO0002 UNCONTROLLED TYPE 2 DIABETES MELLITUS WITH COMPLICATION, WITHOUT LONG-TERM CURRENT USE OF INSULIN: Status: ACTIVE | Noted: 2021-01-01

## 2021-09-10 PROBLEM — IMO0002 UNCONTROLLED TYPE 2 DIABETES MELLITUS WITH COMPLICATION, WITHOUT LONG-TERM CURRENT USE OF INSULIN: Status: RESOLVED | Noted: 2021-01-01 | Resolved: 2021-01-01
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
## 2021-09-10 NOTE — ASSESSMENT & PLAN NOTE
Lab Results   Component Value Date    HGBA1C 6 3 09/10/2021     Currently well controlled on current regimen- continue with Amaryl 2 mg daily

## 2021-09-10 NOTE — ASSESSMENT & PLAN NOTE
BP is elevated today in the office, patient is asymptomatic   He reports that he has been out of medication x several days   Restart medications, RTC in one week for BP check   ED precautions discussed

## 2021-09-10 NOTE — PATIENT INSTRUCTIONS
Medicare Preventive Visit Patient Instructions  Thank you for completing your Welcome to Medicare Visit or Medicare Annual Wellness Visit today  Your next wellness visit will be due in one year (9/11/2022)  The screening/preventive services that you may require over the next 5-10 years are detailed below  Some tests may not apply to you based off risk factors and/or age  Screening tests ordered at today's visit but not completed yet may show as past due  Also, please note that scanned in results may not display below  Preventive Screenings:  Service Recommendations Previous Testing/Comments   Colorectal Cancer Screening  · Colonoscopy    · Fecal Occult Blood Test (FOBT)/Fecal Immunochemical Test (FIT)  · Fecal DNA/Cologuard Test  · Flexible Sigmoidoscopy Age: 54-65 years old   Colonoscopy: every 10 years (May be performed more frequently if at higher risk)  OR  FOBT/FIT: every 1 year  OR  Cologuard: every 3 years  OR  Sigmoidoscopy: every 5 years  Screening may be recommended earlier than age 48 if at higher risk for colorectal cancer  Also, an individualized decision between you and your healthcare provider will decide whether screening between the ages of 74-80 would be appropriate   Colonoscopy: 09/29/2014  FOBT/FIT: Not on file  Cologuard: Not on file  Sigmoidoscopy: Not on file    Screening Current     Prostate Cancer Screening Individualized decision between patient and health care provider in men between ages of 53-78   Medicare will cover every 12 months beginning on the day after your 50th birthday PSA: 0 6 ng/mL     Screening Current     Hepatitis C Screening Once for adults born between 1945 and 1965  More frequently in patients at high risk for Hepatitis C Hep C Antibody: 04/20/2017    Screening Not Indicated  History Hepatitis C   Diabetes Screening 1-2 times per year if you're at risk for diabetes or have pre-diabetes Fasting glucose: 133 mg/dL   A1C: 6 4    Screening Not Indicated  History Diabetes Cholesterol Screening Once every 5 years if you don't have a lipid disorder  May order more often based on risk factors  Lipid panel: 07/06/2021    Screening Current      Other Preventive Screenings Covered by Medicare:  1  Abdominal Aortic Aneurysm (AAA) Screening: covered once if your at risk  You're considered to be at risk if you have a family history of AAA or a male between the age of 73-68 who smoking at least 100 cigarettes in your lifetime  2  Lung Cancer Screening: covers low dose CT scan once per year if you meet all of the following conditions: (1) Age 50-69; (2) No signs or symptoms of lung cancer; (3) Current smoker or have quit smoking within the last 15 years; (4) You have a tobacco smoking history of at least 30 pack years (packs per day x number of years you smoked); (5) You get a written order from a healthcare provider  3  Glaucoma Screening: covered annually if you're considered high risk: (1) You have diabetes OR (2) Family history of glaucoma OR (3)  aged 48 and older OR (3)  American aged 72 and older  3  Osteoporosis Screening: covered every 2 years if you meet one of the following conditions: (1) Have a vertebral abnormality; (2) On glucocorticoid therapy for more than 3 months; (3) Have primary hyperparathyroidism; (4) On osteoporosis medications and need to assess response to drug therapy  5  HIV Screening: covered annually if you're between the age of 12-76  Also covered annually if you are younger than 13 and older than 72 with risk factors for HIV infection  For pregnant patients, it is covered up to 3 times per pregnancy      Immunizations:  Immunization Recommendations   Influenza Vaccine Annual influenza vaccination during flu season is recommended for all persons aged >= 6 months who do not have contraindications   Pneumococcal Vaccine (Prevnar and Pneumovax)  * Prevnar = PCV13  * Pneumovax = PPSV23 Adults 25-60 years old: 1-3 doses may be recommended based on certain risk factors  Adults 72 years old: Prevnar (PCV13) vaccine recommended followed by Pneumovax (PPSV23) vaccine  If already received PPSV23 since turning 65, then PCV13 recommended at least one year after PPSV23 dose  Hepatitis B Vaccine 3 dose series if at intermediate or high risk (ex: diabetes, end stage renal disease, liver disease)   Tetanus (Td) Vaccine - COST NOT COVERED BY MEDICARE PART B Following completion of primary series, a booster dose should be given every 10 years to maintain immunity against tetanus  Td may also be given as tetanus wound prophylaxis  Tdap Vaccine - COST NOT COVERED BY MEDICARE PART B Recommended at least once for all adults  For pregnant patients, recommended with each pregnancy  Shingles Vaccine (Shingrix) - COST NOT COVERED BY MEDICARE PART B  2 shot series recommended in those aged 48 and above     Health Maintenance Due:      Topic Date Due    Colorectal Cancer Screening  09/29/2019    Hepatitis C Screening  Completed     Immunizations Due:      Topic Date Due    Hepatitis A Vaccine (1 of 2 - Risk 2-dose series) Never done    Hepatitis B Vaccine (1 of 3 - Risk 3-dose series) Never done    DTaP,Tdap,and Td Vaccines (1 - Tdap) Never done    Pneumococcal Vaccine: 65+ Years (2 of 2 - PPSV23) 08/31/2020    Influenza Vaccine (1) 09/01/2021     Advance Directives   What are advance directives? Advance directives are legal documents that state your wishes and plans for medical care  These plans are made ahead of time in case you lose your ability to make decisions for yourself  Advance directives can apply to any medical decision, such as the treatments you want, and if you want to donate organs  What are the types of advance directives? There are many types of advance directives, and each state has rules about how to use them  You may choose a combination of any of the following:  · Living will: This is a written record of the treatment you want   You can also choose which treatments you do not want, which to limit, and which to stop at a certain time  This includes surgery, medicine, IV fluid, and tube feedings  · Durable power of  for healthcare [facility]): This is a written record that states who you want to make healthcare choices for you when you are unable to make them for yourself  This person, called a proxy, is usually a family member or a friend  You may choose more than 1 proxy  · Do not resuscitate (DNR) order:  A DNR order is used in case your heart stops beating or you stop breathing  It is a request not to have certain forms of treatment, such as CPR  A DNR order may be included in other types of advance directives  · Medical directive: This covers the care that you want if you are in a coma, near death, or unable to make decisions for yourself  You can list the treatments you want for each condition  Treatment may include pain medicine, surgery, blood transfusions, dialysis, IV or tube feedings, and a ventilator (breathing machine)  · Values history: This document has questions about your views, beliefs, and how you feel and think about life  This information can help others choose the care that you would choose  Why are advance directives important? An advance directive helps you control your care  Although spoken wishes may be used, it is better to have your wishes written down  Spoken wishes can be misunderstood, or not followed  Treatments may be given even if you do not want them  An advance directive may make it easier for your family to make difficult choices about your care  Weight Management   Why it is important to manage your weight:  Being overweight increases your risk of health conditions such as heart disease, high blood pressure, type 2 diabetes, and certain types of cancer  It can also increase your risk for osteoarthritis, sleep apnea, and other respiratory problems  Aim for a slow, steady weight loss   Even a small amount of weight loss can lower your risk of health problems  How to lose weight safely:  A safe and healthy way to lose weight is to eat fewer calories and get regular exercise  You can lose up about 1 pound a week by decreasing the number of calories you eat by 500 calories each day  Healthy meal plan for weight management:  A healthy meal plan includes a variety of foods, contains fewer calories, and helps you stay healthy  A healthy meal plan includes the following:  · Eat whole-grain foods more often  A healthy meal plan should contain fiber  Fiber is the part of grains, fruits, and vegetables that is not broken down by your body  Whole-grain foods are healthy and provide extra fiber in your diet  Some examples of whole-grain foods are whole-wheat breads and pastas, oatmeal, brown rice, and bulgur  · Eat a variety of vegetables every day  Include dark, leafy greens such as spinach, kale, kae greens, and mustard greens  Eat yellow and orange vegetables such as carrots, sweet potatoes, and winter squash  · Eat a variety of fruits every day  Choose fresh or canned fruit (canned in its own juice or light syrup) instead of juice  Fruit juice has very little or no fiber  · Eat low-fat dairy foods  Drink fat-free (skim) milk or 1% milk  Eat fat-free yogurt and low-fat cottage cheese  Try low-fat cheeses such as mozzarella and other reduced-fat cheeses  · Choose meat and other protein foods that are low in fat  Choose beans or other legumes such as split peas or lentils  Choose fish, skinless poultry (chicken or turkey), or lean cuts of red meat (beef or pork)  Before you cook meat or poultry, cut off any visible fat  · Use less fat and oil  Try baking foods instead of frying them  Add less fat, such as margarine, sour cream, regular salad dressing and mayonnaise to foods  Eat fewer high-fat foods  Some examples of high-fat foods include french fries, doughnuts, ice cream, and cakes    · Eat fewer sweets  Limit foods and drinks that are high in sugar  This includes candy, cookies, regular soda, and sweetened drinks  Exercise:  Exercise at least 30 minutes per day on most days of the week  Some examples of exercise include walking, biking, dancing, and swimming  You can also fit in more physical activity by taking the stairs instead of the elevator or parking farther away from stores  Ask your healthcare provider about the best exercise plan for you  © Copyright [employer] 2018 Information is for End User's use only and may not be sold, redistributed or otherwise used for commercial purposes   All illustrations and images included in CareNotes® are the copyrighted property of [employer]  or [address] [place] [address]

## 2021-09-17 NOTE — Clinical Note
Pt was here today for a BP check, BP was 162/108   Pt didn't report any headaches, dizziness, chest pain or blurred vision  Pt reports taking BP medication around 7:30 AM     I spoke to Win Breen which advise she will increased lisinopril to 40mg  Pt was informed of the changes

## 2021-09-17 NOTE — PROGRESS NOTES
Pt was here today for a BP check, BP was 162/108   Pt didn't report any headaches, dizziness, chest pain or blurred vision  Pt reports taking BP medication around 7:30 AM     I spoke to Kylee Verdugo which advise she will increased lisinopril to 40mg  Pt was informed of the changes

## 2021-10-13 PROBLEM — M54.32 SCIATICA OF LEFT SIDE: Status: ACTIVE | Noted: 2021-01-01

## 2021-12-03 PROBLEM — Z20.822 CLOSE EXPOSURE TO COVID-19 VIRUS: Status: ACTIVE | Noted: 2021-01-01

## 2021-12-10 PROBLEM — U07.1 COVID-19: Status: ACTIVE | Noted: 2021-01-01

## 2022-01-01 ENCOUNTER — APPOINTMENT (INPATIENT)
Dept: CT IMAGING | Facility: HOSPITAL | Age: 67
DRG: 435 | End: 2022-01-01
Payer: COMMERCIAL

## 2022-01-01 ENCOUNTER — TELEPHONE (OUTPATIENT)
Dept: PALLIATIVE MEDICINE | Facility: CLINIC | Age: 67
End: 2022-01-01

## 2022-01-01 ENCOUNTER — APPOINTMENT (INPATIENT)
Dept: RADIOLOGY | Facility: HOSPITAL | Age: 67
DRG: 435 | End: 2022-01-01
Payer: COMMERCIAL

## 2022-01-01 ENCOUNTER — TELEPHONE (OUTPATIENT)
Dept: INTERVENTIONAL RADIOLOGY/VASCULAR | Facility: HOSPITAL | Age: 67
End: 2022-01-01

## 2022-01-01 ENCOUNTER — TELEPHONE (OUTPATIENT)
Dept: HEMATOLOGY ONCOLOGY | Facility: CLINIC | Age: 67
End: 2022-01-01

## 2022-01-01 ENCOUNTER — OFFICE VISIT (OUTPATIENT)
Dept: PALLIATIVE MEDICINE | Facility: CLINIC | Age: 67
End: 2022-01-01
Payer: COMMERCIAL

## 2022-01-01 ENCOUNTER — CONSULT (OUTPATIENT)
Dept: PALLIATIVE MEDICINE | Facility: CLINIC | Age: 67
End: 2022-01-01
Payer: COMMERCIAL

## 2022-01-01 ENCOUNTER — ANESTHESIA (INPATIENT)
Dept: RADIOLOGY | Facility: HOSPITAL | Age: 67
DRG: 435 | End: 2022-01-01
Payer: COMMERCIAL

## 2022-01-01 ENCOUNTER — OFFICE VISIT (OUTPATIENT)
Dept: HEMATOLOGY ONCOLOGY | Facility: CLINIC | Age: 67
End: 2022-01-01
Payer: COMMERCIAL

## 2022-01-01 ENCOUNTER — HOSPITAL ENCOUNTER (OUTPATIENT)
Dept: CT IMAGING | Facility: HOSPITAL | Age: 67
Discharge: HOME | End: 2022-03-01
Attending: RADIOLOGY
Payer: COMMERCIAL

## 2022-01-01 ENCOUNTER — HOSPITAL ENCOUNTER (OUTPATIENT)
Dept: INFUSION CENTER | Facility: CLINIC | Age: 67
Discharge: HOME | End: 2022-04-25
Payer: COMMERCIAL

## 2022-01-01 ENCOUNTER — HOSPITAL ENCOUNTER (OUTPATIENT)
Dept: INFUSION CENTER | Facility: CLINIC | Age: 67
Discharge: HOME | End: 2022-05-10
Payer: COMMERCIAL

## 2022-01-01 ENCOUNTER — HOSPITAL ENCOUNTER (OUTPATIENT)
Dept: ULTRASOUND IMAGING | Facility: HOSPITAL | Age: 67
Discharge: HOME | End: 2022-01-14
Payer: COMMERCIAL

## 2022-01-01 ENCOUNTER — PREP FOR PROCEDURE (OUTPATIENT)
Dept: INTERVENTIONAL RADIOLOGY/VASCULAR | Facility: CLINIC | Age: 67
End: 2022-01-01

## 2022-01-01 ENCOUNTER — OFFICE VISIT (OUTPATIENT)
Dept: FAMILY MEDICINE CLINIC | Facility: CLINIC | Age: 67
End: 2022-01-01

## 2022-01-01 ENCOUNTER — HOSPITAL ENCOUNTER (OUTPATIENT)
Dept: INFUSION CENTER | Facility: CLINIC | Age: 67
Discharge: HOME | End: 2022-03-28
Payer: COMMERCIAL

## 2022-01-01 ENCOUNTER — HOSPITAL ENCOUNTER (OUTPATIENT)
Dept: INFUSION CENTER | Facility: CLINIC | Age: 67
End: 2022-01-01

## 2022-01-01 ENCOUNTER — CLINICAL SUPPORT (OUTPATIENT)
Dept: FAMILY MEDICINE CLINIC | Facility: CLINIC | Age: 67
End: 2022-01-01

## 2022-01-01 ENCOUNTER — HOSPITAL ENCOUNTER (OUTPATIENT)
Dept: INFUSION CENTER | Facility: CLINIC | Age: 67
Discharge: HOME | End: 2022-04-28
Payer: COMMERCIAL

## 2022-01-01 ENCOUNTER — APPOINTMENT (INPATIENT)
Dept: RADIOLOGY | Facility: HOSPITAL | Age: 67
DRG: 435 | End: 2022-01-01
Attending: RADIOLOGY
Payer: COMMERCIAL

## 2022-01-01 ENCOUNTER — TELEPHONE (OUTPATIENT)
Dept: NUTRITION | Facility: CLINIC | Age: 67
End: 2022-01-01

## 2022-01-01 ENCOUNTER — HOSPITAL ENCOUNTER (INPATIENT)
Facility: HOSPITAL | Age: 67
LOS: 2 days | Discharge: HOME | DRG: 392 | End: 2022-02-25
Attending: EMERGENCY MEDICINE | Admitting: INTERNAL MEDICINE
Payer: COMMERCIAL

## 2022-01-01 ENCOUNTER — HOSPITAL ENCOUNTER (OUTPATIENT)
Dept: INFUSION CENTER | Facility: CLINIC | Age: 67
Discharge: HOME | End: 2022-04-26
Payer: COMMERCIAL

## 2022-01-01 ENCOUNTER — APPOINTMENT (OUTPATIENT)
Dept: LAB | Facility: HOSPITAL | Age: 67
End: 2022-01-01
Attending: INTERNAL MEDICINE
Payer: COMMERCIAL

## 2022-01-01 ENCOUNTER — HOSPITAL ENCOUNTER (OUTPATIENT)
Dept: INFUSION CENTER | Facility: CLINIC | Age: 67
Discharge: HOME | End: 2022-04-14
Payer: COMMERCIAL

## 2022-01-01 ENCOUNTER — TRANSITIONAL CARE MANAGEMENT (OUTPATIENT)
Dept: FAMILY MEDICINE CLINIC | Facility: CLINIC | Age: 67
End: 2022-01-01

## 2022-01-01 ENCOUNTER — ANESTHESIA EVENT (INPATIENT)
Dept: RADIOLOGY | Facility: HOSPITAL | Age: 67
DRG: 435 | End: 2022-01-01
Payer: COMMERCIAL

## 2022-01-01 ENCOUNTER — HOSPITAL ENCOUNTER (EMERGENCY)
Facility: HOSPITAL | Age: 67
Discharge: HOME | End: 2022-05-07
Attending: EMERGENCY MEDICINE | Admitting: EMERGENCY MEDICINE
Payer: COMMERCIAL

## 2022-01-01 ENCOUNTER — SOCIAL WORK (OUTPATIENT)
Dept: PALLIATIVE MEDICINE | Facility: CLINIC | Age: 67
End: 2022-01-01
Payer: COMMERCIAL

## 2022-01-01 ENCOUNTER — HOSPITAL ENCOUNTER (OUTPATIENT)
Dept: INFUSION CENTER | Facility: CLINIC | Age: 67
Discharge: HOME | End: 2022-04-12
Payer: COMMERCIAL

## 2022-01-01 ENCOUNTER — HOSPITAL ENCOUNTER (OUTPATIENT)
Dept: INFUSION CENTER | Facility: CLINIC | Age: 67
Discharge: HOME | End: 2022-04-11
Payer: COMMERCIAL

## 2022-01-01 ENCOUNTER — TELEPHONE (OUTPATIENT)
Dept: OTHER | Facility: OTHER | Age: 67
End: 2022-01-01

## 2022-01-01 ENCOUNTER — APPOINTMENT (INPATIENT)
Dept: MRI IMAGING | Facility: HOSPITAL | Age: 67
DRG: 435 | End: 2022-01-01
Payer: COMMERCIAL

## 2022-01-01 ENCOUNTER — SOCIAL WORK (OUTPATIENT)
Dept: PALLIATIVE MEDICINE | Facility: CLINIC | Age: 67
End: 2022-01-01

## 2022-01-01 ENCOUNTER — HOSPITAL ENCOUNTER (INPATIENT)
Facility: HOSPITAL | Age: 67
LOS: 9 days | DRG: 435 | End: 2022-05-29
Attending: EMERGENCY MEDICINE | Admitting: STUDENT IN AN ORGANIZED HEALTH CARE EDUCATION/TRAINING PROGRAM
Payer: COMMERCIAL

## 2022-01-01 ENCOUNTER — APPOINTMENT (EMERGENCY)
Dept: CT IMAGING | Facility: HOSPITAL | Age: 67
DRG: 392 | End: 2022-01-01
Payer: COMMERCIAL

## 2022-01-01 ENCOUNTER — APPOINTMENT (EMERGENCY)
Dept: CT IMAGING | Facility: HOSPITAL | Age: 67
End: 2022-01-01
Payer: COMMERCIAL

## 2022-01-01 ENCOUNTER — HOSPITAL ENCOUNTER (OUTPATIENT)
Dept: INFUSION CENTER | Facility: CLINIC | Age: 67
Discharge: HOME | End: 2022-05-12
Payer: COMMERCIAL

## 2022-01-01 ENCOUNTER — NUTRITION (OUTPATIENT)
Dept: NUTRITION | Facility: CLINIC | Age: 67
End: 2022-01-01

## 2022-01-01 ENCOUNTER — CONSULT (OUTPATIENT)
Dept: SURGERY | Facility: CLINIC | Age: 67
End: 2022-01-01
Payer: COMMERCIAL

## 2022-01-01 ENCOUNTER — HOSPITAL ENCOUNTER (OUTPATIENT)
Dept: NON INVASIVE DIAGNOSTICS | Facility: HOSPITAL | Age: 67
Discharge: HOME | End: 2022-03-08
Payer: COMMERCIAL

## 2022-01-01 ENCOUNTER — HOSPITAL ENCOUNTER (OUTPATIENT)
Dept: INFUSION CENTER | Facility: CLINIC | Age: 67
Discharge: HOME | End: 2022-03-30
Payer: COMMERCIAL

## 2022-01-01 ENCOUNTER — HOSPITAL ENCOUNTER (OUTPATIENT)
Dept: INTERVENTIONAL RADIOLOGY/VASCULAR | Facility: HOSPITAL | Age: 67
Discharge: HOME | End: 2022-03-25
Attending: INTERNAL MEDICINE | Admitting: RADIOLOGY
Payer: COMMERCIAL

## 2022-01-01 VITALS
TEMPERATURE: 98.2 F | HEIGHT: 67 IN | WEIGHT: 146 LBS | BODY MASS INDEX: 22.91 KG/M2 | SYSTOLIC BLOOD PRESSURE: 148 MMHG | HEART RATE: 80 BPM | OXYGEN SATURATION: 98 % | DIASTOLIC BLOOD PRESSURE: 98 MMHG

## 2022-01-01 VITALS
RESPIRATION RATE: 18 BRPM | SYSTOLIC BLOOD PRESSURE: 111 MMHG | DIASTOLIC BLOOD PRESSURE: 70 MMHG | TEMPERATURE: 97.6 F | HEART RATE: 49 BPM

## 2022-01-01 VITALS
WEIGHT: 142.2 LBS | HEART RATE: 79 BPM | RESPIRATION RATE: 18 BRPM | SYSTOLIC BLOOD PRESSURE: 111 MMHG | BODY MASS INDEX: 22.32 KG/M2 | DIASTOLIC BLOOD PRESSURE: 67 MMHG | TEMPERATURE: 97.1 F | HEIGHT: 67 IN

## 2022-01-01 VITALS
DIASTOLIC BLOOD PRESSURE: 82 MMHG | HEART RATE: 64 BPM | SYSTOLIC BLOOD PRESSURE: 143 MMHG | RESPIRATION RATE: 18 BRPM | WEIGHT: 150 LBS | BODY MASS INDEX: 23.54 KG/M2 | OXYGEN SATURATION: 99 % | HEIGHT: 67 IN | TEMPERATURE: 97.6 F

## 2022-01-01 VITALS
OXYGEN SATURATION: 98 % | TEMPERATURE: 97.6 F | BODY MASS INDEX: 25.22 KG/M2 | HEART RATE: 81 BPM | RESPIRATION RATE: 17 BRPM | DIASTOLIC BLOOD PRESSURE: 102 MMHG | WEIGHT: 161 LBS | SYSTOLIC BLOOD PRESSURE: 180 MMHG

## 2022-01-01 VITALS
RESPIRATION RATE: 16 BRPM | OXYGEN SATURATION: 97 % | DIASTOLIC BLOOD PRESSURE: 62 MMHG | BODY MASS INDEX: 22.74 KG/M2 | WEIGHT: 145.2 LBS | TEMPERATURE: 95.7 F | SYSTOLIC BLOOD PRESSURE: 110 MMHG | HEART RATE: 64 BPM

## 2022-01-01 VITALS
OXYGEN SATURATION: 99 % | WEIGHT: 146.4 LBS | SYSTOLIC BLOOD PRESSURE: 142 MMHG | TEMPERATURE: 97.8 F | HEART RATE: 79 BPM | HEIGHT: 67 IN | RESPIRATION RATE: 18 BRPM | BODY MASS INDEX: 22.98 KG/M2 | DIASTOLIC BLOOD PRESSURE: 98 MMHG

## 2022-01-01 VITALS
TEMPERATURE: 97 F | SYSTOLIC BLOOD PRESSURE: 120 MMHG | BODY MASS INDEX: 19.87 KG/M2 | OXYGEN SATURATION: 98 % | HEART RATE: 75 BPM | HEIGHT: 67 IN | WEIGHT: 126.6 LBS | RESPIRATION RATE: 16 BRPM | DIASTOLIC BLOOD PRESSURE: 62 MMHG

## 2022-01-01 VITALS
HEIGHT: 67 IN | BODY MASS INDEX: 23.56 KG/M2 | DIASTOLIC BLOOD PRESSURE: 84 MMHG | SYSTOLIC BLOOD PRESSURE: 144 MMHG | TEMPERATURE: 96.8 F | HEART RATE: 59 BPM | RESPIRATION RATE: 18 BRPM | WEIGHT: 150.13 LBS

## 2022-01-01 VITALS
DIASTOLIC BLOOD PRESSURE: 62 MMHG | SYSTOLIC BLOOD PRESSURE: 102 MMHG | WEIGHT: 125.5 LBS | BODY MASS INDEX: 19.7 KG/M2 | OXYGEN SATURATION: 99 % | TEMPERATURE: 95.2 F | HEIGHT: 67 IN | RESPIRATION RATE: 18 BRPM | HEART RATE: 63 BPM

## 2022-01-01 VITALS
RESPIRATION RATE: 18 BRPM | TEMPERATURE: 96.5 F | OXYGEN SATURATION: 97 % | HEART RATE: 69 BPM | BODY MASS INDEX: 23.54 KG/M2 | WEIGHT: 150 LBS | DIASTOLIC BLOOD PRESSURE: 64 MMHG | HEIGHT: 67 IN | SYSTOLIC BLOOD PRESSURE: 118 MMHG

## 2022-01-01 VITALS — WEIGHT: 124.89 LBS | HEIGHT: 67 IN | BODY MASS INDEX: 19.6 KG/M2

## 2022-01-01 VITALS
DIASTOLIC BLOOD PRESSURE: 83 MMHG | HEIGHT: 67 IN | SYSTOLIC BLOOD PRESSURE: 117 MMHG | HEART RATE: 65 BPM | BODY MASS INDEX: 25.27 KG/M2 | WEIGHT: 161 LBS

## 2022-01-01 VITALS
OXYGEN SATURATION: 98 % | TEMPERATURE: 97.6 F | HEIGHT: 67 IN | HEART RATE: 52 BPM | DIASTOLIC BLOOD PRESSURE: 61 MMHG | WEIGHT: 126.98 LBS | BODY MASS INDEX: 19.93 KG/M2 | SYSTOLIC BLOOD PRESSURE: 98 MMHG | RESPIRATION RATE: 20 BRPM

## 2022-01-01 VITALS — SYSTOLIC BLOOD PRESSURE: 158 MMHG | DIASTOLIC BLOOD PRESSURE: 90 MMHG

## 2022-01-01 VITALS — TEMPERATURE: 97.2 F

## 2022-01-01 VITALS
RESPIRATION RATE: 18 BRPM | DIASTOLIC BLOOD PRESSURE: 90 MMHG | HEART RATE: 100 BPM | SYSTOLIC BLOOD PRESSURE: 140 MMHG | WEIGHT: 159.6 LBS | HEIGHT: 67 IN | OXYGEN SATURATION: 97 % | TEMPERATURE: 98 F | BODY MASS INDEX: 25.05 KG/M2

## 2022-01-01 VITALS
BODY MASS INDEX: 22.05 KG/M2 | DIASTOLIC BLOOD PRESSURE: 64 MMHG | HEART RATE: 59 BPM | TEMPERATURE: 98.4 F | WEIGHT: 140.5 LBS | HEIGHT: 67 IN | RESPIRATION RATE: 18 BRPM | OXYGEN SATURATION: 98 % | SYSTOLIC BLOOD PRESSURE: 106 MMHG

## 2022-01-01 VITALS — SYSTOLIC BLOOD PRESSURE: 100 MMHG | HEART RATE: 71 BPM | DIASTOLIC BLOOD PRESSURE: 70 MMHG

## 2022-01-01 VITALS
OXYGEN SATURATION: 94 % | RESPIRATION RATE: 18 BRPM | DIASTOLIC BLOOD PRESSURE: 69 MMHG | TEMPERATURE: 98 F | SYSTOLIC BLOOD PRESSURE: 117 MMHG | HEART RATE: 69 BPM

## 2022-01-01 VITALS
HEIGHT: 67 IN | BODY MASS INDEX: 21.3 KG/M2 | DIASTOLIC BLOOD PRESSURE: 76 MMHG | HEART RATE: 60 BPM | SYSTOLIC BLOOD PRESSURE: 106 MMHG | WEIGHT: 135.69 LBS | TEMPERATURE: 98.1 F | RESPIRATION RATE: 16 BRPM

## 2022-01-01 VITALS
RESPIRATION RATE: 18 BRPM | OXYGEN SATURATION: 98 % | BODY MASS INDEX: 23.59 KG/M2 | SYSTOLIC BLOOD PRESSURE: 154 MMHG | WEIGHT: 150.3 LBS | HEART RATE: 67 BPM | DIASTOLIC BLOOD PRESSURE: 88 MMHG | HEIGHT: 67 IN | TEMPERATURE: 97.1 F

## 2022-01-01 VITALS
SYSTOLIC BLOOD PRESSURE: 124 MMHG | OXYGEN SATURATION: 98 % | DIASTOLIC BLOOD PRESSURE: 82 MMHG | HEART RATE: 71 BPM | BODY MASS INDEX: 23.46 KG/M2 | HEIGHT: 67 IN | TEMPERATURE: 97.8 F | RESPIRATION RATE: 17 BRPM | WEIGHT: 149.5 LBS

## 2022-01-01 VITALS
HEART RATE: 73 BPM | SYSTOLIC BLOOD PRESSURE: 137 MMHG | TEMPERATURE: 97.6 F | DIASTOLIC BLOOD PRESSURE: 89 MMHG | BODY MASS INDEX: 16.74 KG/M2 | WEIGHT: 110.45 LBS | HEIGHT: 68 IN | OXYGEN SATURATION: 92 % | RESPIRATION RATE: 20 BRPM

## 2022-01-01 VITALS
DIASTOLIC BLOOD PRESSURE: 72 MMHG | TEMPERATURE: 98.3 F | SYSTOLIC BLOOD PRESSURE: 99 MMHG | OXYGEN SATURATION: 98 % | RESPIRATION RATE: 18 BRPM | HEART RATE: 59 BPM

## 2022-01-01 VITALS
HEART RATE: 60 BPM | DIASTOLIC BLOOD PRESSURE: 65 MMHG | RESPIRATION RATE: 18 BRPM | SYSTOLIC BLOOD PRESSURE: 102 MMHG | TEMPERATURE: 96.8 F

## 2022-01-01 VITALS
BODY MASS INDEX: 23.37 KG/M2 | OXYGEN SATURATION: 97 % | HEART RATE: 70 BPM | RESPIRATION RATE: 16 BRPM | TEMPERATURE: 97.2 F | SYSTOLIC BLOOD PRESSURE: 122 MMHG | WEIGHT: 149.2 LBS | DIASTOLIC BLOOD PRESSURE: 72 MMHG

## 2022-01-01 VITALS
OXYGEN SATURATION: 99 % | RESPIRATION RATE: 18 BRPM | TEMPERATURE: 97.1 F | HEART RATE: 68 BPM | DIASTOLIC BLOOD PRESSURE: 104 MMHG | SYSTOLIC BLOOD PRESSURE: 201 MMHG

## 2022-01-01 VITALS — TEMPERATURE: 97.7 F

## 2022-01-01 DIAGNOSIS — C77.2 SECONDARY MALIGNANT NEOPLASM OF RETROPERITONEAL LYMPH NODES (HCC): ICD-10-CM

## 2022-01-01 DIAGNOSIS — T45.1X5A CHEMOTHERAPY INDUCED NEUTROPENIA (HCC): ICD-10-CM

## 2022-01-01 DIAGNOSIS — D70.1 CHEMOTHERAPY INDUCED NEUTROPENIA (HCC): ICD-10-CM

## 2022-01-01 DIAGNOSIS — C22.0 HEPATOCELLULAR CARCINOMA (HCC): Primary | ICD-10-CM

## 2022-01-01 DIAGNOSIS — K70.30 ALCOHOLIC CIRRHOSIS OF LIVER WITHOUT ASCITES (HCC): ICD-10-CM

## 2022-01-01 DIAGNOSIS — K21.00 GASTROESOPHAGEAL REFLUX DISEASE WITH ESOPHAGITIS WITHOUT HEMORRHAGE: ICD-10-CM

## 2022-01-01 DIAGNOSIS — E03.9 HYPOTHYROIDISM, UNSPECIFIED TYPE: ICD-10-CM

## 2022-01-01 DIAGNOSIS — C25.1 MALIGNANT NEOPLASM OF BODY OF PANCREAS (HCC): Primary | ICD-10-CM

## 2022-01-01 DIAGNOSIS — G89.3 CANCER RELATED PAIN: ICD-10-CM

## 2022-01-01 DIAGNOSIS — R53.0 NEOPLASTIC MALIGNANT RELATED FATIGUE: ICD-10-CM

## 2022-01-01 DIAGNOSIS — Z95.828 PORT-A-CATH IN PLACE: ICD-10-CM

## 2022-01-01 DIAGNOSIS — C22.0 HEPATOCELLULAR CARCINOMA (HCC): ICD-10-CM

## 2022-01-01 DIAGNOSIS — B37.89 PHARYNGEAL CANDIDIASIS: ICD-10-CM

## 2022-01-01 DIAGNOSIS — C25.1 MALIGNANT NEOPLASM OF BODY OF PANCREAS (HCC): ICD-10-CM

## 2022-01-01 DIAGNOSIS — R11.0 NAUSEA: ICD-10-CM

## 2022-01-01 DIAGNOSIS — I10 PRIMARY HYPERTENSION: Primary | ICD-10-CM

## 2022-01-01 DIAGNOSIS — I10 UNCONTROLLED HYPERTENSION: ICD-10-CM

## 2022-01-01 DIAGNOSIS — Z85.21 H/O LARYNGEAL CANCER: ICD-10-CM

## 2022-01-01 DIAGNOSIS — R10.10 PAIN OF UPPER ABDOMEN: ICD-10-CM

## 2022-01-01 DIAGNOSIS — R53.1 WEAKNESS: Primary | ICD-10-CM

## 2022-01-01 DIAGNOSIS — B37.0 ORAL PHARYNGEAL CANDIDIASIS: ICD-10-CM

## 2022-01-01 DIAGNOSIS — R10.9 ABDOMINAL PAIN: Primary | ICD-10-CM

## 2022-01-01 DIAGNOSIS — C79.9 METASTASIS FROM MALIGNANT NEOPLASM OF LIVER (HCC): ICD-10-CM

## 2022-01-01 DIAGNOSIS — E11.9 TYPE 2 DIABETES MELLITUS WITHOUT COMPLICATION, WITHOUT LONG-TERM CURRENT USE OF INSULIN (HCC): ICD-10-CM

## 2022-01-01 DIAGNOSIS — R11.2 NAUSEA AND VOMITING, UNSPECIFIED VOMITING TYPE: Primary | ICD-10-CM

## 2022-01-01 DIAGNOSIS — K43.2 INCISIONAL HERNIA, WITHOUT OBSTRUCTION OR GANGRENE: ICD-10-CM

## 2022-01-01 DIAGNOSIS — R64 CANCER CACHEXIA (HCC): ICD-10-CM

## 2022-01-01 DIAGNOSIS — R63.0 POOR APPETITE: ICD-10-CM

## 2022-01-01 DIAGNOSIS — R68.89: ICD-10-CM

## 2022-01-01 DIAGNOSIS — Z71.89 COUNSELING REGARDING ADVANCED CARE PLANNING AND GOALS OF CARE: ICD-10-CM

## 2022-01-01 DIAGNOSIS — R13.12 OROPHARYNGEAL DYSPHAGIA: ICD-10-CM

## 2022-01-01 DIAGNOSIS — E87.6 HYPOKALEMIA: ICD-10-CM

## 2022-01-01 DIAGNOSIS — I46.9 CARDIAC ARREST (HCC): ICD-10-CM

## 2022-01-01 DIAGNOSIS — K74.69 OTHER CIRRHOSIS OF LIVER (HCC): ICD-10-CM

## 2022-01-01 DIAGNOSIS — R11.0 NAUSEA: Primary | ICD-10-CM

## 2022-01-01 DIAGNOSIS — Z71.89 COUNSELING AND COORDINATION OF CARE: Primary | ICD-10-CM

## 2022-01-01 DIAGNOSIS — Z71.3 NUTRITIONAL COUNSELING: Primary | ICD-10-CM

## 2022-01-01 DIAGNOSIS — R43.2 DYSGEUSIA: ICD-10-CM

## 2022-01-01 DIAGNOSIS — C25.9 MALIGNANT NEOPLASM OF PANCREAS, UNSPECIFIED LOCATION OF MALIGNANCY (HCC): ICD-10-CM

## 2022-01-01 DIAGNOSIS — R11.0 CHEMOTHERAPY-INDUCED NAUSEA: Primary | ICD-10-CM

## 2022-01-01 DIAGNOSIS — Z86.16 HISTORY OF 2019 NOVEL CORONAVIRUS DISEASE (COVID-19): ICD-10-CM

## 2022-01-01 DIAGNOSIS — D64.9 ANEMIA: ICD-10-CM

## 2022-01-01 DIAGNOSIS — E43 SEVERE PROTEIN-CALORIE MALNUTRITION (HCC): ICD-10-CM

## 2022-01-01 DIAGNOSIS — R53.83 FATIGUE: ICD-10-CM

## 2022-01-01 DIAGNOSIS — K59.03 DRUG INDUCED CONSTIPATION: ICD-10-CM

## 2022-01-01 DIAGNOSIS — D69.6 PLATELETS DECREASED (HCC): ICD-10-CM

## 2022-01-01 DIAGNOSIS — R13.10 DYSPHAGIA, UNSPECIFIED TYPE: Primary | ICD-10-CM

## 2022-01-01 DIAGNOSIS — K74.60 HEPATIC CIRRHOSIS (HCC): ICD-10-CM

## 2022-01-01 DIAGNOSIS — G89.3 CANCER RELATED PAIN: Primary | ICD-10-CM

## 2022-01-01 DIAGNOSIS — R79.89 ELEVATED TSH: ICD-10-CM

## 2022-01-01 DIAGNOSIS — C22.8 METASTASIS FROM MALIGNANT NEOPLASM OF LIVER (HCC): ICD-10-CM

## 2022-01-01 DIAGNOSIS — I10 HYPERTENSION, UNSPECIFIED TYPE: ICD-10-CM

## 2022-01-01 DIAGNOSIS — K86.2 PANCREATIC CYST: ICD-10-CM

## 2022-01-01 DIAGNOSIS — Z95.828 PORT-A-CATH IN PLACE: Primary | ICD-10-CM

## 2022-01-01 DIAGNOSIS — R49.0 HOARSENESS OF VOICE: Primary | ICD-10-CM

## 2022-01-01 DIAGNOSIS — T45.1X5A CHEMOTHERAPY-INDUCED NAUSEA: Primary | ICD-10-CM

## 2022-01-01 DIAGNOSIS — M54.32 SCIATICA OF LEFT SIDE: ICD-10-CM

## 2022-01-01 DIAGNOSIS — R11.2 NAUSEA AND VOMITING, UNSPECIFIED VOMITING TYPE: ICD-10-CM

## 2022-01-01 LAB
2HR DELTA HS TROPONIN: -1 NG/L
2HR DELTA HS TROPONIN: 0 NG/L
4HR DELTA HS TROPONIN: 0 NG/L
AFP-TM SERPL-MCNC: 12.4 NG/ML (ref 0.5–8)
AFP-TM SERPL-MCNC: 14.8 NG/ML (ref 0.5–8)
AFP-TM SERPL-MCNC: 15.9 NG/ML (ref 0.5–8)
AFP-TM SERPL-MCNC: 16.5 NG/ML (ref 0.5–8)
ALBUMIN SERPL BCP-MCNC: 2.3 G/DL (ref 3.5–5)
ALBUMIN SERPL BCP-MCNC: 2.7 G/DL (ref 3.5–5)
ALBUMIN SERPL BCP-MCNC: 2.9 G/DL (ref 3.5–5)
ALBUMIN SERPL BCP-MCNC: 3.1 G/DL (ref 3.5–5)
ALBUMIN SERPL BCP-MCNC: 3.1 G/DL (ref 3.5–5)
ALBUMIN SERPL BCP-MCNC: 3.3 G/DL (ref 3.5–5)
ALBUMIN SERPL BCP-MCNC: 3.4 G/DL (ref 3.5–5)
ALBUMIN SERPL BCP-MCNC: 3.5 G/DL (ref 3.5–5)
ALBUMIN SERPL BCP-MCNC: 3.6 G/DL (ref 3.5–5)
ALP SERPL-CCNC: 105 U/L (ref 46–116)
ALP SERPL-CCNC: 107 U/L (ref 46–116)
ALP SERPL-CCNC: 163 U/L (ref 46–116)
ALP SERPL-CCNC: 165 U/L (ref 46–116)
ALP SERPL-CCNC: 190 U/L (ref 46–116)
ALP SERPL-CCNC: 201 U/L (ref 46–116)
ALP SERPL-CCNC: 80 U/L (ref 46–116)
ALP SERPL-CCNC: 89 U/L (ref 46–116)
ALP SERPL-CCNC: 92 U/L (ref 46–116)
ALT SERPL W P-5'-P-CCNC: 14 U/L (ref 12–78)
ALT SERPL W P-5'-P-CCNC: 18 U/L (ref 12–78)
ALT SERPL W P-5'-P-CCNC: 20 U/L (ref 12–78)
ALT SERPL W P-5'-P-CCNC: 21 U/L (ref 12–78)
ALT SERPL W P-5'-P-CCNC: 22 U/L (ref 12–78)
ALT SERPL W P-5'-P-CCNC: 22 U/L (ref 12–78)
ALT SERPL W P-5'-P-CCNC: 25 U/L (ref 12–78)
ALT SERPL W P-5'-P-CCNC: 33 U/L (ref 12–78)
ALT SERPL W P-5'-P-CCNC: 50 U/L (ref 12–78)
AMMONIA PLAS-SCNC: 29 UMOL/L (ref 11–35)
AMMONIA PLAS-SCNC: 37 UMOL/L (ref 11–35)
ANION GAP SERPL CALCULATED.3IONS-SCNC: 10 MMOL/L (ref 4–13)
ANION GAP SERPL CALCULATED.3IONS-SCNC: 10 MMOL/L (ref 4–13)
ANION GAP SERPL CALCULATED.3IONS-SCNC: 12 MMOL/L (ref 4–13)
ANION GAP SERPL CALCULATED.3IONS-SCNC: 5 MMOL/L (ref 4–13)
ANION GAP SERPL CALCULATED.3IONS-SCNC: 6 MMOL/L (ref 4–13)
ANION GAP SERPL CALCULATED.3IONS-SCNC: 7 MMOL/L (ref 4–13)
ANION GAP SERPL CALCULATED.3IONS-SCNC: 9 MMOL/L (ref 4–13)
ANISOCYTOSIS BLD QL SMEAR: PRESENT
AORTIC ROOT: 3.1 CM
AORTIC VALVE MEAN VELOCITY: 15.5 M/S
APICAL FOUR CHAMBER EJECTION FRACTION: 65 %
APTT PPP: 26 SECONDS (ref 23–37)
APTT PPP: 30 SECONDS (ref 23–37)
AST SERPL W P-5'-P-CCNC: 16 U/L (ref 5–45)
AST SERPL W P-5'-P-CCNC: 24 U/L (ref 5–45)
AST SERPL W P-5'-P-CCNC: 25 U/L (ref 5–45)
AST SERPL W P-5'-P-CCNC: 27 U/L (ref 5–45)
AST SERPL W P-5'-P-CCNC: 29 U/L (ref 5–45)
AST SERPL W P-5'-P-CCNC: 31 U/L (ref 5–45)
AST SERPL W P-5'-P-CCNC: 34 U/L (ref 5–45)
AST SERPL W P-5'-P-CCNC: 36 U/L (ref 5–45)
AST SERPL W P-5'-P-CCNC: 57 U/L (ref 5–45)
ATRIAL RATE: 66 BPM
ATRIAL RATE: 67 BPM
ATRIAL RATE: 69 BPM
ATRIAL RATE: 69 BPM
ATRIAL RATE: 71 BPM
ATRIAL RATE: 73 BPM
AV AREA BY CONTINUOUS VTI: 2.1 CM2
AV AREA PEAK VELOCITY: 2 CM2
AV LVOT MEAN GRADIENT: 2 MMHG
AV LVOT PEAK GRADIENT: 4 MMHG
AV MEAN GRADIENT: 11 MMHG
AV PEAK GRADIENT: 19 MMHG
AV VALVE AREA: 2.05 CM2
AV VELOCITY RATIO: 0.47
BACTERIA UR QL AUTO: ABNORMAL /HPF
BASOPHILS # BLD AUTO: 0.03 THOUSANDS/ΜL (ref 0–0.1)
BASOPHILS # BLD MANUAL: 0 THOUSAND/UL (ref 0–0.1)
BASOPHILS NFR BLD AUTO: 0 % (ref 0–1)
BASOPHILS NFR BLD AUTO: 1 % (ref 0–1)
BASOPHILS NFR MAR MANUAL: 0 % (ref 0–1)
BILIRUB DIRECT SERPL-MCNC: 0.2 MG/DL (ref 0–0.2)
BILIRUB SERPL-MCNC: 0.75 MG/DL (ref 0.2–1)
BILIRUB SERPL-MCNC: 0.91 MG/DL (ref 0.2–1)
BILIRUB SERPL-MCNC: 1 MG/DL (ref 0.2–1)
BILIRUB SERPL-MCNC: 1.01 MG/DL (ref 0.2–1)
BILIRUB SERPL-MCNC: 1.07 MG/DL (ref 0.2–1)
BILIRUB SERPL-MCNC: 1.24 MG/DL (ref 0.2–1)
BILIRUB SERPL-MCNC: 1.37 MG/DL (ref 0.2–1)
BILIRUB SERPL-MCNC: 1.38 MG/DL (ref 0.2–1)
BILIRUB SERPL-MCNC: 1.54 MG/DL (ref 0.2–1)
BILIRUB UR QL STRIP: ABNORMAL
BILIRUB UR QL STRIP: NEGATIVE
BUN SERPL-MCNC: 10 MG/DL (ref 5–25)
BUN SERPL-MCNC: 10 MG/DL (ref 5–25)
BUN SERPL-MCNC: 11 MG/DL (ref 5–25)
BUN SERPL-MCNC: 12 MG/DL (ref 5–25)
BUN SERPL-MCNC: 13 MG/DL (ref 5–25)
BUN SERPL-MCNC: 13 MG/DL (ref 5–25)
BUN SERPL-MCNC: 16 MG/DL (ref 5–25)
BUN SERPL-MCNC: 17 MG/DL (ref 5–25)
BUN SERPL-MCNC: 17 MG/DL (ref 5–25)
BUN SERPL-MCNC: 21 MG/DL (ref 5–25)
BUN SERPL-MCNC: 22 MG/DL (ref 5–25)
BUN SERPL-MCNC: 7 MG/DL (ref 5–25)
BUN SERPL-MCNC: 9 MG/DL (ref 5–25)
BUN SERPL-MCNC: 9 MG/DL (ref 5–25)
C DIFF TOX B TCDB STL QL NAA+PROBE: NEGATIVE
CALCIUM ALBUM COR SERPL-MCNC: 10 MG/DL (ref 8.3–10.1)
CALCIUM ALBUM COR SERPL-MCNC: 9.2 MG/DL (ref 8.3–10.1)
CALCIUM ALBUM COR SERPL-MCNC: 9.2 MG/DL (ref 8.3–10.1)
CALCIUM ALBUM COR SERPL-MCNC: 9.5 MG/DL (ref 8.3–10.1)
CALCIUM ALBUM COR SERPL-MCNC: 9.6 MG/DL (ref 8.3–10.1)
CALCIUM SERPL-MCNC: 7.1 MG/DL (ref 8.3–10.1)
CALCIUM SERPL-MCNC: 7.1 MG/DL (ref 8.3–10.1)
CALCIUM SERPL-MCNC: 7.2 MG/DL (ref 8.3–10.1)
CALCIUM SERPL-MCNC: 7.3 MG/DL (ref 8.3–10.1)
CALCIUM SERPL-MCNC: 7.4 MG/DL (ref 8.3–10.1)
CALCIUM SERPL-MCNC: 7.8 MG/DL (ref 8.3–10.1)
CALCIUM SERPL-MCNC: 8.5 MG/DL (ref 8.3–10.1)
CALCIUM SERPL-MCNC: 8.6 MG/DL (ref 8.3–10.1)
CALCIUM SERPL-MCNC: 8.9 MG/DL (ref 8.3–10.1)
CALCIUM SERPL-MCNC: 9.1 MG/DL (ref 8.3–10.1)
CALCIUM SERPL-MCNC: 9.1 MG/DL (ref 8.3–10.1)
CALCIUM SERPL-MCNC: 9.2 MG/DL (ref 8.3–10.1)
CAMPYLOBACTER DNA SPEC NAA+PROBE: NORMAL
CANCER AG19-9 SERPL-ACNC: 24 U/ML (ref 0–35)
CARDIAC TROPONIN I PNL SERPL HS: 5 NG/L
CARDIAC TROPONIN I PNL SERPL HS: 6 NG/L
CARDIAC TROPONIN I PNL SERPL HS: 7 NG/L
CHLORIDE SERPL-SCNC: 100 MMOL/L (ref 100–108)
CHLORIDE SERPL-SCNC: 100 MMOL/L (ref 100–108)
CHLORIDE SERPL-SCNC: 101 MMOL/L (ref 100–108)
CHLORIDE SERPL-SCNC: 102 MMOL/L (ref 100–108)
CHLORIDE SERPL-SCNC: 103 MMOL/L (ref 100–108)
CHLORIDE SERPL-SCNC: 105 MMOL/L (ref 100–108)
CHLORIDE SERPL-SCNC: 108 MMOL/L (ref 100–108)
CHLORIDE SERPL-SCNC: 110 MMOL/L (ref 100–108)
CHLORIDE SERPL-SCNC: 111 MMOL/L (ref 100–108)
CHLORIDE SERPL-SCNC: 112 MMOL/L (ref 100–108)
CHLORIDE SERPL-SCNC: 117 MMOL/L (ref 100–108)
CHLORIDE SERPL-SCNC: 118 MMOL/L (ref 100–108)
CHLORIDE SERPL-SCNC: 91 MMOL/L (ref 100–108)
CHLORIDE SERPL-SCNC: 92 MMOL/L (ref 100–108)
CHLORIDE SERPL-SCNC: 93 MMOL/L (ref 100–108)
CHLORIDE SERPL-SCNC: 99 MMOL/L (ref 100–108)
CHLORIDE SERPL-SCNC: 99 MMOL/L (ref 100–108)
CLARITY UR: CLEAR
CLARITY UR: CLEAR
CO2 SERPL-SCNC: 15 MMOL/L (ref 21–32)
CO2 SERPL-SCNC: 18 MMOL/L (ref 21–32)
CO2 SERPL-SCNC: 20 MMOL/L (ref 21–32)
CO2 SERPL-SCNC: 20 MMOL/L (ref 21–32)
CO2 SERPL-SCNC: 24 MMOL/L (ref 21–32)
CO2 SERPL-SCNC: 26 MMOL/L (ref 21–32)
CO2 SERPL-SCNC: 26 MMOL/L (ref 21–32)
CO2 SERPL-SCNC: 28 MMOL/L (ref 21–32)
CO2 SERPL-SCNC: 29 MMOL/L (ref 21–32)
CO2 SERPL-SCNC: 30 MMOL/L (ref 21–32)
CO2 SERPL-SCNC: 30 MMOL/L (ref 21–32)
CO2 SERPL-SCNC: 32 MMOL/L (ref 21–32)
CO2 SERPL-SCNC: 33 MMOL/L (ref 21–32)
COLOR UR: ABNORMAL
COLOR UR: YELLOW
CREAT SERPL-MCNC: 0.63 MG/DL (ref 0.6–1.3)
CREAT SERPL-MCNC: 0.71 MG/DL (ref 0.6–1.3)
CREAT SERPL-MCNC: 0.75 MG/DL (ref 0.6–1.3)
CREAT SERPL-MCNC: 0.76 MG/DL (ref 0.6–1.3)
CREAT SERPL-MCNC: 0.78 MG/DL (ref 0.6–1.3)
CREAT SERPL-MCNC: 0.78 MG/DL (ref 0.6–1.3)
CREAT SERPL-MCNC: 0.83 MG/DL (ref 0.6–1.3)
CREAT SERPL-MCNC: 0.84 MG/DL (ref 0.6–1.3)
CREAT SERPL-MCNC: 0.86 MG/DL (ref 0.6–1.3)
CREAT SERPL-MCNC: 0.86 MG/DL (ref 0.6–1.3)
CREAT SERPL-MCNC: 0.88 MG/DL (ref 0.6–1.3)
CREAT SERPL-MCNC: 0.89 MG/DL (ref 0.6–1.3)
CREAT SERPL-MCNC: 0.9 MG/DL (ref 0.6–1.3)
CREAT SERPL-MCNC: 0.95 MG/DL (ref 0.6–1.3)
CREAT SERPL-MCNC: 1.28 MG/DL (ref 0.6–1.3)
DOP CALC AO PEAK VEL: 2.19 M/S
DOP CALC AO VTI: 44.71 CM
DOP CALC LVOT AREA: 4.15 CM2
DOP CALC LVOT DIAMETER: 2.3 CM
DOP CALC LVOT PEAK VEL VTI: 22.12 CM
DOP CALC LVOT PEAK VEL: 1.02 M/S
DOP CALC LVOT STROKE INDEX: 51.6 ML/M2
DOP CALC LVOT STROKE VOLUME: 91.86 CM3
E WAVE DECELERATION TIME: 289 MS
EOSINOPHIL # BLD AUTO: 0.03 THOUSAND/ΜL (ref 0–0.61)
EOSINOPHIL # BLD AUTO: 0.09 THOUSAND/ΜL (ref 0–0.61)
EOSINOPHIL # BLD AUTO: 0.11 THOUSAND/ΜL (ref 0–0.61)
EOSINOPHIL # BLD AUTO: 0.24 THOUSAND/ΜL (ref 0–0.61)
EOSINOPHIL # BLD MANUAL: 0 THOUSAND/UL (ref 0–0.4)
EOSINOPHIL # BLD MANUAL: 0.32 THOUSAND/UL (ref 0–0.4)
EOSINOPHIL NFR BLD AUTO: 0 % (ref 0–6)
EOSINOPHIL NFR BLD AUTO: 2 % (ref 0–6)
EOSINOPHIL NFR BLD AUTO: 2 % (ref 0–6)
EOSINOPHIL NFR BLD AUTO: 4 % (ref 0–6)
EOSINOPHIL NFR BLD MANUAL: 0 % (ref 0–6)
EOSINOPHIL NFR BLD MANUAL: 2 % (ref 0–6)
ERYTHROCYTE [DISTWIDTH] IN BLOOD BY AUTOMATED COUNT: 14.5 % (ref 11.6–15.1)
ERYTHROCYTE [DISTWIDTH] IN BLOOD BY AUTOMATED COUNT: 14.6 % (ref 11.6–15.1)
ERYTHROCYTE [DISTWIDTH] IN BLOOD BY AUTOMATED COUNT: 14.7 % (ref 11.6–15.1)
ERYTHROCYTE [DISTWIDTH] IN BLOOD BY AUTOMATED COUNT: 14.8 % (ref 11.6–15.1)
ERYTHROCYTE [DISTWIDTH] IN BLOOD BY AUTOMATED COUNT: 14.8 % (ref 11.6–15.1)
ERYTHROCYTE [DISTWIDTH] IN BLOOD BY AUTOMATED COUNT: 15.8 % (ref 11.6–15.1)
ERYTHROCYTE [DISTWIDTH] IN BLOOD BY AUTOMATED COUNT: 16.4 % (ref 11.6–15.1)
ERYTHROCYTE [DISTWIDTH] IN BLOOD BY AUTOMATED COUNT: 18.6 % (ref 11.6–15.1)
ERYTHROCYTE [DISTWIDTH] IN BLOOD BY AUTOMATED COUNT: 19.5 % (ref 11.6–15.1)
ERYTHROCYTE [DISTWIDTH] IN BLOOD BY AUTOMATED COUNT: 20.5 % (ref 11.6–15.1)
ERYTHROCYTE [DISTWIDTH] IN BLOOD BY AUTOMATED COUNT: 21.3 % (ref 11.6–15.1)
ERYTHROCYTE [DISTWIDTH] IN BLOOD BY AUTOMATED COUNT: 21.5 % (ref 11.6–15.1)
ERYTHROCYTE [DISTWIDTH] IN BLOOD BY AUTOMATED COUNT: 22.1 % (ref 11.6–15.1)
FLUAV RNA RESP QL NAA+PROBE: NEGATIVE
FLUBV RNA RESP QL NAA+PROBE: NEGATIVE
FOLATE SERPL-MCNC: >20 NG/ML (ref 3.1–17.5)
FRACTIONAL SHORTENING: 36 % (ref 28–44)
G LAMBLIA AG STL QL IA: NEGATIVE
GFR SERPL CREATININE-BSD FRML MDRD: 102 ML/MIN/1.73SQ M
GFR SERPL CREATININE-BSD FRML MDRD: 57 ML/MIN/1.73SQ M
GFR SERPL CREATININE-BSD FRML MDRD: 82 ML/MIN/1.73SQ M
GFR SERPL CREATININE-BSD FRML MDRD: 88 ML/MIN/1.73SQ M
GFR SERPL CREATININE-BSD FRML MDRD: 88 ML/MIN/1.73SQ M
GFR SERPL CREATININE-BSD FRML MDRD: 89 ML/MIN/1.73SQ M
GFR SERPL CREATININE-BSD FRML MDRD: 89 ML/MIN/1.73SQ M
GFR SERPL CREATININE-BSD FRML MDRD: 90 ML/MIN/1.73SQ M
GFR SERPL CREATININE-BSD FRML MDRD: 91 ML/MIN/1.73SQ M
GFR SERPL CREATININE-BSD FRML MDRD: 91 ML/MIN/1.73SQ M
GFR SERPL CREATININE-BSD FRML MDRD: 93 ML/MIN/1.73SQ M
GFR SERPL CREATININE-BSD FRML MDRD: 93 ML/MIN/1.73SQ M
GFR SERPL CREATININE-BSD FRML MDRD: 94 ML/MIN/1.73SQ M
GFR SERPL CREATININE-BSD FRML MDRD: 95 ML/MIN/1.73SQ M
GFR SERPL CREATININE-BSD FRML MDRD: 97 ML/MIN/1.73SQ M
GLUCOSE SERPL-MCNC: 110 MG/DL (ref 65–140)
GLUCOSE SERPL-MCNC: 110 MG/DL (ref 65–140)
GLUCOSE SERPL-MCNC: 117 MG/DL (ref 65–140)
GLUCOSE SERPL-MCNC: 118 MG/DL (ref 65–140)
GLUCOSE SERPL-MCNC: 123 MG/DL (ref 65–140)
GLUCOSE SERPL-MCNC: 125 MG/DL (ref 65–140)
GLUCOSE SERPL-MCNC: 135 MG/DL (ref 65–140)
GLUCOSE SERPL-MCNC: 139 MG/DL (ref 65–140)
GLUCOSE SERPL-MCNC: 142 MG/DL (ref 65–140)
GLUCOSE SERPL-MCNC: 144 MG/DL (ref 65–140)
GLUCOSE SERPL-MCNC: 149 MG/DL (ref 65–140)
GLUCOSE SERPL-MCNC: 153 MG/DL (ref 65–140)
GLUCOSE SERPL-MCNC: 154 MG/DL (ref 65–140)
GLUCOSE SERPL-MCNC: 178 MG/DL (ref 65–140)
GLUCOSE SERPL-MCNC: 179 MG/DL (ref 65–140)
GLUCOSE SERPL-MCNC: 182 MG/DL (ref 65–140)
GLUCOSE SERPL-MCNC: 193 MG/DL (ref 65–140)
GLUCOSE SERPL-MCNC: 207 MG/DL (ref 65–140)
GLUCOSE SERPL-MCNC: 217 MG/DL (ref 65–140)
GLUCOSE SERPL-MCNC: 220 MG/DL (ref 65–140)
GLUCOSE SERPL-MCNC: 221 MG/DL (ref 65–140)
GLUCOSE SERPL-MCNC: 225 MG/DL (ref 65–140)
GLUCOSE SERPL-MCNC: 228 MG/DL (ref 65–140)
GLUCOSE SERPL-MCNC: 245 MG/DL (ref 65–140)
GLUCOSE SERPL-MCNC: 246 MG/DL (ref 65–140)
GLUCOSE SERPL-MCNC: 67 MG/DL (ref 65–140)
GLUCOSE SERPL-MCNC: 94 MG/DL (ref 65–140)
GLUCOSE SERPL-MCNC: 96 MG/DL (ref 65–140)
GLUCOSE UR STRIP-MCNC: NEGATIVE MG/DL
GLUCOSE UR STRIP-MCNC: NEGATIVE MG/DL
HCT VFR BLD AUTO: 31.4 % (ref 36.5–49.3)
HCT VFR BLD AUTO: 31.5 % (ref 36.5–49.3)
HCT VFR BLD AUTO: 31.7 % (ref 36.5–49.3)
HCT VFR BLD AUTO: 33.2 % (ref 36.5–49.3)
HCT VFR BLD AUTO: 33.4 % (ref 36.5–49.3)
HCT VFR BLD AUTO: 33.6 % (ref 36.5–49.3)
HCT VFR BLD AUTO: 34.5 % (ref 36.5–49.3)
HCT VFR BLD AUTO: 34.7 % (ref 36.5–49.3)
HCT VFR BLD AUTO: 35.3 % (ref 36.5–49.3)
HCT VFR BLD AUTO: 35.5 % (ref 36.5–49.3)
HCT VFR BLD AUTO: 36.9 % (ref 36.5–49.3)
HCT VFR BLD AUTO: 37.6 % (ref 36.5–49.3)
HCT VFR BLD AUTO: 41.9 % (ref 36.5–49.3)
HGB BLD-MCNC: 10.6 G/DL (ref 12–17)
HGB BLD-MCNC: 10.6 G/DL (ref 12–17)
HGB BLD-MCNC: 10.8 G/DL (ref 12–17)
HGB BLD-MCNC: 11 G/DL (ref 12–17)
HGB BLD-MCNC: 11.1 G/DL (ref 12–17)
HGB BLD-MCNC: 11.2 G/DL (ref 12–17)
HGB BLD-MCNC: 11.4 G/DL (ref 12–17)
HGB BLD-MCNC: 11.4 G/DL (ref 12–17)
HGB BLD-MCNC: 11.7 G/DL (ref 12–17)
HGB BLD-MCNC: 12.1 G/DL (ref 12–17)
HGB BLD-MCNC: 12.1 G/DL (ref 12–17)
HGB BLD-MCNC: 12.9 G/DL (ref 12–17)
HGB BLD-MCNC: 14.1 G/DL (ref 12–17)
HGB UR QL STRIP.AUTO: NEGATIVE
HGB UR QL STRIP.AUTO: NEGATIVE
IMM GRANULOCYTES # BLD AUTO: 0.02 THOUSAND/UL (ref 0–0.2)
IMM GRANULOCYTES # BLD AUTO: 0.02 THOUSAND/UL (ref 0–0.2)
IMM GRANULOCYTES # BLD AUTO: 0.03 THOUSAND/UL (ref 0–0.2)
IMM GRANULOCYTES # BLD AUTO: 0.03 THOUSAND/UL (ref 0–0.2)
IMM GRANULOCYTES NFR BLD AUTO: 0 % (ref 0–2)
IMM GRANULOCYTES NFR BLD AUTO: 1 % (ref 0–2)
INR PPP: 1.17 (ref 0.84–1.19)
INR PPP: 1.37 (ref 0.84–1.19)
INTERVENTRICULAR SEPTUM IN DIASTOLE (PARASTERNAL SHORT AXIS VIEW): 1 CM
INTERVENTRICULAR SEPTUM: 1 CM (ref 0.52–0.97)
KETONES UR STRIP-MCNC: ABNORMAL MG/DL
KETONES UR STRIP-MCNC: NEGATIVE MG/DL
LAAS-AP2: 24 CM2
LAAS-AP4: 23.4 CM2
LACTATE SERPL-SCNC: 1.6 MMOL/L (ref 0.5–2)
LEFT ATRIUM AREA SYSTOLE SINGLE PLANE A4C: 24 CM2
LEFT ATRIUM SIZE: 4 CM
LEFT INTERNAL DIMENSION IN SYSTOLE: 2.8 CM (ref 2.61–3.96)
LEFT VENTRICULAR INTERNAL DIMENSION IN DIASTOLE: 4.4 CM (ref 4.27–6.36)
LEFT VENTRICULAR POSTERIOR WALL IN END DIASTOLE: 1.1 CM (ref 0.51–0.96)
LEFT VENTRICULAR STROKE VOLUME: 57 ML
LEUKOCYTE ESTERASE UR QL STRIP: NEGATIVE
LEUKOCYTE ESTERASE UR QL STRIP: NEGATIVE
LIPASE SERPL-CCNC: 192 U/L (ref 73–393)
LIPASE SERPL-CCNC: 27 U/L (ref 73–393)
LVSV (TEICH): 57 ML
LYMPHOCYTES # BLD AUTO: 0.28 THOUSAND/UL (ref 0.6–4.47)
LYMPHOCYTES # BLD AUTO: 0.41 THOUSANDS/ΜL (ref 0.6–4.47)
LYMPHOCYTES # BLD AUTO: 0.46 THOUSAND/UL (ref 0.6–4.47)
LYMPHOCYTES # BLD AUTO: 0.46 THOUSAND/UL (ref 0.6–4.47)
LYMPHOCYTES # BLD AUTO: 0.48 THOUSAND/UL (ref 0.6–4.47)
LYMPHOCYTES # BLD AUTO: 0.49 THOUSAND/UL (ref 0.6–4.47)
LYMPHOCYTES # BLD AUTO: 0.52 THOUSANDS/ΜL (ref 0.6–4.47)
LYMPHOCYTES # BLD AUTO: 0.6 THOUSANDS/ΜL (ref 0.6–4.47)
LYMPHOCYTES # BLD AUTO: 0.64 THOUSANDS/ΜL (ref 0.6–4.47)
LYMPHOCYTES # BLD AUTO: 0.81 THOUSAND/UL (ref 0.6–4.47)
LYMPHOCYTES # BLD AUTO: 0.9 THOUSAND/UL (ref 0.6–4.47)
LYMPHOCYTES # BLD AUTO: 17 % (ref 14–44)
LYMPHOCYTES # BLD AUTO: 18 % (ref 14–44)
LYMPHOCYTES # BLD AUTO: 3 % (ref 14–44)
LYMPHOCYTES # BLD AUTO: 3 % (ref 14–44)
LYMPHOCYTES # BLD AUTO: 5 % (ref 14–44)
LYMPHOCYTES # BLD AUTO: 5 % (ref 14–44)
LYMPHOCYTES # BLD AUTO: 8 % (ref 14–44)
LYMPHOCYTES NFR BLD AUTO: 11 % (ref 14–44)
LYMPHOCYTES NFR BLD AUTO: 12 % (ref 14–44)
LYMPHOCYTES NFR BLD AUTO: 6 % (ref 14–44)
LYMPHOCYTES NFR BLD AUTO: 9 % (ref 14–44)
MACROCYTES BLD QL AUTO: PRESENT
MACROCYTES BLD QL AUTO: PRESENT
MAGNESIUM SERPL-MCNC: 1.4 MG/DL (ref 1.6–2.6)
MAGNESIUM SERPL-MCNC: 1.7 MG/DL (ref 1.6–2.6)
MAGNESIUM SERPL-MCNC: 1.7 MG/DL (ref 1.6–2.6)
MAGNESIUM SERPL-MCNC: 2 MG/DL (ref 1.6–2.6)
MAGNESIUM SERPL-MCNC: 2 MG/DL (ref 1.6–2.6)
MCH RBC QN AUTO: 29.4 PG (ref 26.8–34.3)
MCH RBC QN AUTO: 29.5 PG (ref 26.8–34.3)
MCH RBC QN AUTO: 29.6 PG (ref 26.8–34.3)
MCH RBC QN AUTO: 30.1 PG (ref 26.8–34.3)
MCH RBC QN AUTO: 30.5 PG (ref 26.8–34.3)
MCH RBC QN AUTO: 30.5 PG (ref 26.8–34.3)
MCH RBC QN AUTO: 31.1 PG (ref 26.8–34.3)
MCH RBC QN AUTO: 31.5 PG (ref 26.8–34.3)
MCH RBC QN AUTO: 31.8 PG (ref 26.8–34.3)
MCH RBC QN AUTO: 32 PG (ref 26.8–34.3)
MCH RBC QN AUTO: 32 PG (ref 26.8–34.3)
MCH RBC QN AUTO: 32.2 PG (ref 26.8–34.3)
MCH RBC QN AUTO: 32.4 PG (ref 26.8–34.3)
MCHC RBC AUTO-ENTMCNC: 32 G/DL (ref 31.4–37.4)
MCHC RBC AUTO-ENTMCNC: 32.2 G/DL (ref 31.4–37.4)
MCHC RBC AUTO-ENTMCNC: 32.7 G/DL (ref 31.4–37.4)
MCHC RBC AUTO-ENTMCNC: 33 G/DL (ref 31.4–37.4)
MCHC RBC AUTO-ENTMCNC: 33 G/DL (ref 31.4–37.4)
MCHC RBC AUTO-ENTMCNC: 33.4 G/DL (ref 31.4–37.4)
MCHC RBC AUTO-ENTMCNC: 33.7 G/DL (ref 31.4–37.4)
MCHC RBC AUTO-ENTMCNC: 33.7 G/DL (ref 31.4–37.4)
MCHC RBC AUTO-ENTMCNC: 33.8 G/DL (ref 31.4–37.4)
MCHC RBC AUTO-ENTMCNC: 34.1 G/DL (ref 31.4–37.4)
MCHC RBC AUTO-ENTMCNC: 34.3 G/DL (ref 31.4–37.4)
MCHC RBC AUTO-ENTMCNC: 34.3 G/DL (ref 31.4–37.4)
MCHC RBC AUTO-ENTMCNC: 35 G/DL (ref 31.4–37.4)
MCV RBC AUTO: 100 FL (ref 82–98)
MCV RBC AUTO: 86 FL (ref 82–98)
MCV RBC AUTO: 87 FL (ref 82–98)
MCV RBC AUTO: 88 FL (ref 82–98)
MCV RBC AUTO: 88 FL (ref 82–98)
MCV RBC AUTO: 91 FL (ref 82–98)
MCV RBC AUTO: 92 FL (ref 82–98)
MCV RBC AUTO: 93 FL (ref 82–98)
MCV RBC AUTO: 96 FL (ref 82–98)
MCV RBC AUTO: 98 FL (ref 82–98)
MCV RBC AUTO: 99 FL (ref 82–98)
METAMYELOCYTES NFR BLD MANUAL: 1 % (ref 0–1)
METAMYELOCYTES NFR BLD MANUAL: 1 % (ref 0–1)
METAMYELOCYTES NFR BLD MANUAL: 5 % (ref 0–1)
MONOCYTES # BLD AUTO: 0.18 THOUSAND/UL (ref 0–1.22)
MONOCYTES # BLD AUTO: 0.2 THOUSAND/UL (ref 0–1.22)
MONOCYTES # BLD AUTO: 0.3 THOUSAND/UL (ref 0–1.22)
MONOCYTES # BLD AUTO: 0.32 THOUSAND/UL (ref 0–1.22)
MONOCYTES # BLD AUTO: 0.41 THOUSAND/ΜL (ref 0.17–1.22)
MONOCYTES # BLD AUTO: 0.6 THOUSAND/ΜL (ref 0.17–1.22)
MONOCYTES # BLD AUTO: 0.66 THOUSAND/ΜL (ref 0.17–1.22)
MONOCYTES # BLD AUTO: 0.7 THOUSAND/ΜL (ref 0.17–1.22)
MONOCYTES # BLD AUTO: 0.72 THOUSAND/UL (ref 0–1.22)
MONOCYTES # BLD AUTO: 0.81 THOUSAND/UL (ref 0–1.22)
MONOCYTES # BLD AUTO: 1.12 THOUSAND/UL (ref 0–1.22)
MONOCYTES NFR BLD AUTO: 10 % (ref 4–12)
MONOCYTES NFR BLD AUTO: 12 % (ref 4–12)
MONOCYTES NFR BLD AUTO: 13 % (ref 4–12)
MONOCYTES NFR BLD AUTO: 6 % (ref 4–12)
MONOCYTES NFR BLD: 12 % (ref 4–12)
MONOCYTES NFR BLD: 13 % (ref 4–12)
MONOCYTES NFR BLD: 2 % (ref 4–12)
MONOCYTES NFR BLD: 3 % (ref 4–12)
MONOCYTES NFR BLD: 4 % (ref 4–12)
MONOCYTES NFR BLD: 5 % (ref 4–12)
MONOCYTES NFR BLD: 7 % (ref 4–12)
MV E'TISSUE VEL-SEP: 5 CM/S
MV PEAK A VEL: 0.99 M/S
MV PEAK E VEL: 68 CM/S
MV STENOSIS PRESSURE HALF TIME: 84 MS
MV VALVE AREA P 1/2 METHOD: 2.62 CM2
MYELOCYTES NFR BLD MANUAL: 1 % (ref 0–1)
MYELOCYTES NFR BLD MANUAL: 3 % (ref 0–1)
NEUTROPHILS # BLD AUTO: 3.9 THOUSANDS/ΜL (ref 1.85–7.62)
NEUTROPHILS # BLD AUTO: 4.32 THOUSANDS/ΜL (ref 1.85–7.62)
NEUTROPHILS # BLD AUTO: 4.62 THOUSANDS/ΜL (ref 1.85–7.62)
NEUTROPHILS # BLD AUTO: 6.07 THOUSANDS/ΜL (ref 1.85–7.62)
NEUTROPHILS # BLD MANUAL: 1.08 THOUSAND/UL (ref 1.85–7.62)
NEUTROPHILS # BLD MANUAL: 1.9 THOUSAND/UL (ref 1.85–7.62)
NEUTROPHILS # BLD MANUAL: 13.18 THOUSAND/UL (ref 1.85–7.62)
NEUTROPHILS # BLD MANUAL: 14.94 THOUSAND/UL (ref 1.85–7.62)
NEUTROPHILS # BLD MANUAL: 16.16 THOUSAND/UL (ref 1.85–7.62)
NEUTROPHILS # BLD MANUAL: 8.49 THOUSAND/UL (ref 1.85–7.62)
NEUTROPHILS # BLD MANUAL: 8.62 THOUSAND/UL (ref 1.85–7.62)
NEUTS BAND NFR BLD MANUAL: 12 % (ref 0–8)
NEUTS BAND NFR BLD MANUAL: 26 % (ref 0–8)
NEUTS BAND NFR BLD MANUAL: 3 % (ref 0–8)
NEUTS BAND NFR BLD MANUAL: 4 % (ref 0–8)
NEUTS BAND NFR BLD MANUAL: 4 % (ref 0–8)
NEUTS BAND NFR BLD MANUAL: 9 % (ref 0–8)
NEUTS SEG NFR BLD AUTO: 45 % (ref 43–75)
NEUTS SEG NFR BLD AUTO: 69 % (ref 43–75)
NEUTS SEG NFR BLD AUTO: 70 % (ref 43–75)
NEUTS SEG NFR BLD AUTO: 74 % (ref 43–75)
NEUTS SEG NFR BLD AUTO: 77 % (ref 43–75)
NEUTS SEG NFR BLD AUTO: 78 % (ref 43–75)
NEUTS SEG NFR BLD AUTO: 78 % (ref 43–75)
NEUTS SEG NFR BLD AUTO: 82 % (ref 43–75)
NEUTS SEG NFR BLD AUTO: 84 % (ref 43–75)
NEUTS SEG NFR BLD AUTO: 88 % (ref 43–75)
NEUTS SEG NFR BLD AUTO: 88 % (ref 43–75)
NITRITE UR QL STRIP: NEGATIVE
NITRITE UR QL STRIP: NEGATIVE
NON-SQ EPI CELLS URNS QL MICRO: ABNORMAL /HPF
NRBC BLD AUTO-RTO: 0 /100 WBCS
P AXIS: 33 DEGREES
P AXIS: 38 DEGREES
P AXIS: 53 DEGREES
P AXIS: 61 DEGREES
P AXIS: 64 DEGREES
PH UR STRIP.AUTO: 5 [PH]
PH UR STRIP.AUTO: 6 [PH] (ref 4.5–8)
PHOSPHATE SERPL-MCNC: 2 MG/DL (ref 2.3–4.1)
PHOSPHATE SERPL-MCNC: 2.9 MG/DL (ref 2.3–4.1)
PHOSPHATE SERPL-MCNC: 3.5 MG/DL (ref 2.3–4.1)
PHOSPHATE SERPL-MCNC: 6.4 MG/DL (ref 2.3–4.1)
PLATELET # BLD AUTO: 122 THOUSANDS/UL (ref 149–390)
PLATELET # BLD AUTO: 151 THOUSANDS/UL (ref 149–390)
PLATELET # BLD AUTO: 153 THOUSANDS/UL (ref 149–390)
PLATELET # BLD AUTO: 154 THOUSANDS/UL (ref 149–390)
PLATELET # BLD AUTO: 156 THOUSANDS/UL (ref 149–390)
PLATELET # BLD AUTO: 164 THOUSANDS/UL (ref 149–390)
PLATELET # BLD AUTO: 176 THOUSANDS/UL (ref 149–390)
PLATELET # BLD AUTO: 177 THOUSANDS/UL (ref 149–390)
PLATELET # BLD AUTO: 193 THOUSANDS/UL (ref 149–390)
PLATELET # BLD AUTO: 194 THOUSANDS/UL (ref 149–390)
PLATELET # BLD AUTO: 206 THOUSANDS/UL (ref 149–390)
PLATELET # BLD AUTO: 208 THOUSANDS/UL (ref 149–390)
PLATELET # BLD AUTO: 212 THOUSANDS/UL (ref 149–390)
PLATELET # BLD AUTO: 252 THOUSANDS/UL (ref 149–390)
PLATELET BLD QL SMEAR: ADEQUATE
PMV BLD AUTO: 10.1 FL (ref 8.9–12.7)
PMV BLD AUTO: 10.2 FL (ref 8.9–12.7)
PMV BLD AUTO: 10.4 FL (ref 8.9–12.7)
PMV BLD AUTO: 10.7 FL (ref 8.9–12.7)
PMV BLD AUTO: 10.7 FL (ref 8.9–12.7)
PMV BLD AUTO: 10.9 FL (ref 8.9–12.7)
PMV BLD AUTO: 10.9 FL (ref 8.9–12.7)
PMV BLD AUTO: 11.3 FL (ref 8.9–12.7)
PMV BLD AUTO: 8.8 FL (ref 8.9–12.7)
PMV BLD AUTO: 9.4 FL (ref 8.9–12.7)
PMV BLD AUTO: 9.5 FL (ref 8.9–12.7)
PMV BLD AUTO: 9.6 FL (ref 8.9–12.7)
PMV BLD AUTO: 9.7 FL (ref 8.9–12.7)
PMV BLD AUTO: 9.9 FL (ref 8.9–12.7)
POIKILOCYTOSIS BLD QL SMEAR: PRESENT
POIKILOCYTOSIS BLD QL SMEAR: PRESENT
POLYCHROMASIA BLD QL SMEAR: PRESENT
POTASSIUM SERPL-SCNC: 2.9 MMOL/L (ref 3.5–5.3)
POTASSIUM SERPL-SCNC: 3 MMOL/L (ref 3.5–5.3)
POTASSIUM SERPL-SCNC: 3 MMOL/L (ref 3.5–5.3)
POTASSIUM SERPL-SCNC: 3.1 MMOL/L (ref 3.5–5.3)
POTASSIUM SERPL-SCNC: 3.2 MMOL/L (ref 3.5–5.3)
POTASSIUM SERPL-SCNC: 3.3 MMOL/L (ref 3.5–5.3)
POTASSIUM SERPL-SCNC: 3.4 MMOL/L (ref 3.5–5.3)
POTASSIUM SERPL-SCNC: 3.5 MMOL/L (ref 3.5–5.3)
POTASSIUM SERPL-SCNC: 3.6 MMOL/L (ref 3.5–5.3)
POTASSIUM SERPL-SCNC: 3.9 MMOL/L (ref 3.5–5.3)
POTASSIUM SERPL-SCNC: 4.2 MMOL/L (ref 3.5–5.3)
POTASSIUM SERPL-SCNC: 4.4 MMOL/L (ref 3.5–5.3)
POTASSIUM SERPL-SCNC: 4.5 MMOL/L (ref 3.5–5.3)
POTASSIUM SERPL-SCNC: 4.7 MMOL/L (ref 3.5–5.3)
PR INTERVAL: 146 MS
PR INTERVAL: 152 MS
PR INTERVAL: 160 MS
PR INTERVAL: 166 MS
PR INTERVAL: 170 MS
PROT SERPL-MCNC: 5.8 G/DL (ref 6.4–8.2)
PROT SERPL-MCNC: 6.3 G/DL (ref 6.4–8.2)
PROT SERPL-MCNC: 7.1 G/DL (ref 6.4–8.2)
PROT SERPL-MCNC: 7.4 G/DL (ref 6.4–8.2)
PROT SERPL-MCNC: 7.4 G/DL (ref 6.4–8.2)
PROT SERPL-MCNC: 8 G/DL (ref 6.4–8.2)
PROT SERPL-MCNC: 8.1 G/DL (ref 6.4–8.2)
PROT SERPL-MCNC: 8.5 G/DL (ref 6.4–8.2)
PROT SERPL-MCNC: 8.6 G/DL (ref 6.4–8.2)
PROT UR STRIP-MCNC: ABNORMAL MG/DL
PROT UR STRIP-MCNC: NEGATIVE MG/DL
PROTHROMBIN TIME: 14.7 SECONDS (ref 11.6–14.5)
PROTHROMBIN TIME: 16.4 SECONDS (ref 11.6–14.5)
QRS AXIS: 29 DEGREES
QRS AXIS: 33 DEGREES
QRS AXIS: 35 DEGREES
QRS AXIS: 36 DEGREES
QRS AXIS: 4 DEGREES
QRS AXIS: 44 DEGREES
QRSD INTERVAL: 100 MS
QRSD INTERVAL: 104 MS
QRSD INTERVAL: 104 MS
QRSD INTERVAL: 96 MS
QRSD INTERVAL: 96 MS
QRSD INTERVAL: 98 MS
QT INTERVAL: 380 MS
QT INTERVAL: 380 MS
QT INTERVAL: 398 MS
QT INTERVAL: 402 MS
QT INTERVAL: 444 MS
QT INTERVAL: 476 MS
QTC INTERVAL: 407 MS
QTC INTERVAL: 418 MS
QTC INTERVAL: 426 MS
QTC INTERVAL: 436 MS
QTC INTERVAL: 469 MS
QTC INTERVAL: 499 MS
RBC # BLD AUTO: 3.38 MILLION/UL (ref 3.88–5.62)
RBC # BLD AUTO: 3.4 MILLION/UL (ref 3.88–5.62)
RBC # BLD AUTO: 3.41 MILLION/UL (ref 3.88–5.62)
RBC # BLD AUTO: 3.47 MILLION/UL (ref 3.88–5.62)
RBC # BLD AUTO: 3.56 MILLION/UL (ref 3.88–5.62)
RBC # BLD AUTO: 3.58 MILLION/UL (ref 3.88–5.62)
RBC # BLD AUTO: 3.63 MILLION/UL (ref 3.88–5.62)
RBC # BLD AUTO: 3.77 MILLION/UL (ref 3.88–5.62)
RBC # BLD AUTO: 3.78 MILLION/UL (ref 3.88–5.62)
RBC # BLD AUTO: 3.87 MILLION/UL (ref 3.88–5.62)
RBC # BLD AUTO: 4.02 MILLION/UL (ref 3.88–5.62)
RBC # BLD AUTO: 4.23 MILLION/UL (ref 3.88–5.62)
RBC # BLD AUTO: 4.77 MILLION/UL (ref 3.88–5.62)
RBC #/AREA URNS AUTO: ABNORMAL /HPF
RBC MORPH BLD: NORMAL
RBC MORPH BLD: NORMAL
RBC MORPH BLD: PRESENT
RBC MORPH BLD: PRESENT
RIGHT ATRIUM AREA SYSTOLE A4C: 21.6 CM2
RIGHT VENTRICLE ID DIMENSION: 3.1 CM
RSV RNA RESP QL NAA+PROBE: NEGATIVE
SALMONELLA DNA SPEC QL NAA+PROBE: NORMAL
SARS-COV-2 RNA RESP QL NAA+PROBE: NEGATIVE
SCAN RESULT: NORMAL
SHIGA TOXIN STX GENE SPEC NAA+PROBE: NORMAL
SHIGELLA DNA SPEC QL NAA+PROBE: NORMAL
SL AMB POCT HEMOGLOBIN AIC: 6.4 (ref ?–6.5)
SL CV LEFT ATRIUM LENGTH A2C: 6.3 CM
SL CV PED ECHO LEFT VENTRICLE DIASTOLIC VOLUME (MOD BIPLANE) 2D: 87 ML
SL CV PED ECHO LEFT VENTRICLE SYSTOLIC VOLUME (MOD BIPLANE) 2D: 30 ML
SODIUM SERPL-SCNC: 131 MMOL/L (ref 136–145)
SODIUM SERPL-SCNC: 132 MMOL/L (ref 136–145)
SODIUM SERPL-SCNC: 133 MMOL/L (ref 136–145)
SODIUM SERPL-SCNC: 137 MMOL/L (ref 136–145)
SODIUM SERPL-SCNC: 138 MMOL/L (ref 136–145)
SODIUM SERPL-SCNC: 138 MMOL/L (ref 136–145)
SODIUM SERPL-SCNC: 139 MMOL/L (ref 136–145)
SODIUM SERPL-SCNC: 140 MMOL/L (ref 136–145)
SODIUM SERPL-SCNC: 141 MMOL/L (ref 136–145)
SODIUM SERPL-SCNC: 145 MMOL/L (ref 136–145)
SODIUM SERPL-SCNC: 145 MMOL/L (ref 136–145)
SP GR UR STRIP.AUTO: 1.01 (ref 1–1.03)
SP GR UR STRIP.AUTO: >=1.03 (ref 1–1.03)
T WAVE AXIS: -12 DEGREES
T WAVE AXIS: -8 DEGREES
T WAVE AXIS: 0 DEGREES
T WAVE AXIS: 15 DEGREES
T WAVE AXIS: 19 DEGREES
T WAVE AXIS: 22 DEGREES
T3FREE SERPL-MCNC: 2.42 PG/ML (ref 2.3–4.2)
T4 FREE SERPL-MCNC: 0.88 NG/DL (ref 0.76–1.46)
T4 FREE SERPL-MCNC: 1.71 NG/DL (ref 0.76–1.46)
TR MAX PG: 29 MMHG
TR PEAK VELOCITY: 2.7 M/S
TRICUSPID VALVE PEAK REGURGITATION VELOCITY: 2.72 M/S
TSH SERPL DL<=0.05 MIU/L-ACNC: 4.24 UIU/ML (ref 0.36–3.74)
TSH SERPL DL<=0.05 MIU/L-ACNC: 42.15 UIU/ML (ref 0.45–4.5)
UROBILINOGEN UR QL STRIP.AUTO: 0.2 E.U./DL
UROBILINOGEN UR QL STRIP.AUTO: 1 E.U./DL
VENTRICULAR RATE: 66 BPM
VENTRICULAR RATE: 67 BPM
VENTRICULAR RATE: 69 BPM
VENTRICULAR RATE: 69 BPM
VENTRICULAR RATE: 71 BPM
VENTRICULAR RATE: 73 BPM
VIT B12 SERPL-MCNC: >6000 PG/ML (ref 100–900)
WBC # BLD AUTO: 1.57 THOUSAND/UL (ref 4.31–10.16)
WBC # BLD AUTO: 10.14 THOUSAND/UL (ref 4.31–10.16)
WBC # BLD AUTO: 16.07 THOUSAND/UL (ref 4.31–10.16)
WBC # BLD AUTO: 16.24 THOUSAND/UL (ref 4.31–10.16)
WBC # BLD AUTO: 17.96 THOUSAND/UL (ref 4.31–10.16)
WBC # BLD AUTO: 2.68 THOUSAND/UL (ref 4.31–10.16)
WBC # BLD AUTO: 5.53 THOUSAND/UL (ref 4.31–10.16)
WBC # BLD AUTO: 5.74 THOUSAND/UL (ref 4.31–10.16)
WBC # BLD AUTO: 5.89 THOUSAND/UL (ref 4.31–10.16)
WBC # BLD AUTO: 6.98 THOUSAND/UL (ref 4.31–10.16)
WBC # BLD AUTO: 7.44 THOUSAND/UL (ref 4.31–10.16)
WBC # BLD AUTO: 7.61 THOUSAND/UL (ref 4.31–10.16)
WBC # BLD AUTO: 9.13 THOUSAND/UL (ref 4.31–10.16)
WBC #/AREA URNS AUTO: ABNORMAL /HPF
Z-SCORE OF INTERVENTRICULAR SEPTUM IN END DIASTOLE: 2.21
Z-SCORE OF LEFT VENTRICULAR DIMENSION IN END DIASTOLE: -1.69
Z-SCORE OF LEFT VENTRICULAR DIMENSION IN END SYSTOLE: -1.09
Z-SCORE OF LEFT VENTRICULAR POSTERIOR WALL IN END DIASTOLE: 3.18

## 2022-01-01 PROCEDURE — 88185 FLOWCYTOMETRY/TC ADD-ON: CPT

## 2022-01-01 PROCEDURE — 80048 BASIC METABOLIC PNL TOTAL CA: CPT | Performed by: INTERNAL MEDICINE

## 2022-01-01 PROCEDURE — 83690 ASSAY OF LIPASE: CPT | Performed by: PHYSICIAN ASSISTANT

## 2022-01-01 PROCEDURE — 96415 CHEMO IV INFUSION ADDL HR: CPT

## 2022-01-01 PROCEDURE — 99232 SBSQ HOSP IP/OBS MODERATE 35: CPT | Performed by: INTERNAL MEDICINE

## 2022-01-01 PROCEDURE — 80053 COMPREHEN METABOLIC PANEL: CPT

## 2022-01-01 PROCEDURE — 88313 SPECIAL STAINS GROUP 2: CPT | Performed by: PATHOLOGY

## 2022-01-01 PROCEDURE — 85007 BL SMEAR W/DIFF WBC COUNT: CPT | Performed by: PHYSICIAN ASSISTANT

## 2022-01-01 PROCEDURE — 1036F TOBACCO NON-USER: CPT | Performed by: NURSE PRACTITIONER

## 2022-01-01 PROCEDURE — 99497 ADVNCD CARE PLAN 30 MIN: CPT | Performed by: INTERNAL MEDICINE

## 2022-01-01 PROCEDURE — 3008F BODY MASS INDEX DOCD: CPT | Performed by: NURSE PRACTITIONER

## 2022-01-01 PROCEDURE — 99152 MOD SED SAME PHYS/QHP 5/>YRS: CPT | Performed by: RADIOLOGY

## 2022-01-01 PROCEDURE — 88305 TISSUE EXAM BY PATHOLOGIST: CPT | Performed by: PATHOLOGY

## 2022-01-01 PROCEDURE — 99214 OFFICE O/P EST MOD 30 MIN: CPT | Performed by: NURSE PRACTITIONER

## 2022-01-01 PROCEDURE — 88341 IMHCHEM/IMCYTCHM EA ADD ANTB: CPT | Performed by: PATHOLOGY

## 2022-01-01 PROCEDURE — 85049 AUTOMATED PLATELET COUNT: CPT | Performed by: RADIOLOGY

## 2022-01-01 PROCEDURE — 96417 CHEMO IV INFUS EACH ADDL SEQ: CPT

## 2022-01-01 PROCEDURE — 96361 HYDRATE IV INFUSION ADD-ON: CPT

## 2022-01-01 PROCEDURE — 85025 COMPLETE CBC W/AUTO DIFF WBC: CPT

## 2022-01-01 PROCEDURE — 99222 1ST HOSP IP/OBS MODERATE 55: CPT | Performed by: INTERNAL MEDICINE

## 2022-01-01 PROCEDURE — 80053 COMPREHEN METABOLIC PANEL: CPT | Performed by: INTERNAL MEDICINE

## 2022-01-01 PROCEDURE — 87505 NFCT AGENT DETECTION GI: CPT | Performed by: PHYSICIAN ASSISTANT

## 2022-01-01 PROCEDURE — 97530 THERAPEUTIC ACTIVITIES: CPT

## 2022-01-01 PROCEDURE — 36415 COLL VENOUS BLD VENIPUNCTURE: CPT

## 2022-01-01 PROCEDURE — 83735 ASSAY OF MAGNESIUM: CPT | Performed by: INTERNAL MEDICINE

## 2022-01-01 PROCEDURE — 80053 COMPREHEN METABOLIC PANEL: CPT | Performed by: PHYSICIAN ASSISTANT

## 2022-01-01 PROCEDURE — NC001 PR NO CHARGE

## 2022-01-01 PROCEDURE — 80048 BASIC METABOLIC PNL TOTAL CA: CPT | Performed by: NURSE PRACTITIONER

## 2022-01-01 PROCEDURE — 99223 1ST HOSP IP/OBS HIGH 75: CPT | Performed by: INTERNAL MEDICINE

## 2022-01-01 PROCEDURE — 80076 HEPATIC FUNCTION PANEL: CPT | Performed by: PHYSICIAN ASSISTANT

## 2022-01-01 PROCEDURE — C9113 INJ PANTOPRAZOLE SODIUM, VIA: HCPCS | Performed by: INTERNAL MEDICINE

## 2022-01-01 PROCEDURE — A9585 GADOBUTROL INJECTION: HCPCS | Performed by: STUDENT IN AN ORGANIZED HEALTH CARE EDUCATION/TRAINING PROGRAM

## 2022-01-01 PROCEDURE — 77012 CT SCAN FOR NEEDLE BIOPSY: CPT

## 2022-01-01 PROCEDURE — 82140 ASSAY OF AMMONIA: CPT | Performed by: PHYSICIAN ASSISTANT

## 2022-01-01 PROCEDURE — 99215 OFFICE O/P EST HI 40 MIN: CPT | Performed by: INTERNAL MEDICINE

## 2022-01-01 PROCEDURE — 85025 COMPLETE CBC W/AUTO DIFF WBC: CPT | Performed by: EMERGENCY MEDICINE

## 2022-01-01 PROCEDURE — 38505 NEEDLE BIOPSY LYMPH NODES: CPT

## 2022-01-01 PROCEDURE — 99152 MOD SED SAME PHYS/QHP 5/>YRS: CPT

## 2022-01-01 PROCEDURE — 93005 ELECTROCARDIOGRAM TRACING: CPT

## 2022-01-01 PROCEDURE — 85007 BL SMEAR W/DIFF WBC COUNT: CPT | Performed by: INTERNAL MEDICINE

## 2022-01-01 PROCEDURE — 99285 EMERGENCY DEPT VISIT HI MDM: CPT

## 2022-01-01 PROCEDURE — 96372 THER/PROPH/DIAG INJ SC/IM: CPT

## 2022-01-01 PROCEDURE — 74230 X-RAY XM SWLNG FUNCJ C+: CPT

## 2022-01-01 PROCEDURE — 84443 ASSAY THYROID STIM HORMONE: CPT | Performed by: PHYSICIAN ASSISTANT

## 2022-01-01 PROCEDURE — NC001 PR NO CHARGE: Performed by: RADIOLOGY

## 2022-01-01 PROCEDURE — 80048 BASIC METABOLIC PNL TOTAL CA: CPT | Performed by: PHYSICIAN ASSISTANT

## 2022-01-01 PROCEDURE — 96375 TX/PRO/DX INJ NEW DRUG ADDON: CPT

## 2022-01-01 PROCEDURE — 84484 ASSAY OF TROPONIN QUANT: CPT | Performed by: PHYSICIAN ASSISTANT

## 2022-01-01 PROCEDURE — 85025 COMPLETE CBC W/AUTO DIFF WBC: CPT | Performed by: PHYSICIAN ASSISTANT

## 2022-01-01 PROCEDURE — 76705 ECHO EXAM OF ABDOMEN: CPT

## 2022-01-01 PROCEDURE — 85025 COMPLETE CBC W/AUTO DIFF WBC: CPT | Performed by: INTERNAL MEDICINE

## 2022-01-01 PROCEDURE — 99222 1ST HOSP IP/OBS MODERATE 55: CPT | Performed by: NURSE PRACTITIONER

## 2022-01-01 PROCEDURE — 36561 INSERT TUNNELED CV CATH: CPT

## 2022-01-01 PROCEDURE — 0241U HB NFCT DS VIR RESP RNA 4 TRGT: CPT | Performed by: PHYSICIAN ASSISTANT

## 2022-01-01 PROCEDURE — 93306 TTE W/DOPPLER COMPLETE: CPT | Performed by: INTERNAL MEDICINE

## 2022-01-01 PROCEDURE — 84100 ASSAY OF PHOSPHORUS: CPT | Performed by: INTERNAL MEDICINE

## 2022-01-01 PROCEDURE — 99153 MOD SED SAME PHYS/QHP EA: CPT

## 2022-01-01 PROCEDURE — 85027 COMPLETE CBC AUTOMATED: CPT | Performed by: INTERNAL MEDICINE

## 2022-01-01 PROCEDURE — 82746 ASSAY OF FOLIC ACID SERUM: CPT | Performed by: INTERNAL MEDICINE

## 2022-01-01 PROCEDURE — 93010 ELECTROCARDIOGRAM REPORT: CPT | Performed by: INTERNAL MEDICINE

## 2022-01-01 PROCEDURE — 92610 EVALUATE SWALLOWING FUNCTION: CPT

## 2022-01-01 PROCEDURE — 85730 THROMBOPLASTIN TIME PARTIAL: CPT | Performed by: PHYSICIAN ASSISTANT

## 2022-01-01 PROCEDURE — C1725 CATH, TRANSLUMIN NON-LASER: HCPCS

## 2022-01-01 PROCEDURE — 96413 CHEMO IV INFUSION 1 HR: CPT

## 2022-01-01 PROCEDURE — 82948 REAGENT STRIP/BLOOD GLUCOSE: CPT

## 2022-01-01 PROCEDURE — 88342 IMHCHEM/IMCYTCHM 1ST ANTB: CPT | Performed by: PATHOLOGY

## 2022-01-01 PROCEDURE — 85007 BL SMEAR W/DIFF WBC COUNT: CPT | Performed by: EMERGENCY MEDICINE

## 2022-01-01 PROCEDURE — 99285 EMERGENCY DEPT VISIT HI MDM: CPT | Performed by: PHYSICIAN ASSISTANT

## 2022-01-01 PROCEDURE — 96365 THER/PROPH/DIAG IV INF INIT: CPT

## 2022-01-01 PROCEDURE — NC001 PR NO CHARGE: Performed by: NURSE PRACTITIONER

## 2022-01-01 PROCEDURE — 5A12012 PERFORMANCE OF CARDIAC OUTPUT, SINGLE, MANUAL: ICD-10-PCS | Performed by: INTERNAL MEDICINE

## 2022-01-01 PROCEDURE — 96368 THER/DIAG CONCURRENT INF: CPT

## 2022-01-01 PROCEDURE — 1160F RVW MEDS BY RX/DR IN RCRD: CPT | Performed by: NURSE PRACTITIONER

## 2022-01-01 PROCEDURE — 96367 TX/PROPH/DG ADDL SEQ IV INF: CPT

## 2022-01-01 PROCEDURE — 87493 C DIFF AMPLIFIED PROBE: CPT | Performed by: PHYSICIAN ASSISTANT

## 2022-01-01 PROCEDURE — 81003 URINALYSIS AUTO W/O SCOPE: CPT

## 2022-01-01 PROCEDURE — 82105 ALPHA-FETOPROTEIN SERUM: CPT

## 2022-01-01 PROCEDURE — 87329 GIARDIA AG IA: CPT | Performed by: PHYSICIAN ASSISTANT

## 2022-01-01 PROCEDURE — 84439 ASSAY OF FREE THYROXINE: CPT | Performed by: PHYSICIAN ASSISTANT

## 2022-01-01 PROCEDURE — G1004 CDSM NDSC: HCPCS

## 2022-01-01 PROCEDURE — 99284 EMERGENCY DEPT VISIT MOD MDM: CPT | Performed by: EMERGENCY MEDICINE

## 2022-01-01 PROCEDURE — 99214 OFFICE O/P EST MOD 30 MIN: CPT | Performed by: INTERNAL MEDICINE

## 2022-01-01 PROCEDURE — 36415 COLL VENOUS BLD VENIPUNCTURE: CPT | Performed by: EMERGENCY MEDICINE

## 2022-01-01 PROCEDURE — 82105 ALPHA-FETOPROTEIN SERUM: CPT | Performed by: INTERNAL MEDICINE

## 2022-01-01 PROCEDURE — 82140 ASSAY OF AMMONIA: CPT | Performed by: NURSE PRACTITIONER

## 2022-01-01 PROCEDURE — 88184 FLOWCYTOMETRY/ TC 1 MARKER: CPT

## 2022-01-01 PROCEDURE — 84443 ASSAY THYROID STIM HORMONE: CPT

## 2022-01-01 PROCEDURE — 84132 ASSAY OF SERUM POTASSIUM: CPT | Performed by: INTERNAL MEDICINE

## 2022-01-01 PROCEDURE — 88333 PATH CONSLTJ SURG CYTO XM 1: CPT | Performed by: PATHOLOGY

## 2022-01-01 PROCEDURE — C1769 GUIDE WIRE: HCPCS

## 2022-01-01 PROCEDURE — G0498 CHEMO EXTEND IV INFUS W/PUMP: HCPCS

## 2022-01-01 PROCEDURE — 31500 INSERT EMERGENCY AIRWAY: CPT | Performed by: NURSE PRACTITIONER

## 2022-01-01 PROCEDURE — 49440 PLACE GASTROSTOMY TUBE PERC: CPT | Performed by: RADIOLOGY

## 2022-01-01 PROCEDURE — 85610 PROTHROMBIN TIME: CPT | Performed by: PHYSICIAN ASSISTANT

## 2022-01-01 PROCEDURE — 0BH17EZ INSERTION OF ENDOTRACHEAL AIRWAY INTO TRACHEA, VIA NATURAL OR ARTIFICIAL OPENING: ICD-10-PCS | Performed by: INTERNAL MEDICINE

## 2022-01-01 PROCEDURE — 81001 URINALYSIS AUTO W/SCOPE: CPT | Performed by: PHYSICIAN ASSISTANT

## 2022-01-01 PROCEDURE — 99232 SBSQ HOSP IP/OBS MODERATE 35: CPT | Performed by: PHYSICIAN ASSISTANT

## 2022-01-01 PROCEDURE — 92611 MOTION FLUOROSCOPY/SWALLOW: CPT

## 2022-01-01 PROCEDURE — 71250 CT THORAX DX C-: CPT

## 2022-01-01 PROCEDURE — C1788 PORT, INDWELLING, IMP: HCPCS

## 2022-01-01 PROCEDURE — 85007 BL SMEAR W/DIFF WBC COUNT: CPT | Performed by: NURSE PRACTITIONER

## 2022-01-01 PROCEDURE — 36415 COLL VENOUS BLD VENIPUNCTURE: CPT | Performed by: PHYSICIAN ASSISTANT

## 2022-01-01 PROCEDURE — 74177 CT ABD & PELVIS W/CONTRAST: CPT

## 2022-01-01 PROCEDURE — 84481 FREE ASSAY (FT-3): CPT

## 2022-01-01 PROCEDURE — 84439 ASSAY OF FREE THYROXINE: CPT

## 2022-01-01 PROCEDURE — 83735 ASSAY OF MAGNESIUM: CPT | Performed by: STUDENT IN AN ORGANIZED HEALTH CARE EDUCATION/TRAINING PROGRAM

## 2022-01-01 PROCEDURE — 36561 INSERT TUNNELED CV CATH: CPT | Performed by: RADIOLOGY

## 2022-01-01 PROCEDURE — 97116 GAIT TRAINING THERAPY: CPT

## 2022-01-01 PROCEDURE — 97110 THERAPEUTIC EXERCISES: CPT

## 2022-01-01 PROCEDURE — 84402 ASSAY OF FREE TESTOSTERONE: CPT | Performed by: INTERNAL MEDICINE

## 2022-01-01 PROCEDURE — 93306 TTE W/DOPPLER COMPLETE: CPT

## 2022-01-01 PROCEDURE — 99232 SBSQ HOSP IP/OBS MODERATE 35: CPT | Performed by: STUDENT IN AN ORGANIZED HEALTH CARE EDUCATION/TRAINING PROGRAM

## 2022-01-01 PROCEDURE — 85027 COMPLETE CBC AUTOMATED: CPT | Performed by: EMERGENCY MEDICINE

## 2022-01-01 PROCEDURE — 96374 THER/PROPH/DIAG INJ IV PUSH: CPT

## 2022-01-01 PROCEDURE — 3044F HG A1C LEVEL LT 7.0%: CPT | Performed by: NURSE PRACTITIONER

## 2022-01-01 PROCEDURE — 99223 1ST HOSP IP/OBS HIGH 75: CPT | Performed by: STUDENT IN AN ORGANIZED HEALTH CARE EDUCATION/TRAINING PROGRAM

## 2022-01-01 PROCEDURE — 83036 HEMOGLOBIN GLYCOSYLATED A1C: CPT | Performed by: NURSE PRACTITIONER

## 2022-01-01 PROCEDURE — 80048 BASIC METABOLIC PNL TOTAL CA: CPT | Performed by: STUDENT IN AN ORGANIZED HEALTH CARE EDUCATION/TRAINING PROGRAM

## 2022-01-01 PROCEDURE — 99205 OFFICE O/P NEW HI 60 MIN: CPT | Performed by: INTERNAL MEDICINE

## 2022-01-01 PROCEDURE — 99204 OFFICE O/P NEW MOD 45 MIN: CPT | Performed by: SPECIALIST

## 2022-01-01 PROCEDURE — 76937 US GUIDE VASCULAR ACCESS: CPT

## 2022-01-01 PROCEDURE — 97166 OT EVAL MOD COMPLEX 45 MIN: CPT

## 2022-01-01 PROCEDURE — 74183 MRI ABD W/O CNTR FLWD CNTR: CPT

## 2022-01-01 PROCEDURE — 99239 HOSP IP/OBS DSCHRG MGMT >30: CPT | Performed by: INTERNAL MEDICINE

## 2022-01-01 PROCEDURE — 76937 US GUIDE VASCULAR ACCESS: CPT | Performed by: RADIOLOGY

## 2022-01-01 PROCEDURE — 85027 COMPLETE CBC AUTOMATED: CPT | Performed by: NURSE PRACTITIONER

## 2022-01-01 PROCEDURE — 80053 COMPREHEN METABOLIC PANEL: CPT | Performed by: EMERGENCY MEDICINE

## 2022-01-01 PROCEDURE — 83605 ASSAY OF LACTIC ACID: CPT | Performed by: PHYSICIAN ASSISTANT

## 2022-01-01 PROCEDURE — 85027 COMPLETE CBC AUTOMATED: CPT | Performed by: PHYSICIAN ASSISTANT

## 2022-01-01 PROCEDURE — 70490 CT SOFT TISSUE NECK W/O DYE: CPT

## 2022-01-01 PROCEDURE — 86301 IMMUNOASSAY TUMOR CA 19-9: CPT

## 2022-01-01 PROCEDURE — 49440 PLACE GASTROSTOMY TUBE PERC: CPT

## 2022-01-01 PROCEDURE — 97163 PT EVAL HIGH COMPLEX 45 MIN: CPT

## 2022-01-01 PROCEDURE — 77001 FLUOROGUIDE FOR VEIN DEVICE: CPT | Performed by: RADIOLOGY

## 2022-01-01 PROCEDURE — 82607 VITAMIN B-12: CPT | Performed by: INTERNAL MEDICINE

## 2022-01-01 PROCEDURE — 93010 ELECTROCARDIOGRAM REPORT: CPT

## 2022-01-01 RX ORDER — ATROPINE SULFATE 1 MG/ML
0.25 INJECTION, SOLUTION INTRAMUSCULAR; INTRAVENOUS; SUBCUTANEOUS ONCE AS NEEDED
Status: CANCELLED | OUTPATIENT
Start: 2022-01-01

## 2022-01-01 RX ORDER — LIDOCAINE AND PRILOCAINE 25; 25 MG/G; MG/G
CREAM TOPICAL AS NEEDED
Qty: 30 G | Refills: 1 | Status: SHIPPED | OUTPATIENT
Start: 2022-01-01 | End: 2022-01-01

## 2022-01-01 RX ORDER — SODIUM CHLORIDE 9 MG/ML
75 INJECTION, SOLUTION INTRAVENOUS CONTINUOUS
Status: DISCONTINUED | OUTPATIENT
Start: 2022-01-01 | End: 2022-01-01 | Stop reason: HOSPADM

## 2022-01-01 RX ORDER — ATROPINE SULFATE 1 MG/ML
0.25 INJECTION, SOLUTION INTRAMUSCULAR; INTRAVENOUS; SUBCUTANEOUS ONCE
Status: CANCELLED | OUTPATIENT
Start: 2022-01-01

## 2022-01-01 RX ORDER — SCOLOPAMINE TRANSDERMAL SYSTEM 1 MG/1
1 PATCH, EXTENDED RELEASE TRANSDERMAL
Status: DISCONTINUED | OUTPATIENT
Start: 2022-01-01 | End: 2022-01-01 | Stop reason: HOSPADM

## 2022-01-01 RX ORDER — ONDANSETRON 4 MG/1
4 TABLET, FILM COATED ORAL EVERY 8 HOURS PRN
Qty: 30 TABLET | Refills: 1 | Status: SHIPPED | OUTPATIENT
Start: 2022-01-01 | End: 2022-01-01 | Stop reason: SDUPTHER

## 2022-01-01 RX ORDER — POTASSIUM CHLORIDE 20MEQ/15ML
40 LIQUID (ML) ORAL ONCE
Status: COMPLETED | OUTPATIENT
Start: 2022-01-01 | End: 2022-01-01

## 2022-01-01 RX ORDER — MORPHINE SULFATE 30 MG/1
30 TABLET ORAL EVERY 4 HOURS PRN
Qty: 60 TABLET | Refills: 0 | Status: SHIPPED | OUTPATIENT
Start: 2022-01-01 | End: 2022-01-01 | Stop reason: SDUPTHER

## 2022-01-01 RX ORDER — OXYCODONE HYDROCHLORIDE 10 MG/1
10 TABLET ORAL EVERY 8 HOURS PRN
Qty: 30 TABLET | Refills: 0 | Status: SHIPPED | OUTPATIENT
Start: 2022-01-01 | End: 2022-01-01 | Stop reason: SDUPTHER

## 2022-01-01 RX ORDER — MORPHINE SULFATE 30 MG/1
30 TABLET, FILM COATED, EXTENDED RELEASE ORAL 2 TIMES DAILY
Qty: 30 TABLET | Refills: 0 | Status: SHIPPED | OUTPATIENT
Start: 2022-01-01 | End: 2022-01-01

## 2022-01-01 RX ORDER — FAMOTIDINE 20 MG/1
20 TABLET, FILM COATED ORAL DAILY PRN
Status: DISCONTINUED | OUTPATIENT
Start: 2022-01-01 | End: 2022-01-01

## 2022-01-01 RX ORDER — HEPARIN SODIUM 5000 [USP'U]/ML
5000 INJECTION, SOLUTION INTRAVENOUS; SUBCUTANEOUS EVERY 8 HOURS SCHEDULED
Status: COMPLETED | OUTPATIENT
Start: 2022-01-01 | End: 2022-01-01

## 2022-01-01 RX ORDER — DILTIAZEM HYDROCHLORIDE 240 MG/1
240 CAPSULE, COATED, EXTENDED RELEASE ORAL DAILY
Qty: 90 CAPSULE | Refills: 1 | Status: SHIPPED | OUTPATIENT
Start: 2022-01-01 | End: 2022-01-01

## 2022-01-01 RX ORDER — ATROPINE SULFATE 1 MG/ML
0.25 INJECTION, SOLUTION INTRAMUSCULAR; INTRAVENOUS; SUBCUTANEOUS ONCE
Status: CANCELLED | OUTPATIENT
Start: 2022-06-08

## 2022-01-01 RX ORDER — LISINOPRIL 40 MG/1
120 TABLET ORAL DAILY
COMMUNITY
End: 2022-01-01

## 2022-01-01 RX ORDER — SENNA AND DOCUSATE SODIUM 50; 8.6 MG/1; MG/1
1 TABLET, FILM COATED ORAL DAILY
Qty: 60 TABLET | Refills: 0 | Status: SHIPPED | OUTPATIENT
Start: 2022-01-01 | End: 2022-01-01

## 2022-01-01 RX ORDER — DEXTROSE, SODIUM CHLORIDE, AND POTASSIUM CHLORIDE 5; .9; .15 G/100ML; G/100ML; G/100ML
75 INJECTION INTRAVENOUS CONTINUOUS
Status: DISCONTINUED | OUTPATIENT
Start: 2022-01-01 | End: 2022-01-01

## 2022-01-01 RX ORDER — SODIUM CHLORIDE 9 MG/ML
20 INJECTION, SOLUTION INTRAVENOUS ONCE
Status: CANCELLED | OUTPATIENT
Start: 2022-01-01

## 2022-01-01 RX ORDER — MORPHINE SULFATE 100 MG/5ML
30 SOLUTION, CONCENTRATE ORAL EVERY 4 HOURS PRN
Status: DISCONTINUED | OUTPATIENT
Start: 2022-01-01 | End: 2022-01-01

## 2022-01-01 RX ORDER — DEXTROSE MONOHYDRATE 50 MG/ML
20 INJECTION, SOLUTION INTRAVENOUS ONCE
Status: CANCELLED | OUTPATIENT
Start: 2022-01-01

## 2022-01-01 RX ORDER — MIDAZOLAM HYDROCHLORIDE 2 MG/2ML
INJECTION, SOLUTION INTRAMUSCULAR; INTRAVENOUS AS NEEDED
Status: DISCONTINUED | OUTPATIENT
Start: 2022-01-01 | End: 2022-01-01

## 2022-01-01 RX ORDER — MAGNESIUM HYDROXIDE/ALUMINUM HYDROXICE/SIMETHICONE 120; 1200; 1200 MG/30ML; MG/30ML; MG/30ML
30 SUSPENSION ORAL EVERY 6 HOURS PRN
Status: DISCONTINUED | OUTPATIENT
Start: 2022-01-01 | End: 2022-01-01

## 2022-01-01 RX ORDER — FAMOTIDINE 20 MG/1
20 TABLET, FILM COATED ORAL 2 TIMES DAILY
Status: DISCONTINUED | OUTPATIENT
Start: 2022-01-01 | End: 2022-01-01

## 2022-01-01 RX ORDER — MORPHINE SULFATE 15 MG/1
30 TABLET ORAL EVERY 4 HOURS PRN
Status: DISCONTINUED | OUTPATIENT
Start: 2022-01-01 | End: 2022-01-01 | Stop reason: SDUPTHER

## 2022-01-01 RX ORDER — ENOXAPARIN SODIUM 100 MG/ML
30 INJECTION SUBCUTANEOUS
Status: DISCONTINUED | OUTPATIENT
Start: 2022-01-01 | End: 2022-01-01

## 2022-01-01 RX ORDER — DEXTROSE MONOHYDRATE 50 MG/ML
20 INJECTION, SOLUTION INTRAVENOUS ONCE
Status: COMPLETED | OUTPATIENT
Start: 2022-01-01 | End: 2022-01-01

## 2022-01-01 RX ORDER — OXYCODONE HYDROCHLORIDE 10 MG/1
10 TABLET ORAL EVERY 8 HOURS PRN
Qty: 20 TABLET | Refills: 0 | Status: CANCELLED | OUTPATIENT
Start: 2022-01-01

## 2022-01-01 RX ORDER — OXYCODONE HYDROCHLORIDE 20 MG/1
20 TABLET ORAL EVERY 4 HOURS PRN
Qty: 150 TABLET | Refills: 0 | Status: SHIPPED | OUTPATIENT
Start: 2022-01-01 | End: 2022-01-01 | Stop reason: ALTCHOICE

## 2022-01-01 RX ORDER — LANOLIN ALCOHOL/MO/W.PET/CERES
6 CREAM (GRAM) TOPICAL
Status: DISCONTINUED | OUTPATIENT
Start: 2022-01-01 | End: 2022-01-01 | Stop reason: HOSPADM

## 2022-01-01 RX ORDER — NADOLOL 40 MG/1
40 TABLET ORAL DAILY
Status: DISCONTINUED | OUTPATIENT
Start: 2022-01-01 | End: 2022-01-01

## 2022-01-01 RX ORDER — PROPOFOL 10 MG/ML
INJECTION, EMULSION INTRAVENOUS CONTINUOUS PRN
Status: DISCONTINUED | OUTPATIENT
Start: 2022-01-01 | End: 2022-01-01

## 2022-01-01 RX ORDER — HEPARIN SODIUM 5000 [USP'U]/ML
5000 INJECTION, SOLUTION INTRAVENOUS; SUBCUTANEOUS EVERY 8 HOURS SCHEDULED
Status: DISCONTINUED | OUTPATIENT
Start: 2022-01-01 | End: 2022-01-01

## 2022-01-01 RX ORDER — DEXAMETHASONE 0.5 MG/1
0.5 TABLET ORAL
Qty: 60 TABLET | Refills: 0 | Status: SHIPPED | OUTPATIENT
Start: 2022-01-01 | End: 2022-01-01

## 2022-01-01 RX ORDER — DEXAMETHASONE 0.5 MG/1
0.5 TABLET ORAL
Status: DISCONTINUED | OUTPATIENT
Start: 2022-01-01 | End: 2022-01-01

## 2022-01-01 RX ORDER — POTASSIUM CHLORIDE 14.9 MG/ML
20 INJECTION INTRAVENOUS ONCE
Status: DISCONTINUED | OUTPATIENT
Start: 2022-01-01 | End: 2022-01-01

## 2022-01-01 RX ORDER — ACETAMINOPHEN 325 MG/1
650 TABLET ORAL EVERY 6 HOURS PRN
Status: DISCONTINUED | OUTPATIENT
Start: 2022-01-01 | End: 2022-01-01 | Stop reason: HOSPADM

## 2022-01-01 RX ORDER — SORAFENIB 200 MG/1
400 TABLET, FILM COATED ORAL 2 TIMES DAILY
Status: DISCONTINUED | OUTPATIENT
Start: 2022-01-01 | End: 2022-01-01 | Stop reason: HOSPADM

## 2022-01-01 RX ORDER — SODIUM CHLORIDE 9 MG/ML
20 INJECTION, SOLUTION INTRAVENOUS ONCE AS NEEDED
Status: DISCONTINUED | OUTPATIENT
Start: 2022-01-01 | End: 2022-01-01 | Stop reason: HOSPADM

## 2022-01-01 RX ORDER — CEFAZOLIN SODIUM 1 G/3ML
INJECTION, POWDER, FOR SOLUTION INTRAMUSCULAR; INTRAVENOUS AS NEEDED
Status: DISCONTINUED | OUTPATIENT
Start: 2022-01-01 | End: 2022-01-01

## 2022-01-01 RX ORDER — ACETAMINOPHEN 325 MG/1
325 TABLET ORAL EVERY 6 HOURS PRN
Status: DISCONTINUED | OUTPATIENT
Start: 2022-01-01 | End: 2022-01-01 | Stop reason: HOSPADM

## 2022-01-01 RX ORDER — SODIUM CHLORIDE 9 MG/ML
20 INJECTION, SOLUTION INTRAVENOUS ONCE AS NEEDED
Status: CANCELLED | OUTPATIENT
Start: 2022-01-01

## 2022-01-01 RX ORDER — ONDANSETRON 2 MG/ML
4 INJECTION INTRAMUSCULAR; INTRAVENOUS ONCE
Status: COMPLETED | OUTPATIENT
Start: 2022-01-01 | End: 2022-01-01

## 2022-01-01 RX ORDER — MAGNESIUM HYDROXIDE/ALUMINUM HYDROXICE/SIMETHICONE 120; 1200; 1200 MG/30ML; MG/30ML; MG/30ML
30 SUSPENSION ORAL EVERY 6 HOURS PRN
Status: DISCONTINUED | OUTPATIENT
Start: 2022-01-01 | End: 2022-01-01 | Stop reason: HOSPADM

## 2022-01-01 RX ORDER — LOPERAMIDE HYDROCHLORIDE 2 MG/1
2 CAPSULE ORAL 2 TIMES DAILY PRN
Status: DISCONTINUED | OUTPATIENT
Start: 2022-01-01 | End: 2022-01-01 | Stop reason: HOSPADM

## 2022-01-01 RX ORDER — ACETAMINOPHEN 325 MG/1
650 TABLET ORAL EVERY 4 HOURS PRN
Status: DISCONTINUED | OUTPATIENT
Start: 2022-01-01 | End: 2022-01-01 | Stop reason: HOSPADM

## 2022-01-01 RX ORDER — ACETAMINOPHEN 325 MG/1
650 TABLET ORAL EVERY 6 HOURS PRN
Status: DISCONTINUED | OUTPATIENT
Start: 2022-01-01 | End: 2022-01-01

## 2022-01-01 RX ORDER — POTASSIUM CHLORIDE AND SODIUM CHLORIDE 900; 300 MG/100ML; MG/100ML
75 INJECTION, SOLUTION INTRAVENOUS CONTINUOUS
Status: DISCONTINUED | OUTPATIENT
Start: 2022-01-01 | End: 2022-01-01

## 2022-01-01 RX ORDER — FAMOTIDINE 40 MG/5ML
20 POWDER, FOR SUSPENSION ORAL 2 TIMES DAILY
Status: DISCONTINUED | OUTPATIENT
Start: 2022-01-01 | End: 2022-01-01

## 2022-01-01 RX ORDER — ATROPINE SULFATE 1 MG/ML
0.25 INJECTION, SOLUTION INTRAMUSCULAR; INTRAVENOUS; SUBCUTANEOUS ONCE
Status: COMPLETED | OUTPATIENT
Start: 2022-01-01 | End: 2022-01-01

## 2022-01-01 RX ORDER — POTASSIUM CHLORIDE 20 MEQ/1
40 TABLET, EXTENDED RELEASE ORAL ONCE
Status: DISCONTINUED | OUTPATIENT
Start: 2022-01-01 | End: 2022-01-01

## 2022-01-01 RX ORDER — ONDANSETRON 2 MG/ML
4 INJECTION INTRAMUSCULAR; INTRAVENOUS ONCE AS NEEDED
Status: DISCONTINUED | OUTPATIENT
Start: 2022-01-01 | End: 2022-01-01 | Stop reason: HOSPADM

## 2022-01-01 RX ORDER — LIDOCAINE WITH 8.4% SOD BICARB 0.9%(10ML)
SYRINGE (ML) INJECTION CODE/TRAUMA/SEDATION MEDICATION
Status: COMPLETED | OUTPATIENT
Start: 2022-01-01 | End: 2022-01-01

## 2022-01-01 RX ORDER — METOPROLOL TARTRATE 5 MG/5ML
2.5 INJECTION INTRAVENOUS EVERY 6 HOURS
Status: DISCONTINUED | OUTPATIENT
Start: 2022-01-01 | End: 2022-01-01 | Stop reason: HOSPADM

## 2022-01-01 RX ORDER — DILTIAZEM HYDROCHLORIDE 240 MG/1
240 CAPSULE, COATED, EXTENDED RELEASE ORAL DAILY
Qty: 30 CAPSULE | Refills: 0 | Status: SHIPPED | OUTPATIENT
Start: 2022-01-01 | End: 2022-01-01 | Stop reason: SDUPTHER

## 2022-01-01 RX ORDER — FENTANYL CITRATE/PF 50 MCG/ML
25 SYRINGE (ML) INJECTION
Status: DISCONTINUED | OUTPATIENT
Start: 2022-01-01 | End: 2022-01-01 | Stop reason: HOSPADM

## 2022-01-01 RX ORDER — NADOLOL 40 MG/1
40 TABLET ORAL DAILY
Status: DISCONTINUED | OUTPATIENT
Start: 2022-01-01 | End: 2022-01-01 | Stop reason: HOSPADM

## 2022-01-01 RX ORDER — ALBUTEROL SULFATE 90 UG/1
2 AEROSOL, METERED RESPIRATORY (INHALATION) EVERY 4 HOURS PRN
Status: DISCONTINUED | OUTPATIENT
Start: 2022-01-01 | End: 2022-01-01 | Stop reason: HOSPADM

## 2022-01-01 RX ORDER — MORPHINE SULFATE 10 MG/ML
8 INJECTION, SOLUTION INTRAMUSCULAR; INTRAVENOUS EVERY 4 HOURS PRN
Status: DISCONTINUED | OUTPATIENT
Start: 2022-01-01 | End: 2022-01-01 | Stop reason: SDUPTHER

## 2022-01-01 RX ORDER — DEXTROSE MONOHYDRATE 50 MG/ML
20 INJECTION, SOLUTION INTRAVENOUS ONCE
Status: CANCELLED | OUTPATIENT
Start: 2022-06-08

## 2022-01-01 RX ORDER — ATROPINE SULFATE 1 MG/ML
0.25 INJECTION, SOLUTION INTRAVENOUS ONCE
Status: COMPLETED | OUTPATIENT
Start: 2022-01-01 | End: 2022-01-01

## 2022-01-01 RX ORDER — SODIUM CHLORIDE 9 MG/ML
20 INJECTION, SOLUTION INTRAVENOUS ONCE AS NEEDED
Status: CANCELLED | OUTPATIENT
Start: 2022-06-08

## 2022-01-01 RX ORDER — LACTOBACILLUS ACIDOPHILUS / LACTOBACILLUS BULGARICUS 100 MILLION CFU STRENGTH
1 GRANULES ORAL
Status: DISCONTINUED | OUTPATIENT
Start: 2022-01-01 | End: 2022-01-01

## 2022-01-01 RX ORDER — ATROPINE SULFATE 1 MG/ML
0.25 INJECTION, SOLUTION INTRAMUSCULAR; INTRAVENOUS; SUBCUTANEOUS ONCE AS NEEDED
Status: DISCONTINUED | OUTPATIENT
Start: 2022-01-01 | End: 2022-01-01 | Stop reason: HOSPADM

## 2022-01-01 RX ORDER — ATROPINE SULFATE 1 MG/ML
0.25 INJECTION, SOLUTION INTRAVENOUS ONCE AS NEEDED
Status: DISCONTINUED | OUTPATIENT
Start: 2022-01-01 | End: 2022-01-01 | Stop reason: HOSPADM

## 2022-01-01 RX ORDER — DILTIAZEM HYDROCHLORIDE 180 MG/1
180 CAPSULE, COATED, EXTENDED RELEASE ORAL DAILY
Qty: 30 CAPSULE | Refills: 0 | Status: SHIPPED | OUTPATIENT
Start: 2022-01-01 | End: 2022-01-01

## 2022-01-01 RX ORDER — METOCLOPRAMIDE HYDROCHLORIDE 5 MG/ML
10 INJECTION INTRAMUSCULAR; INTRAVENOUS EVERY 6 HOURS SCHEDULED
Status: DISCONTINUED | OUTPATIENT
Start: 2022-01-01 | End: 2022-01-01 | Stop reason: HOSPADM

## 2022-01-01 RX ORDER — LEVOTHYROXINE SODIUM 112 UG/1
112 TABLET ORAL DAILY
Qty: 90 TABLET | Refills: 1 | Status: SHIPPED | OUTPATIENT
Start: 2022-01-01 | End: 2022-01-01

## 2022-01-01 RX ORDER — DEXAMETHASONE 0.5 MG/1
0.5 TABLET ORAL
Qty: 20 TABLET | Refills: 0 | Status: SHIPPED | OUTPATIENT
Start: 2022-01-01 | End: 2022-01-01 | Stop reason: SDUPTHER

## 2022-01-01 RX ORDER — OXYCODONE HYDROCHLORIDE 5 MG/1
5 TABLET ORAL EVERY 4 HOURS PRN
Status: DISCONTINUED | OUTPATIENT
Start: 2022-01-01 | End: 2022-01-01 | Stop reason: HOSPADM

## 2022-01-01 RX ORDER — MORPHINE SULFATE 30 MG/1
30 TABLET, FILM COATED, EXTENDED RELEASE ORAL 2 TIMES DAILY
Status: DISCONTINUED | OUTPATIENT
Start: 2022-01-01 | End: 2022-01-01

## 2022-01-01 RX ORDER — LEVOTHYROXINE SODIUM 112 UG/1
112 TABLET ORAL
Status: DISCONTINUED | OUTPATIENT
Start: 2022-01-01 | End: 2022-01-01 | Stop reason: HOSPADM

## 2022-01-01 RX ORDER — FENTANYL CITRATE 50 UG/ML
INJECTION, SOLUTION INTRAMUSCULAR; INTRAVENOUS CODE/TRAUMA/SEDATION MEDICATION
Status: COMPLETED | OUTPATIENT
Start: 2022-01-01 | End: 2022-01-01

## 2022-01-01 RX ORDER — PROCHLORPERAZINE MALEATE 10 MG
10 TABLET ORAL EVERY 6 HOURS PRN
Qty: 30 TABLET | Refills: 0 | Status: SHIPPED | OUTPATIENT
Start: 2022-01-01 | End: 2022-01-01

## 2022-01-01 RX ORDER — PANTOPRAZOLE SODIUM 40 MG/1
40 TABLET, DELAYED RELEASE ORAL
Status: DISCONTINUED | OUTPATIENT
Start: 2022-01-01 | End: 2022-01-01

## 2022-01-01 RX ORDER — ONDANSETRON 4 MG/1
4 TABLET, FILM COATED ORAL EVERY 8 HOURS PRN
Qty: 30 TABLET | Refills: 1 | Status: SHIPPED | OUTPATIENT
Start: 2022-01-01 | End: 2022-01-01

## 2022-01-01 RX ORDER — MORPHINE SULFATE 30 MG/1
30 TABLET ORAL EVERY 4 HOURS PRN
Qty: 60 TABLET | Refills: 0 | Status: SHIPPED | OUTPATIENT
Start: 2022-01-01 | End: 2022-01-01

## 2022-01-01 RX ORDER — MIDAZOLAM HYDROCHLORIDE 2 MG/2ML
INJECTION, SOLUTION INTRAMUSCULAR; INTRAVENOUS CODE/TRAUMA/SEDATION MEDICATION
Status: COMPLETED | OUTPATIENT
Start: 2022-01-01 | End: 2022-01-01

## 2022-01-01 RX ORDER — SIMETHICONE 80 MG
80 TABLET,CHEWABLE ORAL 4 TIMES DAILY PRN
Status: DISCONTINUED | OUTPATIENT
Start: 2022-01-01 | End: 2022-01-01

## 2022-01-01 RX ORDER — MORPHINE SULFATE 4 MG/ML
8 INJECTION, SOLUTION INTRAMUSCULAR; INTRAVENOUS EVERY 4 HOURS PRN
Status: DISCONTINUED | OUTPATIENT
Start: 2022-01-01 | End: 2022-01-01 | Stop reason: HOSPADM

## 2022-01-01 RX ORDER — NADOLOL 40 MG/1
40 TABLET ORAL DAILY
Qty: 90 TABLET | Refills: 3 | Status: SHIPPED | OUTPATIENT
Start: 2022-01-01 | End: 2022-01-01

## 2022-01-01 RX ORDER — SODIUM CHLORIDE 9 MG/ML
75 INJECTION, SOLUTION INTRAVENOUS CONTINUOUS
Status: DISPENSED | OUTPATIENT
Start: 2022-01-01 | End: 2022-01-01

## 2022-01-01 RX ORDER — ONDANSETRON 2 MG/ML
4 INJECTION INTRAMUSCULAR; INTRAVENOUS
Status: DISCONTINUED | OUTPATIENT
Start: 2022-01-01 | End: 2022-01-01

## 2022-01-01 RX ORDER — MORPHINE SULFATE 15 MG/1
30 TABLET ORAL EVERY 4 HOURS PRN
Status: DISCONTINUED | OUTPATIENT
Start: 2022-01-01 | End: 2022-01-01

## 2022-01-01 RX ORDER — LOPERAMIDE HYDROCHLORIDE 2 MG/1
2 TABLET ORAL 4 TIMES DAILY PRN
COMMUNITY
End: 2022-01-01

## 2022-01-01 RX ORDER — SACCHAROMYCES BOULARDII 250 MG
250 CAPSULE ORAL 2 TIMES DAILY
Status: DISCONTINUED | OUTPATIENT
Start: 2022-01-01 | End: 2022-01-01

## 2022-01-01 RX ORDER — POTASSIUM CHLORIDE 29.8 MG/ML
40 INJECTION INTRAVENOUS ONCE
Status: DISCONTINUED | OUTPATIENT
Start: 2022-01-01 | End: 2022-01-01

## 2022-01-01 RX ORDER — TRAMADOL HYDROCHLORIDE 50 MG/1
50 TABLET ORAL EVERY 12 HOURS PRN
Status: DISCONTINUED | OUTPATIENT
Start: 2022-01-01 | End: 2022-01-01

## 2022-01-01 RX ORDER — NALOXONE HYDROCHLORIDE 4 MG/.1ML
SPRAY NASAL
Qty: 1 EACH | Refills: 1 | Status: SHIPPED | OUTPATIENT
Start: 2022-01-01 | End: 2022-01-01

## 2022-01-01 RX ORDER — DEXAMETHASONE SODIUM PHOSPHATE 4 MG/ML
2 INJECTION, SOLUTION INTRA-ARTICULAR; INTRALESIONAL; INTRAMUSCULAR; INTRAVENOUS; SOFT TISSUE EVERY 12 HOURS SCHEDULED
Status: DISCONTINUED | OUTPATIENT
Start: 2022-01-01 | End: 2022-01-01 | Stop reason: HOSPADM

## 2022-01-01 RX ORDER — SODIUM CHLORIDE 9 MG/ML
50 INJECTION, SOLUTION INTRAVENOUS CONTINUOUS
Status: DISCONTINUED | OUTPATIENT
Start: 2022-01-01 | End: 2022-01-01 | Stop reason: HOSPADM

## 2022-01-01 RX ORDER — MORPHINE SULFATE 30 MG/1
30 TABLET, FILM COATED, EXTENDED RELEASE ORAL 2 TIMES DAILY
Qty: 20 TABLET | Refills: 0 | Status: SHIPPED | OUTPATIENT
Start: 2022-01-01 | End: 2022-01-01 | Stop reason: SDUPTHER

## 2022-01-01 RX ORDER — OXYCODONE HYDROCHLORIDE 10 MG/1
10 TABLET ORAL EVERY 8 HOURS PRN
Qty: 20 TABLET | Refills: 0 | Status: SHIPPED | OUTPATIENT
Start: 2022-01-01 | End: 2022-01-01 | Stop reason: SDUPTHER

## 2022-01-01 RX ORDER — POTASSIUM CHLORIDE 20MEQ/15ML
40 LIQUID (ML) ORAL 2 TIMES DAILY
Status: COMPLETED | OUTPATIENT
Start: 2022-01-01 | End: 2022-01-01

## 2022-01-01 RX ORDER — SODIUM CHLORIDE 9 MG/ML
100 INJECTION, SOLUTION INTRAVENOUS CONTINUOUS
Status: DISCONTINUED | OUTPATIENT
Start: 2022-01-01 | End: 2022-01-01

## 2022-01-01 RX ORDER — DILTIAZEM HYDROCHLORIDE 240 MG/1
240 CAPSULE, COATED, EXTENDED RELEASE ORAL DAILY
Status: DISCONTINUED | OUTPATIENT
Start: 2022-01-01 | End: 2022-01-01

## 2022-01-01 RX ORDER — MORPHINE SULFATE 100 MG/5ML
30 SOLUTION, CONCENTRATE ORAL EVERY 4 HOURS PRN
Status: DISCONTINUED | OUTPATIENT
Start: 2022-01-01 | End: 2022-01-01 | Stop reason: HOSPADM

## 2022-01-01 RX ORDER — CEFAZOLIN SODIUM 1 G/50ML
1000 SOLUTION INTRAVENOUS ONCE
Status: COMPLETED | OUTPATIENT
Start: 2022-01-01 | End: 2022-01-01

## 2022-01-01 RX ORDER — POTASSIUM CHLORIDE AND SODIUM CHLORIDE 900; 300 MG/100ML; MG/100ML
100 INJECTION, SOLUTION INTRAVENOUS CONTINUOUS
Status: DISCONTINUED | OUTPATIENT
Start: 2022-01-01 | End: 2022-01-01

## 2022-01-01 RX ORDER — MORPHINE SULFATE 4 MG/ML
8 INJECTION, SOLUTION INTRAMUSCULAR; INTRAVENOUS EVERY 4 HOURS PRN
Status: DISCONTINUED | OUTPATIENT
Start: 2022-01-01 | End: 2022-01-01

## 2022-01-01 RX ORDER — MAGNESIUM SULFATE HEPTAHYDRATE 40 MG/ML
2 INJECTION, SOLUTION INTRAVENOUS ONCE
Status: COMPLETED | OUTPATIENT
Start: 2022-01-01 | End: 2022-01-01

## 2022-01-01 RX ORDER — ONDANSETRON 2 MG/ML
4 INJECTION INTRAMUSCULAR; INTRAVENOUS EVERY 6 HOURS PRN
Status: DISCONTINUED | OUTPATIENT
Start: 2022-01-01 | End: 2022-01-01 | Stop reason: HOSPADM

## 2022-01-01 RX ORDER — OXYCODONE HYDROCHLORIDE 10 MG/1
15 TABLET ORAL EVERY 4 HOURS PRN
Qty: 45 TABLET | Refills: 0 | Status: SHIPPED | OUTPATIENT
Start: 2022-01-01 | End: 2022-01-01

## 2022-01-01 RX ORDER — PANTOPRAZOLE SODIUM 40 MG/1
40 INJECTION, POWDER, FOR SOLUTION INTRAVENOUS EVERY 12 HOURS SCHEDULED
Status: DISCONTINUED | OUTPATIENT
Start: 2022-01-01 | End: 2022-01-01 | Stop reason: HOSPADM

## 2022-01-01 RX ORDER — FAMOTIDINE 20 MG/1
40 TABLET, FILM COATED ORAL
Status: DISCONTINUED | OUTPATIENT
Start: 2022-01-01 | End: 2022-01-01 | Stop reason: HOSPADM

## 2022-01-01 RX ORDER — EPINEPHRINE 0.1 MG/ML
SYRINGE (ML) INJECTION CODE/TRAUMA/SEDATION MEDICATION
Status: COMPLETED | OUTPATIENT
Start: 2022-01-01 | End: 2022-01-01

## 2022-01-01 RX ORDER — DOCUSATE SODIUM 100 MG/1
100 CAPSULE, LIQUID FILLED ORAL 2 TIMES DAILY
COMMUNITY
End: 2022-01-01

## 2022-01-01 RX ORDER — ENOXAPARIN SODIUM 100 MG/ML
40 INJECTION SUBCUTANEOUS
Status: DISCONTINUED | OUTPATIENT
Start: 2022-01-01 | End: 2022-01-01 | Stop reason: HOSPADM

## 2022-01-01 RX ORDER — LEVOTHYROXINE SODIUM 112 UG/1
112 TABLET ORAL
Status: DISCONTINUED | OUTPATIENT
Start: 2022-01-01 | End: 2022-01-01

## 2022-01-01 RX ORDER — MORPHINE SULFATE 30 MG/1
30 TABLET, FILM COATED, EXTENDED RELEASE ORAL 2 TIMES DAILY
Qty: 30 TABLET | Refills: 0 | Status: SHIPPED | OUTPATIENT
Start: 2022-01-01 | End: 2022-01-01 | Stop reason: SDUPTHER

## 2022-01-01 RX ORDER — LORAZEPAM 2 MG/ML
1 INJECTION INTRAMUSCULAR ONCE
Status: COMPLETED | OUTPATIENT
Start: 2022-01-01 | End: 2022-01-01

## 2022-01-01 RX ORDER — CEFAZOLIN SODIUM 1 G/50ML
1000 SOLUTION INTRAVENOUS ONCE
Status: CANCELLED | OUTPATIENT
Start: 2022-01-01 | End: 2022-01-01

## 2022-01-01 RX ORDER — SODIUM BICARBONATE 84 MG/ML
INJECTION, SOLUTION INTRAVENOUS CODE/TRAUMA/SEDATION MEDICATION
Status: COMPLETED | OUTPATIENT
Start: 2022-01-01 | End: 2022-01-01

## 2022-01-01 RX ORDER — SODIUM CHLORIDE 9 MG/ML
INJECTION, SOLUTION INTRAVENOUS CONTINUOUS PRN
Status: DISCONTINUED | OUTPATIENT
Start: 2022-01-01 | End: 2022-01-01

## 2022-01-01 RX ORDER — METOCLOPRAMIDE HYDROCHLORIDE 5 MG/ML
10 INJECTION INTRAMUSCULAR; INTRAVENOUS EVERY 6 HOURS PRN
Status: DISCONTINUED | OUTPATIENT
Start: 2022-01-01 | End: 2022-01-01

## 2022-01-01 RX ORDER — GLIMEPIRIDE 2 MG/1
2 TABLET ORAL DAILY
Qty: 90 TABLET | Refills: 1 | Status: SHIPPED | OUTPATIENT
Start: 2022-01-01 | End: 2022-01-01

## 2022-01-01 RX ORDER — DILTIAZEM HYDROCHLORIDE 120 MG/1
120 CAPSULE, COATED, EXTENDED RELEASE ORAL DAILY
Status: DISCONTINUED | OUTPATIENT
Start: 2022-01-01 | End: 2022-01-01

## 2022-01-01 RX ORDER — FAMOTIDINE 40 MG/1
40 TABLET, FILM COATED ORAL DAILY
Qty: 90 TABLET | Refills: 0 | Status: SHIPPED | OUTPATIENT
Start: 2022-01-01 | End: 2022-01-01

## 2022-01-01 RX ORDER — NADOLOL 40 MG/1
40 TABLET ORAL
Status: DISCONTINUED | OUTPATIENT
Start: 2022-01-01 | End: 2022-01-01

## 2022-01-01 RX ORDER — MORPHINE SULFATE 100 MG/5ML
30 SOLUTION, CONCENTRATE ORAL EVERY 8 HOURS SCHEDULED
Status: DISCONTINUED | OUTPATIENT
Start: 2022-01-01 | End: 2022-01-01

## 2022-01-01 RX ORDER — DILTIAZEM HYDROCHLORIDE 240 MG/1
240 CAPSULE, COATED, EXTENDED RELEASE ORAL DAILY
Status: DISCONTINUED | OUTPATIENT
Start: 2022-01-01 | End: 2022-01-01 | Stop reason: HOSPADM

## 2022-01-01 RX ORDER — SODIUM CHLORIDE 9 MG/ML
75 INJECTION, SOLUTION INTRAVENOUS CONTINUOUS
Status: CANCELLED | OUTPATIENT
Start: 2022-01-01

## 2022-01-01 RX ORDER — MORPHINE SULFATE 10 MG/ML
8 INJECTION, SOLUTION INTRAMUSCULAR; INTRAVENOUS EVERY 4 HOURS PRN
Status: DISCONTINUED | OUTPATIENT
Start: 2022-01-01 | End: 2022-01-01

## 2022-01-01 RX ORDER — ATROPINE SULFATE 1 MG/ML
0.25 INJECTION, SOLUTION INTRAMUSCULAR; INTRAVENOUS; SUBCUTANEOUS ONCE AS NEEDED
Status: CANCELLED | OUTPATIENT
Start: 2022-06-08

## 2022-01-01 RX ORDER — MORPHINE SULFATE 100 MG/5ML
30 SOLUTION, CONCENTRATE ORAL EVERY 8 HOURS SCHEDULED
Qty: 118 ML | Refills: 0 | Status: SHIPPED | OUTPATIENT
Start: 2022-01-01

## 2022-01-01 RX ORDER — ONDANSETRON 2 MG/ML
4 INJECTION INTRAMUSCULAR; INTRAVENOUS EVERY 6 HOURS PRN
Status: DISCONTINUED | OUTPATIENT
Start: 2022-01-01 | End: 2022-01-01

## 2022-01-01 RX ADMIN — ENOXAPARIN SODIUM 40 MG: 40 INJECTION SUBCUTANEOUS at 08:32

## 2022-01-01 RX ADMIN — SODIUM CHLORIDE 75 ML/HR: 0.9 INJECTION, SOLUTION INTRAVENOUS at 05:49

## 2022-01-01 RX ADMIN — CEFAZOLIN SODIUM 1000 MG: 1 INJECTION, POWDER, FOR SOLUTION INTRAMUSCULAR; INTRAVENOUS at 13:43

## 2022-01-01 RX ADMIN — PANTOPRAZOLE SODIUM 40 MG: 40 INJECTION, POWDER, FOR SOLUTION INTRAVENOUS at 20:04

## 2022-01-01 RX ADMIN — SODIUM BICARBONATE 50 MEQ: 84 INJECTION INTRAVENOUS at 06:06

## 2022-01-01 RX ADMIN — INSULIN LISPRO 3 UNITS: 100 INJECTION, SOLUTION INTRAVENOUS; SUBCUTANEOUS at 17:11

## 2022-01-01 RX ADMIN — LEUCOVORIN CALCIUM 700 MG: 500 INJECTION, POWDER, LYOPHILIZED, FOR SOLUTION INTRAMUSCULAR; INTRAVENOUS at 13:42

## 2022-01-01 RX ADMIN — MELATONIN 6 MG: at 21:05

## 2022-01-01 RX ADMIN — POTASSIUM CHLORIDE 40 MEQ: 20 SOLUTION ORAL at 17:23

## 2022-01-01 RX ADMIN — ATROPINE SULFATE 0.25 MG: 1 INJECTION, SOLUTION INTRAMUSCULAR; INTRAVENOUS; SUBCUTANEOUS at 13:35

## 2022-01-01 RX ADMIN — MORPHINE SULFATE 30 MG: 100 SOLUTION ORAL at 06:21

## 2022-01-01 RX ADMIN — HEPARIN SODIUM 5000 UNITS: 5000 INJECTION INTRAVENOUS; SUBCUTANEOUS at 05:22

## 2022-01-01 RX ADMIN — DEXAMETHASONE SODIUM PHOSPHATE: 10 INJECTION, SOLUTION INTRAMUSCULAR; INTRAVENOUS at 10:26

## 2022-01-01 RX ADMIN — EPINEPHRINE 1 MG: 0.1 INJECTION, SOLUTION ENDOTRACHEAL; INTRACARDIAC; INTRAVENOUS at 06:05

## 2022-01-01 RX ADMIN — LEUCOVORIN CALCIUM 700 MG: 100 INJECTION, POWDER, LYOPHILIZED, FOR SUSPENSION INTRAMUSCULAR; INTRAVENOUS at 13:11

## 2022-01-01 RX ADMIN — SODIUM CHLORIDE 20 ML/HR: 9 INJECTION, SOLUTION INTRAVENOUS at 10:26

## 2022-01-01 RX ADMIN — IOHEXOL 100 ML: 350 INJECTION, SOLUTION INTRAVENOUS at 10:09

## 2022-01-01 RX ADMIN — DEXAMETHASONE 0.5 MG: 0.5 TABLET ORAL at 09:33

## 2022-01-01 RX ADMIN — NADOLOL 40 MG: 40 TABLET ORAL at 08:04

## 2022-01-01 RX ADMIN — PANTOPRAZOLE SODIUM 40 MG: 40 TABLET, DELAYED RELEASE ORAL at 16:22

## 2022-01-01 RX ADMIN — SODIUM CHLORIDE 20 ML/HR: 9 INJECTION, SOLUTION INTRAVENOUS at 10:24

## 2022-01-01 RX ADMIN — ONDANSETRON 4 MG: 2 INJECTION INTRAMUSCULAR; INTRAVENOUS at 19:49

## 2022-01-01 RX ADMIN — MORPHINE SULFATE 30 MG: 100 SOLUTION ORAL at 13:56

## 2022-01-01 RX ADMIN — LEVOTHYROXINE SODIUM 112 MCG: 112 TABLET ORAL at 05:22

## 2022-01-01 RX ADMIN — GADOBUTROL 5 ML: 604.72 INJECTION INTRAVENOUS at 14:41

## 2022-01-01 RX ADMIN — Medication 10 ML: at 14:21

## 2022-01-01 RX ADMIN — ONDANSETRON 4 MG: 2 INJECTION INTRAMUSCULAR; INTRAVENOUS at 05:37

## 2022-01-01 RX ADMIN — SODIUM BICARBONATE 50 MEQ: 84 INJECTION INTRAVENOUS at 06:17

## 2022-01-01 RX ADMIN — HEPARIN SODIUM 5000 UNITS: 5000 INJECTION INTRAVENOUS; SUBCUTANEOUS at 13:36

## 2022-01-01 RX ADMIN — SCOPALAMINE 1 PATCH: 1 PATCH, EXTENDED RELEASE TRANSDERMAL at 20:22

## 2022-01-01 RX ADMIN — ONDANSETRON 4 MG: 2 INJECTION INTRAMUSCULAR; INTRAVENOUS at 06:20

## 2022-01-01 RX ADMIN — ATROPINE SULFATE 0.25 MG: 1 INJECTION, SOLUTION INTRAMUSCULAR; INTRAVENOUS; SUBCUTANEOUS at 12:47

## 2022-01-01 RX ADMIN — MIDAZOLAM 2 MG: 1 INJECTION INTRAMUSCULAR; INTRAVENOUS at 13:26

## 2022-01-01 RX ADMIN — LEUCOVORIN CALCIUM 700 MG: 500 INJECTION, POWDER, LYOPHILIZED, FOR SOLUTION INTRAMUSCULAR; INTRAVENOUS at 14:32

## 2022-01-01 RX ADMIN — DEXTROSE, SODIUM CHLORIDE, AND POTASSIUM CHLORIDE 75 ML/HR: 5; .9; .15 INJECTION INTRAVENOUS at 11:28

## 2022-01-01 RX ADMIN — ENOXAPARIN SODIUM 40 MG: 40 INJECTION SUBCUTANEOUS at 13:57

## 2022-01-01 RX ADMIN — SODIUM CHLORIDE 1000 ML: 0.9 INJECTION, SOLUTION INTRAVENOUS at 06:07

## 2022-01-01 RX ADMIN — LACTOBACILLUS ACIDOPHILUS / LACTOBACILLUS BULGARICUS 1 PACKET: 100 MILLION CFU STRENGTH GRANULES at 08:36

## 2022-01-01 RX ADMIN — PANTOPRAZOLE SODIUM 40 MG: 40 INJECTION, POWDER, FOR SOLUTION INTRAVENOUS at 08:32

## 2022-01-01 RX ADMIN — METOCLOPRAMIDE 10 MG: 5 INJECTION, SOLUTION INTRAMUSCULAR; INTRAVENOUS at 11:25

## 2022-01-01 RX ADMIN — HEPARIN SODIUM 5000 UNITS: 5000 INJECTION INTRAVENOUS; SUBCUTANEOUS at 06:33

## 2022-01-01 RX ADMIN — METOPROLOL TARTRATE 2.5 MG: 5 INJECTION INTRAVENOUS at 00:09

## 2022-01-01 RX ADMIN — LEVOTHYROXINE SODIUM 112 MCG: 112 TABLET ORAL at 06:41

## 2022-01-01 RX ADMIN — MORPHINE SULFATE 30 MG: 30 TABLET, EXTENDED RELEASE ORAL at 09:25

## 2022-01-01 RX ADMIN — DEXAMETHASONE SODIUM PHOSPHATE 2 MG: 4 INJECTION INTRA-ARTICULAR; INTRALESIONAL; INTRAMUSCULAR; INTRAVENOUS; SOFT TISSUE at 20:05

## 2022-01-01 RX ADMIN — DILTIAZEM HYDROCHLORIDE 240 MG: 240 CAPSULE, COATED, EXTENDED RELEASE ORAL at 09:25

## 2022-01-01 RX ADMIN — MORPHINE SULFATE 30 MG: 100 SOLUTION ORAL at 06:41

## 2022-01-01 RX ADMIN — DEXAMETHASONE 0.5 MG: 0.5 TABLET ORAL at 15:19

## 2022-01-01 RX ADMIN — NADOLOL 40 MG: 40 TABLET ORAL at 09:37

## 2022-01-01 RX ADMIN — MORPHINE SULFATE 30 MG: 30 TABLET, EXTENDED RELEASE ORAL at 08:16

## 2022-01-01 RX ADMIN — DEXAMETHASONE SODIUM PHOSPHATE: 10 INJECTION, SOLUTION INTRAMUSCULAR; INTRAVENOUS at 10:24

## 2022-01-01 RX ADMIN — PANTOPRAZOLE SODIUM 40 MG: 40 TABLET, DELAYED RELEASE ORAL at 05:37

## 2022-01-01 RX ADMIN — LEVOTHYROXINE SODIUM 112 MCG: 112 TABLET ORAL at 06:32

## 2022-01-01 RX ADMIN — FAMOTIDINE 20 MG: 40 POWDER, FOR SUSPENSION ORAL at 17:24

## 2022-01-01 RX ADMIN — LEVOTHYROXINE SODIUM 112 MCG: 112 TABLET ORAL at 06:05

## 2022-01-01 RX ADMIN — ENOXAPARIN SODIUM 40 MG: 40 INJECTION SUBCUTANEOUS at 08:52

## 2022-01-01 RX ADMIN — ONDANSETRON 4 MG: 2 INJECTION INTRAMUSCULAR; INTRAVENOUS at 21:35

## 2022-01-01 RX ADMIN — MIDAZOLAM 1 MG: 1 INJECTION INTRAMUSCULAR; INTRAVENOUS at 14:28

## 2022-01-01 RX ADMIN — FENTANYL CITRATE 25 MCG: 50 INJECTION, SOLUTION INTRAMUSCULAR; INTRAVENOUS at 14:38

## 2022-01-01 RX ADMIN — NADOLOL 40 MG: 40 TABLET ORAL at 08:19

## 2022-01-01 RX ADMIN — MORPHINE SULFATE 30 MG: 30 TABLET, EXTENDED RELEASE ORAL at 09:33

## 2022-01-01 RX ADMIN — PANTOPRAZOLE SODIUM 40 MG: 40 INJECTION, POWDER, FOR SOLUTION INTRAVENOUS at 09:41

## 2022-01-01 RX ADMIN — METOPROLOL TARTRATE 2.5 MG: 5 INJECTION INTRAVENOUS at 18:49

## 2022-01-01 RX ADMIN — METOCLOPRAMIDE 10 MG: 5 INJECTION, SOLUTION INTRAMUSCULAR; INTRAVENOUS at 06:40

## 2022-01-01 RX ADMIN — OXYCODONE HYDROCHLORIDE 5 MG: 5 TABLET ORAL at 05:46

## 2022-01-01 RX ADMIN — PEGFILGRASTIM 6 MG: KIT SUBCUTANEOUS at 14:07

## 2022-01-01 RX ADMIN — SODIUM CHLORIDE 20 ML/HR: 0.9 INJECTION, SOLUTION INTRAVENOUS at 10:48

## 2022-01-01 RX ADMIN — MIDAZOLAM 1 MG: 1 INJECTION INTRAMUSCULAR; INTRAVENOUS at 14:15

## 2022-01-01 RX ADMIN — PEGFILGRASTIM 6 MG: KIT SUBCUTANEOUS at 12:54

## 2022-01-01 RX ADMIN — MIDAZOLAM 0.5 MG: 1 INJECTION INTRAMUSCULAR; INTRAVENOUS at 14:38

## 2022-01-01 RX ADMIN — NADOLOL 40 MG: 40 TABLET ORAL at 12:44

## 2022-01-01 RX ADMIN — MORPHINE SULFATE 30 MG: 100 SOLUTION ORAL at 06:05

## 2022-01-01 RX ADMIN — OXYCODONE HYDROCHLORIDE 5 MG: 5 TABLET ORAL at 14:33

## 2022-01-01 RX ADMIN — HEPARIN SODIUM 5000 UNITS: 5000 INJECTION INTRAVENOUS; SUBCUTANEOUS at 14:37

## 2022-01-01 RX ADMIN — PROPOFOL 50 MCG/KG/MIN: 10 INJECTION, EMULSION INTRAVENOUS at 13:32

## 2022-01-01 RX ADMIN — ONDANSETRON 4 MG: 2 INJECTION INTRAMUSCULAR; INTRAVENOUS at 16:22

## 2022-01-01 RX ADMIN — HEPARIN SODIUM 5000 UNITS: 5000 INJECTION INTRAVENOUS; SUBCUTANEOUS at 21:17

## 2022-01-01 RX ADMIN — ONDANSETRON 4 MG: 2 INJECTION INTRAMUSCULAR; INTRAVENOUS at 21:56

## 2022-01-01 RX ADMIN — EPINEPHRINE 1 MG: 0.1 INJECTION, SOLUTION ENDOTRACHEAL; INTRACARDIAC; INTRAVENOUS at 06:09

## 2022-01-01 RX ADMIN — POTASSIUM CHLORIDE AND SODIUM CHLORIDE 100 ML/HR: 900; 300 INJECTION, SOLUTION INTRAVENOUS at 09:15

## 2022-01-01 RX ADMIN — DEXTROSE 20 ML/HR: 5 SOLUTION INTRAVENOUS at 10:38

## 2022-01-01 RX ADMIN — SODIUM CHLORIDE 100 ML/HR: 0.9 INJECTION, SOLUTION INTRAVENOUS at 07:50

## 2022-01-01 RX ADMIN — DEXAMETHASONE 0.5 MG: 0.5 TABLET ORAL at 09:26

## 2022-01-01 RX ADMIN — LORAZEPAM 1 MG: 2 INJECTION INTRAMUSCULAR; INTRAVENOUS at 13:36

## 2022-01-01 RX ADMIN — HEPARIN SODIUM 5000 UNITS: 5000 INJECTION INTRAVENOUS; SUBCUTANEOUS at 06:02

## 2022-01-01 RX ADMIN — POTASSIUM CHLORIDE AND SODIUM CHLORIDE 100 ML/HR: 900; 300 INJECTION, SOLUTION INTRAVENOUS at 20:51

## 2022-01-01 RX ADMIN — ONDANSETRON 4 MG: 2 INJECTION INTRAMUSCULAR; INTRAVENOUS at 11:01

## 2022-01-01 RX ADMIN — POTASSIUM CHLORIDE 40 MEQ: 20 SOLUTION ORAL at 11:26

## 2022-01-01 RX ADMIN — PANTOPRAZOLE SODIUM 40 MG: 40 INJECTION, POWDER, FOR SOLUTION INTRAVENOUS at 21:37

## 2022-01-01 RX ADMIN — LEUCOVORIN CALCIUM 700 MG: 500 INJECTION, POWDER, LYOPHILIZED, FOR SOLUTION INTRAMUSCULAR; INTRAVENOUS at 12:51

## 2022-01-01 RX ADMIN — METOCLOPRAMIDE 10 MG: 5 INJECTION, SOLUTION INTRAMUSCULAR; INTRAVENOUS at 17:45

## 2022-01-01 RX ADMIN — MORPHINE SULFATE 8 MG: 2 INJECTION, SOLUTION INTRAMUSCULAR; INTRAVENOUS at 18:56

## 2022-01-01 RX ADMIN — PANTOPRAZOLE SODIUM 40 MG: 40 TABLET, DELAYED RELEASE ORAL at 06:02

## 2022-01-01 RX ADMIN — ATROPINE SULFATE 0.25 MG: 1 INJECTION, SOLUTION INTRAMUSCULAR; INTRAVENOUS; SUBCUTANEOUS at 13:09

## 2022-01-01 RX ADMIN — ONDANSETRON 4 MG: 2 INJECTION INTRAMUSCULAR; INTRAVENOUS at 11:35

## 2022-01-01 RX ADMIN — POTASSIUM CHLORIDE 20 MEQ: 14.9 INJECTION, SOLUTION INTRAVENOUS at 04:24

## 2022-01-01 RX ADMIN — SODIUM CHLORIDE 1000 ML: 0.9 INJECTION, SOLUTION INTRAVENOUS at 08:54

## 2022-01-01 RX ADMIN — DEXAMETHASONE SODIUM PHOSPHATE: 10 INJECTION, SOLUTION INTRAMUSCULAR; INTRAVENOUS at 10:43

## 2022-01-01 RX ADMIN — SODIUM CHLORIDE 75 ML/HR: 0.9 INJECTION, SOLUTION INTRAVENOUS at 12:45

## 2022-01-01 RX ADMIN — MORPHINE SULFATE 30 MG: 15 TABLET ORAL at 20:49

## 2022-01-01 RX ADMIN — POTASSIUM CHLORIDE 40 MEQ: 20 SOLUTION ORAL at 09:41

## 2022-01-01 RX ADMIN — DEXTROSE 20 ML/HR: 5 SOLUTION INTRAVENOUS at 10:14

## 2022-01-01 RX ADMIN — MORPHINE SULFATE 8 MG: 4 INJECTION INTRAVENOUS at 19:48

## 2022-01-01 RX ADMIN — METOCLOPRAMIDE 10 MG: 5 INJECTION, SOLUTION INTRAMUSCULAR; INTRAVENOUS at 18:49

## 2022-01-01 RX ADMIN — OXYCODONE HYDROCHLORIDE 5 MG: 5 TABLET ORAL at 08:04

## 2022-01-01 RX ADMIN — ONDANSETRON 4 MG: 2 INJECTION INTRAMUSCULAR; INTRAVENOUS at 11:27

## 2022-01-01 RX ADMIN — PEGFILGRASTIM 6 MG: KIT SUBCUTANEOUS at 12:37

## 2022-01-01 RX ADMIN — OXALIPLATIN 150 MG: 5 INJECTION, SOLUTION INTRAVENOUS at 10:55

## 2022-01-01 RX ADMIN — POTASSIUM CHLORIDE AND SODIUM CHLORIDE 75 ML/HR: 900; 300 INJECTION, SOLUTION INTRAVENOUS at 21:49

## 2022-01-01 RX ADMIN — LEVOTHYROXINE SODIUM 112 MCG: 112 TABLET ORAL at 05:58

## 2022-01-01 RX ADMIN — HEPARIN SODIUM 5000 UNITS: 5000 INJECTION INTRAVENOUS; SUBCUTANEOUS at 22:02

## 2022-01-01 RX ADMIN — MELATONIN 6 MG: at 21:01

## 2022-01-01 RX ADMIN — FAMOTIDINE 20 MG: 40 POWDER, FOR SUSPENSION ORAL at 18:10

## 2022-01-01 RX ADMIN — DILTIAZEM HYDROCHLORIDE 240 MG: 240 CAPSULE, COATED, EXTENDED RELEASE ORAL at 08:52

## 2022-01-01 RX ADMIN — Medication 25 MG: at 13:27

## 2022-01-01 RX ADMIN — PANTOPRAZOLE SODIUM 40 MG: 40 TABLET, DELAYED RELEASE ORAL at 06:32

## 2022-01-01 RX ADMIN — ONDANSETRON 4 MG: 2 INJECTION INTRAMUSCULAR; INTRAVENOUS at 15:19

## 2022-01-01 RX ADMIN — ONDANSETRON 4 MG: 2 INJECTION INTRAMUSCULAR; INTRAVENOUS at 06:40

## 2022-01-01 RX ADMIN — IRINOTECAN HYDROCHLORIDE 220 MG: 20 INJECTION, SOLUTION INTRAVENOUS at 13:13

## 2022-01-01 RX ADMIN — OXYCODONE HYDROCHLORIDE 5 MG: 5 TABLET ORAL at 21:05

## 2022-01-01 RX ADMIN — LEVOTHYROXINE SODIUM 112 MCG: 112 TABLET ORAL at 05:46

## 2022-01-01 RX ADMIN — DEXTROSE 200 MG: 5 SOLUTION INTRAVENOUS at 14:34

## 2022-01-01 RX ADMIN — DILTIAZEM HYDROCHLORIDE 240 MG: 240 CAPSULE, COATED, EXTENDED RELEASE ORAL at 08:04

## 2022-01-01 RX ADMIN — DEXAMETHASONE SODIUM PHOSPHATE 2 MG: 4 INJECTION INTRA-ARTICULAR; INTRALESIONAL; INTRAMUSCULAR; INTRAVENOUS; SOFT TISSUE at 08:32

## 2022-01-01 RX ADMIN — MORPHINE SULFATE 8 MG: 4 INJECTION INTRAVENOUS at 15:22

## 2022-01-01 RX ADMIN — LEVOTHYROXINE SODIUM 112 MCG: 112 TABLET ORAL at 06:02

## 2022-01-01 RX ADMIN — ONDANSETRON 4 MG: 2 INJECTION INTRAMUSCULAR; INTRAVENOUS at 12:43

## 2022-01-01 RX ADMIN — DEXTROSE 20 ML/HR: 5 SOLUTION INTRAVENOUS at 12:34

## 2022-01-01 RX ADMIN — METOPROLOL TARTRATE 2.5 MG: 5 INJECTION INTRAVENOUS at 17:48

## 2022-01-01 RX ADMIN — FENTANYL CITRATE 50 MCG: 50 INJECTION, SOLUTION INTRAMUSCULAR; INTRAVENOUS at 14:15

## 2022-01-01 RX ADMIN — DEXAMETHASONE 0.5 MG: 0.5 TABLET ORAL at 13:19

## 2022-01-01 RX ADMIN — POTASSIUM PHOSPHATE, MONOBASIC POTASSIUM PHOSPHATE, DIBASIC 12 MMOL: 224; 236 INJECTION, SOLUTION, CONCENTRATE INTRAVENOUS at 13:57

## 2022-01-01 RX ADMIN — DEXTROSE, SODIUM CHLORIDE, AND POTASSIUM CHLORIDE 75 ML/HR: 5; .9; .15 INJECTION INTRAVENOUS at 21:17

## 2022-01-01 RX ADMIN — MIDAZOLAM 1 MG: 1 INJECTION INTRAMUSCULAR; INTRAVENOUS at 14:06

## 2022-01-01 RX ADMIN — METOPROLOL TARTRATE 2.5 MG: 5 INJECTION INTRAVENOUS at 00:17

## 2022-01-01 RX ADMIN — POTASSIUM CHLORIDE 40 MEQ: 20 SOLUTION ORAL at 18:45

## 2022-01-01 RX ADMIN — MORPHINE SULFATE 30 MG: 100 SOLUTION ORAL at 22:14

## 2022-01-01 RX ADMIN — ENOXAPARIN SODIUM 40 MG: 40 INJECTION SUBCUTANEOUS at 08:04

## 2022-01-01 RX ADMIN — ONDANSETRON 4 MG: 2 INJECTION INTRAMUSCULAR; INTRAVENOUS at 09:37

## 2022-01-01 RX ADMIN — EPINEPHRINE 1 MG: 0.1 INJECTION, SOLUTION ENDOTRACHEAL; INTRACARDIAC; INTRAVENOUS at 06:13

## 2022-01-01 RX ADMIN — MORPHINE SULFATE 30 MG: 100 SOLUTION ORAL at 21:36

## 2022-01-01 RX ADMIN — FAMOTIDINE 40 MG: 20 TABLET, FILM COATED ORAL at 05:46

## 2022-01-01 RX ADMIN — ONDANSETRON 4 MG: 2 INJECTION INTRAMUSCULAR; INTRAVENOUS at 17:39

## 2022-01-01 RX ADMIN — IRINOTECAN HYDROCHLORIDE 220 MG: 20 INJECTION, SOLUTION INTRAVENOUS at 13:46

## 2022-01-01 RX ADMIN — PANTOPRAZOLE SODIUM 40 MG: 40 INJECTION, POWDER, FOR SOLUTION INTRAVENOUS at 20:22

## 2022-01-01 RX ADMIN — DEXAMETHASONE 0.5 MG: 0.5 TABLET ORAL at 13:45

## 2022-01-01 RX ADMIN — OXALIPLATIN 150 MG: 5 INJECTION, SOLUTION INTRAVENOUS at 11:37

## 2022-01-01 RX ADMIN — METOPROLOL TARTRATE 2.5 MG: 5 INJECTION INTRAVENOUS at 11:25

## 2022-01-01 RX ADMIN — DEXAMETHASONE 0.5 MG: 0.5 TABLET ORAL at 08:16

## 2022-01-01 RX ADMIN — NADOLOL 40 MG: 40 TABLET ORAL at 08:36

## 2022-01-01 RX ADMIN — ONDANSETRON 4 MG: 2 INJECTION INTRAMUSCULAR; INTRAVENOUS at 11:18

## 2022-01-01 RX ADMIN — SODIUM BICARBONATE 50 MEQ: 84 INJECTION INTRAVENOUS at 06:10

## 2022-01-01 RX ADMIN — ENOXAPARIN SODIUM 40 MG: 40 INJECTION SUBCUTANEOUS at 21:01

## 2022-01-01 RX ADMIN — METOCLOPRAMIDE 10 MG: 5 INJECTION, SOLUTION INTRAMUSCULAR; INTRAVENOUS at 23:38

## 2022-01-01 RX ADMIN — FAMOTIDINE 20 MG: 20 TABLET, FILM COATED ORAL at 08:36

## 2022-01-01 RX ADMIN — GLUCAGON HYDROCHLORIDE 1 MG: KIT at 13:37

## 2022-01-01 RX ADMIN — ONDANSETRON 4 MG: 2 INJECTION INTRAMUSCULAR; INTRAVENOUS at 17:16

## 2022-01-01 RX ADMIN — NADOLOL 40 MG: 40 TABLET ORAL at 08:52

## 2022-01-01 RX ADMIN — ENOXAPARIN SODIUM 40 MG: 40 INJECTION SUBCUTANEOUS at 08:34

## 2022-01-01 RX ADMIN — ONDANSETRON 4 MG: 2 INJECTION INTRAMUSCULAR; INTRAVENOUS at 11:25

## 2022-01-01 RX ADMIN — MIDAZOLAM 0.5 MG: 1 INJECTION INTRAMUSCULAR; INTRAVENOUS at 14:32

## 2022-01-01 RX ADMIN — POTASSIUM CHLORIDE AND SODIUM CHLORIDE 75 ML/HR: 900; 300 INJECTION, SOLUTION INTRAVENOUS at 11:18

## 2022-01-01 RX ADMIN — ATROPINE SULFATE 0.25 MG: 1 INJECTION, SOLUTION INTRAMUSCULAR; INTRAVENOUS; SUBCUTANEOUS at 14:30

## 2022-01-01 RX ADMIN — SODIUM CHLORIDE 50 ML/HR: 0.9 INJECTION, SOLUTION INTRAVENOUS at 13:28

## 2022-01-01 RX ADMIN — MORPHINE SULFATE 30 MG: 30 TABLET, EXTENDED RELEASE ORAL at 11:02

## 2022-01-01 RX ADMIN — MORPHINE SULFATE 30 MG: 100 SOLUTION ORAL at 14:31

## 2022-01-01 RX ADMIN — DEXAMETHASONE SODIUM PHOSPHATE 2 MG: 4 INJECTION INTRA-ARTICULAR; INTRALESIONAL; INTRAMUSCULAR; INTRAVENOUS; SOFT TISSUE at 09:41

## 2022-01-01 RX ADMIN — EPINEPHRINE 1 MG: 0.1 INJECTION, SOLUTION ENDOTRACHEAL; INTRACARDIAC; INTRAVENOUS at 06:18

## 2022-01-01 RX ADMIN — PEGFILGRASTIM 6 MG: KIT SUBCUTANEOUS at 14:24

## 2022-01-01 RX ADMIN — DEXAMETHASONE 0.5 MG: 0.5 TABLET ORAL at 09:49

## 2022-01-01 RX ADMIN — DEXAMETHASONE 0.5 MG: 0.5 TABLET ORAL at 14:31

## 2022-01-01 RX ADMIN — TRAMADOL HYDROCHLORIDE 50 MG: 50 TABLET, FILM COATED ORAL at 21:01

## 2022-01-01 RX ADMIN — ONDANSETRON 8 MG: 2 INJECTION INTRAMUSCULAR; INTRAVENOUS at 14:00

## 2022-01-01 RX ADMIN — LEVOTHYROXINE SODIUM 112 MCG: 112 TABLET ORAL at 05:37

## 2022-01-01 RX ADMIN — ONDANSETRON 4 MG: 2 INJECTION INTRAMUSCULAR; INTRAVENOUS at 06:05

## 2022-01-01 RX ADMIN — IRINOTECAN HYDROCHLORIDE 220 MG: 20 INJECTION, SOLUTION INTRAVENOUS at 12:51

## 2022-01-01 RX ADMIN — DEXAMETHASONE 0.5 MG: 0.5 TABLET ORAL at 08:07

## 2022-01-01 RX ADMIN — SODIUM CHLORIDE 1000 ML: 0.9 INJECTION, SOLUTION INTRAVENOUS at 10:34

## 2022-01-01 RX ADMIN — METOCLOPRAMIDE 10 MG: 5 INJECTION, SOLUTION INTRAMUSCULAR; INTRAVENOUS at 18:52

## 2022-01-01 RX ADMIN — FENTANYL CITRATE 50 MCG: 50 INJECTION, SOLUTION INTRAMUSCULAR; INTRAVENOUS at 14:28

## 2022-01-01 RX ADMIN — HEPARIN SODIUM 5000 UNITS: 5000 INJECTION INTRAVENOUS; SUBCUTANEOUS at 21:00

## 2022-01-01 RX ADMIN — NADOLOL 40 MG: 40 TABLET ORAL at 09:26

## 2022-01-01 RX ADMIN — INSULIN LISPRO 1 UNITS: 100 INJECTION, SOLUTION INTRAVENOUS; SUBCUTANEOUS at 12:17

## 2022-01-01 RX ADMIN — POTASSIUM CHLORIDE AND SODIUM CHLORIDE 75 ML/HR: 900; 300 INJECTION, SOLUTION INTRAVENOUS at 09:47

## 2022-01-01 RX ADMIN — SODIUM CHLORIDE 75 ML/HR: 0.9 INJECTION, SOLUTION INTRAVENOUS at 16:58

## 2022-01-01 RX ADMIN — Medication 10 ML: at 14:29

## 2022-01-01 RX ADMIN — HEPARIN SODIUM 5000 UNITS: 5000 INJECTION INTRAVENOUS; SUBCUTANEOUS at 13:20

## 2022-01-01 RX ADMIN — METOPROLOL TARTRATE 2.5 MG: 5 INJECTION INTRAVENOUS at 06:40

## 2022-01-01 RX ADMIN — METOCLOPRAMIDE 10 MG: 5 INJECTION, SOLUTION INTRAMUSCULAR; INTRAVENOUS at 00:09

## 2022-01-01 RX ADMIN — CEFAZOLIN SODIUM 1000 MG: 1 SOLUTION INTRAVENOUS at 13:52

## 2022-01-01 RX ADMIN — ENOXAPARIN SODIUM 40 MG: 40 INJECTION SUBCUTANEOUS at 09:41

## 2022-01-01 RX ADMIN — SODIUM CHLORIDE 75 ML/HR: 0.9 INJECTION, SOLUTION INTRAVENOUS at 22:24

## 2022-01-01 RX ADMIN — DEXAMETHASONE 0.5 MG: 0.5 TABLET ORAL at 13:56

## 2022-01-01 RX ADMIN — METOCLOPRAMIDE 10 MG: 5 INJECTION, SOLUTION INTRAMUSCULAR; INTRAVENOUS at 05:58

## 2022-01-01 RX ADMIN — SODIUM CHLORIDE 75 ML/HR: 0.9 INJECTION, SOLUTION INTRAVENOUS at 18:50

## 2022-01-01 RX ADMIN — POTASSIUM CHLORIDE AND SODIUM CHLORIDE 75 ML/HR: 900; 300 INJECTION, SOLUTION INTRAVENOUS at 09:42

## 2022-01-01 RX ADMIN — MORPHINE SULFATE 30 MG: 30 TABLET, EXTENDED RELEASE ORAL at 21:17

## 2022-01-01 RX ADMIN — MAGNESIUM SULFATE HEPTAHYDRATE 2 G: 40 INJECTION, SOLUTION INTRAVENOUS at 09:33

## 2022-01-01 RX ADMIN — INSULIN LISPRO 2 UNITS: 100 INJECTION, SOLUTION INTRAVENOUS; SUBCUTANEOUS at 21:09

## 2022-01-01 RX ADMIN — MIDAZOLAM 2 MG: 1 INJECTION INTRAMUSCULAR; INTRAVENOUS at 11:09

## 2022-01-01 RX ADMIN — FENTANYL CITRATE 50 MCG: 50 INJECTION, SOLUTION INTRAMUSCULAR; INTRAVENOUS at 14:06

## 2022-01-01 RX ADMIN — METOCLOPRAMIDE 10 MG: 5 INJECTION, SOLUTION INTRAMUSCULAR; INTRAVENOUS at 13:32

## 2022-01-01 RX ADMIN — MORPHINE SULFATE 30 MG: 30 TABLET, EXTENDED RELEASE ORAL at 21:57

## 2022-01-01 RX ADMIN — DEXAMETHASONE SODIUM PHOSPHATE 2 MG: 4 INJECTION INTRA-ARTICULAR; INTRALESIONAL; INTRAMUSCULAR; INTRAVENOUS; SOFT TISSUE at 20:22

## 2022-01-01 RX ADMIN — FENTANYL CITRATE 100 MCG: 50 INJECTION INTRAMUSCULAR; INTRAVENOUS at 11:10

## 2022-01-01 RX ADMIN — ONDANSETRON 4 MG: 2 INJECTION INTRAMUSCULAR; INTRAVENOUS at 08:32

## 2022-01-01 RX ADMIN — ONDANSETRON 4 MG: 2 INJECTION INTRAMUSCULAR; INTRAVENOUS at 16:53

## 2022-01-01 RX ADMIN — OXALIPLATIN 150 MG: 5 INJECTION, SOLUTION INTRAVENOUS at 10:51

## 2022-01-01 RX ADMIN — FOSAPREPITANT 150 MG: 150 INJECTION, POWDER, LYOPHILIZED, FOR SOLUTION INTRAVENOUS at 11:16

## 2022-01-01 RX ADMIN — DEXTROSE, SODIUM CHLORIDE, AND POTASSIUM CHLORIDE 75 ML/HR: 5; .9; .15 INJECTION INTRAVENOUS at 18:58

## 2022-01-01 RX ADMIN — DEXTROSE 20 ML/HR: 5 SOLUTION INTRAVENOUS at 11:36

## 2022-01-01 RX ADMIN — FAMOTIDINE 20 MG: 20 TABLET, FILM COATED ORAL at 13:56

## 2022-01-01 RX ADMIN — FAMOTIDINE 20 MG: 40 POWDER, FOR SUSPENSION ORAL at 09:49

## 2022-01-01 RX ADMIN — METOCLOPRAMIDE 10 MG: 5 INJECTION, SOLUTION INTRAMUSCULAR; INTRAVENOUS at 00:18

## 2022-01-01 RX ADMIN — MORPHINE SULFATE 30 MG: 100 SOLUTION ORAL at 21:38

## 2022-01-01 RX ADMIN — Medication 10 ML: at 11:09

## 2022-01-01 RX ADMIN — FAMOTIDINE 40 MG: 20 TABLET, FILM COATED ORAL at 05:48

## 2022-01-01 RX ADMIN — ONDANSETRON 4 MG: 2 INJECTION INTRAMUSCULAR; INTRAVENOUS at 17:23

## 2022-01-01 RX ADMIN — SODIUM CHLORIDE: 0.9 INJECTION, SOLUTION INTRAVENOUS at 13:18

## 2022-01-01 RX ADMIN — DEXAMETHASONE SODIUM PHOSPHATE: 10 INJECTION, SOLUTION INTRAMUSCULAR; INTRAVENOUS at 11:02

## 2022-01-01 RX ADMIN — MAGNESIUM SULFATE HEPTAHYDRATE 2 G: 40 INJECTION, SOLUTION INTRAVENOUS at 11:26

## 2022-01-01 RX ADMIN — SODIUM CHLORIDE 1000 ML: 0.9 INJECTION, SOLUTION INTRAVENOUS at 02:32

## 2022-01-01 RX ADMIN — LACTOBACILLUS ACIDOPHILUS / LACTOBACILLUS BULGARICUS 1 PACKET: 100 MILLION CFU STRENGTH GRANULES at 13:56

## 2022-01-01 RX ADMIN — FENTANYL CITRATE 25 MCG: 50 INJECTION, SOLUTION INTRAMUSCULAR; INTRAVENOUS at 14:32

## 2022-01-01 RX ADMIN — OXALIPLATIN 141.95 MG: 5 INJECTION, SOLUTION INTRAVENOUS at 12:37

## 2022-01-01 RX ADMIN — ONDANSETRON 4 MG: 2 INJECTION INTRAMUSCULAR; INTRAVENOUS at 08:54

## 2022-01-01 RX ADMIN — SODIUM CHLORIDE 75 ML/HR: 0.9 INJECTION, SOLUTION INTRAVENOUS at 08:17

## 2022-01-01 RX ADMIN — POTASSIUM CHLORIDE AND SODIUM CHLORIDE 75 ML/HR: 900; 300 INJECTION, SOLUTION INTRAVENOUS at 00:20

## 2022-01-01 RX ADMIN — LEVOTHYROXINE SODIUM 112 MCG: 112 TABLET ORAL at 05:48

## 2022-01-01 RX ADMIN — POTASSIUM CHLORIDE AND SODIUM CHLORIDE 100 ML/HR: 900; 300 INJECTION, SOLUTION INTRAVENOUS at 05:29

## 2022-01-01 RX ADMIN — LEVOTHYROXINE SODIUM 112 MCG: 112 TABLET ORAL at 06:21

## 2022-01-01 RX ADMIN — DEXAMETHASONE 0.5 MG: 0.5 TABLET ORAL at 08:37

## 2022-01-01 RX ADMIN — MORPHINE SULFATE 30 MG: 30 TABLET, EXTENDED RELEASE ORAL at 22:02

## 2022-01-01 RX ADMIN — FAMOTIDINE 20 MG: 20 TABLET, FILM COATED ORAL at 17:39

## 2022-01-01 RX ADMIN — INSULIN LISPRO 1 UNITS: 100 INJECTION, SOLUTION INTRAVENOUS; SUBCUTANEOUS at 17:28

## 2022-01-03 PROBLEM — Z20.822 CLOSE EXPOSURE TO COVID-19 VIRUS: Status: RESOLVED | Noted: 2021-01-01 | Resolved: 2022-01-01

## 2022-01-03 PROBLEM — Z86.16 HISTORY OF 2019 NOVEL CORONAVIRUS DISEASE (COVID-19): Status: ACTIVE | Noted: 2022-01-01

## 2022-01-03 NOTE — PATIENT INSTRUCTIONS
COVID-19 and Chronic Health Conditions   WHAT YOU NEED TO KNOW:   Your chronic condition may increase your risk for COVID-19 or serious problems it can cause  Healthcare providers might need to make changes that affect how you usually manage your chronic health condition  Providers may change hours of operation or not have patients come in to be seen  You may not be able to make appointments to get blood drawn or to have tests or procedures  This may continue until the virus that causes COVID-19 is controlled  Until then, you can take steps to manage your condition  The steps will also lower your risk for COVID-19 or the serious problems it causes  If you do develop COVID-19, healthcare providers will tell you when it is okay to be around others after you recover  This depends on your chronic condition, any symptoms of COVID-19 that developed, and how severe the symptoms were  DISCHARGE INSTRUCTIONS:   If you think you may be infected with the coronavirus,  do the following to protect others:  · If emergency care is needed,  tell the  about the possible infection, or call ahead and tell the emergency department  · Call a healthcare provider  for instructions if symptoms are mild  Do not  arrive without calling first  Your provider will need to protect staff members and other patients  · Cover your mouth and nose  while you are getting medical care  This will help lower the risk of infecting others  Call your local emergency number [phone] in the [address] [place] [address],[address]) or an emergency department if:   · You have trouble breathing or shortness of breath  · You have chest pain or pressure that lasts longer than 5 minutes  · You become confused or hard to wake  · Your lips or face are blue  · You have a fever of 104°F (40°C) or higher  Call your doctor or healthcare provider if:   · You do not  have symptoms of COVID-19 but had close physical contact within 14 days with someone who tested positive      · You have questions or concerns about your COVID-19 or your chronic condition  What you need to know about serious problems from the virus:  Serious health problems may improve or continue for weeks or months, and possibly years  Health problems that continue may be called long COVID  · The virus can affect many parts of the body  Information is still being learned  You may develop these or other health problems:    ? Serious lower respiratory conditions, such as pneumonia or acute respiratory distress syndrome (ARDS)    ? Blood vessel damage, leading to blood clots    ? Organ damage from a lack of oxygen or from blood clots    ? Sleep problems    ? Problems thinking clearly, remembering information, or concentrating    ? Mood changes, depression, or anxiety    · Anyone can develop serious problems from the virus,  but some health conditions increase the risk  Healthcare professionals are still learning how the virus affects a person who has a chronic health condition  Protect yourself from infection, even if your chronic condition is not listed below  More is being learned about the virus every day  Health conditions known to increase the risk for COVID-19 or its serious complications include the following:    ? Obesity, diabetes, cancer, Down syndrome, or a liver disease    ? Kidney failure, chronic kidney disease, or sickle cell disease    ? Lung disease, COPD, moderate-to-severe asthma, cystic fibrosis, or pulmonary fibrosis (scarring in your lungs)    ? A severe heart disease, such as coronary artery disease or heart failure    ? A brain blood vessel disorder or disease, or a condition such as dementia    ? Hypertension (or high blood pressure), or thalassemia    ? An immune system problem, HIV or AIDS, or a blood, bone marrow, or organ transplant    · Other factors that increase your risk  are age 72 or older or living in a long-term care facility   Pregnancy, cigarette smoking, or long-term corticosteroid use can also increase your risk  How the 2019 coronavirus spreads: The virus spreads quickly and easily  The virus can be passed starting 2 days before symptoms begin or before a positive test if symptoms never begin  The following are ways the virus is thought to spread, but more information may be coming:  · Droplets are the main way all coronaviruses spread  The virus travels in droplets that form when a person talks, coughs, or sneezes  The droplets can also float in the air for minutes or hours  Infection happens when you breathe in the droplets or get them in your eyes or nose  Close personal contact with an infected person increases your risk for infection  This means being within 6 feet (2 meters) of the person for at least 15 minutes over 24 hours  · Person-to-person contact can spread the virus  For example, a person with the virus on his or her hands can spread it by shaking hands with someone  · The virus can stay on objects and surfaces for a short time  You may become infected by touching the object or surface and then touching your eyes or mouth  · An infected animal may be able to infect a person who touches it  This may happen at live markets or on a farm  Manage your chronic health condition during this time:  If you do not have a regular healthcare provider, experts recommend you contact a local community health center or health department  The following can help you manage your condition and prevent COVID-19:  · Get emergency care for your condition if needed  Talk to your healthcare providers about symptoms of your chronic condition that need immediate care  Your providers can help you create a plan or add exacerbation management to your plan  The plan will include when to go to an emergency department and when to call your local emergency number  This will depend on where you live and the services that are available during this time      · Go to dialysis appointments as scheduled  It is important to stay on schedule  You will need to have enough food to be able to follow the emergency diet plan if you must miss a session  The emergency diet needs to be part of the management plan for your condition  · Reschedule any upcoming appointments as needed  Medical facilities may be closed until the coronavirus is better controlled  This means you may need to reschedule a surgery, procedure, or check-up appointment  If you cannot have a phone or video appointment, you will need to make a new appointment  Some providers may be scheduling appointments several months in advance  Some surgeries and procedures will happen as scheduled  This depends on the medical condition and the reason for the surgery or procedure  You may need to have extra testing for COVID-19 several days before  · Follow any regular management plan you use  Your healthcare provider will tell you if you need to make any changes to your regular management plan  For example, if you have asthma, continue to follow your asthma action plan  If you have diabetes, you may need to check your blood sugar level more often  Stress and illness can make blood sugar levels go up  You may need to adjust medicine such as insulin  If you have a heart condition or high blood pressure, you may need to check your blood pressure more often  Stress and illness can also raise your blood pressure  · Talk to your healthcare providers about your medicines  You may be able to get more than 1 month of medicine at a time  This will lower the number of times you need to go to a pharmacy to get your medicines  Make sure you have enough medicine if you have a condition that can lead to an emergency  Examples include asthma medicines, insulin, or an epinephrine pen  Check the expiration dates on the medicines you currently have  Ask for refills as soon as possible, if needed   If it is not time to refill prescriptions, you may be able to get an emergency supply of some medicines  Medicine plans vary, so ask your healthcare provider or pharmacist for options  · Have supplies available in your home  If possible, get extra supplies you use regularly  Examples include absorbent pads, syringes, and wound cleaning solutions  This will limit the number of trips out of your home to get supplies  · Know the signs and symptoms of COVID-19  Signs and symptoms usually start about 5 days after infection but can take 2 to 14 days  Signs and symptoms range from mild to severe  You may feel like you have the flu or a bad cold  Your chronic health condition may cause some of the same symptoms COVID-19 causes  This can make it hard to know if a symptom is from COVID-19 or your chronic condition  Keep a record of any new or worsening symptom you have  This is especially important if you have a condition that often causes shortness of breath  Your provider can tell you if you should be tested for COVID-19  Information is still being learned  Tell your healthcare provider if you think you were infected but develop signs or symptoms not listed below:    ? A cough    ? Shortness of breath or trouble breathing that may become severe    ? A fever of at least 100 4°F, or 38°C (may be lower in adults 65 or older)    ? Chills that might include shaking    ? Muscle pain, body aches, or a headache    ? A sore throat    ? Suddenly not being able to taste or smell anything    ? Feeling very tired (fatigue)    ? Congestion (stuffy head and nose), or a runny nose    ? Diarrhea, nausea, or vomiting    What you can do to prevent having to go out of your home during this time:   · Ask your healthcare provider for other ways to have appointments  Some providers offer phone, video, or other types of appointments  · Have food, medicines, and other supplies delivered  Some pharmacies can send certain medicines to you through the mail   Grocery stores and restaurants may be able to deliver food and other items  If possible, have delivered items placed somewhere  Try not to have someone hand you an item  You will be so close to the person that the virus can spread between you  · Ask someone to get items you need  The person can get groceries, medicines, or other needed items for you  Choose a person who does not have signs or symptoms of COVID-19 or has tested negative for it  The person should not be waiting for test results  He or she should not have a condition that increases the risk for COVID-19 or serious problems it causes  What you need to know about COVID-19 vaccines: Your healthcare provider can give you more information about what to expect, depending on your chronic condition  · COVID-19 vaccines are given as a shot in 1 or 2 doses  A 2-dose vaccine is fully approved for use in those 16 years or older  Other vaccines have emergency use authorization (EUA)  An EUA means the vaccine is not approved but is given because the benefits outweigh the risks  Your healthcare provider can help you understand the benefits and risks  · A third dose is recommended for adults with a weakened immune system who get a 2-dose vaccine  The third dose is given at least 28 days after the second  · Even after you get the vaccine, continue social distancing and other measures  Experts are still learning how well the vaccines work to prevent infection, transmission, and severe illness  Although rare, you can become infected after you get the vaccine  You may also be able to pass the virus to others without knowing you are infected  · After you get the vaccine, check local, national, and international travel rules  Check to see if you need to be tested before you travel  You may also need to quarantine after you return  Some countries require proof of a negative test before you leave  You should also be tested 3 to 5 days after you return from another country      Lower your risk for COVID-19:  The best way to prevent infection is to avoid anyone who is infected, but this can be hard to do  An infected person can spread the virus before signs or symptoms begin, or even if signs or symptoms never develop  The following are ways to prevent the spread of the virus and lower your risk for COVID-19:      · Wash your hands often throughout the day  Use soap and water  Rub your soapy hands together, lacing your fingers  Wash the front and back of each hand, and in between your fingers  Use the fingers of one hand to scrub under the fingernails of the other hand  Wash for at least 20 seconds  Rinse with warm, running water for several seconds  Then dry your hands with a clean towel or paper towel  Use hand  that contains alcohol if soap and water are not available  Do not touch your eyes, nose, or mouth without washing your hands first  Teach children how to wash their hands  · Cover sneezes and coughs  Turn your face away and cover your mouth and nose with a tissue  Throw the tissue away  Use the bend of your arm if a tissue is not available  Then wash your hands well with soap and water or use hand   Turn your head and cover your face if someone near you is coughing or sneezing  Teach children how to cover a cough or sneeze  Remind them to wash their hands  · Make a habit of not touching your face  If you get the virus on your hands, you can transfer it to your eyes, nose, or mouth and become infected  · Wear a face covering (mask) around anyone who does not live in your home  A covering helps protect the person wearing it from being infected or passing the virus to others  Use a cloth covering with at least 2 layers  You can also create layers by putting a cloth face covering over a disposable non-medical mask  Cover your mouth and your nose  The covering should fit snugly against the bridge of your nose   Securely fasten it under your chin and on the sides of your face  Do not use coverings on children younger than 2 years or on anyone who has breathing problems or cannot remove it  Your healthcare provider can tell you what to do if you cannot wear a face covering  · Clean and disinfect high-touch surfaces and objects in your home often  Some surfaces and objects may need to be cleaned several times each day, depending on how often they are used  Use disinfecting wipes, or make a solution by mixing 4 teaspoons of bleach with 1 quart (4 cups) of water  Do not  use any cleaning or disinfecting products that can trigger an asthma attack or other breathing problems  Open windows or have circulating air as you clean  Do not  mix ammonia with bleach  This will create toxic fumes  How to follow social distancing guidelines:  Social distancing means people avoid close personal contact so the virus cannot spread from one person to another  Close personal contact means being within 6 feet (2 meters) of someone for at least 15 minutes over 24 hours  National and local social distancing rules vary  Rules may change over time as restrictions are lifted, but they may return if an outbreak happens where you live  It is important to know and follow all current social distancing rules in your area  The following are general guidelines:  · Stay home if you are sick or think you may have COVID-19  It is important to stay home if you are waiting for a testing appointment or for test results  Even if you do not have symptoms, you can pass the virus to others  · Limit trips out of your home  Plan your route so you make the fewest stops possible to limit contact  Keep track of places you go  This will help contact tracers notify others if you become infected  Wear a face covering while you are out  You will need to wear the covering on mass transit, such as a bus or the subway  You will also need to wear it while you travel on an airplane      · Stay at least 6 feet (2 meters) away from anyone who does not live in your home  Do not shake hands with, hug, or kiss a person as a greeting  Stand or walk as far from others as possible, especially around anyone who is sneezing or coughing  If you must use public transportation (such as a bus or subway), try to sit or stand away from others  Check in on anyone who lives alone  · Do not have anyone over to your home unless it is necessary  It is okay to have medical workers or other helpers in to provide care  Wear a face covering, and remind others to wear a mask or face covering  Remind them to wash their hands when they arrive and before they leave  Do not  have anyone over just to visit, even if you both do not feel sick  The virus can pass from one of you to the other before symptoms of COVID-19 begin  Some people never even develop symptoms  It is important that you continue social distancing with everyone, including children  It may be hard to tell a child not to hug or kiss you  Explain that this is how he or she can help you stay healthy  · Do not go to someone else's home unless it is necessary  Do not go over to visit, even if the person is lonely  Only go if you need to help him or her  · Avoid in-person gatherings and crowds  Gatherings or crowds of 10 or more individuals can cause the virus to spread  Avoid places such as masy, beaches, sporting events, and tourist attractions  For events such as parties, holiday meals, [employer]reg[employer] services, and conferences, attend virtually (on a computer), if possible  · Stay safe if you must go out to work  Keep physical distance between you and other workers as much as possible  Follow your employer's rules so everyone stays safe  Help strengthen your immune system:   · Ask about other vaccines you may need  Get the influenza (flu) vaccine as soon as recommended each year, usually starting in September or October  Get the pneumonia vaccine if recommended   Your healthcare provider can tell you if you should also get other vaccines, and when to get them  · Do not smoke  Nicotine and other chemicals in cigarettes and cigars can increase your risk for infection and for serious COVID-19 effects  Ask your healthcare provider for information if you currently smoke and need help to quit  E-cigarettes or smokeless tobacco still contain nicotine  Talk to your healthcare provider before you use these products  · Eat a variety of healthy foods  Examples include vegetables, fruits, whole-grain breads and cereals, lean meats and poultry, fish, low-fat dairy products, and cooked beans  Healthy foods contain nutrients that help keep your immune system strong  · Find ways to manage stress  You may be feeling more stressed than usual because of the COVID-19 outbreak  The situation is very stressful to many people  Talk to your healthcare providers about ways to manage stress during this time  Stress can lead to breathing problems or make the problems worse  Stress can trigger an attack or exacerbation of many health conditions  It is important to do things that help you feel more relaxed, such as the following:     ? Pick 1 or 2 times a day to watch the news  Constant news watching can increase your stress levels  ? Talk to a friend on the phone or through a video chat  ? Take a warm, soothing bath  ? Listen to music  ? Exercise can also help relieve stress  This may be hard if your regular gym or outdoor exercise area is closed  If you do not have exercise equipment at home, try walking inside your home  You can walk quickly or turn on music and dance  Follow up with your doctor or healthcare provider as directed: Your providers will tell you when you can come in for tests, procedures, or check-ups  Bring your symptom record with you to all appointments  Write down your questions so you remember to ask them during your visits    For more information:   · Centers for Disease Control and Prevention  [address] [facility] [address] [place] , [address]  Phone: 7- 780 - 2638669  Phone: 3- 622 - 9317273  Web Address: [employer]    © [address] [place] [address] 2021 Information is for End User's use only and may not be sold, redistributed or otherwise used for commercial purposes  All illustrations and images included in CareNotes® are the copyrighted property of [employer]  or [facility] Nimisha Rucker   The above information is an  only  It is not intended as medical advice for individual conditions or treatments  Talk to your doctor, nurse or pharmacist before following any medical regimen to see if it is safe and effective for you

## 2022-01-03 NOTE — ASSESSMENT & PLAN NOTE
Lab Results   Component Value Date    HGBA1C 6 3 09/10/2021     Well controlled, continue Amaryl 2mg daily   Recheck A1c next visit

## 2022-01-03 NOTE — ASSESSMENT & PLAN NOTE
Tested positive for COVID on 12/17/21   Had mild course, did not require O2 or hospitalization  Overall back to baseline aside from mild dyspnea with exertion, improves with use of albuterol inhaler

## 2022-01-03 NOTE — ASSESSMENT & PLAN NOTE
BP slightly above goal today 140/90  Continue with current medication at this time (lisinopril 40 mg daily and nadolol 40 mg daily) and incorporate diet/ lifestyle modifications

## 2022-01-03 NOTE — ASSESSMENT & PLAN NOTE
Lab Results   Component Value Date    [id number] 1 969 07/06/2021     Stable, continue current dose levothyroxine 112 mcg daily

## 2022-01-03 NOTE — PROGRESS NOTES
Assessment/Plan:    Diabetes mellitus, type II ([facility] [address] )    Lab Results   Component Value Date    HGBA1C 6 3 09/10/2021     Well controlled, continue Amaryl 2mg daily   Recheck A1c next visit     Hypothyroidism  Lab Results   Component Value Date    [id number] 1 969 07/06/2021     Stable, continue current dose levothyroxine 112 mcg daily       Hypertension  BP slightly above goal today 140/90  Continue with current medication at this time (lisinopril 40 mg daily and nadolol 40 mg daily) and incorporate diet/ lifestyle modifications     COVID-19  Tested positive for COVID on 12/17/21   Had mild course, did not require O2 or hospitalization  Overall back to baseline aside from mild dyspnea with exertion, improves with use of albuterol inhaler      Galina Kelly was seen today for follow-up  Diagnoses and all orders for this visit:    Primary hypertension    History of 2019 novel coronavirus disease (COVID-19)    Gastroesophageal reflux disease with esophagitis without hemorrhage  -     famotidine (PEPCID) 40 MG tablet; Take 1 tablet (40 mg total) by mouth daily    Pain of upper abdomen  -     US abdomen limited; Future    Type 2 diabetes mellitus without complication, without long-term current use of insulin (HCC)    Hypothyroidism, unspecified type        Return in about 4 weeks (around 1/31/2022) for abdominal pain   Patient Instructions     COVID-19 and Chronic Health Conditions   WHAT YOU NEED TO KNOW:   Your chronic condition may increase your risk for COVID-19 or serious problems it can cause  Healthcare providers might need to make changes that affect how you usually manage your chronic health condition  Providers may change hours of operation or not have patients come in to be seen  You may not be able to make appointments to get blood drawn or to have tests or procedures  This may continue until the virus that causes COVID-19 is controlled  Until then, you can take steps to manage your condition   The steps will also lower your risk for COVID-19 or the serious problems it causes  If you do develop COVID-19, healthcare providers will tell you when it is okay to be around others after you recover  This depends on your chronic condition, any symptoms of COVID-19 that developed, and how severe the symptoms were  DISCHARGE INSTRUCTIONS:   If you think you may be infected with the coronavirus,  do the following to protect others:  · If emergency care is needed,  tell the  about the possible infection, or call ahead and tell the emergency department  · Call a healthcare provider  for instructions if symptoms are mild  Do not  arrive without calling first  Your provider will need to protect staff members and other patients  · Cover your mouth and nose  while you are getting medical care  This will help lower the risk of infecting others  Call your local emergency number [phone] in the [address] [place] [address],[address]) or an emergency department if:   · You have trouble breathing or shortness of breath  · You have chest pain or pressure that lasts longer than 5 minutes  · You become confused or hard to wake  · Your lips or face are blue  · You have a fever of 104°F (40°C) or higher  Call your doctor or healthcare provider if:   · You do not  have symptoms of COVID-19 but had close physical contact within 14 days with someone who tested positive  · You have questions or concerns about your COVID-19 or your chronic condition  What you need to know about serious problems from the virus:  Serious health problems may improve or continue for weeks or months, and possibly years  Health problems that continue may be called long COVID  · The virus can affect many parts of the body  Information is still being learned  You may develop these or other health problems:    ? Serious lower respiratory conditions, such as pneumonia or acute respiratory distress syndrome (ARDS)    ? Blood vessel damage, leading to blood clots    ?  Organ damage from a lack of oxygen or from blood clots    ? Sleep problems    ? Problems thinking clearly, remembering information, or concentrating    ? Mood changes, depression, or anxiety    · Anyone can develop serious problems from the virus,  but some health conditions increase the risk  Healthcare professionals are still learning how the virus affects a person who has a chronic health condition  Protect yourself from infection, even if your chronic condition is not listed below  More is being learned about the virus every day  Health conditions known to increase the risk for COVID-19 or its serious complications include the following:    ? Obesity, diabetes, cancer, Down syndrome, or a liver disease    ? Kidney failure, chronic kidney disease, or sickle cell disease    ? Lung disease, COPD, moderate-to-severe asthma, cystic fibrosis, or pulmonary fibrosis (scarring in your lungs)    ? A severe heart disease, such as coronary artery disease or heart failure    ? A brain blood vessel disorder or disease, or a condition such as dementia    ? Hypertension (or high blood pressure), or thalassemia    ? An immune system problem, HIV or AIDS, or a blood, bone marrow, or organ transplant    · Other factors that increase your risk  are age 72 or older or living in a long-term care facility  Pregnancy, cigarette smoking, or long-term corticosteroid use can also increase your risk  How the 2019 coronavirus spreads: The virus spreads quickly and easily  The virus can be passed starting 2 days before symptoms begin or before a positive test if symptoms never begin  The following are ways the virus is thought to spread, but more information may be coming:  · Droplets are the main way all coronaviruses spread  The virus travels in droplets that form when a person talks, coughs, or sneezes  The droplets can also float in the air for minutes or hours   Infection happens when you breathe in the droplets or get them in your eyes or nose  Close personal contact with an infected person increases your risk for infection  This means being within 6 feet (2 meters) of the person for at least 15 minutes over 24 hours  · Person-to-person contact can spread the virus  For example, a person with the virus on his or her hands can spread it by shaking hands with someone  · The virus can stay on objects and surfaces for a short time  You may become infected by touching the object or surface and then touching your eyes or mouth  · An infected animal may be able to infect a person who touches it  This may happen at live markets or on a farm  Manage your chronic health condition during this time:  If you do not have a regular healthcare provider, experts recommend you contact a local [facility] health center or health department  The following can help you manage your condition and prevent COVID-19:  · Get emergency care for your condition if needed  Talk to your healthcare providers about symptoms of your chronic condition that need immediate care  Your providers can help you create a plan or add exacerbation management to your plan  The plan will include when to go to an emergency department and when to call your local emergency number  This will depend on where you live and the services that are available during this time  · Go to dialysis appointments as scheduled  It is important to stay on schedule  You will need to have enough food to be able to follow the emergency diet plan if you must miss a session  The emergency diet needs to be part of the management plan for your condition  · Reschedule any upcoming appointments as needed  Medical facilities may be closed until the coronavirus is better controlled  This means you may need to reschedule a surgery, procedure, or check-up appointment  If you cannot have a phone or video appointment, you will need to make a new appointment   Some providers may be scheduling appointments several months in advance  Some surgeries and procedures will happen as scheduled  This depends on the medical condition and the reason for the surgery or procedure  You may need to have extra testing for COVID-19 several days before  · Follow any regular management plan you use  Your healthcare provider will tell you if you need to make any changes to your regular management plan  For example, if you have asthma, continue to follow your asthma action plan  If you have diabetes, you may need to check your blood sugar level more often  Stress and illness can make blood sugar levels go up  You may need to adjust medicine such as insulin  If you have a heart condition or high blood pressure, you may need to check your blood pressure more often  Stress and illness can also raise your blood pressure  · Talk to your healthcare providers about your medicines  You may be able to get more than 1 month of medicine at a time  This will lower the number of times you need to go to a pharmacy to get your medicines  Make sure you have enough medicine if you have a condition that can lead to an emergency  Examples include asthma medicines, insulin, or an epinephrine pen  Check the expiration dates on the medicines you currently have  Ask for refills as soon as possible, if needed  If it is not time to refill prescriptions, you may be able to get an emergency supply of some medicines  Medicine plans vary, so ask your healthcare provider or pharmacist for options  · Have supplies available in your home  If possible, get extra supplies you use regularly  Examples include absorbent pads, syringes, and wound cleaning solutions  This will limit the number of trips out of your home to get supplies  · Know the signs and symptoms of COVID-19  Signs and symptoms usually start about 5 days after infection but can take 2 to 14 days  Signs and symptoms range from mild to severe  You may feel like you have the flu or a bad cold  Your chronic health condition may cause some of the same symptoms COVID-19 causes  This can make it hard to know if a symptom is from COVID-19 or your chronic condition  Keep a record of any new or worsening symptom you have  This is especially important if you have a condition that often causes shortness of breath  Your provider can tell you if you should be tested for COVID-19  Information is still being learned  Tell your healthcare provider if you think you were infected but develop signs or symptoms not listed below:    ? A cough    ? Shortness of breath or trouble breathing that may become severe    ? A fever of at least 100 4°F, or 38°C (may be lower in adults 65 or older)    ? Chills that might include shaking    ? Muscle pain, body aches, or a headache    ? A sore throat    ? Suddenly not being able to taste or smell anything    ? Feeling very tired (fatigue)    ? Congestion (stuffy head and nose), or a runny nose    ? Diarrhea, nausea, or vomiting    What you can do to prevent having to go out of your home during this time:   · Ask your healthcare provider for other ways to have appointments  Some providers offer phone, video, or other types of appointments  · Have food, medicines, and other supplies delivered  Some pharmacies can send certain medicines to you through the mail  Grocery stores and restaurants may be able to deliver food and other items  If possible, have delivered items placed somewhere  Try not to have someone hand you an item  You will be so close to the person that the virus can spread between you  · Ask someone to get items you need  The person can get groceries, medicines, or other needed items for you  Choose a person who does not have signs or symptoms of COVID-19 or has tested negative for it  The person should not be waiting for test results  He or she should not have a condition that increases the risk for COVID-19 or serious problems it causes      What you need to know about COVID-19 vaccines: Your healthcare provider can give you more information about what to expect, depending on your chronic condition  · COVID-19 vaccines are given as a shot in 1 or 2 doses  A 2-dose vaccine is fully approved for use in those 16 years or older  Other vaccines have emergency use authorization (EUA)  An EUA means the vaccine is not approved but is given because the benefits outweigh the risks  Your healthcare provider can help you understand the benefits and risks  · A third dose is recommended for adults with a weakened immune system who get a 2-dose vaccine  The third dose is given at least 28 days after the second  · Even after you get the vaccine, continue social distancing and other measures  Experts are still learning how well the vaccines work to prevent infection, transmission, and severe illness  Although rare, you can become infected after you get the vaccine  You may also be able to pass the virus to others without knowing you are infected  · After you get the vaccine, check local, national, and international travel rules  Check to see if you need to be tested before you travel  You may also need to quarantine after you return  Some countries require proof of a negative test before you leave  You should also be tested 3 to 5 days after you return from another country  Lower your risk for COVID-19:  The best way to prevent infection is to avoid anyone who is infected, but this can be hard to do  An infected person can spread the virus before signs or symptoms begin, or even if signs or symptoms never develop  The following are ways to prevent the spread of the virus and lower your risk for COVID-19:      · Wash your hands often throughout the day  Use soap and water  Rub your soapy hands together, lacing your fingers  Wash the front and back of each hand, and in between your fingers  Use the fingers of one hand to scrub under the fingernails of the other hand   Wash for at least 20 seconds  Rinse with warm, running water for several seconds  Then dry your hands with a clean towel or paper towel  Use hand  that contains alcohol if soap and water are not available  Do not touch your eyes, nose, or mouth without washing your hands first  Teach children how to wash their hands  · Cover sneezes and coughs  Turn your face away and cover your mouth and nose with a tissue  Throw the tissue away  Use the bend of your arm if a tissue is not available  Then wash your hands well with soap and water or use hand   Turn your head and cover your face if someone near you is coughing or sneezing  Teach children how to cover a cough or sneeze  Remind them to wash their hands  · Make a habit of not touching your face  If you get the virus on your hands, you can transfer it to your eyes, nose, or mouth and become infected  · Wear a face covering (mask) around anyone who does not live in your home  A covering helps protect the person wearing it from being infected or passing the virus to others  Use a cloth covering with at least 2 layers  You can also create layers by putting a cloth face covering over a disposable non-medical mask  Cover your mouth and your nose  The covering should fit snugly against the bridge of your nose  Securely fasten it under your chin and on the sides of your face  Do not use coverings on children younger than 2 years or on anyone who has breathing problems or cannot remove it  Your healthcare provider can tell you what to do if you cannot wear a face covering  · Clean and disinfect high-touch surfaces and objects in your home often  Some surfaces and objects may need to be cleaned several times each day, depending on how often they are used  Use disinfecting wipes, or make a solution by mixing 4 teaspoons of bleach with 1 quart (4 cups) of water   Do not  use any cleaning or disinfecting products that can trigger an asthma attack or other breathing problems  Open windows or have circulating air as you clean  Do not  mix ammonia with bleach  This will create toxic fumes  How to follow social distancing guidelines:  Social distancing means people avoid close personal contact so the virus cannot spread from one person to another  Close personal contact means being within 6 feet (2 meters) of someone for at least 15 minutes over 24 hours  National and local social distancing rules vary  Rules may change over time as restrictions are lifted, but they may return if an outbreak happens where you live  It is important to know and follow all current social distancing rules in your area  The following are general guidelines:  · Stay home if you are sick or think you may have COVID-19  It is important to stay home if you are waiting for a testing appointment or for test results  Even if you do not have symptoms, you can pass the virus to others  · Limit trips out of your home  Plan your route so you make the fewest stops possible to limit contact  Keep track of places you go  This will help contact tracers notify others if you become infected  Wear a face covering while you are out  You will need to wear the covering on mass transit, such as a bus or the subway  You will also need to wear it while you travel on an airplane  · Stay at least 6 feet (2 meters) away from anyone who does not live in your home  Do not shake hands with, hug, or kiss a person as a greeting  Stand or walk as far from others as possible, especially around anyone who is sneezing or coughing  If you must use public transportation (such as a bus or subway), try to sit or stand away from others  Check in on anyone who lives alone  · Do not have anyone over to your home unless it is necessary  It is okay to have medical workers or other helpers in to provide care  Wear a face covering, and remind others to wear a mask or face covering   Remind them to wash their hands when they arrive and before they leave  Do not  have anyone over just to visit, even if you both do not feel sick  The virus can pass from one of you to the other before symptoms of COVID-19 begin  Some people never even develop symptoms  It is important that you continue social distancing with everyone, including children  It may be hard to tell a child not to hug or kiss you  Explain that this is how he or she can help you stay healthy  · Do not go to someone else's home unless it is necessary  Do not go over to visit, even if the person is lonely  Only go if you need to help him or her  · Avoid in-person gatherings and crowds  Gatherings or crowds of 10 or more individuals can cause the virus to spread  Avoid places such as [place], beaches, sporting events, and tourist attractions  For events such as parties, holiday meals, [employer] services, and conferences, attend virtually (on a computer), if possible  · Stay safe if you must go out to work  Keep physical distance between you and other workers as much as possible  Follow your employer's rules so everyone stays safe  Help strengthen your immune system:   · Ask about other vaccines you may need  Get the influenza (flu) vaccine as soon as recommended each year, usually starting in September or October  Get the pneumonia vaccine if recommended  Your healthcare provider can tell you if you should also get other vaccines, and when to get them  · Do not smoke  Nicotine and other chemicals in cigarettes and cigars can increase your risk for infection and for serious COVID-19 effects  Ask your healthcare provider for information if you currently smoke and need help to quit  E-cigarettes or smokeless tobacco still contain nicotine  Talk to your healthcare provider before you use these products  · Eat a variety of healthy foods    Examples include vegetables, fruits, whole-grain breads and cereals, lean meats and poultry, fish, low-fat dairy products, and cooked beans  Healthy foods contain nutrients that help keep your immune system strong  · Find ways to manage stress  You may be feeling more stressed than usual because of the COVID-19 outbreak  The situation is very stressful to many people  Talk to your healthcare providers about ways to manage stress during this time  Stress can lead to breathing problems or make the problems worse  Stress can trigger an attack or exacerbation of many health conditions  It is important to do things that help you feel more relaxed, such as the following:     ? Pick 1 or 2 times a day to watch the news  Constant news watching can increase your stress levels  ? Talk to a friend on the phone or through a video chat  ? Take a warm, soothing bath  ? Listen to music  ? Exercise can also help relieve stress  This may be hard if your regular gym or outdoor exercise area is closed  If you do not have exercise equipment at home, try walking inside your home  You can walk quickly or turn on music and dance  Follow up with your doctor or healthcare provider as directed: Your providers will tell you when you can come in for tests, procedures, or check-ups  Bring your symptom record with you to all appointments  Write down your questions so you remember to ask them during your visits  For more information:   · Centers for Disease Control and Prevention  [address] , [address]  Phone: 5- 201 - [phone]  Phone: 7- 549 - [phone]  Web Address: [employer]     © [address] [place] [address] 2021 Information is for End User's use only and may not be sold, redistributed or otherwise used for commercial purposes  All illustrations and images included in CareNotes® are the copyrighted property of A D A M , Inc  or [facility] Nimisha Rucker   The above information is an  only  It is not intended as medical advice for individual conditions or treatments   Talk to your doctor, nurse or pharmacist before following any medical regimen to see if it is safe and effective for you  Subjective:     Lis Vergara is a 77 y o  male who  has a past medical history of Cardiac disorder, Chronic hepatitis C virus infection ([facility] [address] ), Diabetes mellitus ([facility] [address] ), Dysphagia, Esophageal varices ([facility] [address] ), Hepatic disease, Hepatitis, History of chemotherapy, History of radiation therapy, Hypertension, Laryngeal cancer ([facility] [address] ), Liver cancer ([facility] [address] ), 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[facility] [address] ), Recurrent hepatitis C ([facility] [address] ), and Rheumatic fever  He has no past medical history of Mental disorder  who presented to the office today for follow up  Patient was dx with COVID on 12/17  Reports that he had decreased appetite and lost about 10 pounds in the last several weeks  He overall feels better except that he does still experience shortness of breath with exertion , this improves with rescue inhaler  He does report some tenderness of the upper abdomen where he previously had a hernia repair      The following portions of the patient's history were reviewed and updated as appropriate: allergies, current medications, past family history, past medical history, past social history, past surgical history and problem list     Current Outpatient Medications on File Prior to Visit   Medication Sig Dispense Refill    acyclovir (ZOVIRAX) 400 MG tablet Take 1 tablet (400 mg total) by mouth 3 (three) times a day for 10 days 30 tablet 0    albuterol (Ventolin HFA) 90 mcg/act inhaler Inhale 2 puffs every 6 (six) hours as needed for wheezing 18 g 5    Diclofenac Sodium (VOLTAREN) 1 % Apply 2 g topically 4 (four) times a day 100 g 2    glimepiride (AMARYL) 2 mg tablet Take 1 tablet (2 mg total) by mouth daily 90 tablet 1    ibuprofen (MOTRIN) 600 mg tablet Take 1 tablet (600 mg total) by mouth every 6 (six) hours as needed for mild pain for up to 14 days 30 tablet 0    levothyroxine 112 mcg tablet Take 1 tablet (112 mcg total) by mouth daily 90 tablet 1    lidocaine (Lidoderm) 5 % Apply 1 patch topically daily Remove & Discard patch within 12 hours or as directed by MD 15 patch 0    lisinopril (ZESTRIL) 40 mg tablet Take 1 tablet (40 mg total) by mouth daily 90 tablet 1    methylPREDNISolone 4 MG tablet therapy pack Use as directed on package 21 each 0    nadolol (CORGARD) 40 mg tablet Take 1 tablet (40 mg total) by mouth daily 90 tablet 3    NexAVAR 200 MG tablet Take 2 tablets (400 mg total) by mouth 2 times a day  (Patient not taking: Reported on 10/13/2021) 120 tablet 10    tiZANidine (ZANAFLEX) 4 mg tablet Take 2 tablets (8 mg total) by mouth every 8 (eight) hours as needed for muscle spasms 60 tablet 0    valACYclovir (VALTREX) 500 mg tablet Take 1 tablet (500 mg total) by mouth 2 (two) times a day for 3 days 6 tablet 2    [DISCONTINUED] sildenafil (VIAGRA) 100 mg tablet Take 1 tablet (100 mg total) by mouth as needed for erectile dysfunction (Patient not taking: Reported on 7/22/2021) 9 tablet 5     No current facility-administered medications on file prior to visit  Review of Systems   Constitutional: Positive for unexpected weight change  Negative for chills and fever  HENT: Negative for ear pain and sore throat  Eyes: Negative for pain and visual disturbance  Respiratory: Positive for shortness of breath  Negative for cough  Cardiovascular: Negative for chest pain and palpitations  Gastrointestinal: Positive for abdominal pain  Negative for blood in stool, constipation, diarrhea, nausea and vomiting  Genitourinary: Negative for dysuria and hematuria  Musculoskeletal: Negative for arthralgias and back pain  Skin: Negative for color change and rash  Neurological: Negative for seizures and syncope  All other systems reviewed and are negative  BMI Counseling: Body mass index is 25 kg/m²   The BMI is above normal  Nutrition recommendations include decreasing portion sizes, encouraging healthy choices of fruits and vegetables, decreasing fast food intake, consuming healthier snacks and limiting drinks that contain sugar  Rationale for BMI follow-up plan is due to patient being overweight or obese  Objective:    /90 (BP Location: Right arm, Patient Position: Sitting, Cuff Size: Standard) Comment: Pt states he took BP medication about 20 mins ago  Pulse 100   Temp 98 °F (36 7 °C) (Temporal)   Resp 18   Ht 5' 7" (1 702 m)   Wt 72 4 kg (159 lb 9 6 oz)   SpO2 97%   BMI 25 00 kg/m²     Physical Exam  Vitals and nursing note reviewed  Constitutional:       General: He is not in acute distress  Appearance: He is well-developed  He is not diaphoretic  HENT:      Head: Normocephalic and atraumatic  Eyes:      Conjunctiva/sclera: Conjunctivae normal       Pupils: Pupils are equal, round, and reactive to light  Cardiovascular:      Rate and Rhythm: Normal rate and regular rhythm  Pulmonary:      Effort: Pulmonary effort is normal  No respiratory distress  Breath sounds: Normal breath sounds  No wheezing  Abdominal:      General: A surgical scar is present  Bowel sounds are normal       Palpations: Abdomen is soft  Tenderness: There is abdominal tenderness  Comments: Patient with surgical scar in upper abdominal area, mild TTP and mass palpated in area    Musculoskeletal:         General: No deformity  Normal range of motion  Cervical back: Normal range of motion and neck supple  Lymphadenopathy:      Cervical: No cervical adenopathy  Skin:     General: Skin is warm and dry  Capillary Refill: Capillary refill takes less than 2 seconds  Findings: No rash  Neurological:      Mental Status: He is alert and oriented to person, place, and time     Psychiatric:         Behavior: Behavior normal          PRINCE Mello  01/03/22  10:10 AM

## 2022-01-17 NOTE — TELEPHONE ENCOUNTER
Patient states he would like to discuss the findings of the 1/14 US ordered by his family doctor at his 1/21 fu appt

## 2022-01-21 NOTE — PROGRESS NOTES
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    He has history of chronic hepatitis C as well as alcohol abuse  Nida Silvestre has liver cirrhosis   He has multifocal liver lesion with low level of AFP    This was histologically confirmed to be well-differentiated hepatocellular carcinoma   He started sorafenib in October 2018  He had radiographical as well as biochemical response  He is tolerating sorafenib very well  Clinically, he is doing well from Oncology standpoint  His AFP is minimally elevated  Therefore, I recommended him to continue sorafenib 400 mg b i d  He is in agreement with my recommendation  I will see him again in 6 months with CT scan of chest abdomen pelvis, AFP, CBC, and CMP  He is in agreement with my recommendations         Subjective:      HPI:             Interval History:  A 77year old gentleman with 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 diagnosed in November 2011  He underwent concurrent chemoradiation with high dose cisplatin resulting in complete response  He has chronic hepatitis C, as well as liver cirrhosis, based on a CT scan of abdomen and pelvis  In October 2014, he was found to have multifocal liver lesions, based on the CT scan  This was confirmed by MRI of the abdomen  However, the PET/CT scan showed the lesion was not hypermetabolic   He declined liver biopsy    Radiographically, this was consistent with multifocal hepatocellular carcinoma   He has been under the care of Gianna Mary summer of 2018, CT scan showed progression   He was suggested to have Siro sphere which he declined  Sarah Rob, he accepted sorafenib, which he started in October 2018   He had partial response on sorafenib, based on CT scan as well as AFP decline  Rui Bryant presents today for routine follow-up  He had COVID-19 infection in December 2019  He lost 13 lb  He is currently regaining his weight  He also have low back pain due to sciatica  Otherwise, he feels well  He has no respiratory symptoms  He denied hand-foot reaction  His performance status is normal        Objective:      Primary Diagnosis:     1  Squamous cell carcinoma of oropharynx, stage SHAINA disease, diagnosed in November 2011  2 Multifocal liver lesion, clinically clinically consistent with hepatocellular carcinoma      Cancer Staging:  Cancer Staging  No matching staging information was found for the patient         Previous Hematologic/ Oncologic Treatment:      Concurrent chemoradiation with high-dose cisplatin completed on February 13, 2012       Current Hematologic/ Oncologic Treatment:       Sorafenib of since October 2018         Disease Status:      Partial response as well as biochemical response      Test Results:     Pathology:      Liver biopsy showed well-differentiated hepatocellular carcinoma      Radiology:     CT scan of chest abdomen pelvis in  July 2021 showed stable enhanced liver mass with no evidence of progression      Laboratory:      AFP is 12 in January 2022  See below otherwise        Physical Exam:        General Appearance:    Alert, oriented          Eyes:    PERRL   Ears:    Normal external ear canals, both ears   Nose:   Nares normal, septum midline   Throat:   Mucosa moist  Pharynx without injection      Neck:   Supple         Lungs:     Clear to auscultation bilaterally   Chest Wall:    No tenderness or deformity    Heart:    Regular rate and rhythm         Abdomen:     Soft, non-tender, bowel sounds +, hepatomegaly   No palpable spleen                Extremities:   Extremities no cyanosis or edema         Skin:   no rash or icterus  Lymph nodes:   Cervical, supraclavicular, and axillary nodes normal   Neurologic:   CNII-XII intact, normal strength, sensation and reflexes     Throughout             Breast exam:   Not applicable  ROS: Review of Systems   All other systems reviewed and are negative  Imaging: US abdomen limited    Result Date: 1/14/2022  Narrative: ABDOMINAL WALL ULTRASOUND INDICATION:   R10 10: Upper abdominal pain, unspecified  Patient states history of umbilical hernia with mesh repair 17 years ago pain again in same area December 2021 concern for hernia  COMPARISON:  CT 07/15/2021 TECHNIQUE:   Real-time ultrasound of the palpable abnormality in the ventral abdominal wall  Was performed with a transducer with both volumetric sweeps and still imaging techniques  FINDINGS:  The area shown by the patient of palpable abnormality is a 23 x 8 x 29 mm oblong focus similar in echogenicity to fat with internal linear echogenic foci along its long axis without significant blood flow, this does not appear to get larger with Valsalva, on sagittal images this appears to extend from a hernia  Additional images of the ventral abdominal wall slightly cranially demonstrate 2 adjacent fat-containing probable incisional hernias  No evidence for involved bowel  These do not appear to enlarge with Valsalva        Impression: Area in question shown by patient appears to represent a fat-containing incisional hernia vs lipoma  Additional images of the ventral abdominal wall demonstrate 2 additional small fat-containing incisional hernias  Recommend surgical consultation  The study was marked in EPIC for significant notification   Workstation performed: [id number]         Labs:   Lab Results   Component Value Date    WBC 5 53 01/18/2022    HGB 14 1 01/18/2022    HCT 41 9 01/18/2022    MCV 88 01/18/2022     01/18/2022     Lab Results   Component Value Date     12/09/2015    K 4 4 01/18/2022     01/18/2022    CO2 32 01/18/2022    ANIONGAP 5 12/09/2015    BUN 7 01/18/2022    CREATININE 0 84 01/18/2022    GLUCOSE 267 (H) 12/09/2015    GLUF 133 (H) 07/06/2021    CALCIUM 8 9 01/18/2022    AST 29 01/18/2022    ALT 20 01/18/2022    ALKPHOS 107 01/18/2022    PROT 7 4 12/09/2015    BILITOT 0 97 12/09/2015    EGFR 91 01/18/2022       Lab Results   Component Value Date    PSA 0 6 07/06/2021    PSA 0 6 01/20/2020    PSA 0 4 01/18/2018         Current Medications: Reviewed  Allergies: Reviewed  PMH/FH/SH:  Reviewed      Vital Sign:    Body surface area is 1 77 meters squared      Wt Readings from Last 3 Encounters:   01/21/22 66 4 kg (146 lb 6 4 oz)   01/03/22 72 4 kg (159 lb 9 6 oz)   10/26/21 78 5 kg (173 lb)        Temp Readings from Last 3 Encounters:   01/21/22 97 8 °F (36 6 °C) (Tympanic Core)   01/03/22 98 °F (36 7 °C) (Temporal)   10/26/21 99 1 °F (37 3 °C) (Oral)        BP Readings from Last 3 Encounters:   01/21/22 142/98   01/03/22 140/90   10/26/21 (!) 174/91         Pulse Readings from Last 3 Encounters:   01/21/22 79   01/03/22 100   10/26/21 (!) 107     @LASTSAO2(3)@
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
## 2022-01-31 NOTE — PROGRESS NOTES
Assessment/Plan:    Diabetes mellitus, type II ([facility] [address] )    Lab Results   Component Value Date    HGBA1C 6 4 01/31/2022     Stable, continue current treatment- Amaryl 2 mg daily     Hypertension  BP persistently not at goal, today 180/102 - asymptomatic   EKG in office NSR with possible LVH, will check echo   Start diltiazem 180 mg QD and continue with lisinopril 40 mg daily and on nadolol 40 mg daily     Sciatica of left side  CT Lumbar Spine 10/2021  IMPRESSION:   Chronic disc and facet degenerative change resulting in multilevel moderate to severe neural foraminal narrowing in the lower lumbar spine  Patient has completed PT w/o much improvement   Recommend Pain management at this point, patient declines at this time - not interested in injections or surgical intervention     Ingrid Samuels was seen today for follow-up and weight loss  Diagnoses and all orders for this visit:    Primary hypertension    Uncontrolled hypertension  -     Echo complete w/ contrast if indicated; Future  -     diltiazem (CARDIZEM CD) 180 mg 24 hr capsule; Take 1 capsule (180 mg total) by mouth daily    Type 2 diabetes mellitus without complication, without long-term current use of insulin ([facility])  -     POCT hemoglobin A1c    Sciatica of left side        Return for RTC in 1 week for BP check with nursre   Patient Instructions     Benefits of an Active Lifestyle   AMBULATORY CARE:   An active lifestyle  means you do physical activity throughout the day  Any activity that gets you up and moving is part of an active lifestyle  Physical activity includes exercise such as walking or lifting weights  It also includes playing sports  Physical activity is different from other kinds of activity, such as reading a book  This kind of activity is called sedentary  A sedentary lifestyle means you sit or do not move much during the day  An active lifestyle has many benefits, such as helping you prevent or manage health conditions    Call your doctor if: · You notice changes in your health, such as new or worsening shortness of breath  · You have questions or concerns about your condition or care  Benefits of an active lifestyle:   · You may be able to do daily activities more easily  Activity helps condition your heart, lungs, and muscles  This can help you get through your daily activities without feeling tired  · You can help control your weight  Activity helps your body use the calories you eat instead of storing them as fat  Your body continues to burn calories at a higher rate after you are active  · Activity can increase your health  Activity helps lower your risk for cancer, heart disease, diabetes, and stroke  Activity can help you control your blood pressure and blood sugar levels, and lower your cholesterol  If you have arthritis, activity can help your joints move more easily and with less pain  · Your bones and muscles will get stronger  This will help prevent osteoporosis and reduce your risk for falls  · Activity can help improve your mood  Activity can reduce or prevent depression and stress  Activity can also help improve your sleep  Risks of a sedentary lifestyle:  A sedentary lifestyle increases your risk for diseases such as diabetes, high blood pressure, and heart disease  Your immune system also becomes weaker  This means it cannot fight infections well  How much activity you need:  Any activity is better than no activity at all  When you go from being mostly inactive to adding some activity, you will see health benefits  The following are general guidelines:  · Do aerobic activity several days each week  Aerobic activity includes walking, bicycling, dancing, swimming, and raking leaves  Aim for 150 to 300 minutes of moderate activity, or 75 to 150 of vigorous activity each week  You can also do a combination of moderate and vigorous activity  · Do strength training at least 2 times each week    Strength training helps you keep the muscles you have and build new muscles  Strength training includes pushups, yoga, marlee chi, and weightlifting  If you do not have access to weights, you can lift items around your house  Try to work all the major muscle groups, such as your legs, arms, abdomen, and chest  Do 2 or 3 sets on each area  Use a weight that is slightly heavier than you can lift easily  You can work up to [employer]  You can also use resistance bands instead of weights  Steps you can take to become more active:   · Set goals  Set some long-term goals and some short-term goals  For example, you may want to be able to walk for 30 minutes without becoming short of breath  Try not to put time requirements on your goals  For example, do not think you should reach your goal in a month  Set smaller goals, such as walking a little longer each week, or feeling less shortness of breath  · Be active all day  Activity does not have to mean structured exercise each day  You can be more active by making small changes all day  For example, try parking as far from the entrance of buildings as you can when you run errands  If possible, walk or ride a bike instead of driving  Take the stairs instead of the elevator  · Keep a record of your activity and your progress  You can do this by writing down your daily activity  Include the kind of activity and how long you did it  You can also use a program on your phone or other device that will track activity for you  Also record your progress  You may be doing daily activities more easily, sleeping better, or building muscles  · Step counting can help you monitor activity  A general guide is to take 10,000 steps each day  A pedometer is a device you can wear to track your steps  Some phones have programs that will count and record steps  You may need to work up to 10,000 steps  Start by finding out how many steps you usually take in a day   Then try to take more steps each day than you took the day before  Tips to help you stay on track:   · Start slowly and work up  You do not have to do 30 minutes of activity at one time  You can break the activity up and do a few minutes at a time  Remember that some physical activity is better than none  Stand up during the day, even if you cannot walk around  Your body uses more energy when you stand  You may be able to get a desk that allows you to stand while you type or make phone calls for work  Aim for a speed or intensity that is challenging but not too difficult  You should be able to speak a few words at a time but not be able to sing  · Plan activities you enjoy  Do a variety of activities so you do not become bored and you stay challenged  Include activities that strengthen your bones  These activities are called weight-bearing exercises  Examples include tennis, jumping rope, and running  Swimming, riding a bike, and similar exercises keep weight off your bones  They will not help strengthen bones, but they will help your heart and lungs work better  · Ask for support from the people in your life  Go for a walk after dinner with your family  Meet friends at the park  Take a break with a coworker and walk around  Find someone who likes to go to the gym at the same time you do  You may be more likely to go if you know another person is counting on you  Get involved in community events, such as cleaning a community park  Ask someone to help you stay on track  For example, you can tell the person about your daily or weekly activity  · Treat yourself to a reward when you reach a goal   The rewards can be for activity done for a certain amount of time each day or days each week  Rewards can also be for progress you make  Have rewards that are not food, such as a new clothing item or book  What you need to know about nutrition and activity:  Healthy foods will give you the energy you need to be active   Activity and good nutrition work together to help you reach or maintain a healthy weight  Healthy foods include fruits, vegetables, lean meats, fish, cooked beans, whole-grain breads, and low-fat dairy products  Your healthcare provider can help you create a healthy meal plan  He or she can tell you how many calories you need to stay active and still lose weight if needed  Follow up with your doctor as directed:  Write down your questions so you remember to ask them during your visits  © Copyright [employer] 2021 Information is for End User's use only and may not be sold, redistributed or otherwise used for commercial purposes  All illustrations and images included in CareNotes® are the copyrighted property of A D A M , Inc  or [facility] [employer]   The above information is an  only  It is not intended as medical advice for individual conditions or treatments  Talk to your doctor, nurse or pharmacist before following any medical regimen to see if it is safe and effective for you  Subjective:     Julia Eric is a 77 y o  male who  has a past medical history of Cardiac disorder, Chronic hepatitis C virus infection ([address] ), Diabetes mellitus ([address] ), Dysphagia, Esophageal varices ([address] ), Hepatic disease, Hepatitis, History of chemotherapy, History of radiation therapy, Hypertension, Laryngeal cancer ([address] ), Liver cancer ([address] ), 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[address] ), Recurrent hepatitis C ([address] ), and Rheumatic fever  He has no past medical history of Mental disorder  who presented to the office today for follow up  Patient reports that his biggest issue today is abdominal discomfort at site of 3 small hernias  He is following with surgeon for this  Also has chronic lumbar back pain with known moderate to severe foraminal stenosis  Has completed PT w/o much improvement  Recently had COVID in December and notes that he lost weight due to lack of appetite   Reports that now appetite is slowly returning to baseline  The following portions of the patient's history were reviewed and updated as appropriate: allergies, current medications, past family history, past medical history, past social history, past surgical history and problem list     Current Outpatient Medications on File Prior to Visit   Medication Sig Dispense Refill    acyclovir (ZOVIRAX) 400 MG tablet Take 1 tablet (400 mg total) by mouth 3 (three) times a day for 10 days 30 tablet 0    albuterol (Ventolin HFA) 90 mcg/act inhaler Inhale 2 puffs every 6 (six) hours as needed for wheezing 18 g 5    Diclofenac Sodium (VOLTAREN) 1 % Apply 2 g topically 4 (four) times a day 100 g 2    famotidine (PEPCID) 40 MG tablet Take 1 tablet (40 mg total) by mouth daily 90 tablet 0    glimepiride (AMARYL) 2 mg tablet Take 1 tablet (2 mg total) by mouth daily 90 tablet 1    ibuprofen (MOTRIN) 600 mg tablet Take 1 tablet (600 mg total) by mouth every 6 (six) hours as needed for mild pain for up to 14 days 30 tablet 0    levothyroxine 112 mcg tablet Take 1 tablet (112 mcg total) by mouth daily 90 tablet 1    lidocaine (Lidoderm) 5 % Apply 1 patch topically daily Remove & Discard patch within 12 hours or as directed by MD 15 patch 0    lisinopril (ZESTRIL) 40 mg tablet Take 1 tablet (40 mg total) by mouth daily 90 tablet 1    nadolol (CORGARD) 40 mg tablet Take 1 tablet (40 mg total) by mouth daily 90 tablet 3    NexAVAR 200 MG tablet Take 2 tablets (400 mg total) by mouth 2 times a day  120 tablet 10    tiZANidine (ZANAFLEX) 4 mg tablet Take 2 tablets (8 mg total) by mouth every 8 (eight) hours as needed for muscle spasms 60 tablet 0    valACYclovir (VALTREX) 500 mg tablet Take 1 tablet (500 mg total) by mouth 2 (two) times a day for 3 days 6 tablet 2    [DISCONTINUED] methylPREDNISolone 4 MG tablet therapy pack Use as directed on package 21 each 0     No current facility-administered medications on file prior to visit         Review of Systems Constitutional: Positive for appetite change and unexpected weight change  Negative for activity change, chills, fatigue and fever  HENT: Negative for hearing loss, nosebleeds, sinus pain, sneezing, sore throat and trouble swallowing  Eyes: Negative for photophobia and visual disturbance  Respiratory: Negative for cough, chest tightness, shortness of breath and wheezing  Cardiovascular: Negative for chest pain, palpitations and leg swelling  Gastrointestinal: Negative for abdominal pain, constipation, nausea and vomiting  Genitourinary: Negative for decreased urine volume, difficulty urinating, dysuria, flank pain, genital sores, hematuria and urgency  Musculoskeletal: Positive for arthralgias, back pain and myalgias  Negative for gait problem  Skin: Negative for pallor, rash and wound  Neurological: Negative for dizziness, seizures, syncope, weakness, numbness and headaches  Hematological: Negative for adenopathy  Does not bruise/bleed easily  Psychiatric/Behavioral: Negative for confusion, hallucinations, self-injury, sleep disturbance and suicidal ideas  The patient is not nervous/anxious  Objective:    BP (!) 180/102   Pulse 81   Temp 97 6 °F (36 4 °C) (Temporal)   Resp 17   Wt 73 kg (161 lb)   SpO2 98%   BMI 25 22 kg/m²     Physical Exam  Vitals and nursing note reviewed  Constitutional:       General: He is not in acute distress  Appearance: He is well-developed  He is not diaphoretic  HENT:      Head: Normocephalic and atraumatic  Right Ear: External ear normal       Left Ear: External ear normal    Eyes:      General:         Right eye: No discharge  Left eye: No discharge  Cardiovascular:      Rate and Rhythm: Normal rate and regular rhythm  Pulmonary:      Effort: Pulmonary effort is normal  No respiratory distress  Breath sounds: Normal breath sounds  No wheezing     Abdominal:      General: Bowel sounds are normal  There is no distension  Palpations: Abdomen is soft  Tenderness: There is no abdominal tenderness  Musculoskeletal:         General: No deformity  Cervical back: Normal range of motion and neck supple  Lumbar back: Tenderness present  Decreased range of motion  Comments: Strength, sensation, reflexes, cap refill intact to BLLE    Lymphadenopathy:      Cervical: No cervical adenopathy  Skin:     General: Skin is warm and dry  Capillary Refill: Capillary refill takes less than 2 seconds  Findings: No rash  Neurological:      General: No focal deficit present  Mental Status: He is alert and oriented to person, place, and time  Cranial Nerves: No cranial nerve deficit  Sensory: No sensory deficit  Motor: No weakness        Coordination: Coordination normal       Gait: Gait normal       Deep Tendon Reflexes: Reflexes normal    Psychiatric:         Behavior: Behavior normal          PRINCE Flower  01/31/22  12:08 PM

## 2022-01-31 NOTE — ASSESSMENT & PLAN NOTE
CT Lumbar Spine 10/2021  IMPRESSION:   Chronic disc and facet degenerative change resulting in multilevel moderate to severe neural foraminal narrowing in the lower lumbar spine      Patient has completed PT w/o much improvement   Recommend Pain management at this point, patient declines at this time - not interested in injections or surgical intervention

## 2022-01-31 NOTE — ASSESSMENT & PLAN NOTE
BP persistently not at goal, today 180/102 - asymptomatic   EKG in office NSR with possible LVH, will check echo   Start diltiazem 180 mg QD and continue with lisinopril 40 mg daily and on nadolol 40 mg daily

## 2022-01-31 NOTE — PATIENT INSTRUCTIONS
Benefits of an Active Lifestyle   AMBULATORY CARE:   An active lifestyle  means you do physical activity throughout the day  Any activity that gets you up and moving is part of an active lifestyle  Physical activity includes exercise such as walking or lifting weights  It also includes playing sports  Physical activity is different from other kinds of activity, such as reading a book  This kind of activity is called sedentary  A sedentary lifestyle means you sit or do not move much during the day  An active lifestyle has many benefits, such as helping you prevent or manage health conditions  Call your doctor if:   · You notice changes in your health, such as new or worsening shortness of breath  · You have questions or concerns about your condition or care  Benefits of an active lifestyle:   · You may be able to do daily activities more easily  Activity helps condition your heart, lungs, and muscles  This can help you get through your daily activities without feeling tired  · You can help control your weight  Activity helps your body use the calories you eat instead of storing them as fat  Your body continues to burn calories at a higher rate after you are active  · Activity can increase your health  Activity helps lower your risk for cancer, heart disease, diabetes, and stroke  Activity can help you control your blood pressure and blood sugar levels, and lower your cholesterol  If you have arthritis, activity can help your joints move more easily and with less pain  · Your bones and muscles will get stronger  This will help prevent osteoporosis and reduce your risk for falls  · Activity can help improve your mood  Activity can reduce or prevent depression and stress  Activity can also help improve your sleep  Risks of a sedentary lifestyle:  A sedentary lifestyle increases your risk for diseases such as diabetes, high blood pressure, and heart disease   Your immune system also becomes weaker  This means it cannot fight infections well  How much activity you need:  Any activity is better than no activity at all  When you go from being mostly inactive to adding some activity, you will see health benefits  The following are general guidelines:  · Do aerobic activity several days each week  Aerobic activity includes walking, bicycling, dancing, swimming, and raking leaves  Aim for 150 to 300 minutes of moderate activity, or 75 to 150 of vigorous activity each week  You can also do a combination of moderate and vigorous activity  · Do strength training at least 2 times each week  Strength training helps you keep the muscles you have and build new muscles  Strength training includes pushups, yoga, marlee chi, and weightlifting  If you do not have access to weights, you can lift items around your house  Try to work all the major muscle groups, such as your legs, arms, abdomen, and chest  Do 2 or 3 sets on each area  Use a weight that is slightly heavier than you can lift easily  You can work up to [employer]  You can also use resistance bands instead of weights  Steps you can take to become more active:   · Set goals  Set some long-term goals and some short-term goals  For example, you may want to be able to walk for 30 minutes without becoming short of breath  Try not to put time requirements on your goals  For example, do not think you should reach your goal in a month  Set smaller goals, such as walking a little longer each week, or feeling less shortness of breath  · Be active all day  Activity does not have to mean structured exercise each day  You can be more active by making small changes all day  For example, try parking as far from the entrance of buildings as you can when you run errands  If possible, walk or ride a bike instead of driving  Take the stairs instead of the elevator  · Keep a record of your activity and your progress    You can do this by writing down your daily activity  Include the kind of activity and how long you did it  You can also use a program on your phone or other device that will track activity for you  Also record your progress  You may be doing daily activities more easily, sleeping better, or building muscles  · Step counting can help you monitor activity  A general guide is to take 10,000 steps each day  A pedometer is a device you can wear to track your steps  Some phones have programs that will count and record steps  You may need to work up to 10,000 steps  Start by finding out how many steps you usually take in a day  Then try to take more steps each day than you took the day before  Tips to help you stay on track:   · Start slowly and work up  You do not have to do 30 minutes of activity at one time  You can break the activity up and do a few minutes at a time  Remember that some physical activity is better than none  Stand up during the day, even if you cannot walk around  Your body uses more energy when you stand  You may be able to get a desk that allows you to stand while you type or make phone calls for work  Aim for a speed or intensity that is challenging but not too difficult  You should be able to speak a few words at a time but not be able to sing  · Plan activities you enjoy  Do a variety of activities so you do not become bored and you stay challenged  Include activities that strengthen your bones  These activities are called weight-bearing exercises  Examples include tennis, jumping rope, and running  Swimming, riding a bike, and similar exercises keep weight off your bones  They will not help strengthen bones, but they will help your heart and lungs work better  · Ask for support from the people in your life  Go for a walk after dinner with your family  Meet friends at the park  Take a break with a coworker and walk around  Find someone who likes to go to the gym at the same time you do   You may be more likely to go if you know another person is counting on you  Get involved in community events, such as cleaning a community park  Ask someone to help you stay on track  For example, you can tell the person about your daily or weekly activity  · Treat yourself to a reward when you reach a goal   The rewards can be for activity done for a certain amount of time each day or days each week  Rewards can also be for progress you make  Have rewards that are not food, such as a new clothing item or book  What you need to know about nutrition and activity:  Healthy foods will give you the energy you need to be active  Activity and good nutrition work together to help you reach or maintain a healthy weight  Healthy foods include fruits, vegetables, lean meats, fish, cooked beans, whole-grain breads, and low-fat dairy products  Your healthcare provider can help you create a healthy meal plan  He or she can tell you how many calories you need to stay active and still lose weight if needed  Follow up with your doctor as directed:  Write down your questions so you remember to ask them during your visits  © Copyright [address] 2021 Information is for End User's use only and may not be sold, redistributed or otherwise used for commercial purposes  All illustrations and images included in CareNotes® are the copyrighted property of [employer]  or [employer]  The above information is an  only  It is not intended as medical advice for individual conditions or treatments  Talk to your doctor, nurse or pharmacist before following any medical regimen to see if it is safe and effective for you

## 2022-02-23 NOTE — ASSESSMENT & PLAN NOTE
71-year-old gentleman with history of laryngeal cancer status post chemoradiation, diabetes, hypothyroidism, and liver cirrhosis well as hepatocellular carcinoma presents with nausea vomiting and worsening weakness  · The patient follows with Dr Fabienne Corcoran as outpatient  · Last known imaging July 2021 stable and patient was supposed to be taking nexavar but has not been taking over the past week  · In the ED CT demonstrated multiple abnormal lymphadenopathy and L1 lytic lesion concerning for metastasis  · There is also a cystic pancreatic lesion up to 2 0 cm  · Continue IV hydration for gastroenteritis  · Consult oncology and gastroenterology for further evaluation and recommendations
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
## 2022-02-23 NOTE — ASSESSMENT & PLAN NOTE
· History of COVID in December with subsequent weight loss  · Currently no respiratory symptoms    Lab Results   Component Value Date    SARSCOV2 Positive (A) 12/03/2021

## 2022-02-23 NOTE — PLAN OF CARE
Problem: Nutrition/Hydration-ADULT  Goal: Nutrient/Hydration intake appropriate for improving, restoring or maintaining nutritional needs  Description: Monitor and assess patient's nutrition/hydration status for malnutrition  Collaborate with interdisciplinary team and initiate plan and interventions as ordered  Monitor patient's weight and dietary intake as ordered or per policy  Utilize nutrition screening tool and intervene as necessary  Determine patient's food preferences and provide high-protein, high-caloric foods as appropriate       INTERVENTIONS:  - Monitor oral intake, urinary output, labs, and treatment plans  - Assess nutrition and hydration status and recommend course of action  - Evaluate amount of meals eaten  - Assist patient with eating if necessary   - Allow adequate time for meals  - Recommend/ encourage appropriate diets, oral nutritional supplements, and vitamin/mineral supplements  - Order, calculate, and assess calorie counts as needed  - Recommend, monitor, and adjust tube feedings and TPN/PPN based on assessed needs  - Assess need for intravenous fluids  - Provide specific nutrition/hydration education as appropriate  - Include patient/family/caregiver in decisions related to nutrition  Outcome: Progressing     Problem: PAIN - ADULT  Goal: Verbalizes/displays adequate comfort level or baseline comfort level  Description: Interventions:  - Encourage patient to monitor pain and request assistance  - Assess pain using appropriate pain scale  - Administer analgesics based on type and severity of pain and evaluate response  - Implement non-pharmacological measures as appropriate and evaluate response  - Consider cultural and social influences on pain and pain management  - Notify physician/advanced practitioner if interventions unsuccessful or patient reports new pain  Outcome: Progressing     Problem: SAFETY ADULT  Goal: Maintain or return to baseline ADL function  Description: INTERVENTIONS:  -  Assess patient's ability to carry out ADLs; assess patient's baseline for ADL function and identify physical deficits which impact ability to perform ADLs (bathing, care of mouth/teeth, toileting, grooming, dressing, etc )  - Assess/evaluate cause of self-care deficits   - Assess range of motion  - Assess patient's mobility; develop plan if impaired  - Assess patient's need for assistive devices and provide as appropriate  - Encourage maximum independence but intervene and supervise when necessary  - Involve family in performance of ADLs  - Assess for home care needs following discharge   - Consider OT consult to assist with ADL evaluation and planning for discharge  - Provide patient education as appropriate  Outcome: Progressing     Problem: SAFETY ADULT  Goal: Maintains/Returns to pre admission functional level  Description: INTERVENTIONS:  - Perform BMAT or MOVE assessment daily    - Set and communicate daily mobility goal to care team and patient/family/caregiver  - Collaborate with rehabilitation services on mobility goals if consulted  - Perform Range of Motion 3 times a day  - Reposition patient every 2 hours    - Stand patient 3 times a day  - Ambulate patient 2 times a day  - Out of bed to chair 3 times a day   - Out of bed for meals 3 times a day  - Out of bed for toileting  - Record patient progress and toleration of activity level   Outcome: Progressing     Problem: DISCHARGE PLANNING  Goal: Discharge to home or other facility with appropriate resources  Description: INTERVENTIONS:  - Identify barriers to discharge w/patient and caregiver  - Arrange for needed discharge resources and transportation as appropriate  - Identify discharge learning needs (meds, wound care, etc )  - Arrange for interpretive services to assist at discharge as needed  - Refer to Case Management Department for coordinating discharge planning if the patient needs post-hospital services based on physician/advanced practitioner order or complex needs related to functional status, cognitive ability, or social support system  Outcome: Progressing
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
## 2022-02-23 NOTE — ASSESSMENT & PLAN NOTE
Lab Results   Component Value Date    HGBA1C 6 4 01/31/2022     Recent Labs     02/23/22  1705   POCGLU 246*     · Prior to admission on glimepiride  Started on sliding scale insulin
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
## 2022-02-24 PROBLEM — E44.1 MILD PROTEIN-CALORIE MALNUTRITION (HCC): Status: ACTIVE | Noted: 2022-01-01

## 2022-02-24 NOTE — ASSESSMENT & PLAN NOTE
Malnutrition Findings:   Adult Malnutrition type: Unknown (comment)  Adult Degree of Malnutrition: Malnutrition of mild degree (unable to eat sufficient energy / protein to maintain a healthy weight due to increased nutrient demand from catabolic illness  generalized loss of subcutaneous fat, muscle mass  treated with oral diet, addition of nutrition supplements )  BMI Findings:  Estimated body mass index is 25 22 kg/m² as calculated from the following:    Height as of 1/25/22: 5' 7" (1 702 m)  Weight as of 1/31/22: 73 kg (161 lb)
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
## 2022-02-24 NOTE — UTILIZATION REVIEW
Initial Clinical Review    Admission: Date/Time/Statement:   Admission Orders (From admission, onward)     Ordered        02/23/22 1234  Inpatient Admission  Once                      Orders Placed This Encounter   Procedures    Inpatient Admission     Standing Status:   Standing     Number of Occurrences:   1     Order Specific Question:   Level of Care     Answer:   Med Surg [16]     Order Specific Question:   Estimated length of stay     Answer:   More than 2 Midnights     Order Specific Question:   Certification     Answer:   I certify that inpatient services are medically necessary for this patient for a duration of greater than two midnights  See H&P and MD Progress Notes for additional information about the patient's course of treatment  ED Arrival Information     Expected Arrival Acuity    - 2/23/2022 08:05 Urgent         Means of arrival Escorted by Service Admission type    Walk-In Family Member Hospitalist Urgent         Arrival complaint    dizzy vomiting        Chief Complaint   Patient presents with    Dizziness     Pt reports dizziness beginning yesterday followed by n/v  Initial Presentation: 76 y/o male with PMhx of laryngeal cancer s/p chemoradiation, diabetes, hypothyroidism, and liver cirrhosis as well as hepatocellular carcinoma presents to ED as a walk-in with nausea vomiting, dizziness and worsening weakness  Pt reports he had covid-19 in December and has had poor intake since then, reports 20 lbs wt loss  The pt follows with oncology with last known imaging in July of 2021 stable  In ED Hgb 10 6, Hct 31 4, Plts 122  Tbili 1 54  CT a/p demonstrated multiple abnormal lymphadenopathy and L1 lytic lesion concerning for metastasis, there is also a cystic pancreatic lesion up to 2 0 cm  ADMIT INPATIENT to M/S UNIT with HEPATOCELLULAR CARCINOMA --  Continue IVFs, consult oncology and GI  Continue previous home po meds      Oncology consult 2/24 -- There is radiographic evidence of progressive disease with metastatic lymphadenopathy, which could render a stage IV disease  Recommend that he follows in the outpatient setting with his established oncologist with further workup with studies as deemed appropriate  Ordered an alpha fetoprotein for assessment  GI consult 2/24 -- Hepatocellular carcinoma, Hepatic cirrhosis with esophageal varices, Abnl CT findings -- Consider palliative care consult  Monitor LFTs and MELD for decompensation  Continue nadolol  IR consult 2/24 -- CT reviewed  Retroperitoneal nodes appear accessible for percutaneous biopsy  Will schedule for early next week when pending available of IR and CT  Date: 2/24   Day 2: reports improvement in back pain today with oxy  IVF's d/c'd  Continue reg diet, encourage po intake  Continue supportive care currently and monitor  Plan for further w/u next wk as op      ED Triage Vitals [02/23/22 0811]   Temperature Pulse Respirations Blood Pressure SpO2   98 2 °F (36 8 °C) 78 18 (!) 182/106 93 %      Temp Source Heart Rate Source Patient Position - Orthostatic VS BP Location FiO2 (%)   Oral Monitor Lying Right arm --      Pain Score       No Pain          Wt Readings from Last 1 Encounters:   01/31/22 73 kg (161 lb)     Additional Vital Signs:   Date/Time Temp Pulse Resp BP MAP (mmHg) SpO2 O2 Device Patient Position - Orthostatic VS   02/24/22 0900 -- -- -- -- -- -- None (Room air) --   02/24/22 07:08:56 -- 63 18 110/69 83 93 % -- --   02/23/22 2100 -- -- -- -- -- 96 % None (Room air) --   02/23/22 20:56:43 98 °F (36 7 °C) 69 17 150/90 110 95 % -- --   02/23/22 1825 -- -- -- 150/90 -- -- -- Standing - Orthostatic VS   02/23/22 18:17:50 -- 67 -- 154/90 111 96 % -- Sitting - Orthostatic VS   02/23/22 1500 97 5 °F (36 4 °C) -- -- -- -- 96 % None (Room air) --   02/23/22 13:56:58 97 5 °F (36 4 °C) 70 -- 155/91 112 96 % -- --   02/23/22 1045 -- 66 15 145/68 -- 97 % -- Lying   02/23/22 0811 98 2 °F (36 8 °C) 78 18 182/106 Abnormal  -- 93 % None (Room air) Lying       Pertinent Labs/Diagnostic Test Results:   CT abdomen pelvis with contrast   Final Result by Chelsey Fall MD (02/23 1152)      1  Bulky new confluent gastrohepatic, portacaval, and retroperitoneal lymphadenopathy concerning for metastatic disease  New 1 cm lytic lesion in the L1 vertebral body which is also concerning for metastasis, for which attention on follow-up is    recommended  Diffuse mesenteric and omental haziness is new from prior study, may reflect venous congestion from portal hypertension however carcinomatosis is not excluded  2   Multiple hepatic lesions consistent with known multifocal hepatocellular carcinoma, all of which are either slightly decreased in size or unchanged, without new hepatic lesions on single phase study  3   Findings which could reflect a nonspecific enterocolitis (infectious/inflammatory or secondary to venous stasis from portal hypertension among other etiologies), as well as discontinuous distal rectal wall thickening which could represent the same    process or an underlying rectal lesion  4   Slow progressive growth of a cystic lesion in the pancreatic head now measuring up to 2 0 cm  For simple cyst(s) 2 cm or greater recommend gastroenterology and/or surgical oncology consult  EUS is likely now warranted  IR biopsy abdomen    (Results Pending)     EKG 2/23 -- Sinus rhythm with occasional Premature ventricular complexes  Otherwise normal ECG    EKG 2/23 -- Sinus rhythm   Normal ECG      Results from last 7 days   Lab Units 02/24/22  0537 02/23/22  0924   WBC Thousand/uL 7 44 6 98   HEMOGLOBIN g/dL 11 4* 10 6*   HEMATOCRIT % 33 4* 31 4*   PLATELETS Thousands/uL 156 122*   NEUTROS ABS Thousands/µL  --  6 07     Results from last 7 days   Lab Units 02/24/22  0537 02/23/22  0853   SODIUM mmol/L 137 138   POTASSIUM mmol/L 3 9 4 5   CHLORIDE mmol/L 100 99*   CO2 mmol/L 32 32   ANION GAP mmol/L 5 7   BUN mg/dL 7 13 CREATININE mg/dL 0 63 0 71   EGFR ml/min/1 73sq m 102 97   CALCIUM mg/dL 8 9 9 2     Results from last 7 days   Lab Units 02/24/22  0537 02/23/22  0853   AST U/L 16 36   ALT U/L 21 14   ALK PHOS U/L 80 92   TOTAL PROTEIN g/dL 7 4 8 1   ALBUMIN g/dL 3 1* 3 5   TOTAL BILIRUBIN mg/dL 1 01* 1 54*   BILIRUBIN DIRECT mg/dL  --  0 20     Results from last 7 days   Lab Units 02/24/22  1055 02/24/22  0709 02/23/22  2058 02/23/22  1705   POC GLUCOSE mg/dl 182* 135 125 246*     Results from last 7 days   Lab Units 02/24/22  0537 02/23/22  0853   GLUCOSE RANDOM mg/dL 139 228*     Results from last 7 days   Lab Units 02/23/22  1252 02/23/22  1100 02/23/22  0853   HS TNI 0HR ng/L  --   --  5   HS TNI 2HR ng/L  --  5  --    HSTNI D2 ng/L  --  0  --    HS TNI 4HR ng/L 5  --   --    HSTNI D4 ng/L 0  --   --        Results from last 7 days   Lab Units 02/23/22  0853   PROTIME seconds 14 7*   INR  1 17   PTT seconds 26     Results from last 7 days   Lab Units 02/23/22  0853   LIPASE u/L 192     Results from last 7 days   Lab Units 02/23/22  1108   CLARITY UA  Clear   COLOR UA  Yellow   SPEC GRAV UA  1 010   PH UA  6 0   GLUCOSE UA mg/dl Negative   KETONES UA mg/dl Negative   BLOOD UA  Negative   PROTEIN UA mg/dl Negative   NITRITE UA  Negative   BILIRUBIN UA  Negative   UROBILINOGEN UA E U /dl 0 2   LEUKOCYTES UA  Negative     ED Treatment:   Medication Administration from 02/23/2022 0805 to 02/23/2022 1333       Date/Time Order Dose Route Action     02/23/2022 0854 sodium chloride 0 9 % bolus 1,000 mL 1,000 mL Intravenous New Bag     02/23/2022 0854 ondansetron (ZOFRAN) injection 4 mg 4 mg Intravenous Given     Past Medical History:   Diagnosis Date    Cardiac disorder     Chronic hepatitis C virus infection ([facility] )     Last Assessed: 7/11/2017    Diabetes mellitus ([address] )     Dysphagia     Esophageal varices (HCC)     Last Assessed: 4/27/2017    Hepatic disease     Hepatitis     History of chemotherapy     History of radiation therapy     Hypertension     Laryngeal cancer ([place] [address] )     Liver cancer ([place] [address] )     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[place] [address] )     Recurrent hepatitis C ([place] [address] )     Last Assessed: 10/17/2016    Rheumatic fever      Present on Admission:   Diabetes mellitus, type II ([place] [address] )   Hepatic cirrhosis ([place] [address] )   Hepatocellular carcinoma ([place] [address] )   History of 2019 novel coronavirus disease (COVID-19)   Gastroesophageal reflux disease with esophagitis   Secondary esophageal varices without bleeding ([place] [address] )   Hypothyroidism   Hypertension      Admitting Diagnosis: Hepatocellular carcinoma ([place] [address] ) [C22 0]  Pancreatic cyst [K86 2]  Platelets decreased ([place] [address] ) [D69 6]  Abdominal pain [R10 9]  Anemia [D64 9]  Dizzy [R42]  Metastasis from malignant neoplasm of liver (HCC) [C79 9, C22 8]  Age/Sex: 77 y o  male  Admission Orders:  Scheduled Medications:  diltiazem, 240 mg, Oral, Daily  enoxaparin, 40 mg, Subcutaneous, Q24H CHARI  famotidine, 40 mg, Oral, Early Morning  insulin lispro, 1-6 Units, Subcutaneous, 4x Daily (AC & HS)  levothyroxine, 112 mcg, Oral, Early Morning  melatonin, 6 mg, Oral, HS  nadolol, 40 mg, Oral, Daily    PRN Meds:  acetaminophen, 325 mg, Oral, Q6H PRN  ondansetron, 4 mg, Intravenous, Q6H PRN  oxyCODONE, 5 mg, Oral, Q4H PRN 2/24 x3, 2/25 x1  Tramadol 50 mg po Q12H PRN 2/23 x1 then d/c'd        IP CONSULT TO GASTROENTEROLOGY  IP CONSULT TO HEMATOLOGY  INPATIENT CONSULT TO IR    Network Utilization Review Department  ATTENTION: Please call with any questions or concerns to [phone] and carefully listen to the prompts so that you are directed to the right person  All voicemails are confidential   Jeffry Agrawal all requests for admission clinical reviews, approved or denied determinations and any other requests to dedicated fax number below belonging to the campus where the patient is receiving treatment   List of dedicated fax numbers for the Facilities:  FACILITY NAME UR FAX NUMBER   ADMISSION DENIALS (Administrative/Medical Necessity) [phone]   [address] (Maternity/NICU/Pediatrics) 261 [facility] 40 125 [facility]  [phone]   [address] [facility] [place] [id number] [place] [address] [address] [facility] [phone]

## 2022-02-24 NOTE — NURSING NOTE
Patient's family to [facility] (Sofafenib) to be administered through pharmacy      Travis Jones RN 2/24/2022 4:07 PM
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
## 2022-02-24 NOTE — CONSULTS
[address]  Department of Hematology & Medical Oncology  Inpatient Consultation/Progress Note    Date: 02/24/22          ASSESSMENT & PLAN        60-year-old male with history of 2 primary malignancies along side history of hepatitis C and cirrhosis admitted on 02/23/2022 with weakness nausea and vomiting  He has a history of locally advanced head and neck cancer that has been with no evidence of disease since 2012  He has been diagnosed with biopsy-proven hepatocellular carcinoma since 2018 undergoing single agent sorafenib treatment  He is now hospitalized and found to have metastatic disease with bulky malignant lymphadenopathy in the retroperitoneal, gastrohepatic and portacaval stations  Additionally, he is also found to have L1 lytic lesion  Assessment/Plan   There is radiographic evidence of progressive disease with metastatic lymphadenopathy, which could render a stage IV disease is admission for the patient   I recommend that he follows in the outpatient setting with his established oncologist with further workup with studies as deemed appropriate  I have sent a message to Dr Sophia Garcia alerting him about this patient   I have ordered an alpha fetoprotein for assessment  Please contact me if any questions or concerns about this patient's case  Thank you  SUBJECTIVE      60-year-old male with multiple medical comorbidities including history of 2 primary malignancies hospitalized overnight with generalized weakness nausea and vomiting  In review of medical chart talking with him, he is established with Dr Yogi Malik  He was last seen in the office on 01/21/2022  As noted his notes, he has 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 that was managed with standard of care that resulted in no evidence of disease since 2012   He has a history of chronic hepatitis C and alcohol abuse with cirrhosis of the results  And was found to have multifocal liver lesion with low level of AFP  This was biopsy proven to be hepatocellular carcinoma and he was thereafter started on sorafenib in 2018  He has been on this medication since then with plan for reimaging in the summer  He now comes in saying that he has been experiencing generalized weakness with episodes of nausea vomiting and bone pain  Denies any headaches or falls  He says that he has been experiencing changes in his bowel pattern as well  I have reviewed the relevant past medical, surgical, social and family history  I have also reviewed allergies and medications for this patient  Review of Systems  Review of Systems   All other systems reviewed and are negative  OBJECTIVE     Physical Exam    Vitals:    02/24/22 0708   BP: 110/69   Pulse: 63   Resp: 18   Temp:    SpO2: 93%         Physical Exam  Vitals reviewed  Constitutional:       General: He is not in acute distress  Appearance: He is not ill-appearing, toxic-appearing or diaphoretic  HENT:      Head: Normocephalic and atraumatic  Eyes:      General: No scleral icterus  Extraocular Movements: Extraocular movements intact  Cardiovascular:      Rate and Rhythm: Normal rate  Pulmonary:      Effort: Pulmonary effort is normal  No respiratory distress  Abdominal:      General: There is no distension  Musculoskeletal:         General: Normal range of motion  Cervical back: Normal range of motion and neck supple  Neurological:      General: No focal deficit present  Mental Status: He is alert and oriented to person, place, and time        Gait: Gait normal           Imaging  Relevant imaging reviewed in chart    Labs  Relevant labs reviewed in chart    CBC  Diff   Lab Results   Component Value Date/Time    WBC 7 44 02/24/2022 05:37 AM    WBC 3 46 (L) 12/09/2015 08:17 AM    HGB 11 4 (L) 02/24/2022 05:37 AM    HGB 12 8 12/09/2015 08:17 AM    HCT 33 4 (L) 02/24/2022 05:37 AM    HCT 38 1 12/09/2015 08:17 AM    RBC 3 87 (L) 02/24/2022 05:37 AM    RBC 4 11 12/09/2015 08:17 AM    MCV 86 02/24/2022 05:37 AM    MCV 93 12/09/2015 08:17 AM    MCHC 34 1 02/24/2022 05:37 AM    MCHC 33 6 12/09/2015 08:17 AM    MCH 29 5 02/24/2022 05:37 AM    MCH 31 1 12/09/2015 08:17 AM    RDW 14 8 02/24/2022 05:37 AM    RDW 14 1 12/09/2015 08:17 AM    MPV 10 9 02/24/2022 05:37 AM    MPV 10 7 12/09/2015 08:17 AM    Lab Results   Component Value Date/Time    LYMPHSABS 0 41 (L) 02/23/2022 09:24 AM    LYMPHSABS 0 48 (L) 12/09/2015 08:17 AM    EOSABS 0 03 02/23/2022 09:24 AM    EOSABS 0 10 12/09/2015 08:17 AM    MONOSABS 0 41 02/23/2022 09:24 AM    MONOSABS 0 48 12/09/2015 08:17 AM    BASOSABS 0 03 02/23/2022 09:24 AM    BASOSABS 0 01 12/09/2015 08:17 AM        Basic Metabolic Profile    Lab Results   Component Value Date/Time    SODIUM 137 02/24/2022 05:37 AM    CO2 32 02/24/2022 05:37 AM    CO2 28 5 12/09/2015 08:17 AM    ANIONGAP 5 12/09/2015 08:17 AM    Lab Results   Component Value Date/Time    BUN 7 02/24/2022 05:37 AM    BUN 9 12/09/2015 08:17 AM    CREATININE 0 63 02/24/2022 05:37 AM    CREATININE 1 04 12/09/2015 08:17 AM    GLUCOSE 267 (H) 12/09/2015 08:17 AM               Dhwani Y Leverette Gaucher, MD  Hematology & [address] [facility]

## 2022-02-24 NOTE — ASSESSMENT & PLAN NOTE
Lab Results   Component Value Date    HGBA1C 6 4 01/31/2022     Recent Labs     02/23/22  1705 02/23/22 2058 02/24/22  0709 02/24/22  1055   POCGLU 246* 125 135 182*     · Prior to admission on glimepiride  Continue on sliding scale insulin

## 2022-02-24 NOTE — ASSESSMENT & PLAN NOTE
59-year-old gentleman with history of laryngeal cancer status post chemoradiation, diabetes, hypothyroidism, and liver cirrhosis well as hepatocellular carcinoma presents with nausea vomiting and worsening weakness  · The patient follows with Dr Fortino Rangel as outpatient  · Last known imaging July 2021 stable and patient was supposed to be taking nexavar but has not been taking over the past week  · In the ED CT demonstrated multiple abnormal lymphadenopathy and L1 lytic lesion concerning for metastasis   · There is also a cystic pancreatic lesion up to 2 0 cm  · Completed IV hydration for gastroenteritis  · Appreciate oncology and gastroenterology for further evaluation and recommendations:  Lymph node biopsy ordered for possible malignant neoplasm lymph nodes/vertebrae

## 2022-02-24 NOTE — PLAN OF CARE
Problem: Nutrition/Hydration-ADULT  Goal: Nutrient/Hydration intake appropriate for improving, restoring or maintaining nutritional needs  Description: Monitor and assess patient's nutrition/hydration status for malnutrition  Collaborate with interdisciplinary team and initiate plan and interventions as ordered  Monitor patient's weight and dietary intake as ordered or per policy  Utilize nutrition screening tool and intervene as necessary  Determine patient's food preferences and provide high-protein, high-caloric foods as appropriate       INTERVENTIONS:  - Monitor oral intake, urinary output, labs, and treatment plans  - Assess nutrition and hydration status and recommend course of action  - Evaluate amount of meals eaten  - Assist patient with eating if necessary   - Allow adequate time for meals  - Recommend/ encourage appropriate diets, oral nutritional supplements, and vitamin/mineral supplements  - Order, calculate, and assess calorie counts as needed  - Recommend, monitor, and adjust tube feedings and TPN/PPN based on assessed needs  - Assess need for intravenous fluids  - Provide specific nutrition/hydration education as appropriate  - Include patient/family/caregiver in decisions related to nutrition  Outcome: Progressing     Problem: SAFETY ADULT  Goal: Patient will remain free of falls  Description: INTERVENTIONS:  - Educate patient/family on patient safety including physical limitations  - Instruct patient to call for assistance with activity   - Consult OT/PT to assist with strengthening/mobility   - Keep Call bell within reach  - Keep bed low and locked with side rails adjusted as appropriate  - Keep care items and personal belongings within reach  - Initiate and maintain comfort rounds  - Make Fall Risk Sign visible to staff  - Offer Toileting every  Hours, in advance of need  - Initiate/Maintain alarm  - Obtain necessary fall risk management equipment:   - Apply yellow socks and bracelet for high fall risk patients  - Consider moving patient to room near nurses station  Outcome: Progressing  Goal: Maintain or return to baseline ADL function  Description: INTERVENTIONS:  -  Assess patient's ability to carry out ADLs; assess patient's baseline for ADL function and identify physical deficits which impact ability to perform ADLs (bathing, care of mouth/teeth, toileting, grooming, dressing, etc )  - Assess/evaluate cause of self-care deficits   - Assess range of motion  - Assess patient's mobility; develop plan if impaired  - Assess patient's need for assistive devices and provide as appropriate  - Encourage maximum independence but intervene and supervise when necessary  - Involve family in performance of ADLs  - Assess for home care needs following discharge   - Consider OT consult to assist with ADL evaluation and planning for discharge  - Provide patient education as appropriate  Outcome: Progressing  Goal: Maintains/Returns to pre admission functional level  Description: INTERVENTIONS:  - Perform BMAT or MOVE assessment daily    - Set and communicate daily mobility goal to care team and patient/family/caregiver  - Collaborate with rehabilitation services on mobility goals if consulted  - Perform Range of Motion  times a day  - Reposition patient every  hours    - Dangle patient  times a day  - Stand patient  times a day  - Ambulate patient  times a day  - Out of bed to chair  times a day   - Out of bed for meals    times a day  - Out of bed for toileting  - Record patient progress and toleration of activity level   Outcome: Progressing     Problem: DISCHARGE PLANNING  Goal: Discharge to home or other facility with appropriate resources  Description: INTERVENTIONS:  - Identify barriers to discharge w/patient and caregiver  - Arrange for needed discharge resources and transportation as appropriate  - Identify discharge learning needs (meds, wound care, etc )  - Arrange for interpretive services to assist at discharge as needed  - Refer to Case Management Department for coordinating discharge planning if the patient needs post-hospital services based on physician/advanced practitioner order or complex needs related to functional status, cognitive ability, or social support system  Outcome: Progressing

## 2022-02-24 NOTE — PROGRESS NOTES
[address]  Progress Note - Garcia Negron 1955, 77 y o  male MRN: [id number]  Unit/Bed#: [address] 68 2 kayleen Bunny [id number] Encounter: [id number]  Primary Care Provider: PRINCE Holbrook   Date and time admitted to hospital: 2/23/2022  8:07 AM    * Hepatocellular carcinoma [facility])  Assessment & Plan  78-year-old gentleman with history of laryngeal cancer status post chemoradiation, diabetes, hypothyroidism, and liver cirrhosis well as hepatocellular carcinoma presents with nausea vomiting and worsening weakness  · The patient follows with Dr Pak as outpatient  · Last known imaging July 2021 stable and patient was supposed to be taking nexavar but has not been taking over the past week  · In the ED CT demonstrated multiple abnormal lymphadenopathy and L1 lytic lesion concerning for metastasis   · There is also a cystic pancreatic lesion up to 2 0 cm  · Completed IV hydration for gastroenteritis  · Appreciate oncology and gastroenterology for further evaluation and recommendations:  Lymph node biopsy ordered for possible malignant neoplasm lymph nodes/vertebrae  Mild protein-calorie malnutrition ([facility] [address] )  Assessment & Plan  Malnutrition Findings:   Adult Malnutrition type: Unknown (comment)  Adult Degree of Malnutrition: Malnutrition of mild degree (unable to eat sufficient energy / protein to maintain a healthy weight due to increased nutrient demand from catabolic illness  generalized loss of subcutaneous fat, muscle mass  treated with oral diet, addition of nutrition supplements )  BMI Findings:  Estimated body mass index is 25 22 kg/m² as calculated from the following:    Height as of 1/25/22: 5' 7" (1 702 m)  Weight as of 1/31/22: 73 kg (161 lb)         History of 2019 novel coronavirus disease (COVID-19)  Assessment & Plan  · History of COVID in December with subsequent weight loss  · Currently no respiratory symptoms    Lab Results   Component Value Date    SARSCOV2 Positive (A) 12/03/2021       Hypothyroidism  Assessment & Plan  · Continue levothyroxine    Hypertension  Assessment & Plan  · Continue diltiazem    Hepatic cirrhosis (HCC)  Assessment & Plan  · Hepatitis c/cirrhosis with history of esophageal varices  Continue nadolol    H/O laryngeal cancer  Assessment & Plan  · History of laryngeal cancer status post chemoradiation  · No evidence of recurrence    Gastroesophageal reflux disease with esophagitis  Assessment & Plan  · Continue famotidine    Diabetes mellitus, type II [facility])  Assessment & Plan  Lab Results   Component Value Date    HGBA1C 6 4 01/31/2022     Recent Labs     02/23/22  1705 02/23/22  2058 02/24/22  0709 02/24/22  1055   POCGLU 246* 125 135 182*     · Prior to admission on glimepiride  Continue on sliding scale insulin  VTE Pharmacologic Prophylaxis:  Moderate Risk (Score 3-4) - Pharmacological DVT Prophylaxis Ordered: Enoxaparin (Lovenox)  Patient Centered Rounds: I have performed bedside rounds with nursing staff today  Discussions with Specialists or Other Care Team Provider:  Case management    Education and Discussions with Family / Patient:  Significant other at bedside    Time Spent for Care: 20 mins  More than 50% of total time spent on counseling and coordination of care as described above  Current Length of Stay: 1 day(s)  Current Patient Status: Inpatient   Certification Statement: The patient will continue to require additional inpatient hospital stay due to IR evaluation for biopsy  Discharge Plan / Estimated Discharge Date: Anticipate discharge tomorrow to home  Code Status: Level 1 - Full Code      Subjective:   Patient seen and examined  Had back pain today improved with oxycodone    Objective:   Vitals: Blood pressure 134/72, pulse 56, temperature 98 °F (36 7 °C), resp  rate 18, SpO2 94 %  Physical Exam  Vitals reviewed  Constitutional:       General: He is not in acute distress  HENT:      Head: Atraumatic  Cardiovascular:      Rate and Rhythm: Regular rhythm  Heart sounds: Murmur heard  Pulmonary:      Effort: Pulmonary effort is normal       Breath sounds: No wheezing  Abdominal:      General: Bowel sounds are normal       Palpations: Abdomen is soft  Tenderness: There is no abdominal tenderness  There is no rebound  Musculoskeletal:         General: No swelling or tenderness  Skin:     General: Skin is warm and dry  Neurological:      General: No focal deficit present  Mental Status: He is alert  Mental status is at baseline  Cranial Nerves: No cranial nerve deficit  Motor: No weakness  Psychiatric:         Mood and Affect: Mood normal        Additional Data:   Labs:  Results from last 7 days   Lab Units 02/24/22  0537 02/23/22  0924 02/23/22  0853   WBC Thousand/uL 7 44 6 98  --    HEMOGLOBIN g/dL 11 4* 10 6*  --    HEMATOCRIT % 33 4* 31 4*  --    MCV fL 86 91  --    PLATELETS Thousands/uL 156 122*  --    INR   --   --  1 17     Results from last 7 days   Lab Units 02/24/22  0537 02/23/22  0853   SODIUM mmol/L 137 138   POTASSIUM mmol/L 3 9 4 5   CHLORIDE mmol/L 100 99*   CO2 mmol/L 32 32   ANION GAP mmol/L 5 7   BUN mg/dL 7 13   CREATININE mg/dL 0 63 0 71   CALCIUM mg/dL 8 9 9 2   ALBUMIN g/dL 3 1* 3 5   TOTAL BILIRUBIN mg/dL 1 01* 1 54*   ALK PHOS U/L 80 92   ALT U/L 21 14   AST U/L 16 36   EGFR ml/min/1 73sq m 102 97   GLUCOSE RANDOM mg/dL 139 228*             Results from last 7 days   Lab Units 02/23/22  1252 02/23/22  1100 02/23/22  0853   HS TNI 0HR ng/L  --   --  5   HS TNI 2HR ng/L  --  5  --    HS TNI 4HR ng/L 5  --   --               Results from last 7 days   Lab Units 02/24/22  1055 02/24/22  0709 02/23/22  2058 02/23/22  1705   POC GLUCOSE mg/dl 182* 135 125 246*             * I Have Reviewed All Lab Data Listed Above      Cultures:                   Lines/Drains:  Invasive Devices  Report    Peripheral Intravenous Line            Peripheral IV 02/23/22 Right Antecubital 1 day              Telemetry:      Imaging:  Imaging Reports Reviewed Today Include:   CT abdomen pelvis with contrast    Result Date: 2/23/2022  Impression: 1  Bulky new confluent gastrohepatic, portacaval, and retroperitoneal lymphadenopathy concerning for metastatic disease  New 1 cm lytic lesion in the L1 vertebral body which is also concerning for metastasis, for which attention on follow-up is recommended  Diffuse mesenteric and omental haziness is new from prior study, may reflect venous congestion from portal hypertension however carcinomatosis is not excluded  2   Multiple hepatic lesions consistent with known multifocal hepatocellular carcinoma, all of which are either slightly decreased in size or unchanged, without new hepatic lesions on single phase study  3   Findings which could reflect a nonspecific enterocolitis (infectious/inflammatory or secondary to venous stasis from portal hypertension among other etiologies), as well as discontinuous distal rectal wall thickening which could represent the same process or an underlying rectal lesion  4   Slow progressive growth of a cystic lesion in the pancreatic head now measuring up to 2 0 cm  For simple cyst(s) 2 cm or greater recommend gastroenterology and/or surgical oncology consult  EUS is likely now warranted    I personally discussed this study with Bradley Castellon on 2/23/2022 at 11:52 AM  Workstation performed: [id number]       Scheduled Meds:  Current Facility-Administered Medications   Medication Dose Route Frequency Provider Last Rate    acetaminophen  325 mg Oral Q6H PRN Aisha Weiss, DO      diltiazem  240 mg Oral Daily Mayito Rivera, DO      enoxaparin  40 mg Subcutaneous Q24H [address] Mayito Miguel, DO      famotidine  40 mg Oral Early Morning Mayito Rivera, DO      insulin lispro  1-6 Units Subcutaneous 4x Daily (AC & HS) Aisha Weiss DO      levothyroxine  112 mcg Oral Early Morning Mayito Rivera, DO      melatonin  6 mg Oral HS Amparo France PA-C      nadolol  40 mg Oral Daily Mayito Rivera DO      ondansetron  4 mg Intravenous Q6H PRN Daphney Angelucci, DO      oxyCODONE  5 mg Oral Q4H PRN Daphney Angelucci, DO         Today, Patient Was Seen By: Daphney Angelucci, DO    ** Please Note: Dictation voice to text software may have been used in the creation of this document   **

## 2022-02-25 NOTE — ASSESSMENT & PLAN NOTE
Lab Results   Component Value Date    HGBA1C 6 4 01/31/2022     Recent Labs     02/24/22  1643 02/24/22  2108 02/25/22  0744 02/25/22  1125   POCGLU 153* 217* 142* 193*     · Prior to admission on glimepiride    Continue as previously taken after discharge

## 2022-02-25 NOTE — DISCHARGE SUMMARY
[facility]  Discharge- Rashaad Brennan 1955, 77 y o  male MRN: [id number]  Unit/Bed#: [address] 68 2 [facility] [id number] Encounter: [id number]  Primary Care Provider: PRINCE Crooks   Date and time admitted to hospital: 2/23/2022  8:07 AM    Admitting Provider:  Ritchie Acevedo DO  Discharge Provider:  Ritchie Acevedo DO  Admission Date: 2/23/2022       Discharge Date: 02/25/22   LOS: 2  Primary Care Physician at Discharge: Magi Garcia, [address]    DISCHARGE DIAGNOSES  * Hepatocellular carcinoma [facility])  Assessment & Plan  78-year-old gentleman with history of laryngeal cancer status post chemoradiation, diabetes, hypothyroidism, and liver cirrhosis well as hepatocellular carcinoma presents with nausea vomiting and worsening weakness  · The patient follows with Dr Freya Brooks as outpatient  · Last known imaging July 2021 stable and patient was supposed to be taking nexavar but has not been taking over the past week  · In the ED CT demonstrated multiple abnormal lymphadenopathy and L1 lytic lesion concerning for metastasis   · There is also a cystic pancreatic lesion up to 2 0 cm  · Completed IV hydration for gastroenteritis  · Appreciate oncology and gastroenterology for further evaluation and recommendations:  Lymph node biopsy ordered for possible malignant neoplasm lymph nodes/vertebrae  · Oxycodone 10 mg 20 tablets sent to pharmacy to be used as needed  Mild protein-calorie malnutrition ([facility] [address] )  Assessment & Plan  Malnutrition Findings:   Adult Malnutrition type: Unknown (comment)  Adult Degree of Malnutrition: Malnutrition of mild degree (unable to eat sufficient energy / protein to maintain a healthy weight due to increased nutrient demand from catabolic illness  generalized loss of subcutaneous fat, muscle mass    treated with oral diet, addition of nutrition supplements )  BMI Findings:  Estimated body mass index is 25 22 kg/m² as calculated from the following:    Height as of 1/25/22: 5' 7" (1 702 m)  Weight as of 1/31/22: 73 kg (161 lb)  History of 2019 novel coronavirus disease (COVID-19)  Assessment & Plan  · History of COVID in December with subsequent weight loss  · Currently no respiratory symptoms    Lab Results   Component Value Date    SARSCOV2 Positive (A) 12/03/2021       Hypothyroidism  Assessment & Plan  · Continue levothyroxine    Hypertension  Assessment & Plan  · Continue diltiazem    Hepatic cirrhosis (HCC)  Assessment & Plan  · Hepatitis c/cirrhosis with history of esophageal varices  Continue nadolol    H/O laryngeal cancer  Assessment & Plan  · History of laryngeal cancer status post chemoradiation  · No evidence of recurrence    Gastroesophageal reflux disease with esophagitis  Assessment & Plan  · Continue famotidine    Diabetes mellitus, type II [facility])  Assessment & Plan  Lab Results   Component Value Date    HGBA1C 6 4 01/31/2022     Recent Labs     02/24/22  1643 02/24/22  2108 02/25/22  0744 02/25/22  1125   POCGLU 153* 217* 142* 193*     · Prior to admission on glimepiride  Continue as previously taken after discharge    REASON FOR ADMISSION  Dizziness (Pt reports dizziness beginning yesterday followed by n/v )    HOSPITAL COURSE:  Rico Mosley is a 77 y o  male with a history of hepatitis-C/cirrhosis, laryngeal cancer, diabetes, and hypertension who presented with poor intake weight loss and back/abdominal discomfort  In the ED he was found have multiple abnormalities on CT scan requiring admission  He was seen by GI, can have EUS non urgently for pancreatic cyst   Hematology recommends lymph node biopsy  This will be scheduled via IR outpatient  The case was discussed with IR schedule a who will call patient to schedule procedure  Patient will then follow up with his primary oncologist Dr Yogi Malik  He does have back pain at time of discharge and will be prescribed oxycodone 10 mg   He was hydrated with IV fluids and now feels good     The case was discussed with significant other during hospitalization prior to discharge  Please see problem list listed above  CONSULTING PROVIDERS   IP CONSULT TO GASTROENTEROLOGY  IP CONSULT TO HEMATOLOGY  INPATIENT CONSULT TO IR    PROCEDURES PERFORMED  * No surgery found *    RADIOLOGY RESULTS  CT abdomen pelvis with contrast    Result Date: 2/23/2022  Impression: 1  Bulky new confluent gastrohepatic, portacaval, and retroperitoneal lymphadenopathy concerning for metastatic disease  New 1 cm lytic lesion in the L1 vertebral body which is also concerning for metastasis, for which attention on follow-up is recommended  Diffuse mesenteric and omental haziness is new from prior study, may reflect venous congestion from portal hypertension however carcinomatosis is not excluded  2   Multiple hepatic lesions consistent with known multifocal hepatocellular carcinoma, all of which are either slightly decreased in size or unchanged, without new hepatic lesions on single phase study  3   Findings which could reflect a nonspecific enterocolitis (infectious/inflammatory or secondary to venous stasis from portal hypertension among other etiologies), as well as discontinuous distal rectal wall thickening which could represent the same process or an underlying rectal lesion  4   Slow progressive growth of a cystic lesion in the pancreatic head now measuring up to 2 0 cm  For simple cyst(s) 2 cm or greater recommend gastroenterology and/or surgical oncology consult  EUS is likely now warranted    I personally discussed this study with Syble Pain on 2/23/2022 at 11:52 AM  Workstation performed: [id number]       LABS  Results from last 7 days   Lab Units 02/24/22  0537 02/23/22  0924 02/23/22  0853   WBC Thousand/uL 7 44 6 98  --    HEMOGLOBIN g/dL 11 4* 10 6*  --    HEMATOCRIT % 33 4* 31 4*  --    MCV fL 86 91  --    PLATELETS Thousands/uL 156 122*  --    INR   --   --  1 17     Results from last 7 days   Lab Units 02/24/22  0537 02/23/22  0853   SODIUM mmol/L 137 138   POTASSIUM mmol/L 3 9 4 5   CHLORIDE mmol/L 100 99*   CO2 mmol/L 32 32   BUN mg/dL 7 13   CREATININE mg/dL 0 63 0 71   CALCIUM mg/dL 8 9 9 2   ALBUMIN g/dL 3 1* 3 5   TOTAL BILIRUBIN mg/dL 1 01* 1 54*   ALK PHOS U/L 80 92   ALT U/L 21 14   AST U/L 16 36   EGFR ml/min/1 73sq m 102 97   GLUCOSE RANDOM mg/dL 139 228*         Results from last 7 days   Lab Units 02/23/22  1252 02/23/22  1100 02/23/22  0853   HS TNI 0HR ng/L  --   --  5   HS TNI 2HR ng/L  --  5  --    HS TNI 4HR ng/L 5  --   --               Results from last 7 days   Lab Units 02/25/22  0744 02/24/22  2108 02/24/22  1643 02/24/22  1055 02/24/22  0709 02/23/22  2058 02/23/22  1705   POC GLUCOSE mg/dl 142* 217* 153* 182* 135 125 246*           Cultures:   Results from last 7 days   Lab Units 02/23/22  1108   COLOR UA  Yellow   CLARITY UA  Clear   SPEC GRAV UA  1 010   PH UA  6 0   LEUKOCYTES UA  Negative   NITRITE UA  Negative   GLUCOSE UA mg/dl Negative   KETONES UA mg/dl Negative   BILIRUBIN UA  Negative   BLOOD UA  Negative          DISCHARGE DAY VISIT AND PHYSICAL EXAM:  Subjective:  Patient seen and examined  No new complaints  Ready for discharge home  Requesting some oxycodone  Vitals:   Blood Pressure: 117/69 (02/25/22 0847)  Pulse: 69 (02/25/22 0847)  Temperature: 98 1 °F (36 7 °C) (02/24/22 2108)  Temp Source: Oral (02/24/22 2108)  Respirations: 17 (02/24/22 2108)  SpO2: 94 % (02/25/22 0847)    Physical Exam  Vitals reviewed  Constitutional:       General: He is not in acute distress  Appearance: Normal appearance  HENT:      Head: Atraumatic  Cardiovascular:      Rate and Rhythm: Regular rhythm  Heart sounds: Murmur heard  Pulmonary:      Effort: Pulmonary effort is normal       Breath sounds: No wheezing  Abdominal:      General: Bowel sounds are normal       Palpations: Abdomen is soft  Tenderness: There is no abdominal tenderness   There is no rebound  Musculoskeletal:         General: No swelling or tenderness  Skin:     General: Skin is warm and dry  Neurological:      Mental Status: He is alert and oriented to person, place, and time  Cranial Nerves: No cranial nerve deficit  Motor: No weakness  Psychiatric:         Mood and Affect: Mood normal        Planned Re-admission:  No  Discharge Disposition: Home/Self Care    Test Results Pending at Discharge:   Incidental findings:   · Bulky lymphadenopathy  IR biopsy to rule out lymphoma or metastasis  · Pancreatic cyst   Follow-up with GI for EUS    Medications   · Discharge Medication List: See after visit summary for reconciled discharge medications  Diet restrictions:  Diabetic diet   Activity restrictions: No strenuous activity  Discharge Condition: stable    Outpatient Follow-Up and Discharge Instructions  See after visit summary section titled Discharge Instructions for information provided to patient and family  Code Status: Level 1 - Full Code  Discharge Statement   I spent 35 minutes discharging the patient  This time was spent on the day of discharge  Greater than 50% of total time was spent with the patient and / or family counseling and / or coordination of care  ** Please Note: This note has been constructed using a voice recognition system   **

## 2022-02-25 NOTE — PLAN OF CARE
Problem: PAIN - ADULT  Goal: Verbalizes/displays adequate comfort level or baseline comfort level  Description: Interventions:  - Encourage patient to monitor pain and request assistance  - Assess pain using appropriate pain scale  - Administer analgesics based on type and severity of pain and evaluate response  - Implement non-pharmacological measures as appropriate and evaluate response  - Consider cultural and social influences on pain and pain management  - Notify physician/advanced practitioner if interventions unsuccessful or patient reports new pain  Outcome: Progressing     Problem: SAFETY ADULT  Goal: Patient will remain free of falls  Description: INTERVENTIONS:  - Educate patient/family on patient safety including physical limitations  - Instruct patient to call for assistance with activity   - Consult OT/PT to assist with strengthening/mobility   - Keep Call bell within reach  - Keep bed low and locked with side rails adjusted as appropriate  - Keep care items and personal belongings within reach  - Initiate and maintain comfort rounds  - Make Fall Risk Sign visible to staff  - Offer Toileting every  Hours, in advance of need  - Initiate/Maintain alarm  - Obtain necessary fall risk management equipment:   - Apply yellow socks and bracelet for high fall risk patients  - Consider moving patient to room near nurses station  Outcome: Progressing  Goal: Maintain or return to baseline ADL function  Description: INTERVENTIONS:  -  Assess patient's ability to carry out ADLs; assess patient's baseline for ADL function and identify physical deficits which impact ability to perform ADLs (bathing, care of mouth/teeth, toileting, grooming, dressing, etc )  - Assess/evaluate cause of self-care deficits   - Assess range of motion  - Assess patient's mobility; develop plan if impaired  - Assess patient's need for assistive devices and provide as appropriate  - Encourage maximum independence but intervene and supervise when necessary  - Involve family in performance of ADLs  - Assess for home care needs following discharge   - Consider OT consult to assist with ADL evaluation and planning for discharge  - Provide patient education as appropriate  Outcome: Progressing  Goal: Maintains/Returns to pre admission functional level  Description: INTERVENTIONS:  - Perform BMAT or MOVE assessment daily    - Set and communicate daily mobility goal to care team and patient/family/caregiver  - Collaborate with rehabilitation services on mobility goals if consulted  - Perform Range of Motion  times a day  - Reposition patient every  hours    - Dangle patient  times a day  - Stand patient  times a day  - Ambulate patient  times a day  - Out of bed to chair  times a day   - Out of bed for meals times a day  - Out of bed for toileting  - Record patient progress and toleration of activity level   Outcome: Progressing     Problem: DISCHARGE PLANNING  Goal: Discharge to home or other facility with appropriate resources  Description: INTERVENTIONS:  - Identify barriers to discharge w/patient and caregiver  - Arrange for needed discharge resources and transportation as appropriate  - Identify discharge learning needs (meds, wound care, etc )  - Arrange for interpretive services to assist at discharge as needed  - Refer to Case Management Department for coordinating discharge planning if the patient needs post-hospital services based on physician/advanced practitioner order or complex needs related to functional status, cognitive ability, or social support system  Outcome: Progressing

## 2022-02-25 NOTE — DISCHARGE INSTR - AVS FIRST PAGE
Hepatocellular carcinoma  Oxycodone sent to pharmacy follow-up with Dr Yogi Malik  Lymphadenopathy    Follow-up with IR for biopsy

## 2022-02-25 NOTE — PLAN OF CARE
Problem: Nutrition/Hydration-ADULT  Goal: Nutrient/Hydration intake appropriate for improving, restoring or maintaining nutritional needs  Description: Monitor and assess patient's nutrition/hydration status for malnutrition  Collaborate with interdisciplinary team and initiate plan and interventions as ordered  Monitor patient's weight and dietary intake as ordered or per policy  Utilize nutrition screening tool and intervene as necessary  Determine patient's food preferences and provide high-protein, high-caloric foods as appropriate       INTERVENTIONS:  - Monitor oral intake, urinary output, labs, and treatment plans  - Assess nutrition and hydration status and recommend course of action  - Evaluate amount of meals eaten  - Assist patient with eating if necessary   - Allow adequate time for meals  - Recommend/ encourage appropriate diets, oral nutritional supplements, and vitamin/mineral supplements  - Order, calculate, and assess calorie counts as needed  - Recommend, monitor, and adjust tube feedings and TPN/PPN based on assessed needs  - Assess need for intravenous fluids  - Provide specific nutrition/hydration education as appropriate  - Include patient/family/caregiver in decisions related to nutrition  Outcome: Adequate for Discharge     Problem: PAIN - ADULT  Goal: Verbalizes/displays adequate comfort level or baseline comfort level  Description: Interventions:  - Encourage patient to monitor pain and request assistance  - Assess pain using appropriate pain scale  - Administer analgesics based on type and severity of pain and evaluate response  - Implement non-pharmacological measures as appropriate and evaluate response  - Consider cultural and social influences on pain and pain management  - Notify physician/advanced practitioner if interventions unsuccessful or patient reports new pain  Outcome: Adequate for Discharge     Problem: SAFETY ADULT  Goal: Patient will remain free of falls  Description: INTERVENTIONS:  - Educate patient/family on patient safety including physical limitations  - Instruct patient to call for assistance with activity   - Consult OT/PT to assist with strengthening/mobility   - Keep Call bell within reach  - Keep bed low and locked with side rails adjusted as appropriate  - Keep care items and personal belongings within reach  - Initiate and maintain comfort rounds  - Make Fall Risk Sign visible to staff  - Offer Toileting every  Hours, in advance of need  - Initiate/Maintain alarm  - Obtain necessary fall risk management equipment  - Apply yellow socks and bracelet for high fall risk patients  - Consider moving patient to room near nurses station  Outcome: Adequate for Discharge  Goal: Maintain or return to baseline ADL function  Description: INTERVENTIONS:  -  Assess patient's ability to carry out ADLs; assess patient's baseline for ADL function and identify physical deficits which impact ability to perform ADLs (bathing, care of mouth/teeth, toileting, grooming, dressing, etc )  - Assess/evaluate cause of self-care deficits   - Assess range of motion  - Assess patient's mobility; develop plan if impaired  - Assess patient's need for assistive devices and provide as appropriate  - Encourage maximum independence but intervene and supervise when necessary  - Involve family in performance of ADLs  - Assess for home care needs following discharge   - Consider OT consult to assist with ADL evaluation and planning for discharge  - Provide patient education as appropriate  Outcome: Adequate for Discharge  Goal: Maintains/Returns to pre admission functional level  Description: INTERVENTIONS:  - Perform BMAT or MOVE assessment daily    - Set and communicate daily mobility goal to care team and patient/family/caregiver     Problem: DISCHARGE PLANNING  Goal: Discharge to home or other facility with appropriate resources  Description: INTERVENTIONS:  - Identify barriers to discharge w/patient and caregiver  - Arrange for needed discharge resources and transportation as appropriate  - Identify discharge learning needs (meds, wound care, etc )  - Arrange for interpretive services to assist at discharge as needed  - Refer to Case Management Department for coordinating discharge planning if the patient needs post-hospital services based on physician/advanced practitioner order or complex needs related to functional status, cognitive ability, or social support system  Outcome: Adequate for Discharge     Problem: Potential for Falls  Goal: Patient will remain free of falls  Description: INTERVENTIONS:  - Educate patient/family on patient safety including physical limitations  - Instruct patient to call for assistance with activity   - Consult OT/PT to assist with strengthening/mobility   - Keep Call bell within reach  - Keep bed low and locked with side rails adjusted as appropriate  - Consider moving patient to room near nurses station  Outcome: Adequate for Discharge     - Record patient progress and toleration of activity level   Outcome: Adequate for Discharge     Problem: DISCHARGE PLANNING  Goal: Discharge to home or other facility with appropriate resources  Description: INTERVENTIONS:  - Identify barriers to discharge w/patient and caregiver  - Arrange for needed discharge resources and transportation as appropriate  - Identify discharge learning needs (meds, wound care, etc )  - Arrange for interpretive services to assist at discharge as needed  - Refer to Case Management Department for coordinating discharge planning if the patient needs post-hospital services based on physician/advanced practitioner order or complex needs related to functional status, cognitive ability, or social support system  Outcome: Adequate for Discharge

## 2022-02-25 NOTE — NURSING NOTE
Patient discharged to home  Discharge instructions were reviewed with patient  Patient agreed to follow up with PCP in 1-2 weeks  Patient was told he would be contacted by IR to schedule biopsy after discharge  Patients IV was removed and patient was escorted out via wheelchair

## 2022-02-25 NOTE — ASSESSMENT & PLAN NOTE
49-year-old gentleman with history of laryngeal cancer status post chemoradiation, diabetes, hypothyroidism, and liver cirrhosis well as hepatocellular carcinoma presents with nausea vomiting and worsening weakness  · The patient follows with Dr Yanet Mitchell as outpatient  · Last known imaging July 2021 stable and patient was supposed to be taking nexavar but has not been taking over the past week  · In the ED CT demonstrated multiple abnormal lymphadenopathy and L1 lytic lesion concerning for metastasis   · There is also a cystic pancreatic lesion up to 2 0 cm  · Completed IV hydration for gastroenteritis  · Appreciate oncology and gastroenterology for further evaluation and recommendations:  Lymph node biopsy ordered for possible malignant neoplasm lymph nodes/vertebrae  · Oxycodone 10 mg 20 tablets sent to pharmacy to be used as needed

## 2022-02-28 NOTE — TELEPHONE ENCOUNTER
Patient called in stating that he was hospitalized this past week  Patient was told to reach out to Dr Fidel Newberry to schedule an appointment as soon as possible  Patient refused to schedule an appointment until he speaks to Dr Fidel Newberry  Patient can be reached back at [phone]  Patient also provided his fiances number at [phone]

## 2022-02-28 NOTE — UTILIZATION REVIEW
Notification of Discharge   This is a Notification of Discharge from our facility [address]  Please be advised that this patient has been discharge from our facility  Below you will find the admission and discharge date and time including the patients disposition  UTILIZATION REVIEW CONTACT:  Randi Roque  Utilization   Network Utilization Review Department  Phone: [phone] x carefully listen to the prompts  All voicemails are confidential   Email: [email or web address]     PHYSICIAN ADVISORY SERVICES:  FOR [id number] REVIEW - MEDICAL NECESSITY DENIAL  Phone: [phone]  Fax: [phone]  Email: [email or web address]     PRESENTATION DATE: 2/23/2022  8:07 AM  OBERVATION ADMISSION DATE:  INPATIENT ADMISSION DATE: 2/23/22 12:34 PM   DISCHARGE DATE: 2/25/2022  2:22 PM  DISPOSITION: Home/Self Care Home/Self Care      IMPORTANT INFORMATION:  Send all requests for admission clinical reviews, approved or denied determinations and any other requests to dedicated fax number below belonging to the campus where the patient is receiving treatment   List of dedicated fax numbers:  [address] DENIALS (Administrative/Medical Necessity) [phone]   [address] (Maternity/NICU/Pediatrics) [phone]   [facility] [phone]   130 [facility] [phone]   [address] [place] [address] [phone]   2000 [facility] [address] [place] [address],[address] [place] [address] [phone]   [facility]  [phone]   [address] [facility] [address]  2401 [facility] [address] [facility] [phone]

## 2022-03-01 NOTE — DISCHARGE INSTRUCTIONS
Needle Biopsy   AMBULATORY CARE:   What you need to know about a needle biopsy:  Biopsies may be taken from anywhere in your body  Examples include an organ such as your liver or lung, a muscle, or skin  The tissue can be sent to the lab and tested for cancer or infection  If you have had an organ transplant, tissue from the organ can be tested for organ rejection  There are 2 types of needle biopsies  A fine needle aspiration  (FNA) biopsy removes a small sample of cells, tissue, or fluid from a tumor  An FNA uses a thin needle  A core needle  biopsy removes a larger amount of tissue from the tumor  This type of biopsy uses a wider needle  How to prepare for a needle biopsy:   · Your healthcare provider will talk to you about how to prepare for your procedure  You may need to stop taking blood thinner medicine or aspirin 3 days before your procedure  The provider may tell you not to eat or drink anything 8 hours before your procedure  The provider will tell you what medicines to take or not take on the day of your procedure  · A CT scan, MRI, or x-ray may be used during your procedure if the tumor cannot be felt  You may be given contrast liquid during your procedure to help the tissue show up better in the pictures  Tell the healthcare provider if you have ever had an allergic reaction to contrast liquid  Also tell him if you are pregnant or think you are pregnant  Special shields can be used during the procedure to keep your baby safe  · Do not put on lotions or powders on the day of your procedure  Do not put on deodorant if your biopsy will be taken near your armpit  These products may cause particles to appear on your x-ray  Wear loose-fitting clothing to your procedure  Arrange for someone to drive you home and stay with you after your procedure if you are having IV sedation or general anesthesia      What will happen during a needle biopsy:   · Depending on where the tumor is, you may receive IV sedatives or general anesthesia  You may, instead, be given local anesthesia to numb the area  With local anesthesia, you may still feel pressure or pushing during your procedure, but you should not feel any pain  · Your healthcare provider may make a small incision  He or she will insert a needle through your skin and into the tissue  The provider may use pictures on the monitor to help guide the needle to the correct place  When the needle reaches the tissue, samples will be taken  · The provider will remove the needle and apply pressure to the area  You will not need stitches  A small bandage will be placed if you have an incision  Your healthcare provider may also wrap a tight-fitting bandage across the area  This may prevent bleeding, swelling, and pain  What will happen after a needle biopsy:  Healthcare providers will monitor you until you are awake  You may be sore or have bruising or swelling for a few days  Do not breastfeed for 24 to 48 hours if you received contrast liquid  The contrast liquid may harm your baby  You may go home after your procedure or you may need to spend a night in the hospital    Risks of a needle biopsy: You may bleed more than expected or get an infection  A pocket of blood or fluid may form under your skin  You may need surgery to drain or remove it  The biopsy needle may damage other organs or tissues  Seek care immediately if:   · Blood soaks through your bandage  · Your bruise suddenly gets larger and feels hard  Call your doctor if:   · You have a fever or chills  · Your biopsy site is red, swollen, or draining pus  · You have nausea or are vomiting  · Your skin is itchy, swollen, or you have a rash  · Your pain does not get better, or gets worse after you take pain medicine  · You have questions or concerns about your condition or care  Medicines:   You may need any of the following:  · NSAIDs , such as ibuprofen, help decrease swelling, pain, and fever  This medicine is available with or without a doctor's order  NSAIDs can cause stomach bleeding or kidney problems in certain people  If you take blood thinner medicine, always ask your healthcare provider if NSAIDs are safe for you  Always read the medicine label and follow directions  · Acetaminophen  decreases pain and fever  It is available without a doctor's order  Ask how much to take and how often to take it  Follow directions  Read the labels of all other medicines you are using to see if they also contain acetaminophen, or ask your doctor or pharmacist  Acetaminophen can cause liver damage if not taken correctly  Do not use more than 4 grams (4,000 milligrams) total of acetaminophen in one day  · Prescription pain medicine  may be given  Ask your healthcare provider how to take this medicine safely  Some prescription pain medicines contain acetaminophen  Do not take other medicines that contain acetaminophen without talking to your healthcare provider  Too much acetaminophen may cause liver damage  Prescription pain medicine may cause constipation  Ask your healthcare provider how to prevent or treat constipation  · Take your medicine as directed  Contact your healthcare provider if you think your medicine is not helping or if you have side effects  Tell him or her if you are allergic to any medicine  Keep a list of the medicines, vitamins, and herbs you take  Include the amounts, and when and why you take them  Bring the list or the pill bottles to follow-up visits  Carry your medicine list with you in case of an emergency  Care for your biopsy site as directed: If you have a tight-fitting bandage, you can remove it in 24 to 48 hours, or as directed  Ask your healthcare provider when your biopsy site can get wet  Carefully wash around the site with soap and water  It is okay to let soap and water gently run over your biopsy site  Do not  scrub the site   Dry the area and put on new, clean bandages as directed  Change your bandages when they get wet or dirty  Check your biopsy site every day for signs of infection such as redness, swelling, or pus  Do not put powders or lotions on your biopsy site  Self-care:   · Apply ice  on your biopsy site for 15 to 20 minutes every hour or as directed  Use an ice pack, or put crushed ice in a plastic bag  Cover it with a towel before you apply it to your skin  Ice helps prevent tissue damage and decreases swelling and pain  · Rest  as directed  Do not lift anything heavier than 5 pounds, play sports, or exercise  These activities may cause bleeding  Short walks around the house are okay  Ask your healthcare provider when you can return to your normal activities  · Drink plenty of liquids  as directed  Liquids help flush contrast liquid from your body  Ask how much liquid to drink each day and which liquids are best for you  Follow up with your doctor as directed:  Write down your questions so you remember to ask them during your visits  © Copyright [employer] 2022 Information is for End User's use only and may not be sold, redistributed or otherwise used for commercial purposes  All illustrations and images included in CareNotes® are the copyrighted property of [employer] M [employer] Inc  or Maya Rucker   The above information is an  only  It is not intended as medical advice for individual conditions or treatments  Talk to your doctor, nurse or pharmacist before following any medical regimen to see if it is safe and effective for you

## 2022-03-01 NOTE — SEDATION DOCUMENTATION
RP lymph node bx completed  Band aid to site  Patient tolerated well and transferred back to [facility] in stable condition  Report and care assumed by primary RN

## 2022-03-01 NOTE — BRIEF OP NOTE (RAD/CATH)
INTERVENTIONAL RADIOLOGY PROCEDURE NOTE    Date: 3/1/2022    Procedure: IR BIOPSY RETROPERITONEUM    Preoperative diagnosis:   1  Hepatocellular carcinoma (HCC)         Postoperative diagnosis: Same  Surgeon: Tristian Byrd MD     Assistant: None  No qualified resident was available  Blood loss: None    Specimens: Yes (formalin + RPMI)     Findings: Successful CT-guided 18G core bx retroperitoneal adenopathy x 5    Complications: None immediate      Anesthesia: conscious sedation and local

## 2022-03-11 NOTE — PROGRESS NOTES
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    He has history of chronic hepatitis C as well as alcohol abuse  [facility] has liver cirrhosis   He has multifocal liver lesion with low level of AFP    This was histologically confirmed to be well-differentiated hepatocellular carcinoma   He started sorafenib in October 2018, which produced long-term stabilization of disease  However, he recently developed progressive disease with bulky abdominal adenopathy which was confirmed to be hepatocellular carcinoma  I recommended him to discontinue sorafenib  We discussed the further treatment options  I recommended him to start atezolizumab and bevacizumab  Side effects of this regimen was thoroughly discussed, including but not limited to immune mediated organ toxicity, very small chance of treatment-related mortality, hypertension, small risk of DVT, protein urea  He understood and wished to proceed  I refilled oxycodone 10 mg as needed  I will refer him to palliative Care for pain management  I will see him again in 7 weeks for routine follow-up  I also had extensive discussion regarding his prognosis  Based on his recent progression as well as weight loss, his life expectancy may be between 1-3 years  He and his wife understood         Subjective:      HPI:             Interval History:  A 77year old gentleman with 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 diagnosed in November 2011  He underwent concurrent chemoradiation with high dose cisplatin resulting in complete response   He has chronic hepatitis C, as well as liver cirrhosis, based on a CT scan of abdomen and pelvis  In October 2014, he was found to have multifocal liver lesions, based on the CT scan  This was confirmed by MRI of the abdomen  However, the PET/CT scan showed the lesion was not hypermetabolic  He declined liver biopsy    Radiographically, this was consistent with multifocal hepatocellular carcinoma   He has been under the care of Foster Driscoll summer of 2018, CT scan showed progression   He was suggested to have Siro sphere which he declined  Salomon Chairez, he accepted sorafenib, which he started in October 2018, resulting in prolonged disease control  However, he was hospitalized with abdominal pain in late February 2022 I which time CT scan showed new bulky gastrohepatic, portacaval and retroperitoneal lymphadenopathy  He underwent CT-guided biopsy which showed hepatocellular carcinoma  He is quite anorexic, resulting in recent weight loss  He continued to have some mid abdominal pain  He has no respiratory symptoms  His performance status is 1/4 on ECOG scale  Objective:      Primary Diagnosis:     1  Squamous cell carcinoma of oropharynx, stage SHAINA disease, diagnosed in November 2011  2 Multifocal liver lesion, clinically clinically consistent with hepatocellular carcinoma      Cancer Staging:  Cancer Staging  No matching staging information was found for the patient         Previous Hematologic/ Oncologic Treatment:      1  Concurrent chemoradiation with high-dose cisplatin completed on February 13, 2012     2  Sorafenib from October 2018 through March 2022        Current Hematologic/ Oncologic Treatment:       Atezolizumab and bevacizumab to be started in March 17, 2022         Disease Status:      Progression of hepatocellular carcinoma on sorafenib      Test Results:     Pathology:      Liver biopsy showed well-differentiated hepatocellular carcinoma      Biopsy of retroperitoneal lymph node showed hepatocellular carcinoma      Radiology:     CT scan of chest abdomen pelvis in February 2022 showed bulky new confirmed went gastro phrenic, portacaval and retroperitoneal lymphadenopathy  Multiple hepatic lesions      Laboratory:      AFP is 12 in January 2022  See below otherwise        Physical Exam:        General Appearance:    Alert, oriented          Eyes:    PERRL   Ears:    Normal external ear canals, both ears   Nose:   Nares normal, septum midline   Throat:   Mucosa moist  Pharynx without injection  Neck:   Supple         Lungs:     Clear to auscultation bilaterally   Chest Wall:    No tenderness or deformity    Heart:    Regular rate and rhythm         Abdomen:     Soft, non-tender, bowel sounds +, hepatomegaly   No palpable spleen                Extremities:   Extremities no cyanosis or edema         Skin:   no rash or icterus  Lymph nodes:   Cervical, supraclavicular, and axillary nodes normal   Neurologic:   CNII-XII intact, normal strength, sensation and reflexes     Throughout             Breast exam:   Not applicable  ROS: Review of Systems   All other systems reviewed and are negative  Imaging: IR biopsy retroperitoneum    Result Date: 3/1/2022  Narrative: IR BIOPSY RETROPERITONEUM PROCEDURE: CT-guided biopsy retroperitoneal adenopathy CLINICAL INDICATION: History of cirrhosis with well differentiated hepatocellular carcinoma (biopsy 10/4/2018) with development of bulky retroperitoneal adenopathy, new since 7/15/2021 COMPARISON: CT 3/1/2022 PERFORMING PHYSICIAN: Smith Mackenzie MD ASSISTANT PHYSICIAN: None MEDICATIONS: 1% lidocaine (local)  2 mg Versed  100 mcg fentanyl  SEDATION TIME: Continuous cardiopulmonary monitoring by the interventional radiology physician and/or nurse for  24 min CONTRAST: None FLUOROSCOPY TIME: None RADIATION DOSE: 491 99 mGy   (air Kerma)   NUMBER OF IMAGES: 60 TECHNIQUE: Informed written consent was obtained from the patient after a thorough discussion of risks, benefits, and alternatives to the procedure   All questions were answered  The patient was brought to the procedure room and a time-out was performed utilizing universal protocol  The patient was identified verbally and via wrist band  The patient was placed prone on the procedure table and localizing images to the retroperitoneal adenopathy were obtained  An appropriate skin entry site utilizing a right-sided approach below the level of the renal compa was chosen and marked  The skin was prepped and draped in the usual sterile fashion  The skin and subcutaneous tissues were infiltrated with local anesthetic  Skin nick made with an 11 blade  17-gauge guiding cannula advanced under CT guidance to representative lymph node conglomerate in the right retrocaval location  6 18-gauge biopsy cores were obtained  Tissue adequacy confirmed by the on-site pathologist  Specimens placed in formalin and RPMI solution  Hemostasis was immediate  Needle and cannula removed  Site was dressed and bandaged  No evidence for postprocedure bleeding on completion scanning  The patient tolerated the procedure well without difficulty or complication encountered and left the section in satisfactory stable condition  FINDINGS: As above  Impression: Technically successful CT-guided biopsy retroperitoneal adenopathy is noted  Workstation performed: [id number]     CT abdomen pelvis with contrast    Result Date: 2/23/2022  Narrative: CT ABDOMEN AND PELVIS WITH IV CONTRAST INDICATION:   abd pain- liver ca  79-year-old male with remote history of oropharyngeal squamous cell carcinoma, chronic hepatitis C with cirrhosis, well-differentiated hepatocellular carcinoma on sorafenib  COMPARISON:  CT chest/abdomen/pelvis 7/15/2021  TECHNIQUE:  CT examination of the abdomen and pelvis was performed  Axial, sagittal, and coronal 2D reformatted images were created from the source data and submitted for interpretation  Radiation dose length product (DLP) for this visit:  448 mGy-cm   This examination, like all CT scans performed in the [facility], was performed utilizing techniques to minimize radiation dose exposure, including the use of iterative reconstruction and automated exposure control  IV Contrast:  100 mL of iohexol (OMNIPAQUE) Enteric Contrast:  Enteric contrast was not administered  FINDINGS: ABDOMEN LOWER CHEST: Mild scarring/atelectasis in the lung bases  Small hiatal hernia  LIVER: Cirrhotic morphology  Again seen are multiple hepatic lesions consistent with known multifocal hepatocellular carcinoma, all of which are either slightly decreased in size or unchanged, without new hepatic lesions on single phase study  - Dominant heterogeneously enhancing segment 7 mass is slightly decreased in size now measuring 4 7 x 3 1 x 3 4 cm (previously 4 9 x 3 2 x 4 3 cm remeasured in same fashion) on series 2/16, 601/103  - Probable correlate for the previously seen arterially enhancing segment 8 lesion at the dome is a 1 2 cm (previously 1 3 cm) focus of hypoattenuation likely representing washout on series 2/6  - Segment 5 subcapsular hyperenhancing lesion measures 1 4 cm (previously 1 6 cm) on series 2/24  - Segment 8 focus of hypoattenuation measures 7 mm (previously 7 mm) on series 2/13  BILIARY: No intrahepatic biliary ductal dilatation  Normal caliber common bile duct  GALLBLADDER: No calcified gallstones  Normal wall thickness  No pericholecystic inflammatory changes  SPLEEN: Splenomegaly measuring 15 cm (previously 14 cm)  PANCREAS: Cystic focus in the pancreatic head is slowly growing, now measures 2 0 x 1 2 x 1 2 cm (previously 1 9 x 1 0 x 1 2 cm in 7/2021, from 1 8 x 1 0 x 1 2 cm in 6/2020 remeasured in same fashion) on series 2/22, 601/47  Pancreatic parenchyma is otherwise within normal limits  No main pancreatic ductal dilatation  No peripancreatic inflammation   ADRENAL GLANDS: Within normal limits  KIDNEYS/URETERS: Normal size and position  Symmetric enhancement  Simple cysts at the upper pole of the right kidney measuring 14 mm  No suspicious lesion  Nonobstructing 3 mm calculus in the left kidney interpolar region, unchanged  No hydronephrosis  Ureters within normal limits  STOMACH AND BOWEL: Stomach is grossly within normal limits  Normal caliber small bowel  Normal caliber large bowel  Colonic diverticulosis without acute diverticulitis  Diffuse wall thickening of small bowel, as well as edematous wall thickening of the distal descending colon and the sigmoid which could reflect a nonspecific enterocolitis in the appropriate clinical scenario  A discontinuous discrete area of wall thickening in the distal rectum (series 2/76-80) may represent the same process although an underlying lesion is not excluded  APPENDIX: Normal air-filled appendix  ABDOMINOPELVIC CAVITY: No ascites  No intraperitoneal free air  There is diffuse mesenteric and omental haziness which is new from prior study, may reflect venous congestion from portal hypertension however carcinomatosis is not excluded  There is new confluent gastrohepatic, portacaval, and retroperitoneal lymphadenopathy, for example (on axial series 2 and coronal series 601 respectively): - Gastrohepatic node measuring 3 3 x 1 0 x 1 1 cm on image 17, 54  - Gastrohepatic node measuring 1 8 x 1 4 x 1 6 cm on image 17, 66  - Queenie hepatis node measuring 3 2 x 1 6 x 3 3 cm on image 18, 64  - Aortocaval conglomerate measuring 6 5 x 2 2 x 6 6 cm on image 23, 78  - Left para-aortic node at the level of the renal vasculature measuring 2 9 x 1 9 x 2 2 cm on image 33, 73  VESSELS: Normal caliber abdominal aorta with mild atherosclerotic plaque  The celiac, SMA, and SADIA are patent  The main, right, and left portal veins are patent, with stable mild dilation of the main portal vein measuring 15 mm which could reflect portal  hypertension   The SMV and splenic vein are patent  The hepatic veins are patent  The renal arteries and veins are patent, noting that the right renal artery is encased in confluent adenopathy  PELVIS REPRODUCTIVE ORGANS:  Enlarged prostate with coarse calcifications  Symmetric seminal vesicles  URINARY BLADDER:  Diffuse bladder wall thickening consistent with sequela of chronic outlet obstruction from prostatomegaly  No calculi  ABDOMINAL WALL/INGUINAL REGIONS:  Postsurgical changes in the upper midline anterior abdominal wall  Small surgical defect in the left rectus abdominis musculature in the left upper quadrant (2/22) which extends to the skin surface similar to prior study  BONES:  Mild multilevel degenerative spine  Vertebral body height is maintained  No acute fracture  Grade 1 anterolisthesis of L4 on L5, retrolisthesis of L5 on S1  Stable bone islands in the right femoral intertrochanteric region, left superior pubic ramus, and scattered in the bilateral sean  New from prior studies, there is a 1 cm lytic lesion with faint surrounding sclerosis in the right aspect of the L1 vertebral body (2/27, 602/104, 601/99)  Impression: 1  Bulky new confluent gastrohepatic, portacaval, and retroperitoneal lymphadenopathy concerning for metastatic disease  New 1 cm lytic lesion in the L1 vertebral body which is also concerning for metastasis, for which attention on follow-up is recommended  Diffuse mesenteric and omental haziness is new from prior study, may reflect venous congestion from portal hypertension however carcinomatosis is not excluded  2   Multiple hepatic lesions consistent with known multifocal hepatocellular carcinoma, all of which are either slightly decreased in size or unchanged, without new hepatic lesions on single phase study   3   Findings which could reflect a nonspecific enterocolitis (infectious/inflammatory or secondary to venous stasis from portal hypertension among other etiologies), as well as discontinuous distal rectal wall thickening which could represent the same process or an underlying rectal lesion  4   Slow progressive growth of a cystic lesion in the pancreatic head now measuring up to 2 0 cm  For simple cyst(s) 2 cm or greater recommend gastroenterology and/or surgical oncology consult  EUS is likely now warranted  I personally discussed this study with Bradley Castellon on 2/23/2022 at 11:52 AM  Workstation performed: [id number]     Echo complete w/ contrast if indicated    Result Date: 3/8/2022  Narrative: Technically adequate transthoracic echocardiogram study  1  Mildly increased left ventricular wall thickness, normal left ventricular systolic function, grade 1 diastolic dysfunction  Ejection fraction is estimated as around 60%  2  Normal right ventricular size and systolic function  3  Mild biatrial enlargement 4  Aortic valve sclerosis with focal calcification  Very mild aortic valve stenosis, mild aortic valve regurgitation  5  Mitral valve sclerosis, trace mitral valve regurgitation  6  Mild tricuspid and trace pulmonic valve regurgitation  7  No obvious pulmonary hypertension  8  No pericardial effusion  Compared to report of previous echocardiogram from September 22, 2015 there is overall no significant change            Labs:   Lab Results   Component Value Date    WBC 7 44 02/24/2022    HGB 11 4 (L) 02/24/2022    HCT 33 4 (L) 02/24/2022    MCV 86 02/24/2022     02/24/2022     Lab Results   Component Value Date     12/09/2015    K 3 9 02/24/2022     02/24/2022    CO2 32 02/24/2022    ANIONGAP 5 12/09/2015    BUN 7 02/24/2022    CREATININE 0 63 02/24/2022    GLUCOSE 267 (H) 12/09/2015    GLUF 133 (H) 07/06/2021    CALCIUM 8 9 02/24/2022    CORRECTEDCA 9 6 02/24/2022    AST 16 02/24/2022    ALT 21 02/24/2022    ALKPHOS 80 02/24/2022    PROT 7 4 12/09/2015    BILITOT 0 97 12/09/2015    EGFR 102 02/24/2022         Lab Results   Component Value Date    PSA 0 6 07/06/2021    PSA 0 6 01/20/2020    PSA 0 4 01/18/2018         Current Medications: Reviewed  Allergies: Reviewed  PMH/FH/SH:  Reviewed      Vital Sign:    Body surface area is 1 79 meters squared      Wt Readings from Last 3 Encounters:   03/11/22 68 kg (150 lb)   03/08/22 73 kg (161 lb)   01/31/22 73 kg (161 lb)        Temp Readings from Last 3 Encounters:   03/11/22 (!) 96 5 °F (35 8 °C) (Tympanic Core)   03/01/22 (!) 97 1 °F (36 2 °C) (Temporal)   02/25/22 98 °F (36 7 °C) (Oral)        BP Readings from Last 3 Encounters:   03/11/22 118/64   03/08/22 117/83   03/01/22 (!) 201/104         Pulse Readings from Last 3 Encounters:   03/11/22 69   03/08/22 65   03/01/22 68     @LASTSAO2(3)@
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
## 2022-03-14 NOTE — TELEPHONE ENCOUNTER
----- Message from Yfn Olivo MD sent at 3/14/2022 12:16 PM EDT -----  His pathology report was revised  His diagnosis may not be hepatocellular Ca  May be pancreatic or cholangiocarcinoma  I held his 1st treatment on Thursday  Can you let him come and see me at 8 am on Thursday to re-discuss Tx options

## 2022-03-14 NOTE — TELEPHONE ENCOUNTER
Spoke with patient to make him aware  He will come into office at [facility] on Thursday  He verbalized understanding and agreed to plan

## 2022-03-15 NOTE — TELEPHONE ENCOUNTER
Patient called to confirm labs are in his chart  I did confirm labs are in chart   Information relayed with patient

## 2022-03-17 PROBLEM — C25.1 MALIGNANT NEOPLASM OF BODY OF PANCREAS (HCC): Status: ACTIVE | Noted: 2022-01-01

## 2022-03-17 PROBLEM — D70.1 CHEMOTHERAPY INDUCED NEUTROPENIA (HCC): Status: ACTIVE | Noted: 2022-01-01

## 2022-03-17 PROBLEM — T45.1X5A CHEMOTHERAPY INDUCED NEUTROPENIA (HCC): Status: ACTIVE | Noted: 2022-01-01

## 2022-03-17 NOTE — TELEPHONE ENCOUNTER
Patient will need a port placement to be scheduled  Please cancel current treatment's, patient is starting a new treatment 3/29 that needs to be scheduled with [facility] infusion

## 2022-03-17 NOTE — PROGRESS NOTES
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    He has history of chronic hepatitis C as well as alcohol abuse  Ashley Batista has liver cirrhosis   He has multifocal liver lesion with low level of AFP    This was histologically confirmed to be well-differentiated hepatocellular carcinoma   He started sorafenib in October 2018, which produced long-term stabilization of disease  However, he recently developed progressive disease with bulky abdominal adenopathy which was confirmed to be hepatocellular carcinoma  He also has slightly enlarged pancreatic cystic lesion  CT-guided biopsy of retroperitoneal lymph node was initially interpreted as hepatocellular carcinoma  However, 2nd review by the pathologist at [facility] as well as addition immunostain with CA 19-9 are more suggestive for adenocarcinoma of pancreatic or biliary origin  Radiographically, this is probably pancreatic origin with metastatic lymph node in the abdomen  Therefore, systemic therapy has to be changed  I recommended him to have FOLFIRINOX, consisting of oxaliplatin, irinotecan, Leucovorin and 5-FU  Side effects of this regimen was thoroughly discussed, including but not limited to alopecia, nausea, vomiting, neutropenia, risk of infection, diarrhea, fatigue, neuropathy as well as core sensitivities  He will need Neulasta support on day 3  Since this is stage IV disease, goal of the treatment is unchanged which is to prolong his survival but not a cure  He understood this  I am going to ask interventional radiology to place a Port-A-Cath    He is going to have 1st cycle of FOLFIRINOX in March 29, 2022  I will see him again prior to the 2nd cycle of FOLFIRINOX  All the patient and his wife's questions were answered to their satisfaction         Subjective:      HPI:             Interval History:  A 77year old gentleman with 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 diagnosed in November 2011  He underwent concurrent chemoradiation with high dose cisplatin resulting in complete response  He has chronic hepatitis C, as well as liver cirrhosis, based on a CT scan of abdomen and pelvis  In October 2014, he was found to have multifocal liver lesions, based on the CT scan  This was confirmed by MRI of the abdomen  However, the PET/CT scan showed the lesion was not hypermetabolic  He declined liver biopsy    Radiographically, this was consistent with multifocal hepatocellular carcinoma   He has been under the care of Rubén Willard summer of 2018, CT scan showed progression   He was suggested to have Siro sphere which he declined  Marda Dubin, he accepted sorafenib, which he started in October 2018, resulting in prolonged disease control  However, he was hospitalized with abdominal pain in late February 2022 I which time CT scan showed new bulky gastrohepatic, portacaval and retroperitoneal lymphadenopathy  CT scan also showed slightly enlarged cystic pancreatic lesions  He underwent CT-guided biopsy of retroperitoneal lymph node which was initially interpreted as consistent with hepatocellular carcinoma  Therefore, we previously discussed treatment with atezolizumab and bevacizumab  However, his pathology slide was sent to [facility] for 2nd opinion where additional stain was done  CA 19-9 stain was positive  Morphologically as well as based on additional stain, this was interpreted as most consistent was pancreatic or biliary cancer  He presents today to discuss the new diagnosis and treatment options    His pain is reasonably well controlled  He is weight is stable  He has no respiratory symptoms  His performance status is 0 to 1/4 on ECOG scale        Objective:      Primary Diagnosis:     1  Squamous cell carcinoma of oropharynx, stage SHAINA disease, diagnosed in November 2011  2 Multifocal liver lesion, clinically clinically consistent with hepatocellular carcinoma      Cancer Staging:  Cancer Staging  No matching staging information was found for the patient         Previous Hematologic/ Oncologic Treatment:      1  Concurrent chemoradiation with high-dose cisplatin completed on February 13, 2012      2  Sorafenib from October 2018 through March 2022        Current Hematologic/ Oncologic Treatment:       Atezolizumab and bevacizumab to be started in March 17, 2022         Disease Status:      Progression of hepatocellular carcinoma on sorafenib      Test Results:     Pathology:      Liver biopsy showed well-differentiated hepatocellular carcinoma      Biopsy of retroperitoneal lymph node showed hepatocellular carcinoma      Radiology:     CT scan of chest abdomen pelvis in February 2022 showed bulky new confirmed went gastro phrenic, portacaval and retroperitoneal lymphadenopathy  Multiple hepatic lesions  Slightly enlarged pancreatic cystic lesions      Laboratory:      AFP is 12 in January 2022   See below otherwise        Physical Exam:        General Appearance:    Alert, oriented          Eyes:    PERRL   Ears:    Normal external ear canals, both ears   Nose:   Nares normal, septum midline   Throat:   Mucosa moist  Pharynx without injection  Neck:   Supple         Lungs:     Clear to auscultation bilaterally   Chest Wall:    No tenderness or deformity    Heart:    Regular rate and rhythm         Abdomen:     Soft, non-tender, bowel sounds +, hepatomegaly   No palpable spleen                Extremities:   Extremities no cyanosis or edema         Skin:   no rash or icterus      Lymph nodes:   Cervical, supraclavicular, and axillary nodes normal   Neurologic:   CNII-XII intact, normal strength, sensation and reflexes     Throughout             Breast exam:   Not applicable  ROS: Review of Systems   All other systems reviewed and are negative  Imaging: IR biopsy retroperitoneum    Result Date: 3/1/2022  Narrative: IR BIOPSY RETROPERITONEUM PROCEDURE: CT-guided biopsy retroperitoneal adenopathy CLINICAL INDICATION: History of cirrhosis with well differentiated hepatocellular carcinoma (biopsy 10/4/2018) with development of bulky retroperitoneal adenopathy, new since 7/15/2021 COMPARISON: CT 3/1/2022 PERFORMING PHYSICIAN: Saniya Coffey MD ASSISTANT PHYSICIAN: None MEDICATIONS: 1% lidocaine (local)  2 mg Versed  100 mcg fentanyl  SEDATION TIME: Continuous cardiopulmonary monitoring by the interventional radiology physician and/or nurse for  24 min CONTRAST: None FLUOROSCOPY TIME: None RADIATION DOSE: 491 99 mGy   (air Kerma)  NUMBER OF IMAGES: 60 TECHNIQUE: Informed written consent was obtained from the patient after a thorough discussion of risks, benefits, and alternatives to the procedure   All questions were answered  The patient was brought to the procedure room and a time-out was performed utilizing universal protocol  The patient was identified verbally and via wrist band  The patient was placed prone on the procedure table and localizing images to the retroperitoneal adenopathy were obtained  An appropriate skin entry site utilizing a right-sided approach below the level of the renal compa was chosen and marked  The skin was prepped and draped in the usual sterile fashion  The skin and subcutaneous tissues were infiltrated with local anesthetic  Skin nick made with an 11 blade  17-gauge guiding cannula advanced under CT guidance to representative lymph node conglomerate in the right retrocaval location  6 18-gauge biopsy cores were obtained   Tissue adequacy confirmed by the on-site pathologist  Specimens placed in formalin and RPMI solution  Hemostasis was immediate  Needle and cannula removed  Site was dressed and bandaged  No evidence for postprocedure bleeding on completion scanning  The patient tolerated the procedure well without difficulty or complication encountered and left the section in satisfactory stable condition  FINDINGS: As above  Impression: Technically successful CT-guided biopsy retroperitoneal adenopathy is noted  Workstation performed: [id number]     CT abdomen pelvis with contrast    Result Date: 2/23/2022  Narrative: CT ABDOMEN AND PELVIS WITH IV CONTRAST INDICATION:   abd pain- liver ca  59-year-old male with remote history of oropharyngeal squamous cell carcinoma, chronic hepatitis C with cirrhosis, well-differentiated hepatocellular carcinoma on sorafenib  COMPARISON:  CT chest/abdomen/pelvis 7/15/2021  TECHNIQUE:  CT examination of the abdomen and pelvis was performed  Axial, sagittal, and coronal 2D reformatted images were created from the source data and submitted for interpretation  Radiation dose length product (DLP) for this visit:  448 mGy-cm   This examination, like all CT scans performed in the [facility], was performed utilizing techniques to minimize radiation dose exposure, including the use of iterative reconstruction and automated exposure control  IV Contrast:  100 mL of iohexol (OMNIPAQUE) Enteric Contrast:  Enteric contrast was not administered  FINDINGS: ABDOMEN LOWER CHEST: Mild scarring/atelectasis in the lung bases  Small hiatal hernia  LIVER: Cirrhotic morphology  Again seen are multiple hepatic lesions consistent with known multifocal hepatocellular carcinoma, all of which are either slightly decreased in size or unchanged, without new hepatic lesions on single phase study   - Dominant heterogeneously enhancing segment 7 mass is slightly decreased in size now measuring 4 7 x 3 1 x 3 4 cm (previously 4 9 x 3 2 x 4 3 cm remeasured in same fashion) on series 2/16, 601/103  - Probable correlate for the previously seen arterially enhancing segment 8 lesion at the dome is a 1 2 cm (previously 1 3 cm) focus of hypoattenuation likely representing washout on series 2/6  - Segment 5 subcapsular hyperenhancing lesion measures 1 4 cm (previously 1 6 cm) on series 2/24  - Segment 8 focus of hypoattenuation measures 7 mm (previously 7 mm) on series 2/13  BILIARY: No intrahepatic biliary ductal dilatation  Normal caliber common bile duct  GALLBLADDER: No calcified gallstones  Normal wall thickness  No pericholecystic inflammatory changes  SPLEEN: Splenomegaly measuring 15 cm (previously 14 cm)  PANCREAS: Cystic focus in the pancreatic head is slowly growing, now measures 2 0 x 1 2 x 1 2 cm (previously 1 9 x 1 0 x 1 2 cm in 7/2021, from 1 8 x 1 0 x 1 2 cm in 6/2020 remeasured in same fashion) on series 2/22, 601/47  Pancreatic parenchyma is otherwise within normal limits  No main pancreatic ductal dilatation  No peripancreatic inflammation  ADRENAL GLANDS: Within normal limits  KIDNEYS/URETERS: Normal size and position  Symmetric enhancement  Simple cysts at the upper pole of the right kidney measuring 14 mm  No suspicious lesion  Nonobstructing 3 mm calculus in the left kidney interpolar region, unchanged  No hydronephrosis  Ureters within normal limits  STOMACH AND BOWEL: Stomach is grossly within normal limits  Normal caliber small bowel  Normal caliber large bowel  Colonic diverticulosis without acute diverticulitis  Diffuse wall thickening of small bowel, as well as edematous wall thickening of the distal descending colon and the sigmoid which could reflect a nonspecific enterocolitis in the appropriate clinical scenario  A discontinuous discrete area of wall thickening in the distal rectum (series 2/76-80) may represent the same process although an underlying lesion is not excluded  APPENDIX: Normal air-filled appendix  ABDOMINOPELVIC CAVITY: No ascites   No intraperitoneal free air  There is diffuse mesenteric and omental haziness which is new from prior study, may reflect venous congestion from portal hypertension however carcinomatosis is not excluded  There is new confluent gastrohepatic, portacaval, and retroperitoneal lymphadenopathy, for example (on axial series 2 and coronal series 601 respectively): - Gastrohepatic node measuring 3 3 x 1 0 x 1 1 cm on image 17, 54  - Gastrohepatic node measuring 1 8 x 1 4 x 1 6 cm on image 17, 66  - Queenie hepatis node measuring 3 2 x 1 6 x 3 3 cm on image 18, 64  - Aortocaval conglomerate measuring 6 5 x 2 2 x 6 6 cm on image 23, 78  - Left para-aortic node at the level of the renal vasculature measuring 2 9 x 1 9 x 2 2 cm on image 33, 73  VESSELS: Normal caliber abdominal aorta with mild atherosclerotic plaque  The celiac, SMA, and SADIA are patent  The main, right, and left portal veins are patent, with stable mild dilation of the main portal vein measuring 15 mm which could reflect portal  hypertension  The SMV and splenic vein are patent  The hepatic veins are patent  The renal arteries and veins are patent, noting that the right renal artery is encased in confluent adenopathy  PELVIS REPRODUCTIVE ORGANS:  Enlarged prostate with coarse calcifications  Symmetric seminal vesicles  URINARY BLADDER:  Diffuse bladder wall thickening consistent with sequela of chronic outlet obstruction from prostatomegaly  No calculi  ABDOMINAL WALL/INGUINAL REGIONS:  Postsurgical changes in the upper midline anterior abdominal wall  Small surgical defect in the left rectus abdominis musculature in the left upper quadrant (2/22) which extends to the skin surface similar to prior study  BONES:  Mild multilevel degenerative spine  Vertebral body height is maintained  No acute fracture  Grade 1 anterolisthesis of L4 on L5, retrolisthesis of L5 on S1   Stable bone islands in the right femoral intertrochanteric region, left superior pubic ramus, and scattered in the bilateral sean  New from prior studies, there is a 1 cm lytic lesion with faint surrounding sclerosis in the right aspect of the L1 vertebral body (2/27, 602/104, 601/99)  Impression: 1  Bulky new confluent gastrohepatic, portacaval, and retroperitoneal lymphadenopathy concerning for metastatic disease  New 1 cm lytic lesion in the L1 vertebral body which is also concerning for metastasis, for which attention on follow-up is recommended  Diffuse mesenteric and omental haziness is new from prior study, may reflect venous congestion from portal hypertension however carcinomatosis is not excluded  2   Multiple hepatic lesions consistent with known multifocal hepatocellular carcinoma, all of which are either slightly decreased in size or unchanged, without new hepatic lesions on single phase study  3   Findings which could reflect a nonspecific enterocolitis (infectious/inflammatory or secondary to venous stasis from portal hypertension among other etiologies), as well as discontinuous distal rectal wall thickening which could represent the same process or an underlying rectal lesion  4   Slow progressive growth of a cystic lesion in the pancreatic head now measuring up to 2 0 cm  For simple cyst(s) 2 cm or greater recommend gastroenterology and/or surgical oncology consult  EUS is likely now warranted  I personally discussed this study with Zabrina Epstein on 2/23/2022 at 11:52 AM  Workstation performed: [id number]     Echo complete w/ contrast if indicated    Result Date: 3/8/2022  Narrative: Technically adequate transthoracic echocardiogram study  1  Mildly increased left ventricular wall thickness, normal left ventricular systolic function, grade 1 diastolic dysfunction  Ejection fraction is estimated as around 60%  2  Normal right ventricular size and systolic function  3  Mild biatrial enlargement 4  Aortic valve sclerosis with focal calcification    Very mild aortic valve stenosis, mild aortic valve regurgitation  5  Mitral valve sclerosis, trace mitral valve regurgitation  6  Mild tricuspid and trace pulmonic valve regurgitation  7  No obvious pulmonary hypertension  8  No pericardial effusion  Compared to report of previous echocardiogram from September 22, 2015 there is overall no significant change  Labs:   Lab Results   Component Value Date    WBC 5 74 03/15/2022    HGB 12 1 03/15/2022    HCT 35 3 (L) 03/15/2022    MCV 88 03/15/2022     03/15/2022     Lab Results   Component Value Date     12/09/2015    K 3 6 03/15/2022     03/15/2022    CO2 30 03/15/2022    ANIONGAP 5 12/09/2015    BUN 7 03/15/2022    CREATININE 0 86 03/15/2022    GLUCOSE 267 (H) 12/09/2015    GLUF 133 (H) 07/06/2021    CALCIUM 8 6 03/15/2022    CORRECTEDCA 9 2 03/15/2022    AST 24 03/15/2022    ALT 22 03/15/2022    ALKPHOS 89 03/15/2022    PROT 7 4 12/09/2015    BILITOT 0 97 12/09/2015    EGFR 90 03/15/2022         Lab Results   Component Value Date    PSA 0 6 07/06/2021    PSA 0 6 01/20/2020    PSA 0 4 01/18/2018         Current Medications: Reviewed  Allergies: Reviewed  PMH/FH/SH:  Reviewed      Vital Sign:    Body surface area is 1 79 meters squared      Wt Readings from Last 3 Encounters:   03/17/22 67 8 kg (149 lb 8 oz)   03/11/22 68 kg (150 lb)   03/08/22 73 kg (161 lb)        Temp Readings from Last 3 Encounters:   03/17/22 97 8 °F (36 6 °C)   03/11/22 (!) 96 5 °F (35 8 °C) (Tympanic Core)   03/01/22 (!) 97 1 °F (36 2 °C) (Temporal)        BP Readings from Last 3 Encounters:   03/17/22 124/82   03/11/22 118/64   03/08/22 117/83         Pulse Readings from Last 3 Encounters:   03/17/22 71   03/11/22 69   03/08/22 65     @LASTSAO2(3)@

## 2022-03-21 PROBLEM — R68.89: Status: ACTIVE | Noted: 2022-01-01

## 2022-03-21 NOTE — ASSESSMENT & PLAN NOTE
Lab Results   Component Value Date    HGBA1C 6 4 01/31/2022   continue current regimen glimepiride 2 mg daily

## 2022-03-21 NOTE — ASSESSMENT & PLAN NOTE
BP above goal   Patient reports that he stopped lisinopril and is only taking  diltiazem 180 mg QD, nadolol 40 mg daily   He is adamant that BP was worse when taking lisinopril, at this time continue current regimen     Recent echocardiogram:    1  Mildly increased left ventricular wall thickness, normal left ventricular systolic function, grade 1 diastolic dysfunction  Ejection fraction is estimated as around 60%  2  Normal right ventricular size and systolic function  3  Mild biatrial enlargement  4  Aortic valve sclerosis with focal calcification  Very mild aortic valve stenosis, mild aortic valve regurgitation  5  Mitral valve sclerosis, trace mitral valve regurgitation  6  Mild tricuspid and trace pulmonic valve regurgitation  7  No obvious pulmonary hypertension  8  No pericardial effusion

## 2022-03-21 NOTE — PATIENT INSTRUCTIONS
Chronic Pain   AMBULATORY CARE:   Chronic pain  is pain that does not get better for 3 months or longer  Chronic pain may hurt all the time, or come and go  Call your local emergency number or have someone else call (911 in the [address]) if:   · You are breathing slower than normal, or you have trouble breathing  · You cannot be awakened  · You have a seizure  Call your doctor if:   · Your heart feels like it is jumping or fluttering  · You cannot think clearly  · You have side effects from prescription pain medicine, such as itching, nausea, or vomiting  · You have trouble sleeping  · Your pain gets worse, even after you take medicine  · You don't think the medicine is working  · You have questions or concerns about your condition or care  Treatment for chronic pain  may need any of the following:  · Acetaminophen  decreases pain and fever  It is available without a doctor's order  Ask how much to take and how often to take it  Follow directions  Read the labels of all other medicines you are using to see if they also contain acetaminophen, or ask your doctor or pharmacist  Acetaminophen can cause liver damage if not taken correctly  Do not use more than 4 grams (4,000 milligrams) total of acetaminophen in one day  · NSAIDs , such as ibuprofen, help decrease swelling, pain, and fever  This medicine is available with or without a doctor's order  NSAIDs can cause stomach bleeding or kidney problems in certain people  If you take blood thinner medicine, always ask your healthcare provider if NSAIDs are safe for you  Always read the medicine label and follow directions  · Prescription pain medicine  called narcotics or opioids may be given for certain types of chronic pain  Ask your healthcare provider how to take this medicine safely  · Anesthetics  can be rubbed on your skin or injected into a nerve or muscle to numb an area      · Other medicines  may reduce pain, anxiety, muscle tension, or swelling  Manage your chronic pain:   · Apply heat  on the area in pain for 20 to 30 minutes every 2 hours for as many days as directed  Heat helps decrease pain and muscle spasms  · Apply ice  on the part of your body that hurts for 15 to 20 minutes every hour or as directed  Use an ice pack, or put crushed ice in a plastic bag  Cover it with a towel  Ice decreases pain and swelling, and helps prevent tissue damage  · Go to physical therapy as directed  A physical therapist teaches you exercises to help improve movement and strength, and to decrease pain  · Exercise for 30 minutes, 3 times a week  Regular physical activity can help decrease pain and improve your quality of life  Ask your healthcare provider about the best exercise plan for your type of pain  · Get enough sleep  Create a relaxing bedtime routine  Go to sleep and wake up at the same time every day  Avoid caffeine in the afternoon  · Talk with a counselor or therapist   A type of counseling called cognitive behavioral therapy (CBT) can help your chronic pain by changing the way you think about it  CBT can also improve your mood, sleep, and ability to move  What you must know if you take narcotic pain medicine:   · You may need to take a bowel movement softener  The most common side effect of prescription pain medicine is constipation  Bowel movement softeners are available over the counter  · Do not mix prescription pain medicines  This can cause an overdose of medicine, which can become life-threatening  Read labels  Make sure you know the ingredients in all of your medicines  · Do not drink alcohol  when you take prescription pain medicine  It is not safe to mix narcotics or opioids with alcohol or illegal drugs  · Prescription pain medicine may impair your ability to drive or work safely  They may also cause dizziness and increase your risk for falling       · Store prescription pain medicine in a safe location at home  Keep your medicine away from children and other people  Never share your medicine with anyone  Follow up with your healthcare provider as directed: You may be referred to a pain specialist  Write down your questions so you remember to ask them during your visits  © Copyright [employer] 2022 Information is for End User's use only and may not be sold, redistributed or otherwise used for commercial purposes  All illustrations and images included in CareNotes® are the copyrighted property of A [employer]  or [employer]  The above information is an  only  It is not intended as medical advice for individual conditions or treatments  Talk to your doctor, nurse or pharmacist before following any medical regimen to see if it is safe and effective for you

## 2022-03-21 NOTE — PROGRESS NOTES
Assessment/Plan:    Diabetes mellitus, type II ([facility] )    Lab Results   Component Value Date    HGBA1C 6 4 01/31/2022   continue current regimen glimepiride 2 mg daily     Hypertension  BP above goal   Patient reports that he stopped lisinopril and is only taking  diltiazem 180 mg QD, nadolol 40 mg daily   He is adamant that BP was worse when taking lisinopril, at this time continue current regimen     Recent echocardiogram:    1  Mildly increased left ventricular wall thickness, normal left ventricular systolic function, grade 1 diastolic dysfunction  Ejection fraction is estimated as around 60%  2  Normal right ventricular size and systolic function  3  Mild biatrial enlargement  4  Aortic valve sclerosis with focal calcification  Very mild aortic valve stenosis, mild aortic valve regurgitation  5  Mitral valve sclerosis, trace mitral valve regurgitation  6  Mild tricuspid and trace pulmonic valve regurgitation  7  No obvious pulmonary hypertension  8  No pericardial effusion  Altered comfort in oncology patient  severe abdominal pain 2/2 hepatocellular carcinoma   Reports improvement in sx with use of oxycodone - will continue at this time until he is established with palliative care     Thong Kennedy was seen today for hypertension  Diagnoses and all orders for this visit:    Hepatocellular carcinoma ([facility] )  -     oxyCODONE (ROXICODONE) 10 MG TABS; Take 1 tablet (10 mg total) by mouth every 8 (eight) hours as needed for severe pain Max Daily Amount: 30 mg  -     naloxone (NARCAN) 4 mg/0 1 mL nasal spray; Administer 1 spray into a nostril  If no response after 2-3 minutes, give another dose in the other nostril using a new spray  Uncontrolled hypertension  -     diltiazem (CARDIZEM CD) 240 mg 24 hr capsule; Take 1 capsule (240 mg total) by mouth daily    Hypothyroidism, unspecified type  -     levothyroxine 112 mcg tablet;  Take 1 tablet (112 mcg total) by mouth daily    Type 2 diabetes mellitus without complication, without long-term current use of insulin (HCC)  -     glimepiride (AMARYL) 2 mg tablet; Take 1 tablet (2 mg total) by mouth daily    Gastroesophageal reflux disease with esophagitis without hemorrhage    Altered comfort in oncology patient  -     oxyCODONE (ROXICODONE) 10 MG TABS; Take 1 tablet (10 mg total) by mouth every 8 (eight) hours as needed for severe pain Max Daily Amount: 30 mg  -     naloxone (NARCAN) 4 mg/0 1 mL nasal spray; Administer 1 spray into a nostril  If no response after 2-3 minutes, give another dose in the other nostril using a new spray  Other cirrhosis of liver (HCC)  -     nadolol (CORGARD) 40 mg tablet; Take 1 tablet (40 mg total) by mouth daily    Hypertension, unspecified type        Return in about 4 months (around 7/21/2022) for htn   Patient Instructions   Chronic Pain   AMBULATORY CARE:   Chronic pain  is pain that does not get better for 3 months or longer  Chronic pain may hurt all the time, or come and go  Call your local emergency number or have someone else call (911 in the [address], located within [facility] [facility]) if:   · You are breathing slower than normal, or you have trouble breathing  · You cannot be awakened  · You have a seizure  Call your doctor if:   · Your heart feels like it is jumping or fluttering  · You cannot think clearly  · You have side effects from prescription pain medicine, such as itching, nausea, or vomiting  · You have trouble sleeping  · Your pain gets worse, even after you take medicine  · You don't think the medicine is working  · You have questions or concerns about your condition or care  Treatment for chronic pain  may need any of the following:  · Acetaminophen  decreases pain and fever  It is available without a doctor's order  Ask how much to take and how often to take it  Follow directions   Read the labels of all other medicines you are using to see if they also contain acetaminophen, or ask your doctor or pharmacist  Acetaminophen can cause liver damage if not taken correctly  Do not use more than 4 grams (4,000 milligrams) total of acetaminophen in one day  · NSAIDs , such as ibuprofen, help decrease swelling, pain, and fever  This medicine is available with or without a doctor's order  NSAIDs can cause stomach bleeding or kidney problems in certain people  If you take blood thinner medicine, always ask your healthcare provider if NSAIDs are safe for you  Always read the medicine label and follow directions  · Prescription pain medicine  called narcotics or opioids may be given for certain types of chronic pain  Ask your healthcare provider how to take this medicine safely  · Anesthetics  can be rubbed on your skin or injected into a nerve or muscle to numb an area  · Other medicines  may reduce pain, anxiety, muscle tension, or swelling  Manage your chronic pain:   · Apply heat  on the area in pain for 20 to 30 minutes every 2 hours for as many days as directed  Heat helps decrease pain and muscle spasms  · Apply ice  on the part of your body that hurts for 15 to 20 minutes every hour or as directed  Use an ice pack, or put crushed ice in a plastic bag  Cover it with a towel  Ice decreases pain and swelling, and helps prevent tissue damage  · Go to physical therapy as directed  A physical therapist teaches you exercises to help improve movement and strength, and to decrease pain  · Exercise for 30 minutes, 3 times a week  Regular physical activity can help decrease pain and improve your quality of life  Ask your healthcare provider about the best exercise plan for your type of pain  · Get enough sleep  Create a relaxing bedtime routine  Go to sleep and wake up at the same time every day  Avoid caffeine in the afternoon  · Talk with a counselor or therapist   A type of counseling called cognitive behavioral therapy (CBT) can help your chronic pain by changing the way you think about it   CBT can also improve your mood, sleep, and ability to move  What you must know if you take narcotic pain medicine:   · You may need to take a bowel movement softener  The most common side effect of prescription pain medicine is constipation  Bowel movement softeners are available over the counter  · Do not mix prescription pain medicines  This can cause an overdose of medicine, which can become life-threatening  Read labels  Make sure you know the ingredients in all of your medicines  · Do not drink alcohol  when you take prescription pain medicine  It is not safe to mix narcotics or opioids with alcohol or illegal drugs  · Prescription pain medicine may impair your ability to drive or work safely  They may also cause dizziness and increase your risk for falling  · Store prescription pain medicine in a safe location at home  Keep your medicine away from children and other people  Never share your medicine with anyone  Follow up with your healthcare provider as directed: You may be referred to a pain specialist  Write down your questions so you remember to ask them during your visits  © Copyright [employer] 2022 Information is for End User's use only and may not be sold, redistributed or otherwise used for commercial purposes  All illustrations and images included in CareNotes® are the copyrighted property of A D A M , Inc  or [facility]  The above information is an  only  It is not intended as medical advice for individual conditions or treatments  Talk to your doctor, nurse or pharmacist before following any medical regimen to see if it is safe and effective for you           Subjective:     Satish Saldana is a 77 y o  male who  has a past medical history of Cardiac disorder, Chronic hepatitis C virus infection ([facility] ), Diabetes mellitus ([facility] ), Dysphagia, Esophageal varices ([facility] ), Hepatic disease, Hepatitis, History of chemotherapy, History of radiation therapy, Hypertension, Laryngeal cancer ([facility] ), Liver cancer ([facility] ), 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[facility] ), Recurrent hepatitis C ([facility] ), and Rheumatic fever  who presented to the office today for follow up  Plans are for patient to start chemotherapy on 3/29 after port insertion for treatment of hepatocellular carcinoma  The following portions of the patient's history were reviewed and updated as appropriate: allergies, current medications, past family history, past medical history, past social history, past surgical history and problem list     Current Outpatient Medications on File Prior to Visit   Medication Sig Dispense Refill    famotidine (PEPCID) 40 MG tablet Take 1 tablet (40 mg total) by mouth daily 90 tablet 0    [DISCONTINUED] diltiazem (CARDIZEM CD) 240 mg 24 hr capsule Take 1 capsule (240 mg total) by mouth daily 30 capsule 0    [DISCONTINUED] glimepiride (AMARYL) 2 mg tablet Take 1 tablet (2 mg total) by mouth daily 90 tablet 1    [DISCONTINUED] levothyroxine 112 mcg tablet Take 1 tablet (112 mcg total) by mouth daily 90 tablet 1    [DISCONTINUED] nadolol (CORGARD) 40 mg tablet Take 1 tablet (40 mg total) by mouth daily 90 tablet 3    albuterol (Ventolin HFA) 90 mcg/act inhaler Inhale 2 puffs every 6 (six) hours as needed for wheezing 18 g 5    [DISCONTINUED] lisinopril (ZESTRIL) 40 mg tablet Take 120 mg by mouth daily 120mg      [DISCONTINUED] oxyCODONE (ROXICODONE) 10 MG TABS Take 1 tablet (10 mg total) by mouth every 8 (eight) hours as needed for severe pain Max Daily Amount: 30 mg 30 tablet 0     No current facility-administered medications on file prior to visit  Review of Systems   Constitutional: Negative for chills and fever  HENT: Negative for ear pain and sore throat  Eyes: Negative for pain and visual disturbance  Respiratory: Negative for cough and shortness of breath  Cardiovascular: Negative for chest pain and palpitations     Gastrointestinal: Positive for abdominal pain and nausea  Negative for blood in stool, constipation, diarrhea and vomiting  Genitourinary: Negative for dysuria and hematuria  Musculoskeletal: Positive for arthralgias, back pain and myalgias  Skin: Negative for color change and rash  Neurological: Negative for seizures and syncope  All other systems reviewed and are negative  Objective:    /88 (BP Location: Left arm, Patient Position: Sitting, Cuff Size: Standard)   Pulse 67   Temp (!) 97 1 °F (36 2 °C) (Temporal)   Resp 18   Ht 5' 7" (1 702 m)   Wt 68 2 kg (150 lb 4 8 oz)   SpO2 98%   BMI 23 54 kg/m²     Physical Exam  Vitals and nursing note reviewed  Constitutional:       General: He is not in acute distress  Appearance: He is well-developed  He is not diaphoretic  HENT:      Head: Normocephalic and atraumatic  Right Ear: External ear normal       Left Ear: External ear normal    Eyes:      General:         Right eye: No discharge  Left eye: No discharge  Cardiovascular:      Rate and Rhythm: Normal rate and regular rhythm  Pulmonary:      Effort: Pulmonary effort is normal  No respiratory distress  Breath sounds: Normal breath sounds  No wheezing  Abdominal:      General: Bowel sounds are normal  There is no distension  Palpations: Abdomen is soft  Tenderness: There is no abdominal tenderness  Musculoskeletal:         General: No deformity  Cervical back: Normal range of motion and neck supple  Lumbar back: Tenderness present  Decreased range of motion  Comments: Strength, sensation, reflexes, cap refill intact to BLLE    Lymphadenopathy:      Cervical: No cervical adenopathy  Skin:     General: Skin is warm and dry  Capillary Refill: Capillary refill takes less than 2 seconds  Findings: No rash  Neurological:      General: No focal deficit present  Mental Status: He is alert and oriented to person, place, and time        Cranial Nerves: No cranial nerve deficit  Sensory: No sensory deficit  Motor: No weakness        Coordination: Coordination normal       Gait: Gait normal       Deep Tendon Reflexes: Reflexes normal    Psychiatric:         Behavior: Behavior normal          PRINCE Nunez  03/21/22  12:52 PM

## 2022-03-21 NOTE — ASSESSMENT & PLAN NOTE
severe abdominal pain 2/2 hepatocellular carcinoma   Reports improvement in sx with use of oxycodone - will continue at this time until he is established with palliative care

## 2022-03-23 NOTE — PATIENT INSTRUCTIONS
For pain:  1  Oxycodone 15mg (1 1/2 tab) every 4 hours as needed  For pain and energy and appetite:  1  Decadron 0 5mg (1 tab) twice a day (morning and noon) for 10 days only  Please do not take on the week of your chemo if they are giving you steroids with chemotherapy  For constipation:  1  Take senokot-S 1-2 tabs daily  If still constipated, add miralax          PRESCRIPTION REFILL REMINDER:  All medication refills should be requested prior to [facility] on Friday  Any refill requests after noon on Friday would be addressed the following Monday  Please protect yourself from COVID-19 and the delta and omicron variants! Even though we do not have good antiviral drugs for this infection, the following tools can help you stay healthy:    = Wash your hands! Soap and water, or hand  with at least 60% alcohol, are both effective at killing the virus  = Wear a mask! This will help protect others from any virus particles you might spread  Your mouth and nose BOTH need to be covered  = Keep the distance! Keep 6 feet of distance from other people, even if they seem healthy  Keeping distance protects you from the other person's virus spread     = Get a vaccine! Three vaccines are approved for use in the [place] for all adults  These vaccines do provide protection against the delta variant, and seem to reduce severity of omicron infection  + Pfizer is FDA approved for all persons age 11 and older   + [address] may be given to all person age 25 and older   + Shruthi Products may be given to all person age 25 and older  (Serious blood clot side effects have only been reported in reproductive-age women, and these are about 1-in-a-million  We do NOT recommend J&J for people who have had Guillan-Rochester syndrome )  = You may get a shot from many other locations outside [facility]: [facility], [employer], [facility], [facility], and certain [facility] locations    + As of 11/29/21, the CDC recommends booster shots on ALL vaccines for ALL adults  [email or web address] [email or web address]      We are recommending that ALL our patients get a complete series of one of the three vaccines, and boost it ASA  Call 2-509-GJUOKXE (Choose Option 7 for faster service!) to get scheduled for your shot  Informacion en espanol sobre vacunas, de nos companeros [address] --  [employer] dk      Numbers of coronavirus cases (and COVID deaths) are worsening in many [place] States, among unvaccinated people, we think this is because vaccines are working, but only if you get one! As of 12/1/21, we do NOT advise travel OUTSIDE the [address], and we encourage you to be very careful when planning travel INSIDE the [address]  Check out [employer] data that are updated daily:    http://www TravelZeeky/     Global Epidemics  Org, from [facility] will give you [id number] information on virus cases and vaccination rates:    Https://globalepidemics  org/    Frequently Asked Questions about COVID, answered by [facility]    [employer] es

## 2022-03-24 PROBLEM — G89.3 CANCER RELATED PAIN: Status: ACTIVE | Noted: 2022-01-01

## 2022-03-24 PROBLEM — C77.2 SECONDARY MALIGNANT NEOPLASM OF RETROPERITONEAL LYMPH NODES (HCC): Status: ACTIVE | Noted: 2022-01-01

## 2022-03-24 PROBLEM — Z71.89 COUNSELING REGARDING ADVANCED CARE PLANNING AND GOALS OF CARE: Status: ACTIVE | Noted: 2022-01-01

## 2022-03-24 PROBLEM — K59.03 DRUG INDUCED CONSTIPATION: Status: ACTIVE | Noted: 2022-01-01

## 2022-03-24 PROBLEM — R10.10 PAIN OF UPPER ABDOMEN: Status: ACTIVE | Noted: 2022-01-01

## 2022-03-24 PROBLEM — R68.89: Status: RESOLVED | Noted: 2022-01-01 | Resolved: 2022-01-01

## 2022-03-24 NOTE — PROGRESS NOTES
Palliative and Supportive Care   Ricky Allison 77 y o  male [id number]    Assessment/Plan:  1  Cancer related pain    2  Hepatocellular carcinoma ([facility] [address] )    3  Poor appetite    4  Drug induced constipation    5  H/O laryngeal cancer    6  Malignant neoplasm of body of pancreas ([facility] [address] )    7  Secondary malignant neoplasm of retroperitoneal lymph nodes ([facility] [address] )    8  Pain of upper abdomen    9  Counseling regarding advanced care planning and goals of care    10  Type 2 diabetes mellitus without complication, without long-term current use of insulin (HCC)      · Start low dose decadron 0 5mg BID x 10 days to help with cancer pain, energy and fatigue, will watch BG closely  Stop if BG remains elevated  · He will ask at the infusion center if they are adding decadron to his cancer regimen  If they are, they will stop the decadron tabs on the week of his chemo and resume on his off week  He will finish 20 pills and then we will re-evaluate after  · Increase oxycodone IR from 10mg to 15mg PO q4H prn  · Because of remote history of drug abuse (Heroin, cocaine, etc), we will try to limit use of narcotics as well as be conservative on the uptitration, if possible  We will wean off as soon as possible, pending response to treatments  He voiced understanding  I requested that he take the medications as directed and to talk to me first prior to making any changes  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 and is determined to stay that way  · Daily 1-2 tabs of senokot-S, add miralax if not enough  · Can consider Remeron 7 5mg PO ODHS to help with insomnia and appetite if sleeping continues to be an issue  · He is advised to keep his port clean when placed  His dogs should not be allowed near the port to avoid infection  · Patient does not have a living will, no mPOA  · Patient is , has 2 sons from prior marriage  Now with fiance/Abril with whom he shares no children   He also has a brother     · [place] 169 explained in terms of hierarchy  He wants Lindsey Isaac to be his mPOA  He does not want his children involved in his care  · The 5 Wishes discussed and provided  Encouraged to fill out  · RTO in 2 weeks    Controlled Substance [place]    [place]  reviewed: No red flags were identified; safe to proceed with prescription  Juan Carlos Soria 03/21/2022  1   03/21/2022  Oxycodone Hcl (Ir) 10 MG Tab    30 00  10 [address]   [id number]     [id number] ([id number])    45 00 MME  [place]   03/11/2022  1   03/11/2022  Oxycodone Hcl (Ir) 10 MG Tab    30 00  10 [address]   [id number]   St (5361)    45 00 MME  [place]   02/25/2022  1   02/25/2022  Oxycodone Hcl (Ir) 10 MG Tab    20 00  7 [address]   [id number]   St (5361)    42 86 MME  [place]   09/02/2021  2   09/01/2021  Diazepam 10 MG Tablet    1 00  1 Radha Mckinley   [id number]   Pen (1192)     [place]     Requested Prescriptions     Signed Prescriptions Disp Refills    oxyCODONE (ROXICODONE) 10 MG TABS 45 tablet 0     Sig: Take 1 5 tablets (15 mg total) by mouth every 4 (four) hours as needed for severe pain Max Daily Amount: 90 mg    senna-docusate sodium (SENOKOT-S) 8 6-50 mg per tablet 60 tablet 0     Sig: Take 1 tablet by mouth daily    dexamethasone (DECADRON) 0 5 mg tablet 20 tablet 0     Sig: Take 1 tablet (0 5 mg total) by mouth 2 (two) times a day     Medications Discontinued During This Encounter   Medication Reason    oxyCODONE (ROXICODONE) 10 MG TABS Reorder       Representatives have queried the patient's controlled substance dispensing history in the Prescription Drug Monitoring Program in compliance with regulations before I have prescribed any controlled substances  The prescription history is consistent with prescribed therapy and our practice policies        60 minutes were spent face to face with Fadi House and his silvestre/Abril with greater than 50% of the time spent in counseling or coordination of care including discussions of etiology of diagnosis, pathogenesis of diagnosis, diagnostic results, impression, and recommendations, risks and benefits of treatment, instructions for disease self management, treatment instructions, follow up requirements, risk factors and risk reduction of disease, patient and family counseling/involvement in care and compliance with treatment regimen and advanced care planning  All of the patient's questions were answered during this discussion  No follow-ups on file  Subjective:   Chief Complaint  New consultation for:  symptom management, goal of care assessment and decisional support, disease process education and discussion of prognosis, advance care planning, emotional support in the setting of serious illness  HPI     Xavier Rincon is a 77 y o  male with a palliative diagnosis of 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 that was diagnosed via retroperitoneal lymph node biopsy on 3/1/2022 who will begin treatment with FOLFIRINOX+neulasta on 3/29/2022  CT on 2/23/2022 showed new bulky abdominal and retroperitoneal LNs, lytic lesion in the L1 vertebral body, possible mesenteric/omental carcinomatosis, multiple liver lesions, and pancreatic head lesion  He also has prior Hx of laryngeal cancer in 2011 with complete response after concurrent RT and Hepatocellular carcinoma with liver cirrhosis in 2014 that was controlled with sorafenib  He has a remote Hx of drug (heroin, cocaine, etc) and alcohol abuse, but he has been clean and sober now for 27 years  He also has DM and is on glimepiride  Met with him and his fiance/Abril  Introduced palliative care  He is currently experiencing intermittent burning pain in his upper abdomen, that does not seem to worsen with any triggers, and can occur even at rest  Moving his bowels seem to make it better  Pain appear consistent with extent of cancer in the abdomen/RP areas   He was prescribed oxyIR 10mg by oncology and he would take 1 or 2 tabs, depending on his pain level, and averaging 5 a day  Time spent discussing etiology of pain (related to cancer) and overall management of cancer-related pain  Discussed short-term side effects - including constipation, respiratory depression, death -  and long-term side effects of opioids - including constipation, dependence, mood changes, sleep-disordered breathing, fractures  Cancer-directed therapies, ie chemotherapy, radiation therapy, immunotherapy, will make the most significant and long term improvement in cancer pain  Discussed future weaning of opioids when able  They voiced understanding and wished to proceed  I advised him to take 15mg oxycodone (1 1/2 tabs) every 4 hours for now  They voiced their concerns about his remote history of drug addiction  We discussed how we will titrate careful while keeping his prior history in mind  I will also add low dose decadron to help with pain as well as energy and appetite, as he is losing weight since December 2021  He is aware of side effects like insomnia, elevated BG, osteoporosis  He is asked to stop the decadron on the week of his chemo if they are adding decadron to his regimen  He has issues with insomnia  He is taking an over the counter sleeping aid for this reason  They are not sure what it is  It may contain diphenhydramine, as most sleeping aids do, so I advised him to stop this for now and focus on his increased oxycodone as part of the reason for poor sleep is his pain  If sleep is still poor on his next visit, we may start remeron for sleep and for appetite  I do not want to start many things at the same time to better monitor tolerability  We spent time talking about his past history of other malignancies  He was able to tell me the prognosis from oncology of 1-3 years, but longer if responding to treatments  His goal is to receive treatments that will prolong his life  He understands that this is incurable, but treatable  He is getting his port placed tomorrow  He will begin treatments next week  ACP: We spoke about living estrada and mPOA  He does not have any  We [place] 169 as [place] pertains to hierarchy in medical decision making via next of kin  He is  from previous wife with whom he shares 2 sons  He is now with Jane and they have no children together  He has a brother who provides support  His sons are not involved in his care and he tells us he does not want them involved  He wants [facility] to be his medical surrogate  The 5 Wishes was dicussed and we encouraged them to fill this out in light of is wishes to have Jane be his mPOA  Oncology History   H/O laryngeal cancer   11/2011 Initial Diagnosis    H/O laryngeal cancer  Squamous cell carcinoma of oropharynx, stage SHAINA disease, diagnosed in November 2011 12/27/2011 - 2/13/2012 Radiation    6996 cGy in 33 fractions at 212 cGy per fraction to the gross disease with margin  Subclinical disease in the neck received 5610 cGy in 33 fractions at 170 cGy per fraction  Treatment was delivered from 12/27/11 to 2/13/12 with intensity modulated radiation therapy and image guided radiation therapy with 6 MV photons       12/28/2011 - 2/13/2012 Chemotherapy    Concurrent chemoradiation with high-dose cisplatin completed on February 13, 2012     Hepatocellular carcinoma [facility])   10/31/2014 Initial Diagnosis    Hepatocellular carcinoma ([address] ) - multifocal liver lesions on CT and MRI   Patient declined liver biopsy     3/17/2022 - 3/17/2022 Chemotherapy    atezolizumab (TECENTRIQ) IVPB, 1,200 mg, Intravenous, Once, 0 of 6 cycles  bevacizumab (AVASTIN) IVPB, 15 mg/kg = 1,020 mg, Intravenous, Once, 0 of 6 cycles     Malignant neoplasm of body of pancreas ([address] )   3/17/2022 Initial Diagnosis    Malignant neoplasm of body of pancreas ([address] )     3/28/2022 -  Chemotherapy    pegfilgrastim (Hershell Friar), 6 mg, Subcutaneous, Once, 0 of 12 cycles  irinotecan (CAMPTOSAR) chemo infusion, 120 mg/m2 = 215 mg (66 7 % of original dose 180 mg/m2), Intravenous, Once, 0 of 12 cycles  Dose modification: 120 mg/m2 (original dose 180 mg/m2, Cycle 1, Reason: Anticipated Tolerance)  leucovorin calcium IVPB, 400 mg/m2 = 716 mg, Intravenous, Once, 0 of 12 cycles  oxaliplatin (ELOXATIN) chemo infusion, 85 mg/m2 = 152 15 mg, Intravenous, Once, 0 of 12 cycles  fluorouracil (ADRUCIL) ambulatory infusion Soln, 1,200 mg/m2/day = 4,295 mg, Intravenous, Over 46 hours, 0 of 12 cycles         The following portions of the medical history were reviewed: past medical history, problem list, medication list, and social history      Current Outpatient Medications:     diltiazem (CARDIZEM CD) 240 mg 24 hr capsule, Take 1 capsule (240 mg total) by mouth daily, Disp: 90 capsule, Rfl: 1    famotidine (PEPCID) 40 MG tablet, Take 1 tablet (40 mg total) by mouth daily, Disp: 90 tablet, Rfl: 0    glimepiride (AMARYL) 2 mg tablet, Take 1 tablet (2 mg total) by mouth daily, Disp: 90 tablet, Rfl: 1    levothyroxine 112 mcg tablet, Take 1 tablet (112 mcg total) by mouth daily, Disp: 90 tablet, Rfl: 1    nadolol (CORGARD) 40 mg tablet, Take 1 tablet (40 mg total) by mouth daily, Disp: 90 tablet, Rfl: 3    oxyCODONE (ROXICODONE) 10 MG TABS, Take 1 5 tablets (15 mg total) by mouth every 4 (four) hours as needed for severe pain Max Daily Amount: 90 mg, Disp: 45 tablet, Rfl: 0    albuterol (Ventolin HFA) 90 mcg/act inhaler, Inhale 2 puffs every 6 (six) hours as needed for wheezing (Patient not taking: Reported on 3/24/2022 ), Disp: 18 g, Rfl: 5    dexamethasone (DECADRON) 0 5 mg tablet, Take 1 tablet (0 5 mg total) by mouth 2 (two) times a day, Disp: 20 tablet, Rfl: 0    [START ON 3/28/2022] fluorouracil 4,295 mg in CADD infusion pump, Infuse 4,295 mg (1,200 mg/m2/day x 1 79 m2) into a venous catheter over 46 hours for 2 days  Do not start before March 28, 2022 , Disp: 1 each, Rfl: 0    naloxone (NARCAN) 4 mg/0 1 mL nasal spray, Administer 1 spray into a nostril  If no response after 2-3 minutes, give another dose in the other nostril using a new spray  (Patient not taking: Reported on 3/24/2022 ), Disp: 1 each, Rfl: 1    senna-docusate sodium (SENOKOT-S) 8 6-50 mg per tablet, Take 1 tablet by mouth daily, Disp: 60 tablet, Rfl: 0  Review of Systems   Constitutional: Positive for appetite change, fatigue and unexpected weight change  Negative for activity change  HENT: Negative for trouble swallowing  Respiratory: Negative for shortness of breath  Cardiovascular: Negative for chest pain  Gastrointestinal: Positive for abdominal pain and constipation  Negative for diarrhea, nausea and vomiting  Musculoskeletal: Negative for back pain  Neurological: Negative for weakness  Psychiatric/Behavioral: Positive for sleep disturbance  The patient is not nervous/anxious  All other systems reviewed and are negative  All other systems negative    Objective:  Vital Signs  /72 (BP Location: Left arm, Patient Position: Sitting, Cuff Size: Standard)   Pulse 70   Temp (!) 97 2 °F (36 2 °C) (Temporal)   Resp 16   Wt 67 7 kg (149 lb 3 2 oz)   SpO2 97%   BMI 23 37 kg/m²    Physical Exam    Constitutional: Appears well-developed and well-nourished  Appears thin  Appears sickly but not toxic looking  Well kempt  Pleasant, jovial  In no acute physical or emotional distress  Head: Normocephalic and atraumatic  Eyes: EOM are normal  No ocular discharge  No scleral icterus  Neck: No visible adenopathy or masses  Respiratory: Effort normal  No stridor  No respiratory distress  Gastrointestinal: No abdominal distension  Musculoskeletal: No edema  Neurological: Alert, oriented and appropriately conversant  Skin: No diaphoresis, no rashes seen on exposed areas of skin  Psychiatric: Displays a normal mood and affect   Behavior, judgement and thought content appear normal      Lyudmila Rodriguez MD  Palliative Medicine & Supportive Care  Internal Medicine  Available via [facility] Text  Office: [phone]  Fax: [phone]

## 2022-03-24 NOTE — PROGRESS NOTES
Palliative Outpatient Assessment of Need    MSW completed an assessment of need which was completed with patient and Vitaly silvestre, in the office  Family dynamics:   Relationship status: - Has konrad Samuels  Duration of relationship:   Name of significant other: Peter Early and Ages: 2 grown sons- he is not in contact with either  Pets: 2  Dogs, a boxer pt mix and a Bryan malamute mix  Other important family information:  Living situation: Resides with his konrad    Patient's primary caregiver:  Self  Any limitations of caregiver: None  Environmental concerns or barriers:  [address] history:None  Employment history/source of income: Worked in the [employer] and at [employer]:    Spirituality/ Caodaism:    Patient's strengths, social supports, and resources: Patient has strong support from Vitaly Ibarra and he has a brother, Lucretia Paz  Cultural information:   Mental Health current or previous: Mood is posiitve  Substance use or history: Smoker, history of drug and alcohol abuse  He reports that he has been clean for 27 years  Sleep: Poor  Exercise: N/A  Diet/nutrition: Has had significant weight loss, his appetite is improving, he does drink Boost as well  Durable Medical Equipment needs: None  Transportation: Self/Abril  Financial concerns:  Advanced Directive: None- We educated on the 5 wishes and provided a copy  Other medical or social work providers involved: None  Patient/caregiver current level of coping: Pt is coping appropriately Understanding: He is understanding of his dx  Patient/family concerns and areas of need: Pts main concern is pain control  Patient's Interests: Pt enjoys going to the [employer]    *All questions may not be answered due to constraints    Follow-up discussions may need to occur

## 2022-03-25 NOTE — NURSING NOTE
Pt is awake,alert,tolerated diet,voided, written and verbal instructions given, pt verbalizes an understanding

## 2022-03-25 NOTE — DISCHARGE INSTRUCTIONS
Implanted Venous Access Port     WHAT YOU NEED TO KNOW:   An implanted venous access port is a device used to give treatments and take blood  It may also be called a central venous access device (CVAD)  The port is a small container that is placed under your skin, usually in your upper chest  The port is attached to a catheter that enters a large vein  DISCHARGE INSTRUCTIONS:   Resume your normal diet  Small sips of flat soda will help with mild nausea  Prevent an infection:   · Wash your hands often  Use soap and water  Clean your hands before and after you care for your port  Remind everyone who cares for your port to wash their hands  · Check your skin for infection every day  Look for redness, swelling, or fluid oozing from the port site  Care for your port:   1  You may shower beginning 48 hours after procedure  2   Leave glue in place  3  It is normal for some bruising to occur  4  Use Tylenol for pain  5  Limit use of arm on the side that your port was placed  Lift nothing heavier than 5 pounds for 1 week, and then gradually increase activity as tolerated  6  DO NOT apply ointment, lotion or cream to port site until incision is healed  Allow glue to fall off  DO NOT attempt to peel glue from skin even it it begins to flake  7  After the port incision is healed you may swim, bathe  Notify the Interventional Radiologist if you have any of the followin  Fever above 101 F    2  Increased redness or swelling after 1st day  3  Increased pain after 1st day  4  Any sign of infection (drainage from port site, skin separation, hot to touch)  5  Persistent nausea or vomiting  Contact Interventional Radiology at [phone] [facility] PATIENTS: Contact Interventional Radiology at [phone]) ([address]: Contact Interventional Radiology at [phone])  Procedural Sedation   WHAT YOU NEED TO KNOW:   Procedural sedation is medicine used during procedures to help you feel relaxed and calm  You will remember little to none of the procedure  After sedation you may feel tired, weak, or unsteady on your feet  You may also have trouble concentrating or short-term memory loss  These symptoms should go away in 24 hours or less  DISCHARGE INSTRUCTIONS:     Call 911 or have someone else call for any of the following:   · You have sudden trouble breathing      · You cannot be woken  Contact Interventional Radiology at [phone]   [facility] PATIENTS: Contact Interventional Radiology at [phone]) Ashely Alicia PATIENTS: Contact Interventional Radiology at [phone]) if any of the following occur:     · You have a severe headache or dizziness      · Your heart is beating faster than usual     · You have a fever or chills      · Your skin is itchy, swollen, or you have a rash      · You have nausea or are vomiting for more than 8 hours after the procedure       · You have questions or concerns about your condition or care  Self-care:   · Have someone stay with you for 24 hours  This person can drive you to errands and help you do things around the house  This person can also watch for problems       · Rest and do quiet activities for 24 hours  Do not exercise, ride a bike, or play sports  Stand up slowly to prevent dizziness and falls  Take short walks around the house with another person  Slowly return to your usual activities the next day       · Do not drive or use dangerous machines or tools for 24 hours  You may injure yourself or others  Examples include a lawnmower, saw, or drill  Do not return to work for 24 hours if you use dangerous machines or tools for work       · Do not make important decisions for 24 hours  For example, do not sign important papers or invest money       · Drink liquids as directed  Liquids help flush the sedation medicine out of your body   Ask how much liquid to drink each day and which liquids are best for you       · Eat small, frequent meals to prevent nausea and vomiting  Start with clear liquids such as juice or broth  If you do not vomit after clear liquids, you can eat your usual foods       · Do not drink alcohol or take medicines that make you drowsy  This includes medicines that help you sleep and anxiety medicines  Ask your healthcare provider if it is safe for you to take pain medicine  Follow up with your healthcare provider as directed: Write down your questions so you remember to ask them during your visits  Procedural Sedation   WHAT YOU NEED TO KNOW:   Procedural sedation is medicine used during procedures to help you feel relaxed and calm  You will remember little to none of the procedure  After sedation you may feel tired, weak, or unsteady on your feet  You may also have trouble concentrating or short-term memory loss  These symptoms should go away in 24 hours or less  DISCHARGE INSTRUCTIONS:     Call 911 or have someone else call for any of the following:   · You have sudden trouble breathing      · You cannot be woken  Contact Interventional Radiology at [phone]   [facility] PATIENTS: Contact Interventional Radiology at [phone]) Mariella Jarrett PATIENTS: Contact Interventional Radiology at [phone]) if any of the following occur:     · You have a severe headache or dizziness      · Your heart is beating faster than usual     · You have a fever or chills      · Your skin is itchy, swollen, or you have a rash      · You have nausea or are vomiting for more than 8 hours after the procedure       · You have questions or concerns about your condition or care  Self-care:   · Have someone stay with you for 24 hours  This person can drive you to errands and help you do things around the house  This person can also watch for problems       · Rest and do quiet activities for 24 hours  Do not exercise, ride a bike, or play sports  Stand up slowly to prevent dizziness and falls  Take short walks around the house with another person  Slowly return to your usual activities the next day       · Do not drive or use dangerous machines or tools for 24 hours  You may injure yourself or others  Examples include a lawnmower, saw, or drill  Do not return to work for 24 hours if you use dangerous machines or tools for work       · Do not make important decisions for 24 hours  For example, do not sign important papers or invest money       · Drink liquids as directed  Liquids help flush the sedation medicine out of your body  Ask how much liquid to drink each day and which liquids are best for you       · Eat small, frequent meals to prevent nausea and vomiting  Start with clear liquids such as juice or broth  If you do not vomit after clear liquids, you can eat your usual foods       · Do not drink alcohol or take medicines that make you drowsy  This includes medicines that help you sleep and anxiety medicines  Ask your healthcare provider if it is safe for you to take pain medicine  Follow up with your healthcare provider as directed: Write down your questions so you remember to ask them during your visits

## 2022-03-28 NOTE — PROGRESS NOTES
Pt  Verbalized concern that he will not have Decadron to take later today as ordered  RN called [facility]  Pharmacy and found his medication was unfortunately sent to wrong address  Address updated but prescription was already sent  Per Pharmacy, Pt  May come to pharmacy today to  [address] up a few tablets to bridge him over until he gets prescription delivered  Pt  Instructed that the prescription will forward if forwarding address in place  RN instructed pt  To call [facility] in next couple of days if he does not get medication  Contact information provided

## 2022-03-28 NOTE — PROGRESS NOTES
Pt  Tolerated chemotherapy without adverse event  5FU will infuse over next 46 hours as ordered via cadd pump  RN provided instructed related to cadd pump and also provided post infusion instructions  Pt  And significant other both verbalized understanding  Pt  Will return on Wednesday for pump disconnect and Neulasta onpro  AVS provided  Pt  Was not instructed on Onpro today as he already was given a lot of information  RN notified office of Dr Benjy Sanchez requesting EMLA cream   Prescription to be sent to [facility]

## 2022-03-28 NOTE — PROGRESS NOTES
Pt  Denies new symptoms or concerns today  Labs reviewed  Parameters met  Oxaliplatin, Irinotecan, Leucovorin and 5FU cadd ordered today  Pt will return on day 3 for 5FU cadd pump disconnect and Neulasta Onpro

## 2022-03-30 NOTE — PROGRESS NOTES
Patient arrived on unit to have CADD pump D/C and neulasta onpro applied  Patient stated he had been nauseated  TEAMS with Deng Bergeron RN with Dr Luke Millan and made aware of nausea  Antinausea medication to be called into pharmacy as per Deng Bergeron  Patient had diarrhea and took imodium which was effective  Educated patient on neulasta onpro, time neulasta will infuse will infuse on 3/31/2022, reasons to notify physician office,  and potential side effect of bone pain  Booklet given to patient as well  Patient verbalized understanding and repeated back neulasta onpro instructions  Encouraged to call Dr Fredy Dumont office if any issues/concerns or questions  Verbalized understanding  CADD pump D/C as per orders  Residual vol- 0ml  Patient aware of next appointments   AVS given to patient

## 2022-04-03 NOTE — TELEPHONE ENCOUNTER
4/3/22 0940    Received TT message that patient with worsening pain after his chemotherapy and requesting further instruction regarding his oxycodone use  Attempted to call x 2 and was unable to connect  Will plan to reach out later today       Pérez Alba,   Palliative and Supportive Care  [phone]

## 2022-04-03 NOTE — TELEPHONE ENCOUNTER
Pt  Called with very bad pain in his stomach  He wanted to know if he could take his steroid medication along with his Oxy  The on call Doctor Ellie Mata) was paged and she called him

## 2022-04-03 NOTE — TELEPHONE ENCOUNTER
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

## 2022-04-06 NOTE — PATIENT INSTRUCTIONS
Pain:  Oxycodone 20mg per tab  · Take 1 tablet every 4 hours as needed  You can skip a dose if you don't feel severe pain  Energy/Appetite:  Decadron 0 5mg per tab  · Take 1 tablet twice a day (morning and at noon)  If you forget to take the 2nd dose and it is past 1 pm, just skip that dose  This can cause insomnia if taken later in the day    Constipation:  · Take senokot daily  Add miralax if senokot not enough    Chemically taste/Dysgeusia:   · Avoid metallic silverware, use plastic utensils instead   · Add more seasoning/spices to food to alter taste to your liking  · Choose mild flavored protein (chicken, turkey, etc)  · Add sugar to decrease bitter or salty taste  · Use Lemon juice/lemon drop candies  · Reduce bitter tasting food like coffee, chocolates  · Marinate meats to change the taste  · Snack on frozen fruits like grapes, melons balls  · Drink more water with meals  · Eat small meals several times a day  · Maintain good oral hygiene    PRESCRIPTION REFILL REMINDER:  All medication refills should be requested prior to Noon on Friday  Any refill requests after noon on Friday would be addressed the following Monday  Please protect yourself from COVID-19 and the delta and omicron variants! Even though we do not have good antiviral drugs for this infection, the following tools can help you stay healthy:    = Wash your hands! Soap and water, or hand  with at least 60% alcohol, are both effective at killing the virus  = Wear a mask! This will help protect others from any virus particles you might spread  Your mouth and nose BOTH need to be covered  = Keep the distance! Keep 6 feet of distance from other people, even if they seem healthy  Keeping distance protects you from the other person's virus spread     = Get a vaccine! Three vaccines are approved for use in the [place] for all adults    These vaccines do provide protection against the delta variant, and seem to reduce severity of omicron infection  + Pfizer is FDA approved for all persons age 11 and older   + Lisset Vargas may be given to all person age 25 and older   + Anselm Esteban may be given to all person age 25 and older  (Serious blood clot side effects have only been reported in reproductive-age women, and these are about 1-in-a-million  We do NOT recommend J&J for people who have had Guillan-Eureka syndrome )  = You may get a shot from many other locations outside [facility]: [address], [employer], [employer], [employer], and certain [facility] locations  + As of 11/29/21, the CDC recommends booster shots on ALL vaccines for ALL adults  https://OhmData/      We are recommending that ALL our patients get a complete series of one of the three vaccines, and boost it ASAP  Call 7-788-NEOXRLV (Choose Option 7 for faster service!) to get scheduled for your shot  Informacion en espanol sobre vacunas, de nos companeros [address] --  [facility]      Numbers of coronavirus cases (and COVID deaths) are worsening in many [place] States, among unvaccinated people, we think this is because vaccines are working, but only if you get one! As of 12/1/21, we do NOT advise travel OUTSIDE the [address], and we encourage you to be very careful when planning travel INSIDE the [address]  Check out Dmailer for Parkinson data that are updated daily:    http://www Segmint/     Global Epidemics  Org, from [facility] (OUTPATIENT CAMP[facility]), will give you [id number] information on virus cases and vaccination rates:    Https://globalepidemics  org/    Frequently Asked Questions about COVID, answered by [facility]    Security es

## 2022-04-07 PROBLEM — R64 CANCER CACHEXIA (HCC): Status: ACTIVE | Noted: 2022-01-01

## 2022-04-07 PROBLEM — Z95.828 PORT-A-CATH IN PLACE: Status: ACTIVE | Noted: 2022-01-01

## 2022-04-07 PROBLEM — R63.0 POOR APPETITE: Status: ACTIVE | Noted: 2022-01-01

## 2022-04-07 PROBLEM — R53.0 NEOPLASTIC MALIGNANT RELATED FATIGUE: Status: ACTIVE | Noted: 2022-01-01

## 2022-04-07 PROBLEM — R43.2 DYSGEUSIA: Status: ACTIVE | Noted: 2022-01-01

## 2022-04-07 NOTE — PROGRESS NOTES
Palliative Supportive Care  met with patient and his, Damon Dill, to continue to provide emotional support and guidance  Updated biopsychosocial information relevant to support: Pt presents today for his follow up visit  He has had increased pain, therefore he called into the office over the weekend and spoke with Dr Anthony Burrows increased his oxy to 20 mg  He states that the chemo is rough, but he is going to continue with it  Pt will continue taking his steroids, as he reports increased energy and improved appetite  He continues to lose weight and we encouraged him to continue drinking the Boost and to eat whenever he can and make the food count  He is also dealing with the metal taste in his mouth, which we discussed may be present for a long time  Dr Imani Burrows thoroughly educated on when to take the steroid and encouraged him not to take it past 1:00 pm  He normally has difficulty sleeping and Dr Imani Burrows educated him on mirtazapine, if he would like to try in the future  Pt completed his 5 wishes, where he named Cale Mcfarlane and his brother, Melanie Aj, as his medical decision makers  Identified areas of need include: Support  Resources provided: None  Areas that need future follow-up include: Continue to provide ongoing support    MSW met with patient and family in the clinic/hospital to discuss advance directives  5 Wishes Document reviewed with patient and family  Opportunity for questions provided and answered  5 wishes document signed by patient and 2 witnesses  Copies made and provided to patient/family  Copy provided to MMAs to be entered in patient's chart

## 2022-04-07 NOTE — PROGRESS NOTES
Palliative and Supportive Care   Judy Cullen 77 y o  male [id number]    Assessment/Plan:  1  Cancer related pain    2  Malignant neoplasm of body of pancreas ([facility] )    3  Secondary malignant neoplasm of retroperitoneal lymph nodes ([facility] )    4  Poor appetite    5  H/O laryngeal cancer    6  Alcoholic cirrhosis of liver without ascites ([facility] )    7  Hepatocellular carcinoma ([place] [address] )    8  Type 2 diabetes mellitus without complication, without long-term current use of insulin ([place] [address] )    9  Counseling regarding advanced care planning and goals of care    10  Drug induced constipation    11  Pain of upper abdomen    12  Dysgeusia    13  Cancer cachexia ([place] [address] )    14  Neoplastic malignant related fatigue      · Continue low dose decadron 0 5mg BID (am and noon) - it is helping his energy and appetite  Stop if BG remains elevated  · He is again reminded to take the last dose no later than 1pm, to avoid insomnia  He will skip the 2nd dose if it is past 1pm  · Increase oxycodone IR from 15mg to 20mg PO q4H prn  · Because of remote history of drug abuse (Heroin, cocaine, etc), we will try to limit use of narcotics as well as be conservative on the uptitration, if possible  We will wean off as soon as possible, pending response to treatments  He voiced understanding  I requested that he take the medications as directed and to talk to me first prior to making any changes  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 and is determined to stay that way  · Daily 1-2 tabs of senokot-S, add miralax if not enough  · Can consider Remeron 7 5mg PO ODHS to help with insomnia and appetite if sleeping continues to be an issue  · The 5 Wishes completed  · Patient is , has 2 sons from prior marriage  Now with konrad/Abril with whom he shares no children  He also has a brother/Dragan  He names Lavonne Rao as 1st agent and Marilynn Lion as 2nd agent  · He wishes to be a DNR/DNI  · RTO in 4 weeks    Controlled Substance Review    [place]MP or [place]  reviewed:  No red flags were identified; safe to proceed with prescription  Jose M Naidu 03/24/2022  1   03/24/2022  Oxycodone Hcl (Ir) 10 MG Tab    45 00  [address]   [id number]   St (5361)    135 00 MME  [place] Ins   [place]   03/21/2022  1   03/21/2022  Oxycodone Hcl (Ir) 10 MG Tab    30 00  10 [address]   [id number]   St (5361)    45 00 MME  [place]   03/11/2022  1   03/11/2022  Oxycodone Hcl (Ir) 10 MG Tab    30 00  10 [address]   [id number]   St (5361)    45 00 MME  [place]   02/25/2022  1   02/25/2022  Oxycodone Hcl (Ir) 10 MG Tab    20 00  7 [address]   [id number]   St (5361)    42 86 MME  Comm Ins       Requested Prescriptions     Pending Prescriptions Disp Refills    dexamethasone (DECADRON) 0 5 mg tablet 60 tablet 0     Sig: Take 1 tablet (0 5 mg total) by mouth 2 (two) times a day    oxyCODONE (ROXICODONE) 20 MG TABS 150 tablet 0     Sig: Take 1 tablet (20 mg total) by mouth every 4 (four) hours as needed for moderate pain Max Daily Amount: 120 mg     Medications Discontinued During This Encounter   Medication Reason    oxyCODONE (ROXICODONE) 10 MG TABS        Representatives have queried the patient's controlled substance dispensing history in the Prescription Drug Monitoring Program in compliance with regulations before I have prescribed any controlled substances  The prescription history is consistent with prescribed therapy and our practice policies  20 minutes were spent face to face with Julia Medley and his fiance/Abril with greater than 50% of the time spent in counseling or coordination of care including discussions of etiology of diagnosis, pathogenesis of diagnosis, diagnostic results, impression, and recommendations, risks and benefits of treatment, instructions for disease self management, treatment instructions, follow up requirements, risk factors and risk reduction of disease, patient and family counseling/involvement in care and compliance with treatment regimen and advanced care planning    All of the patient's questions were answered during this discussion  No follow-ups on file  Subjective:   Chief Complaint  Follow up visit for:  symptom management, goal of care assessment and decisional support, disease process education and discussion of prognosis, advance care planning, emotional support in the setting of serious illness  ROMI De Leon is a 77 y o  male with a palliative diagnosis of 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 that was diagnosed via retroperitoneal lymph node biopsy on 3/1/2022  CT on 2/23/2022 showed new bulky abdominal and retroperitoneal LNs, lytic lesion in the L1 vertebral body, possible mesenteric/omental carcinomatosis, multiple liver lesions, and pancreatic head lesion  He is currently on FOLFIRINOX+neulasta, that was started on 3/28/2022  He also has prior Hx of laryngeal cancer in 2011 with complete response after concurrent RT and Hepatocellular carcinoma with liver cirrhosis in 2014 that was controlled with sorafenib  He has a remote Hx of drug (heroin, cocaine, etc) and alcohol abuse, but he has been clean and sober now for 27 years  He also has DM and is on glimepiride  He was last seen on initial consultation on 3/24 where he reports intermittent burning pain in his upper abdomen, that does not seem to worsen with any triggers, and can occur even at rest  Moving his bowels seem to make it better  Pain appears consistent with extent of cancer in the abdomen/RP areas  He was prescribed oxyIR 10mg by oncology and he would take 1 or 2 tabs, depending on his pain level, and averaging 5 a day  He admitted to pain being under-controlled so we advise to increase oxyIR to 15mg PO q4H prn  We started decadron for pain, energy, appetite, fatigue  Since his last visit, he has started chemo on 3/28/2022  Today, he returns for his routine follow up  His pain is better controlled with 2 tabs of 10mg oxy   This affords him better sleep at night too  He feels that the steroid had been helpful with his energy and appetite  He did forget instructions to only take it am and noon  He had been taking it before bedtime and is affecting his sleep  He already has issues with sleeping prior to this  He stopped OTC sleep aid, which I encouraged to continue  He will stay on the 20mg oxyIR for now, which can help him sleep as well, before we attempt to add another medication for sleep  I did discuss that remeron is my next plan should he still have issues with sleep as this can also help with his appetite  He is taking a stool softener daily which is helpful  ACP: He finished his 5 Wishes  He names Deepadennise Solares as 1st agent and brother/Dragan as 2nd  He wishes to be DNR/DNI  Copy signed an witnessed  Original copy sent to them along with 2 additional photocopies per their request  A copy is scanned into his chart  Oncology History   H/O laryngeal cancer   11/2011 Initial Diagnosis    H/O laryngeal cancer  Squamous cell carcinoma of oropharynx, stage SHAINA disease, diagnosed in November 2011 12/27/2011 - 2/13/2012 Radiation    6996 cGy in 33 fractions at 212 cGy per fraction to the gross disease with margin  Subclinical disease in the neck received 5610 cGy in 33 fractions at 170 cGy per fraction  Treatment was delivered from 12/27/11 to 2/13/12 with intensity modulated radiation therapy and image guided radiation therapy with 6 MV photons       12/28/2011 - 2/13/2012 Chemotherapy    Concurrent chemoradiation with high-dose cisplatin completed on February 13, 2012     Hepatocellular carcinoma [facility])   10/31/2014 Initial Diagnosis    Hepatocellular carcinoma ([facility] ) - multifocal liver lesions on CT and MRI   Patient declined liver biopsy     3/17/2022 - 3/17/2022 Chemotherapy    atezolizumab (TECENTRIQ) IVPB, 1,200 mg, Intravenous, Once, 0 of 6 cycles  bevacizumab (AVASTIN) IVPB, 15 mg/kg = 1,020 mg, Intravenous, Once, 0 of 6 cycles     Malignant neoplasm of body of pancreas ([facility] )   3/17/2022 Initial Diagnosis    [facility]     3/28/2022 -  Chemotherapy    pegfilgrastim (Cheryn Lindau), 6 mg, Subcutaneous, Once, 1 of 12 cycles  Administration: 6 mg (3/30/2022)  irinotecan (CAMPTOSAR) chemo infusion, 215 mg (66 7 % of original dose 180 mg/m2), Intravenous, Once, 1 of 12 cycles  Dose modification: 120 mg/m2 (original dose 180 mg/m2, Cycle 1, Reason: Anticipated Tolerance)  Administration: 220 mg (3/28/2022)  leucovorin calcium IVPB, 716 mg, Intravenous, Once, 1 of 12 cycles  Administration: 700 mg (3/28/2022)  oxaliplatin (ELOXATIN) chemo infusion, 152 15 mg, Intravenous, Once, 1 of 12 cycles  Administration: 150 mg (3/28/2022)  fluorouracil (ADRUCIL) ambulatory infusion Soln, 1,200 mg/m2/day = 4,295 mg, Intravenous, Over 46 hours, 1 of 12 cycles         The following portions of the medical history were reviewed: past medical history, problem list, medication list, and social history      Current Outpatient Medications:     dexamethasone (DECADRON) 0 5 mg tablet, Take 1 tablet (0 5 mg total) by mouth 2 (two) times a day, Disp: 20 tablet, Rfl: 0    diltiazem (CARDIZEM CD) 240 mg 24 hr capsule, Take 1 capsule (240 mg total) by mouth daily, Disp: 90 capsule, Rfl: 1    famotidine (PEPCID) 40 MG tablet, Take 1 tablet (40 mg total) by mouth daily, Disp: 90 tablet, Rfl: 0    [START ON 4/12/2022] fluorouracil 4,295 mg in CADD infusion pump, Infuse 4,295 mg (1,200 mg/m2/day x 1 79 m2) into a venous catheter over 46 hours for 2 days  Do not start before April 12, 2022 , Disp: 1 each, Rfl: 0    glimepiride (AMARYL) 2 mg tablet, Take 1 tablet (2 mg total) by mouth daily, Disp: 90 tablet, Rfl: 1    levothyroxine 112 mcg tablet, Take 1 tablet (112 mcg total) by mouth daily, Disp: 90 tablet, Rfl: 1    lidocaine-prilocaine (EMLA) cream, Apply topically as needed for mild pain, Disp: 30 g, Rfl: 1    nadolol (CORGARD) 40 mg tablet, Take 1 tablet (40 mg total) by mouth daily, Disp: 90 tablet, Rfl: 3    ondansetron (ZOFRAN) 4 mg tablet, Take 1 tablet (4 mg total) by mouth every 8 (eight) hours as needed for nausea or vomiting, Disp: 30 tablet, Rfl: 1    senna-docusate sodium (SENOKOT-S) 8 6-50 mg per tablet, Take 1 tablet by mouth daily, Disp: 60 tablet, Rfl: 0    albuterol (Ventolin HFA) 90 mcg/act inhaler, Inhale 2 puffs every 6 (six) hours as needed for wheezing (Patient not taking: Reported on 4/7/2022 ), Disp: 18 g, Rfl: 5    naloxone (NARCAN) 4 mg/0 1 mL nasal spray, Administer 1 spray into a nostril  If no response after 2-3 minutes, give another dose in the other nostril using a new spray  (Patient not taking: Reported on 3/24/2022 ), Disp: 1 each, Rfl: 1  Review of Systems   Constitutional: Positive for appetite change, fatigue and unexpected weight change  Negative for activity change  HENT: Negative for trouble swallowing  Respiratory: Negative for shortness of breath  Cardiovascular: Negative for chest pain  Gastrointestinal: Positive for abdominal pain and constipation  Negative for diarrhea, nausea and vomiting  Musculoskeletal: Negative for back pain  Neurological: Negative for weakness  Psychiatric/Behavioral: Positive for sleep disturbance  The patient is not nervous/anxious  All other systems reviewed and are negative  All other systems negative    Objective:  Vital Signs  /62 (BP Location: Left arm, Patient Position: Sitting, Cuff Size: Standard)   Pulse 64   Temp (!) 95 7 °F (35 4 °C) (Temporal)   Resp 16   Wt 65 9 kg (145 lb 3 2 oz)   SpO2 97%   BMI 22 74 kg/m²    Physical Exam    Constitutional: Appears well-developed and well-nourished  Appears thin  Appears sickly but not toxic looking  Well kempt  Pleasant, jovial  In no acute physical or emotional distress  Head: Normocephalic and atraumatic  Eyes: EOM are normal  No ocular discharge  No scleral icterus     Neck: No visible adenopathy or masses  Respiratory: Effort normal  No stridor  No respiratory distress  Gastrointestinal: No abdominal distension  Musculoskeletal: No edema  Neurological: Alert, oriented and appropriately conversant  Seems a little repetitive, forgetful but redirectable  Skin: No diaphoresis, no rashes seen on exposed areas of skin  Ashen  Psychiatric: Displays a normal mood and affect   Behavior, judgement and thought content appear normal      Ambar Roque MD  Palliative Medicine & Supportive Care  Internal Medicine  Available via [facility] Text  Office: [phone]  Fax: [phone]

## 2022-04-11 NOTE — PROGRESS NOTES
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    He has history of chronic hepatitis C as well as alcohol abuse  Hero Robb has liver cirrhosis   He has multifocal liver lesion with low level of AFP    This was histologically confirmed to be well-differentiated hepatocellular carcinoma   He started sorafenib in October 2018, which produced long-term stabilization of disease  Balbina Wade, he developed  bulky abdominal adenopathy in January 2022 which was confirmed to be poorly differentiated adenocarcinoma, 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  Therefore, he started FOLFIRINOX  He tolerated 1st cycle of treatment well  However, he has anorexia, resulting in some weight loss  I encouraged him to have more oral intake as well as boost   He is going to have 2nd cycle of FOLFIRINOX tomorrow, assuming that he has adequate ANC   I recommended him to continue current regimen every 2 weeks  I will see him again in 1st week of May 2022 for follow-up  He is in agreement with my recommendations         Subjective:      HPI:             Interval History:  A 77year old gentleman with 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 diagnosed in November 2011  He underwent concurrent chemoradiation with high dose cisplatin resulting in complete response  He has chronic hepatitis C, as well as liver cirrhosis, based on a CT scan of abdomen and pelvis  In October 2014, he was found to have multifocal liver lesions, based on the CT scan  This was confirmed by MRI of the abdomen   However, the PET/CT scan showed the lesion was not hypermetabolic  He declined liver biopsy    Radiographically, this was consistent with multifocal hepatocellular carcinoma   He has been under the care of Glenna Correa summer of 2018, CT scan showed progression   He was suggested to have Siro sphere which he declined  Ronen Kohli, he accepted sorafenib, which he started in October 2018, resulting in prolonged disease control  Radha García, he was hospitalized with abdominal pain in late February 2022 I which time CT scan showed new bulky gastrohepatic, portacaval and retroperitoneal lymphadenopathy  CT scan also showed slightly enlarged cystic pancreatic lesions  He underwent CT-guided biopsy of retroperitoneal lymph node which showed 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  Therefore, he started palliative systemic chemotherapy with FOLFIRINOX  He tolerated 1st treatment well  He had 2 days of nausea  He has metallic taste  Therefore, he has been quite anorexic resulting in some weight loss  He is drinking boost 2 cans per day  He has no diarrhea  He denied any neuropathy  His abdominal pain in back pain is well controlled  His performance status is 0 to 1/4 on ECOG scale  Objective:      Primary Diagnosis:     1  Squamous cell carcinoma of oropharynx, stage SHAINA disease, diagnosed in November 2011     2 Multifocal liver lesion, clinically clinically consistent with hepatocellular carcinoma      Cancer Staging:  Cancer Staging  No matching staging information was found for the patient         Previous Hematologic/ Oncologic Treatment:      1  Concurrent chemoradiation with high-dose cisplatin completed on February 13, 2012      2  Sorafenib from October 2018 through March 2022        Current Hematologic/ Oncologic Treatment:        FOLFIRINOX, 2nd cycle to be given in April 12, 2022      Disease Status:      Not evaluated at this time      Test Results:     Pathology:      Liver biopsy showed well-differentiated hepatocellular carcinoma      Biopsy of retroperitoneal lymph node showed 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      Radiology:     CT scan of chest abdomen pelvis in February 2022 showed bulky new confirmed went gastro phrenic, portacaval and retroperitoneal lymphadenopathy   Multiple hepatic lesions  Slightly enlarged pancreatic cystic lesions      Laboratory:      AFP is 12 in January 2022   See below otherwise        Physical Exam:        General Appearance:    Alert, oriented          Eyes:    PERRL   Ears:    Normal external ear canals, both ears   Nose:   Nares normal, septum midline   Throat:   Mucosa moist  Pharynx without injection  Neck:   Supple         Lungs:     Clear to auscultation bilaterally   Chest Wall:    No tenderness or deformity    Heart:    Regular rate and rhythm         Abdomen:     Soft, non-tender, bowel sounds +, hepatomegaly   No palpable spleen                Extremities:   Extremities no cyanosis or edema         Skin:   no rash or icterus  Lymph nodes:   Cervical, supraclavicular, and axillary nodes normal   Neurologic:   CNII-XII intact, normal strength, sensation and reflexes     Throughout             Breast exam:   Not applicable  ROS: Review of Systems   All other systems reviewed and are negative  Imaging: IR port placement    Result Date: 3/25/2022  Narrative: IMAGE-GUIDED PORT PLACEMENT Indication: Long-term intravenous access for chemotherapy  ( metastatic hepatocellular carcinoma ) Technique: The right neck was prepped and draped in sterile fashion  All elements of maximal sterile barrier technique were followed (cap, mask, sterile gown, sterile gloves, large sterile sheet, hand hygiene, and 2% chlorhexidine for cutaneous antisepsis)  Sterile probe cover and sterile gel were also utilized    1% lidocaine with epinephrine was used as local anesthetic and conscious sedation was administered  The right internal jugular vein was punctured using a 21 gauge needle using direct sonographic guidance  A static sonographic image demonstrating needle entry was saved  A guidewire was advanced into the inferior vena cava through an exchange dilator  The 9 [place]ic peel-away sheath was placed  A port pocket was created approximately 5 cm below the mid right clavicle using sharp and blunt dissection  The 8 [place]ic port catheter was tunneled to the venotomy site and advanced through the peel-away sheath into the right atrium  A single-lumen Conformiqp Dignity power-injectable implantable venous port was attached to the catheter and secured in the pocket using a single 3-0 Vicryl suture  The port pocket was closed with deep interrupted 4-0 Vicryl and subcuticular Statifix  Exofin was applied over the port incision and venotomy sites  The port was primed with heparinized saline and a sterile dressing was applied  Fluoroscopic guidance was utilized for catheter placement  Sedation: 30 minutes Fluoroscopy time: 1 2 Images: 2 Findings: The right internal jugular vein was assessed as a possible access site by ultrasound  The right internal jugular vein is patent and fully compressible by limited ultrasound  The tip of the port catheter terminates in the upper right atrium  The  port flushes and aspirates blood easily  Impression: Impression: Successful image-guided placement of right chest wall port   Workstation performed: [id number]         Labs:   Lab Results   Component Value Date    WBC 5 89 03/23/2022    HGB 12 9 03/23/2022    HCT 36 9 03/23/2022    MCV 87 03/23/2022     03/23/2022     Lab Results   Component Value Date     12/09/2015    K 4 5 03/23/2022     03/23/2022    CO2 29 03/23/2022    ANIONGAP 5 12/09/2015    BUN 7 03/23/2022    CREATININE 0 88 03/23/2022    GLUCOSE 267 (H) 12/09/2015    GLUF 133 (H) 07/06/2021    CALCIUM 9 1 03/23/2022    CORRECTEDCA 9 6 03/23/2022    AST 27 03/23/2022    ALT 22 03/23/2022    ALKPHOS 105 03/23/2022    PROT 7 4 12/09/2015    BILITOT 0 97 12/09/2015    EGFR 89 03/23/2022         Lab Results   Component Value Date    PSA 0 6 07/06/2021    PSA 0 6 01/20/2020    PSA 0 4 01/18/2018         Current Medications: Reviewed  Allergies: Reviewed  PMH/FH/SH:  Reviewed      Vital Sign:    Body surface area is 1 74 meters squared      Wt Readings from Last 3 Encounters:   04/11/22 63 7 kg (140 lb 8 oz)   04/07/22 65 9 kg (145 lb 3 2 oz)   03/28/22 68 1 kg (150 lb 2 1 oz)        Temp Readings from Last 3 Encounters:   04/11/22 98 4 °F (36 9 °C) (Tympanic Core)   04/07/22 (!) 95 7 °F (35 4 °C) (Temporal)   03/28/22 (!) 96 8 °F (36 °C) (Temporal)        BP Readings from Last 3 Encounters:   04/11/22 106/64   04/07/22 110/62   03/28/22 144/84         Pulse Readings from Last 3 Encounters:   04/11/22 59   04/07/22 64   03/28/22 59     @LASTSAO2(3)@

## 2022-04-11 NOTE — PROGRESS NOTES
Patient arrived to unit without complaint  Patient educated on how and when to apply EMLA cream to port prior to appointment time and verbalized understanding  Patient had central labs drawn and port flushed per [facility] protocol without incident  AVS declined and patient aware of his appointment tomorrow  Patient left in stable condition

## 2022-04-12 NOTE — PROGRESS NOTES
Pt presents for oxaliplatin, irinotecan, leucovorin, 5FU CADD connect  No new meds  Pt reports having diarrhea 2 days after the last chemo, taking imodium with relief  Otherwise, no new concerns  Pt tolerated infusion without adverse reaction  Pt is aware of trouble shooting the CADD pump and knows to return on Thrusday at 1230 for disconnect  Future visits discussed  AVS declined

## 2022-04-14 NOTE — PROGRESS NOTES
CADD disconnected as per order  0 Res  Volume noted  Neulasta onpro kit in place to right arm as per order  Patient tolerated treatments without any complications   Patient declined AVS

## 2022-04-20 NOTE — TELEPHONE ENCOUNTER
Pt returned call  Instructed pt on new orders  Needed to repeat instructions several times  Reinforced difference between   MSER (EXTENDED RELEASE) and MSIR IMMEDIATE RELEASE)    Instructed pt or spouse to keep a log as to when taking the MSIR  Instructed to take every 4 hours if still having significant pain  Pt is going to ask pharmacist to show him the bottles and verify which is MSER and MSIR  Pt will call if any issues  This nurse will reach out to pt by Friday to assess pain status  Instructed pt to not wait several days next he is having a problem  Assured pt that he is not a bother and addressing problems as they occur can result in better outcomes

## 2022-04-20 NOTE — TELEPHONE ENCOUNTER
Pt returned call  Reports pain is severe rate 8 -10 /10 located across abdomen and into back   When he takes the Oxycodone 20 mg IR it "does nothing" this nurse asked how often he takes the medication ( ordered 1 tab every 4 hours as needed ) pt reports 2 to 3 x a day  Per conversation pt does not repeat dose within 4 hours re " it doesn't matter, it doesn't touch the pain "    Dexamethasone   Continues at 0 5 mg 2 x day    Other symptoms  Decreased another 4 lbs  downto 134 lbs  Nausea from pain  Poor intake  No emesis  Taking po supplements  " I have been taking for years "  Moving bowels  Several trips to bathroom today  Has spoken to Oncologist  Rafaela Castellanos told too soon for any scans  Instructed on possible long acting opioid for pain control  If ordered would probably require prior authorization  Verbalized understanding  Instructed to proceed to ED if pain is intractable for treatment and evaluation  Verbalized understanding  Pt commenting"this is killing me  It's eating me up inside  "     Next scheduled visit 05/05/22     Await instructions from provider       Thank you

## 2022-04-20 NOTE — TELEPHONE ENCOUNTER
Pt called office regarding pain  Pt was asked the following questions to get a better understanding of their concern  What relieves the pain? Patient called office stating the Oxycodone 20mg tablet is not helping with pain  HOW are you taking your pain medication:    Taking Oxycodne 20mg every 4 hours  Patient stated he stopped taking the Oxycodone 20mg tablet two days ago because it was not helping  He is taking a sleeping pill at night to fall asleep so he does not feel the pain  Next scheduled appt 5/5/2022

## 2022-04-20 NOTE — TELEPHONE ENCOUNTER
Call to pharmacy to verify receipt of script and to check if PA needed  Per pharmacist, no need for PA at this time as quantity in low  May need in future   instructed Oxycodone is dc   Call to pt to instruct on new medication  LMOM to call [phone] or the main number  Await  return call

## 2022-04-20 NOTE — TELEPHONE ENCOUNTER
Call placed to pt  lmom asking ptto please call office so this nurse can further assess status and use of pain medication  Offered him main office number in [address] Number   Await call back

## 2022-04-20 NOTE — TELEPHONE ENCOUNTER
Stop OxyIR  Begin trial of Morphine ER 30mg PO q12H ATC with morphine IR 30mg PO q4H prn  Pls encourage use q4H of the IR  Senokot and miralax daily to prevent OIC  Will only provide a small supply for now  Patient to report next week if above is adequate prior to refills  Controlled Substance Review    [place] or [place]  reviewed: No red flags were identified; safe to proceed with prescription  Matthew Rouginger     04/07/2022  3   04/07/2022  Oxycodone Hcl 20 MG Tablet    150 00  [address]   [id number]   St ([id number])    180 00 MME  Comm Ins   [place]   03/24/2022  1   03/24/2022  Oxycodone Hcl 10 MG Tablet    45 00  [address]   [id number]   St ([id number])    135 00 MME  Comm Ins   OSMAN Devine MD  Palliative Medicine & Supportive Care  Internal Medicine  Available via [facility] Text  Office: [phone]  Fax: [phone]

## 2022-04-23 NOTE — TELEPHONE ENCOUNTER
Was able to chat  With patient  Pain is controlled with the morphine ER  He was taking the morphin IR because he thought he needed to and was becoming nauseated  Discussed using the ER ATC and the IR PRN  Pt verbalized understanding  Will refill zofran

## 2022-04-23 NOTE — TELEPHONE ENCOUNTER
Paged on call provider via TC    Patient would like to know if he has to take his pain medication until they are done or only when he is in pain

## 2022-04-23 NOTE — TELEPHONE ENCOUNTER
Called patient to discuss his pain medication after getting a text from the answering service  No answer, left VM

## 2022-04-25 NOTE — PROGRESS NOTES
Pt presents for central labs  No new meds or concerns  Port a cath accessed with excellent blood return  Labs obtained  Future visits discussed  AVS declined

## 2022-04-26 NOTE — PROGRESS NOTES
Pt arrived to [facility] for Chemotherapy  Noted that pt is loosing weight, on 4/12 was 142lbs and today was 135lbs  Pt still within parameters to treat  However, pt stated he can't keep food down  Also noted pt's labs from 4/25 - K+= 3 3, Wi=367, and Mathew  = 1 07   Pt's WBC and ANC still high and pt on Neulasta  Contacted Cristopher Ascencio RN from Dr Mone Anna office  She was made aware of pt's condition and labs  She notified MD about CMP results mentioned above, and he said to continue treatment as ordered, no further orders at this time  She also put in a referral for dietician and said Neulasta should still be given  Pt made aware about his low potassium and that a referral for dietician was made

## 2022-04-26 NOTE — PLAN OF CARE
Problem: SAFETY ADULT  Goal: Patient will remain free of falls  Description: INTERVENTIONS:  - Educate patient/family on patient safety including physical limitations  - Instruct patient to call for assistance with activity   - Consult OT/PT to assist with strengthening/mobility   - Keep Call bell within reach  - Keep bed low and locked with side rails adjusted as appropriate  - Keep care items and personal belongings within reach  - Initiate and maintain comfort rounds  - Make Fall Risk Sign visible to staff  - Offer Toileting in advance of need  - Obtain necessary fall risk management equipment  - Apply yellow socks and bracelet for high fall risk patients  - Consider moving patient to room near nurses station  Outcome: Progressing

## 2022-04-26 NOTE — PROGRESS NOTES
Pt  Tolerated chemotherapy without adverse event  Pt  Verbalized feeling nauseated towards end of infusion; however stated he want to go home  Pt  States this has been going on intermittently and he will take medication when he arrives  Home  RN notified office of nausea post chemotherapy; also that he was insistent to get out of here  Pt  Stated he will call physician with continued concerns if medication not effective  5Fu will infuse via cadd pump over next 46 hours at 2 ml/hr and will return to infusion on 4/28/22 for cadd pump disconnect  AVS provided

## 2022-04-26 NOTE — PLAN OF CARE
Problem: Knowledge Deficit  Goal: Patient/family/caregiver demonstrates understanding of disease process, treatment plan, medications, and discharge instructions  Description: Complete learning assessment and assess knowledge base    Interventions:  - Provide teaching at level of understanding  - Provide teaching via preferred learning methods  4/26/2022 1122 by Valeriano Silvestre RN  Outcome: Progressing  4/26/2022 1121 by Valeriano Silvestre RN  Outcome: Progressing

## 2022-04-27 NOTE — TELEPHONE ENCOUNTER
Patient  called office c/o occasional nausea and vomiting   The following questions were asked to better understand severity of pt's current condition  WHEN does N/V typically occur? Per patient I vomited twice and I'm concern  Are you EATING and/or LOSING WEIGHT?:  Patient is trying to eat, had something light yesterday after treatment, before vomiting and today had a banana with some lunch meat  Have you vomited recently? How often?: Yes twice     WHAT was the appearance of the vomit? (color): Are you currently undergoing Chemo tx?: yes     Are you taking anything to alleviate N/V?:  Yes, per pt taking Zofran 4 mg only took it once because is every 8 hrs  Please advise

## 2022-04-28 NOTE — PROGRESS NOTES
Pt arrived to unit complaining of N/V  Pt entered infusion center with emesis basin  Pt states that he has been experencing  nausea and has been taking Zofran at home as prescribed with no relief  Pt unable to keep much food down  Pt then proceeded to go to the bathroom to throw up  Spoke with Karen Suh RN via teams  Zofran IVPB ordered for pt  Compazine order will be sent to pt pharmacy  Pt made aware  Pt tolerated Zofran without incident  Neulasta Onpro applied as ordered  AVS provided  Pt left unit in stable condition

## 2022-05-02 NOTE — TELEPHONE ENCOUNTER
Primary palliative medicine provider: Dr Katy King     Medication requested: morphine 30 mg   Morphine 30 hr 12 hr tablet     If for pain, how has the patient been taking their pain medicine?      Last appointment: 4/7     Next scheduled appointment: 5/5     PDMP review:    [id number] 04/20/2022 04/20/2022 Morphine Sulfate (Tablet, Extended Release) 20 0 10 30 MG 60 0 [facility] 00 / 00 [place]     1 [id number] 04/20/2022 04/20/2022 Morphine Sulfate (Tablet) 60 0 10 30  0 [facility] 00 / 00 [place]    1 [id number] 04/07/2022 04/07/2022 oxyCODONE HCL (Tablet) 150 0 25 20  0 [facility] 00 / 00 [place]    2 [id number] 03/24/2022 03/24/2022 oxyCODONE HCL (Tablet) 45 0 5 10  0 [facility] [facility] 00 / 00 [place]

## 2022-05-02 NOTE — TELEPHONE ENCOUNTER
Spoke to spouse at 350 pm  Reports following   Pt weight down to 128 lb  Unable to consume more than 2 tbs of solid food with out feeling nauseated or vomiting  Currently eating rice, apple sauce, babyfood cereal  Per oncology note dated 4/11/22 pt was drinking 2 cans Boost per day  Spouse states he no longer is able to drink Boost    Was given IV Zofran last Thursday and ordered compazine po  Per spouse IV zofran was helpful but does not think Compazine is very helpful  States it is "like the food has trouble going down  But he is nauseated '     Asking for a Nutritionist consult  Reports pt takes himself to infusion so is unsure if pt reports poor intake  Will send to palliative and oncology for review       Next appointment with Palliative 05/05/22

## 2022-05-02 NOTE — TELEPHONE ENCOUNTER
Maribell Adair called office regarding Rowena Birch  She would like to speak to nurse Romina Garzon  She called to inform you she  has not heard from the nutrition yet  She stated he is still having some vomiting after eating  He is only able to eat about two tablespoons at a time  She also wanted to change some thing on the living will    She can be reached at [phone]

## 2022-05-03 NOTE — TELEPHONE ENCOUNTER
Received notification from Wan Taylor RN that Severinoe Fried has triggered for oncology nutrition care  Reason for referral: pt lost a lot of weight, and is unable to consume more than 2 tbs of solid food without feeling nauseated or vomiting  Currently eating rice, apple sauce, babyfood cereal  Per oncology note dated 4/11/22 pt was drinking 2 cans Boost per day  Spouse states he no longer is able to drink Boost  Monda Grow  Introduced self, explained reason for the call, and nutritional services available for pt  Continued conversation with Adwoa-(significant other)  Kristina Manley explained that she is really concerned about Genice Fried nutrition at this point, because he is not able to eat much by mouth  She asked questions about nutrition during cancer treatments, and what can be done to improve pt PO intake  Provided general nutrition guidelines on nutrition during cancer treatments, and food options of liquid/pureed nutrition that she can try for Genice Anayeli Manley was open to receiving some nutrition education materials  Will be sending to pt: Eating hints book; High calorie/high protein food recipes-smoothies, shakes; List of liquids with calories; List of commercial oral nutritional supplements  Appt scheduled on 5/12/22 during his infusion  Jaylin Guadalupe and Kristian Manley were appreciative of the call  They have RD contact information and were encouraged to call with any questions/concerns

## 2022-05-04 NOTE — PATIENT INSTRUCTIONS
PRESCRIPTION REFILL REMINDER:  All medication refills should be requested prior to [facility] on Friday  Any refill requests after noon on Friday would be addressed the following Monday  Please protect yourself from COVID-19 and the delta and omicron variants! Even though we do not have good antiviral drugs for this infection, the following tools can help you stay healthy:    = Wash your hands! Soap and water, or hand  with at least 60% alcohol, are both effective at killing the virus  = Wear a mask! This will help protect others from any virus particles you might spread  Your mouth and nose BOTH need to be covered  = Keep the distance! Keep 6 feet of distance from other people, even if they seem healthy  Keeping distance protects you from the other person's virus spread     = Get a vaccine! Three vaccines are approved for use in the [place] for all adults  These vaccines do provide protection against the delta variant, and seem to reduce severity of omicron infection  + Pfizer is FDA approved for all persons age 11 and older   + Omar Radish may be given to all person age 25 and older   + Lola Villegas may be given to all person age 25 and older  (Serious blood clot side effects have only been reported in reproductive-age women, and these are about 1-in-a-million  We do NOT recommend J&J for people who have had Guillan-Shapleigh syndrome )  = You may get a shot from many other locations outside [address] [place] [address] Blvd: [facility], [employer], [facility], [facility], and certain [facility] locations  + As of 11/29/21, the CDC recommends booster shots on ALL vaccines for ALL adults  https://[employer] com/      We are recommending that ALL our patients get a complete series of one of the three vaccines, and boost it ASAP  Call 2-684-MKDVBSA (Choose Option 7 for faster service!) to get scheduled for your shot  Informacion en espanol sobre vacunas, de nos companeros [address] --  [facility]      Numbers of coronavirus cases (and COVID deaths) are worsening in many [place] States, among unvaccinated people, we think this is because vaccines are working, but only if you get one! As of 12/1/21, we do NOT advise travel OUTSIDE the [address], and we encourage you to be very careful when planning travel INSIDE the [address]  Check out [employer] for [place] data that are updated daily:    http://www [employer]/     Global Epidemics  Org, from [facility] (OUTPATIENT [facility]), will give you Ntqyio-md-Aqvnec information on virus cases and vaccination rates:    Https://globalepidemics  org/    Frequently Asked Questions about COVID, answered by [facility]    [employer]

## 2022-05-05 NOTE — PROGRESS NOTES
Palliative and Supportive Care   Carol Davis 79 y o  male [id number]    Assessment/Plan:  1  Dysphagia, unspecified type    2  H/O laryngeal cancer    3  Hepatocellular carcinoma (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[facility] )    8  Cancer related pain    9  Cancer cachexia ([facility] )    10  Counseling regarding advanced care planning and goals of care       · Continue low dose decadron 0 5mg OD am - it is helping his energy and appetite  · Stop if BG remains elevated  · He is again reminded to take the last dose no later than 1pm, to avoid insomnia  He will skip the 2nd dose if it is past 1pm  · I may consider increasing it to 1mg OD after nystatin, if appropriate  · Continue MSER 30mg BID and MSIR 30mg PO q4H prn  · Because of remote history of drug abuse (Heroin, cocaine, etc), we will try to limit use of narcotics as well as be conservative on the uptitration, if possible  We will wean off as soon as possible, pending response to treatments  He voiced understanding  I requested that he take the medications as directed and to talk to me first prior to making any changes  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 and is determined to stay that way  · Daily 1-2 tabs of senokot-S, add miralax if not enough  · Start nystatin 5mL 4x a day for 14 days for oropharyngeal candidiasis  · Refer to GI for chronic dysphagia with recent worsening, unable to tolerate PO, nausea/vomiting given prior Hx of laryngeal cancer with prior RT to the area, r/o esophageal stricture  · Encouraged to speak with oncology re: their recommendation on feeding tube/TPN as it should be depending on prognosis  Prognosis may depend on repeat imaging  They want this done sooner  I will reach out to Dr Kanika Munoz per their request  They will call his office as well     · Can consider Remeron 7 5mg PO ODHS to help with insomnia and appetite if sleeping continues to be an issue  · They want to change his 5 Wishes, new one filled, signed and completed  · Patient is , has 2 sons from prior marriage  Now with konrad/Abril with whom he shares no children  He also has a brother/Dragan  He names Timbo Herrera as 1st agent and Constance Sanchez as 2nd agent  · He wishes to be a DNR/DNI if in a coma and if with permanent brain damage and not expected to recovery  But wants to receive life support if close to death and expected to recover  The rest stayed the same  · RTO in 2 weeks, to monitor symptoms closely    Controlled Substance Review    [place] or [place]  reviewed: No red flags were identified; safe to proceed with prescription  05/02/2022  3   05/02/2022  Morphine Sulf Er 30 MG Tablet    30 00  [address]   [id number]   [address] ([id number])    60 00 MME  Comm Ins   [place]   05/02/2022  3   05/02/2022  Morphine Sulfate Ir 30 MG Tab    60 00  [address]   [id number]   St ([id number])    180 00 MME  [place] Ins   [place]     Requested Prescriptions     Signed Prescriptions Disp Refills    nystatin (MYCOSTATIN) 500,000 units/5 mL suspension 473 mL 0     Sig: Apply 5 mL (500,000 Units total) to the mouth or throat 4 (four) times a day For 14 days     Medications Discontinued During This Encounter   Medication Reason    oxyCODONE (ROXICODONE) 20 MG TABS Therapy completed       Representatives have queried the patient's controlled substance dispensing history in the Prescription Drug Monitoring Program in compliance with regulations before I have prescribed any controlled substances  The prescription history is consistent with prescribed therapy and our practice policies        45 minutes were spent face to face with Riky Chen and his fimitzi/Abril with greater than 50% of the time spent in counseling or coordination of care including discussions of etiology of diagnosis, pathogenesis of diagnosis, diagnostic results, impression, and recommendations, risks and benefits of treatment, instructions for disease self management, treatment instructions, follow up requirements, risk factors and risk reduction of disease, patient and family counseling/involvement in care and compliance with treatment regimen and advanced care planning  All of the patient's questions were answered during this discussion  No follow-ups on file  Subjective:   Chief Complaint  Follow up visit for:  symptom management, goal of care assessment and decisional support, disease process education and discussion of prognosis, advance care planning, emotional support in the setting of serious illness  ROMI Juan is a 79 y o  male with a palliative diagnosis of 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 that was diagnosed via retroperitoneal lymph node biopsy on 3/1/2022  CT on 2/23/2022 showed new bulky abdominal and retroperitoneal LNs, lytic lesion in the L1 vertebral body, possible mesenteric/omental carcinomatosis, multiple liver lesions, and pancreatic head lesion  He is currently on FOLFIRINOX+neulasta, that was started on 3/28/2022  He also has prior Hx of laryngeal cancer in 2011 with complete response after concurrent RT and Hepatocellular carcinoma with liver cirrhosis in 2014 that was controlled with sorafenib  He has a remote Hx of drug (heroin, cocaine, etc) and alcohol abuse, but he has been clean and sober now for 27 years  He also has DM and is on glimepiride  He was last seen on 4/7 where he reports of increased cancer related pain  We subsequently increased his OxyIR from 15mg to 20mg PO q4H prn  He feels that the steroid had been helpful with his energy and appetite  Since his last visit, he saw oncology on 4/11 where he was advised to continue with same regimen of FOLRINOX  He then called office on 2 occasions - 4/20 when he reported poorly controlled pain with oxyIR   We stopped oxy and swtiched to [facility] 30mg q12H with MSIR 30mg q4H prn; and then again on 5/2 where he reports N/V even with zofran and compazine and wife wanted to change something in his living will  He returns today for follow up, accompanied by his wife  They have a lot of questions and struggles  He reports ongoing and worsening dysphagia which makes it difficult for him to tolerate solid food  He is resorting to baby food currently  He has lost more weight  He reports having a lot of phlegm and mucus that makes it difficult for him to swallow  He said this had been a chronic issue but recently gotten worse when he started to lose more weight  He is frustrated that no one can seem to give him answers  He said he did discuss this with oncology but they couldn't answer his questions until after a repeat imaging to assess response to treatment  This is not scheduled until July  I encouraged them to talk to oncology about this  His continued weight loss is very bothersome to them and asked about TPN and/or TF as well  I discussed with them that it depends on overall prognosis and that they should speak with oncology about his prognosis and get their input about artificial nutrition and hydration  He wants to keep going with treatments for as long as he can  He is frustrated with his lack of progress/continued decline  When asked specifically, he did admit to having ulcer in his lower lip and "white stuff" in the roof of his mouth  He noticed this a week ago  He denies any pain with swallowing  He can still swallow pills/meds  He sometimes crush or mix his meds with apple sauce  He is reminded NOT to do that with his MSER  He said he doesn't  He only does that if the meds are in capsule form  He was asking for a referral to ENT, as he had done before  But I think GI may be the more appropriate specialty to see him in case he has esophageal strictures from prior RT, worsened with current chemo  He voiced understanding      ACP: He wanted to revise his previous 5 Wishes  He still names Kim Hagan as 1st agent and brother/Dragan as 2nd  But now he wants CPR if close to death  He still wishes to be DNR/DNI in the other scenarios  New copy signed and witnessed  Original copy sent to them along with 2 additional photocopies per their request  A copy is scanned into his chart  Oncology History   H/O laryngeal cancer   11/2011 Initial Diagnosis    H/O laryngeal cancer  Squamous cell carcinoma of oropharynx, stage SHAINA disease, diagnosed in November 2011 12/27/2011 - 2/13/2012 Radiation    6996 cGy in 33 fractions at 212 cGy per fraction to the gross disease with margin  Subclinical disease in the neck received 5610 cGy in 33 fractions at 170 cGy per fraction  Treatment was delivered from 12/27/11 to 2/13/12 with intensity modulated radiation therapy and image guided radiation therapy with 6 MV photons       12/28/2011 - 2/13/2012 Chemotherapy    Concurrent chemoradiation with high-dose cisplatin completed on February 13, 2012     Hepatocellular carcinoma [facility])   10/31/2014 Initial Diagnosis    Hepatocellular carcinoma ([address] ) - multifocal liver lesions on CT and MRI   Patient declined liver biopsy     3/17/2022 - 3/17/2022 Chemotherapy    atezolizumab (TECENTRIQ) IVPB, 1,200 mg, Intravenous, Once, 0 of 6 cycles  bevacizumab (AVASTIN) IVPB, 15 mg/kg = 1,020 mg, Intravenous, Once, 0 of 6 cycles     Malignant neoplasm of body of pancreas ([address] )   3/17/2022 Initial Diagnosis    Malignant neoplasm of body of pancreas ([address] )     3/28/2022 -  Chemotherapy    pegfilgrastim (Allan Lopes), 6 mg, Subcutaneous, Once, 3 of 12 cycles  Administration: 6 mg (3/30/2022), 6 mg (4/14/2022), 6 mg (4/28/2022)  irinotecan (CAMPTOSAR) chemo infusion, 215 mg (66 7 % of original dose 180 mg/m2), Intravenous, Once, 3 of 12 cycles  Dose modification: 120 mg/m2 (original dose 180 mg/m2, Cycle 1, Reason: Anticipated Tolerance)  Administration: 220 mg (3/28/2022), 220 mg (4/12/2022), 220 mg (4/26/2022)  leucovorin calcium IVPB, 716 mg, Intravenous, Once, 3 of 12 cycles  Administration: 700 mg (3/28/2022), 700 mg (4/12/2022), 700 mg (4/26/2022)  oxaliplatin (ELOXATIN) chemo infusion, 152 15 mg, Intravenous, Once, 3 of 12 cycles  Administration: 150 mg (3/28/2022), 150 mg (4/12/2022), 150 mg (4/26/2022)  fluorouracil (ADRUCIL) ambulatory infusion Soln, 1,200 mg/m2/day = 4,295 mg, Intravenous, Over 46 hours, 3 of 12 cycles         The following portions of the medical history were reviewed: past medical history, problem list, medication list, and social history      Current Outpatient Medications:     albuterol (Ventolin HFA) 90 mcg/act inhaler, Inhale 2 puffs every 6 (six) hours as needed for wheezing, Disp: 18 g, Rfl: 5    dexamethasone (DECADRON) 0 5 mg tablet, Take 1 tablet (0 5 mg total) by mouth 2 (two) times a day, Disp: 60 tablet, Rfl: 0    diltiazem (CARDIZEM CD) 240 mg 24 hr capsule, Take 1 capsule (240 mg total) by mouth daily, Disp: 90 capsule, Rfl: 1    docusate sodium (COLACE) 100 mg capsule, Take 100 mg by mouth 2 (two) times a day, Disp: , Rfl:     famotidine (PEPCID) 40 MG tablet, Take 1 tablet (40 mg total) by mouth daily, Disp: 90 tablet, Rfl: 0    [START ON 5/10/2022] fluorouracil 4,295 mg in CADD infusion pump, Infuse 4,295 mg (1,200 mg/m2/day x 1 79 m2) into a venous catheter over 46 hours for 2 days  Do not start before May 10, 2022 , Disp: 1 each, Rfl: 0    glimepiride (AMARYL) 2 mg tablet, Take 1 tablet (2 mg total) by mouth daily, Disp: 90 tablet, Rfl: 1    levothyroxine 112 mcg tablet, Take 1 tablet (112 mcg total) by mouth daily, Disp: 90 tablet, Rfl: 1    lidocaine-prilocaine (EMLA) cream, Apply topically as needed for mild pain, Disp: 30 g, Rfl: 1    loperamide (IMODIUM A-D) 2 MG tablet, Take 2 mg by mouth 4 (four) times a day as needed for diarrhea, Disp: , Rfl:     morphine (MS CONTIN) 30 mg 12 hr tablet, Take 1 tablet (30 mg total) by mouth 2 (two) times a day Max Daily Amount: 60 mg, Disp: 30 tablet, Rfl: 0    morphine (MSIR) 30 MG tablet, Take 1 tablet (30 mg total) by mouth every 4 (four) hours as needed for severe pain Max Daily Amount: 180 mg, Disp: 60 tablet, Rfl: 0    nadolol (CORGARD) 40 mg tablet, Take 1 tablet (40 mg total) by mouth daily, Disp: 90 tablet, Rfl: 3    ondansetron (ZOFRAN) 4 mg tablet, Take 1 tablet (4 mg total) by mouth every 8 (eight) hours as needed for nausea or vomiting, Disp: 30 tablet, Rfl: 1    prochlorperazine (COMPAZINE) 10 mg tablet, Take 1 tablet (10 mg total) by mouth every 6 (six) hours as needed for nausea or vomiting, Disp: 30 tablet, Rfl: 0    senna-docusate sodium (SENOKOT-S) 8 6-50 mg per tablet, Take 1 tablet by mouth daily, Disp: 60 tablet, Rfl: 0    naloxone (NARCAN) 4 mg/0 1 mL nasal spray, Administer 1 spray into a nostril  If no response after 2-3 minutes, give another dose in the other nostril using a new spray , Disp: 1 each, Rfl: 1    nystatin (MYCOSTATIN) 500,000 units/5 mL suspension, Apply 5 mL (500,000 Units total) to the mouth or throat 4 (four) times a day For 14 days, Disp: 473 mL, Rfl: 0  Review of Systems   Constitutional: Positive for appetite change, fatigue and unexpected weight change  Negative for activity change  HENT: Negative for trouble swallowing  Respiratory: Negative for shortness of breath  Cardiovascular: Negative for chest pain  Gastrointestinal: Positive for abdominal pain and constipation  Negative for diarrhea, nausea and vomiting  Musculoskeletal: Negative for back pain  Neurological: Negative for weakness  Psychiatric/Behavioral: Positive for sleep disturbance  The patient is not nervous/anxious  All other systems reviewed and are negative        All other systems negative    Objective:  Vital Signs  /62 (BP Location: Left arm, Patient Position: Sitting, Cuff Size: Standard)   Pulse 75   Temp (!) 97 °F (36 1 °C) (Temporal)   Resp 16   Ht 5' 7" (1 702 m)   Wt 57 4 kg (126 lb 9 6 oz)   SpO2 98%   BMI 19 83 kg/m²    Physical Exam    Constitutional: Appears well-developed and well-nourished  Appears to have lost a lot more weight since his last visit  Appears peter  Appears sickly but not toxic looking  Well kempt  Pleasant, jovial  In no acute physical or emotional distress  Head: Normocephalic and atraumatic  Oral: ulcer in the bottom lip, white plaques on the tongue and roof of mouth  Eyes: EOM are normal  No ocular discharge  No scleral icterus  Neck: No visible adenopathy or masses  Respiratory: Effort normal  No stridor  No respiratory distress  Gastrointestinal: No abdominal distension  Musculoskeletal: No edema  Neurological: Alert, oriented and appropriately conversant  Seems a little repetitive, forgetful but redirectable  Skin: No diaphoresis, no rashes seen on exposed areas of skin  Ashen  Psychiatric: Displays a normal mood and affect   Behavior, judgement and thought content appear normal  Appears frustrated and easily annoyed and angry towards partner    Sophie Loco MD  [address] [facility]  Internal Medicine  Available via [facility] Text  Office: [phone]  Fax: [phone]

## 2022-05-05 NOTE — PROGRESS NOTES
Palliative Supportive Care  met with patient and significant other, Wallacema Vincemeek, to continue to provide emotional support and guidance  Updated biopsychosocial information relevant to support: Pt presents for his follow up visit  Pt states he has lost more weight and that is his main concern  He has met with nutrition and has been following their recommendations  He is expectedly very angry and upset with not being able to eat  He is also having swallowing difficulties  He has tried baby food and states he is able to tolerate  He finds that seltzer water is helpful as well  Dr Giovanny Aguilar looked in his mouth and prescribed medication for thrush and also put a referral in for GI if his swallowing does not improve  They have questions regarding scheduling an earlier Ct scan and questions regarding a feeding tube  They were directed to speak with oncology  He has an upcoming appointment next week  We validated how difficult his cancer journey has been and provided support  Dr Giovanny Aguilar also reassured him we are here for him whether he chooses comfort or treatment he is very much treatment focused because he states he wants to live  Identified areas of need include: Support  Resources provided: None  Areas that need future follow-up include: Continue to provide ongoing support to pt and Gracie Santiago  MSW met with patient and family in the clinic/hospital to discuss advance directives  Patient wanted to make changed to current 5 Wishes, he completed a new copy while in the office  5 Wishes Document reviewed with patient and family  Opportunity for questions provided and answered  5 wishes document signed by patient and 2 witnesses  Copies made and provided to patient/family  Copy provided to MMAs to be entered in patient's chart

## 2022-05-05 NOTE — TELEPHONE ENCOUNTER
Dinorah Gibbs called office regarding the nystatin  He is asking if he is to rinse, swallow or spit?

## 2022-05-05 NOTE — TELEPHONE ENCOUNTER
Left message for Nani Phoenix per Dr Cecilia Cedillo instructions on Nystatin Swish and swallow  Any other questions to call office back

## 2022-05-06 NOTE — TELEPHONE ENCOUNTER
Patient's emergency contact, Gracie Santiago, is calling in to confirm whether patient will need to complete labs prior to his upcoming infusion   I have confirmed that he does and Gracie Santiago voiced understanding
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
## 2022-05-10 PROBLEM — R11.2 NAUSEA & VOMITING: Status: ACTIVE | Noted: 2022-01-01

## 2022-05-10 NOTE — PROGRESS NOTES
9lb weight lose noted  Pt weighed 135lb on 4/26 and today pt weighs 124  Heart rate 48  BP 89/60 on right arm and 110/74 on his left arm  Pt states he feels dizzy upon standing  Pt able to keep down some baby food and sometimes solid foos  Pt is taking nausea medications as prescribed  Also noted scab area above port  No drainage noted  Spoke with Matt Estevez RN via teams  Emend and NSS bolus over 2hrs to be added to treatment plan  Ok to access pt and start treatment

## 2022-05-12 NOTE — PROGRESS NOTES
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 butter, cream or nuts/seeds butters, or ground mixed nuts/seeds  · Yogurt/greek yogurt+ avocado, or +banana blended beverages (can add fresh lemon/orange juice to taste)  · Tuna/chicken/egg salads with home made olive oil/greek yogurt dressing  · Avocado in a form of guacamole- as a spread or dip   · Omelets or egg souffle, might add cooked, and finely chopped spinach, broccoli, cauliflower  · Mashed potatoes with Fairlife milk; might add extra butter, cream, variety of cheeses  · Cottage cheese/kefir cheese with blended fresh berry sauce    · Maintain positive optimistic outlook, and positive expectations  Keep your attention on the good things and opportunities  Look for things to appreciate  Follow Up Plan: F/U in 1 week with Ijeoma Lott RD  Recommend Referral to Other Providers: Speech Language PathologistSorin - please assist pt w/ setting up  SLP Dysphagia therapy recommended

## 2022-05-12 NOTE — PATIENT INSTRUCTIONS
Nutrition Rx & Recommendations:  Diet: Pureed and Soft/moist   Diet: High Calorie, High Protein (for high calorie foods see pages 52-53, and for high protein foods see pages 49-51 in your Eating Hints book)  Keep a daily food journal (pen/paper, delfin such as [employer])  Nutrition Supplements: high protein/high calorie  May use homemade shakes/smoothies as desired  If using a pre-made shake/bar, choose ones with >300 calories and >10 grams protein (ex  Ensure Complete, Ensure Enlive, Ensure Plus, Boost Plus, Boost Very High Calorie, etc )  Small, frequent meals/snacks may be easier to tolerate than 3 large daily meals  Aim for 5-6 small meals per day (every 2-3 hours)  Include protein at all meals/snacks  Include a variety of foods (as tolerated/allowed by diet)  Stay hydrated by sipping fluids of choice/tolerance throughout the day  Liquid nutrition may be better tolerated than solids at times  Alter food choices and eating patterns to accommodate changing needs  Light physical activity (such as walking) is encouraged, as able/tolerated  Refer to Eating Hints book for other meal/snack ideas and symptom management  Avoid spicy, acidic, sharp/hard/crunchy foods & carbonated beverages as needed  Follow proper oral care; Try baking soda/salt water rinse recipe (mix 3/4 tsp salt + 1 tsp baking soda + 1 qt water; rinse with plain water after using) in Eating Hints book (pg 18)  Brush your teeth before/after meals & before bed  For lack of taste: Practice good oral care  Blend fresh fruits into shakes, ice cream or yogurt  Eat frozen fruits: grapes, chopped cantaloupe, watermelon  Select fresh vegetables (may be more appealing than canned/frozen)  Add extra flavor to foods: experiment with spices, salt & sweeteners, extra oil/butter, acidic foods; marinate meats (see pages 29-30 in your Eating Hints book)    For appetite loss: try powdered or liquid nutrition supplements; eating by the clock every 2-3 hours; set a timer to remind yourself to eat, keep snacks nearby; add extra protein & calories to your diet; drink liquids in between meals, choose liquids that add calories; eat a bedtime snack; eat soft, cool or frozen foods; eat a larger meal when you feel well & rested; only sip small amounts of liquids during meals (see pages 10-12 in your Eating Hints book)  For weight loss: monitor your weight at home at least 2x/week, record your weight, start by adding 250-500 extra calories per day, eat 5-6 small scheduled meals every 2-3 hrs, choose foods that are high in protein and calories (see pages 49-53 in your Eating Hints book), drink liquids with calories for example: milkshakes/smoothies/juice/soup/whole milk/chocolate milk, cook with protein fortified milk (see recipe on page 36 in your Eating Hints book), consider ready-to-drink oral nutrition supplements such as Ensure Plus, Ensure Enlive, Boost Plus, or Boost Very High Calorie, avoid "diet" and "light" foods when possible, avoid drinking too much with meals, contact your dietitian with any continued weight loss over the course of 1 week  For more info see pages 35-37 in your Eating Hints book  For constipation: drink plenty of fluids (>64 oz/day); drink hot liquids; eat high-fiber foods as tolerated (whole grains, beans, peas, nuts, seeds, fruits, vegetables, etc); increase physical activity as tolerated  Avoid increasing fiber intake too quickly, add fiber into your diet slowly; keep a record of your bowel movements (see pages 13-14 in you Eating Hints book)  Consider trying "Apple/Prune Sauce" recipe on page 14 of your Eating Hints booklet  Be sure to drink 8 oz of water after taking 1-2 tbsp of the mixture before bedtime    For nausea/vomiting: try plain/bland foods; try lemon/lime flavors; eat 5-6 small meals/day (except when vomiting); do not skip meals/snacks; choose appealing foods; sip only small amounts of liquids during meals; stay hydrated in between meals; eat foods/drinks that are room temperature; eat dry toast/crackers (see pages  21-22 and 31-32 in your Eating Hints book)  For trouble swallowing: eat 5-6 small meals/day; choose foods that are easy to swallow; choose foods that are high in protein & calories; cook foods until they are soft/tender; cut food into small pieces; moisten & soften foods (gravy/sauce/broth/yogurt); sip drinks through a straw (as tolerated); avoid foods/drinks that can burn/scrape your throat (hot temperatures, spicy foods, acidic foods, sharp/hard/crunchy foods, alcohol); talk to your doctor about a Speech Therapy referral for a swallowing evaluation (see page 26-28 in your Eating Hints book)  For dry mouth: sip water throughout the day; try very sweet (or tart, as tolerated) drinks; chew gum or suck on hard candy, popsicles, & ice chips; eat easy to swallow foods (such as pureed cooked foods or soups); moisten food with sauce/gravy/salad dressing; do not drink beer, wine, or any type of alcohol; keep lips moist with lip balm; avoid tobacco products & second-hand smoke; try Biotene as needed (see pages 17-18 in your Eating Hints book)  Follow proper oral care; Try baking soda/salt water rinse recipe (mix 3/4 tsp salt + 1 tsp baking soda + 1 qt water; rinse with pain water after using) in Eating Hints book (pg 18)  Brush your teeth before/after meals & before bed  Weigh yourself regularly  If you notice weight loss, make an effort to increase your daily food/calorie intake  If you continue to notice loss after these efforts, reach out to your dietitian to establish a plan to stabilize weight  Eat slowly and chew food thoroughly before swallowing  Maintain upright posture when eating  Chop foods into small pieces(grate, shred, or puree if needed)  Take small bites, and small sips of liquids, and alternate bites and sips to ease swallowing    Might also try these food options to boost calories and protein in Frank's diet:  Fresh made fruits/berries/vegetables smoothies and green smoothies with whole milk plain yogurt/kefir/greek yogurt with added nut butters/peanut butters/seed butters as additional source of nutrients/calories/protein   -Home made broth/bone broth(from meats/poultry/fish), miso soup, clear broth or creamy home made soups/stews with variety of vegetables, greens, meats/poultry, organ meats, fish, eggs, beans/legumes, tofu might be added to diet as highly nutritious and easy to digest meals rich in healthy protein, fats  Crock pots might be the easy tools to make home made soups/broths  Home made eggnog  Liquids with calories: Fairlife whole milk, regular whole milk, soy milk, almond milk, oat milk, coconut milk, coconut water, fresh vegetable/greens juices   Pureed/finely chopped meats/poultry/fish with broth/bone broth  Creamy oatmeal or cream of wheat or mixed whole grains hot cereal(5 grains, 7 grains) with Fairlife milk, added butter, cream or nuts/seeds butters, or ground mixed nuts/seeds  Yogurt/greek yogurt+ avocado, or +banana blended beverages (can add fresh lemon/orange juice to taste)  Tuna/chicken/egg salads with home made olive oil/greek yogurt dressing  Avocado in a form of guacamole- as a spread or dip   Omelets or egg souffle, might add cooked, and finely chopped spinach, broccoli, cauliflower  Mashed potatoes with Fairlife milk; might add extra butter, cream, variety of cheeses  Cottage cheese/kefir cheese with blended fresh berry sauce      Follow Up Plan: F/U in 1 week with Leah Dobbs RD  Recommend Referral to Other Providers: Speech Language PathologistSorin - please assist pt w/ setting up  SLP Dysphagia therapy recommended

## 2022-05-12 NOTE — PROGRESS NOTES
Patient arrived to unit for CADD disconnect and Neulasta Onpro  Res volume 0 0  Port flushed easily with good blood return noted  Port deaccessed per protocol  Neulasta Onpro applied to right arm at patients request  Green light blinking on discharge  Patient is familiar with device, instructed on when to remove device  Patient discharged in stable condition, declined AVS but aware of next appointment

## 2022-05-13 NOTE — TELEPHONE ENCOUNTER
Please change dates  Cycle 5 on 5/24 to 6/1  Cycle 6 on 6/7 to 6/14  Please let all dates after cycle 6 fall on Tuesdays  Thank you!

## 2022-05-13 NOTE — TELEPHONE ENCOUNTER
I scheduled patient for June 1 and Cindy 3   I could not due the 31st we are totally booked because of the holiday

## 2022-05-13 NOTE — TELEPHONE ENCOUNTER
Spoke with patient about scheduled CT scan  Provided details of date and time  Patient acknowledged

## 2022-05-13 NOTE — PROGRESS NOTES
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    He has history of chronic hepatitis C as well as alcohol abuse  [facility] has liver cirrhosis   He has multifocal liver lesion with low level of AFP    This was histologically confirmed to be well-differentiated hepatocellular carcinoma   He started sorafenib in October 2018, which produced long-term stabilization of disease  Kailey Woodruff, he developed  bulky abdominal adenopathy in January 2022 which was confirmed to be poorly differentiated adenocarcinoma, 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  Therefore, he started FOLFIRINOX  He had 4 cycle of FOLFIRINOX with poor tolerance  He continued to lose his weight  He feels weak  His performance status has been gradually declining  It is not clear his weight loss is due to the cancer progression or toxicity from FOLFIRINOX  I recommended him to have CT scan of chest abdomen pelvis soon  I am going to postpone the chemotherapy from May 24th to May 31st   Prior to the next chemotherapy, I will see him to discuss CT scan finding  It is quite likely that he will need some adjustment of treatment regimen  He is in agreement with my recommendations         Subjective:      HPI:             Interval History:  A 79year old gentleman with 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 diagnosed in November 2011  He underwent concurrent chemoradiation with high dose cisplatin resulting in complete response   He has chronic hepatitis C, as well as liver cirrhosis, based on a CT scan of abdomen and pelvis  In October 2014, he was found to have multifocal liver lesions, based on the CT scan  This was confirmed by MRI of the abdomen  However, the PET/CT scan showed the lesion was not hypermetabolic  He declined liver biopsy    Radiographically, this was consistent with multifocal hepatocellular carcinoma   He has been under the care of Jennifer Gonzales summer of 2018, CT scan showed progression   He was suggested to have Siro sphere which he declined  Eddie Cross, he accepted sorafenib, which he started in October 2018, resulting in prolonged disease control  Gwen Fajardo, he was hospitalized with abdominal pain in late February 2022 I which time CT scan showed new bulky gastrohepatic, portacaval and retroperitoneal lymphadenopathy   CT scan also showed slightly enlarged cystic pancreatic lesions   He underwent CT-guided biopsy of retroperitoneal lymph node which showed 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  Therefore, he started palliative systemic chemotherapy with FOLFIRINOX   So far, he received 4th cycle of FOLFIRINOX with significant toxicity  He has failure to thrive  He has anorexia resulting in significant weight loss  He has no febrile episode  He has no respiratory symptoms  His pain is well controlled with narcotics  His performance status is 2/4 on the ECOG scale  Objective:      Primary Diagnosis:     1  Squamous cell carcinoma of oropharynx, stage SHAINA disease, diagnosed in November 2011     2 Multifocal liver lesion, clinically clinically consistent with hepatocellular carcinoma      Cancer Staging:  Cancer Staging  No matching staging information was found for the patient         Previous Hematologic/ Oncologic Treatment:      1  Concurrent chemoradiation with high-dose cisplatin completed on February 13, 2012      2  Sorafenib from October 2018 through March 2022        Current Hematologic/ Oncologic Treatment:        FOLFIRINOX, status post 4 cycle of treatment      Disease Status:      Not evaluated at this time      Test Results:     Pathology:      Liver biopsy showed well-differentiated hepatocellular carcinoma      Biopsy of retroperitoneal lymph node showed 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      Radiology:     CT scan of chest abdomen pelvis in February 2022 showed bulky new confirmed went gastro phrenic, portacaval and retroperitoneal lymphadenopathy   Multiple hepatic lesions   Slightly enlarged pancreatic cystic lesions      Laboratory:      AFP is 12 in January 2022   See below otherwise        Physical Exam:        General Appearance:    Alert, oriented , muscle wasting          Eyes:    PERRL   Ears:    Normal external ear canals, both ears   Nose:   Nares normal, septum midline   Throat:   Mucosa moist  Pharynx without injection  Neck:   Supple         Lungs:     Clear to auscultation bilaterally   Chest Wall:    No tenderness or deformity    Heart:    Regular rate and rhythm         Abdomen:     Soft, non-tender, bowel sounds +, hepatomegaly   No palpable spleen                Extremities:   Extremities no cyanosis or edema         Skin:   no rash or icterus  Lymph nodes:   Cervical, supraclavicular, and axillary nodes normal   Neurologic:   CNII-XII intact, normal strength, sensation and reflexes     Throughout             Breast exam:   Not applicable  ROS: Review of Systems   All other systems reviewed and are negative  Imaging: CT soft tissue neck wo contrast    Result Date: 5/7/2022  Narrative: CT SOFT TISSUE NECK WITHOUT CONTRAST INDICATION:   Stridor, h/o cancer  COMPARISON:  CT neck dated 8/4/2014 TECHNIQUE:  Axial, sagittal, and coronal 2D reformatted images were created from the axial source data and submitted for interpretation  Radiation dose length product (DLP) for this visit:  495 mGy-cm     This examination, like all CT scans [facility], was performed utilizing techniques to minimize radiation dose exposure, including the use of iterative reconstruction and automated exposure control  IV contrast was deferred due to conservation efforts associated with a national shortage  IMAGE QUALITY:  Diagnostic  FINDINGS: VISUALIZED BRAIN PARENCHYMA:  Normal visualized brain parenchyma  VISUALIZED ORBITS AND PARANASAL SINUSES:  Normal  NASAL CAVITY AND NASOPHARYNX:  Normal  SUPRAHYOID NECK:  Patient is edentulous, otherwise grossly normal oropharynx and oral cavity  Normal parapharyngeal and retropharyngeal spaces  Normal tonsillar tissue and epiglottis  Normal infratemporal space  INFRAHYOID NECK:  Aryepiglottic folds and piriform sinuses are normal  Normal larynx and subglottic airway  THYROID GLAND:  Hypoplastic thyroid gland  PAROTID AND SUBMANDIBULAR GLANDS: Normal  LYMPH NODES:  No pathologic or enlarged adenopathy  VASCULAR STRUCTURES:  Limited without contrast  THORACIC INLET: Right-sided chest port, extends into the SVC  Lung apices and upper mediastinum are grossly unremarkable  BONY STRUCTURES:  Degenerative changes of the spine with intervertebral disc space narrowing at C3/C4, C4/C5, C5/C6, and C6/C7 with anterior, marginal, and uncovertebral osteophytosis  Multilevel facet joint arthropathy  Impression: Within the confines of a noncontrast exam, no acute process is seen  Other findings as above   Workstation performed: [id number]         Labs:   Lab Results   Component Value Date    WBC 10 14 05/07/2022    HGB 11 2 (L) 05/07/2022    HCT 33 2 (L) 05/07/2022    MCV 93 05/07/2022     05/07/2022     Lab Results   Component Value Date     12/09/2015    K 3 3 (L) 05/07/2022    CL 92 (L) 05/07/2022    CO2 32 05/07/2022    ANIONGAP 5 12/09/2015    BUN 21 05/07/2022    CREATININE 0 95 05/07/2022    GLUCOSE 267 (H) 12/09/2015    GLUF 133 (H) 07/06/2021    CALCIUM 8 5 05/07/2022    CORRECTEDCA 9 5 05/07/2022 AST 25 05/07/2022    ALT 18 05/07/2022    ALKPHOS 190 (H) 05/07/2022    PROT 7 4 12/09/2015    BILITOT 0 97 12/09/2015    EGFR 82 05/07/2022         Lab Results   Component Value Date    PSA 0 6 07/06/2021    PSA 0 6 01/20/2020    PSA 0 4 01/18/2018           Current Medications: Reviewed  Allergies: Reviewed  PMH/FH/SH:  Reviewed      Vital Sign:    Body surface area is 1 66 meters squared      Wt Readings from Last 3 Encounters:   05/13/22 56 9 kg (125 lb 8 oz)   05/10/22 56 7 kg (124 lb 14 3 oz)   05/07/22 57 6 kg (126 lb 15 8 oz)        Temp Readings from Last 3 Encounters:   05/13/22 (!) 95 2 °F (35 1 °C) (Tympanic)   05/12/22 97 6 °F (36 4 °C) (Temporal)   05/07/22 97 6 °F (36 4 °C) (Oral)        BP Readings from Last 3 Encounters:   05/13/22 102/62   05/12/22 111/70   05/07/22 98/61         Pulse Readings from Last 3 Encounters:   05/13/22 63   05/12/22 (!) 49   05/07/22 (!) 52     @LASTSAO2(3)@

## 2022-05-13 NOTE — TELEPHONE ENCOUNTER
Patient's 5/24 treatment is being changed to 5/31  He goes in the morning to the [facility]

## 2022-05-13 NOTE — TELEPHONE ENCOUNTER
Spoke to patient to make him aware of updated schedule  Pt said that he will view updates on his MyChart and will  an updated calendar when he comes in for his office follow up with Dr González Watts on 5/27

## 2022-05-17 NOTE — TELEPHONE ENCOUNTER
I called and informed patient he said he needs it  He would like for you to call him directly to discuss  [phone]

## 2022-05-17 NOTE — TELEPHONE ENCOUNTER
Called patient and told him that we will extend to an additional 7 days as presumably his infection should be gone by then  This is not meant to be taken long term if not indicated  He voiced understanding

## 2022-05-17 NOTE — TELEPHONE ENCOUNTER
CALL RETURN FORM   Reason for patient call? Patients emergency contact, Lavonne Rao, calling to speak with Dr Thi Roblero  Patient was told not to start infusion appts until he sees him  His appt for 5/27/22 was cancelled  Patient's primary oncologist? Dr Thi Roblero    Name of person the patient was calling for? Dr Thi Roblero   Any additional information to add, if applicable? [phone]   Informed patient that the message will be forwarded to the team and someone will get back to them as soon as possible    Did you relay this information to the patient?  yes

## 2022-05-17 NOTE — TELEPHONE ENCOUNTER
This referral will drop into gastro work que and someone from that office will call him to schedule appt

## 2022-05-17 NOTE — TELEPHONE ENCOUNTER
I called patient back  Will refill for an additional week only  Please call back to assist with GI referral that I placed last office visit   Thank you

## 2022-05-18 NOTE — TELEPHONE ENCOUNTER
Tried calling patient multiple times  Left message for patient that his treatment has been rescheduled to 6/8 @ 8:30am at the [facility] infusion center  Advised patient to call back if he has any questions

## 2022-05-18 NOTE — TELEPHONE ENCOUNTER
I deferred treatment dates and left VM for patient/his wife  Please adjust patient's schedule and let them know new dates/times for treatment  Dr Solitario Bowers needs to see patient in the office prior to any more treatment being given

## 2022-05-19 NOTE — TELEPHONE ENCOUNTER
Update   Pt SO called to report she is nearly 870 5 certain pt has thrown the ondansetron out  Asking if there is another medication he can try  Compazine does not work  Or could another script be sent for ondansetron be sent and pt can try to pay out of pocket with a discount card  Pt isaak wait until Monday to fill his Nystatin  Requesting override with insurances will require working with 2 insurances       Please advise

## 2022-05-19 NOTE — TELEPHONE ENCOUNTER
Primary palliative medicine provider:   Kg Ledesma    Medication requested:  MSER   2 x day  MSIR     1 every 4 hours as needed     If for pain, how has the patient been taking their pain medicine? Last appointment:  5/5    Next scheduled appointment: 5/13    PDMP review:  05/02/2022 05/02/2022 Morphine Sulfate (Tablet, Extended Release) 30 0 15 30 MG 60 0 SHANIQUA HAMEED [facility] Commercial Insurance 00 / 00 [place]    1 [id number] 05/02/2022 05/02/2022 Morphine Sulfate (Tablet) 60 0 10 30  0 SHANIQUA HAMEED           pt requesting Palliative call pharmacy to allow Nystatin to be filled today  ( not due until 5/23)    SO also reports pt Lost his Ondansetron he recently refilled ( 30 tabs)  Asking if possible can another script be called into pharmacy  Instructed Jenniferbhanu Mota to continue looking for bottle as it was brought into the home and an empty bag ws in the garbage  Instructed to call office with outcome of search

## 2022-05-19 NOTE — TELEPHONE ENCOUNTER
Patient's wife Anoop Gonzalestip Martinez would fill his medication ondansetron (ZOFRAN) 4 mg tablet  She states his brand new bottle was thrown out on accident yesterday  I explained to her that on our end it looks like a different provider prescribes that medication   I gave her the providers name   Ifeoma Pringle and phone number listed  [phone]

## 2022-05-20 PROBLEM — R13.10 DYSPHAGIA: Status: ACTIVE | Noted: 2022-01-01

## 2022-05-20 PROBLEM — E87.6 HYPOKALEMIA: Status: ACTIVE | Noted: 2022-01-01

## 2022-05-20 PROBLEM — D72.819 LEUKOPENIA: Status: ACTIVE | Noted: 2022-01-01

## 2022-05-20 NOTE — MALNUTRITION/BMI
This medical record reflects one or more clinical indicators suggestive of malnutrition  Malnutrition Findings:   Adult Malnutrition type: Chronic illness  Adult Degree of Malnutrition: Other severe protein calorie malnutrition  Malnutrition Characteristics: Weight loss, Muscle loss, Inadequate energy                  360 Statement: evidenced by 38% wt loss x 12 mo (6/8/21 81 2kg/179lbs to 5/20/22 50 1kg/110 4lbs0, severe muscle wasting/hollowing of temples, protruding clavicles, po intake <75% of estimated nutritional needs for > 1 month, treated with diet and supplements    BMI Findings:  Adult BMI Classifications: Underweight < 18 5        Body mass index is 16 79 kg/m²  See Nutrition note dated 5/20/22 for additional details  Completed nutrition assessment is viewable in the nutrition documentation
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
## 2022-05-20 NOTE — ASSESSMENT & PLAN NOTE
· 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 treated by concurrent chemoradiation with high-dose cisplatin resulting in KANDACE in 2012  · Developed  bulky abdominal adenopathy in January 2022 which was confirmed to be poorly differentiated adenocarcinoma, 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  · Followed outpatient by Dr Misa Harman

## 2022-05-20 NOTE — ED NOTES
Pt unable to provide urina sample at this time  Will alert RN when one can be provided       Luis Enrique AugustUP[facility]  05/20/22 9511

## 2022-05-20 NOTE — ASSESSMENT & PLAN NOTE
· History of hep C with alcoholic liver cirrhosis  · Mildly elevated ammonia level     · History of opioid induced constipation, will add lactulose 20 g daily  · Repeat serum ammonia level
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
## 2022-05-20 NOTE — ASSESSMENT & PLAN NOTE
BMI 19  · History dysphagia, reports poor appetite, weight loss, having difficulty swallowing food, spits food and fluids back up  · Was started outpatient on Decadron 0 5 mg to stimulate appetite  · Patient considering PEG tube, wants to discuss with his oncologist  · Add dysphagia diet  · IV hydration  · SLP consult  · Nutrition consult  · Aspiration precautions  · GI consult to evaluate for PEG tube  · Oncology consult  · Palliative care consult

## 2022-05-20 NOTE — ASSESSMENT & PLAN NOTE
· Followed outpatient by palliative care for cancer pain, maintained on morphine ER 30mg BID and MSIR 30mg PO q4H prn    Verified on PDMP  · Per palliative, limiting narcotics due to history of heroin and cocaine abuse

## 2022-05-20 NOTE — PROCEDURES
Video Barium Swallow Study    Summary:  Images are on PACS for review  The patient presents with a moderate oral and a severe pharyngeal dysphagia  The patient is assessed with puree solids with honey thick, nectar thick and thin liquids  Oral transfer time is prolonged, with poor oral control  The patient has poor epiglottic inversion, poor laryngeal rise and poor pharyngeal constriction  He presents with a significant amount of valleculae and pyriform retention with all trials  The patient has strong gag, and does expectorate items into oral cavity during study  He is observed with intermittent aspiration events across all consistencies  Aspiration response is silent, but the patient does have a wet vocal quality  Throat clear, chin tuck and prep set does not help to eliminate material from the vestibule, or reduce aspiration amount  Per gross esophageal screen:  Patient observed with stasis and reflux     Recommendations:  Diet: highly consider alternate means of nutrition, but would recommend puree and honey thick liquids to decrease aspiration risk  Meds: crush in puree   Strategies: small bites/sips  Sit upright during meals  Upright position  F/u ST tx: Yes  Therapy Prognosis: guarded  Prognosis considerations: history of laryngeal cancer and radiation tx    Aspiration Precautions  Reflux Precautions  Consider consult with: none at this time   Results reviewed with: pt, nursing, physician    Aspiration precautions posted  If a dedicated assessment of the esophagus is desired, consider esophagram/barium swallow or EGD  Patient's goal:  "I just can't keep going like this"     Goals:  Pt will tolerate least restrictive diet w/out s/s aspiration or oral/pharyngeal difficulties  Consider laryngeal elevation/strengthening exercises as able     HISTORY OF PRESENT ILLNESS:       Kelsey Galvan is a 79 y o  male who presented today for worsening dysphagia of, weakness, nausea and diarrhea    He is well known to palliative care services as he follows with our team for symptoms associated to his pancreatic adenocarcinoma with metastatic disease  Patient tells me that he just feels very unwell  And also eats between severe nausea and diarrhea  He tells me he is able to swallow his pills okay, however the  ODT Zofran is sticking to his tongue and he finds the taste to be even more nauseating  Patient's pain is controlled with his home regimen of morphine extended release and short-acting  He was supposed to go for outpatient imaging today to determine next steps for his oncologic care  Patient is well supported by his spouse and his family  He has completed his advance care planning and 5 wishes is in the chart and reviewed, it is up-to-date and accurate  H&P/pertinent provider notes: (PMH noted above)    Special Studies:  None  Will be completed CT of chest and abdomen  Previous VBS:  None    Does the pt have pain? No    Precautions:  Rule out Cdiff   Food Allergies:  None   Current Diet:  Mechanical soft with thin liquids    Premorbid diet:  Mostly liquids at home   Dentition:  Upper and lower dentures   O2 requirement:  RA  Oral mech:  Strength and ROM: wfl  Vocal Quality/Speech:  wfl-wet at times during study in response to aspiration events   Cognitive status:  wfl    Consistencies administered: Puree, honey thick liquid, nectar thick liquid, thin liquid  Liquids were administered/taken by cup    Pt was seated laterally at 90 degrees  Oral stage: Moderate  Lip closure: wfl  Mastication: n/a  Bolus formation: wfl  Bolus control: decreased  Transfer: prolonged  Residue: lingual residue     Pharyngeal stage:   Moderate-severe  Swallow promptness: delayed  Spill to valleculae: with all   Spill to pyriforms: intermittent   Epiglottic inversion: absent  Laryngeal excursion: decreased  Pharyngeal constriction: poor   Vallecular retention: with all   Pyriform retention: with all   PPW coating: yes  Osteophytes: no  CP prominence: no  Retropulsion from prominence: n/a  Transient penetration: no  Epiglottic undercoat: throughout   Penetration: no  Aspiration: yes  Strategies: chin tuck, prep set and throat clear not helpful in reducing aspiration or clearing material from vestibule  Response to aspiration: silent, but wet vocal quality     Screening of Esophageal stage:  Dysmotility  Retropulsion  Possible reflux  Retention/Stasis

## 2022-05-20 NOTE — PLAN OF CARE
Problem: Nutrition/Hydration-ADULT  Goal: Nutrient/Hydration intake appropriate for improving, restoring or maintaining nutritional needs  Description: Monitor and assess patient's nutrition/hydration status for malnutrition  Collaborate with interdisciplinary team and initiate plan and interventions as ordered  Monitor patient's weight and dietary intake as ordered or per policy  Utilize nutrition screening tool and intervene as necessary  Determine patient's food preferences and provide high-protein, high-caloric foods as appropriate       INTERVENTIONS:  - Monitor oral intake, urinary output, labs, and treatment plans  - Assess nutrition and hydration status and recommend course of action  - Evaluate amount of meals eaten  - Assist patient with eating if necessary   - Allow adequate time for meals  - Recommend/ encourage appropriate diets, oral nutritional supplements, and vitamin/mineral supplements  - Order, calculate, and assess calorie counts as needed  - GI consult for possible PEG tube  - Assess need for intravenous fluids  - Provide specific nutrition/hydration education as appropriate  - Include patient/family/caregiver in decisions related to nutrition  Outcome: Not Progressing
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
## 2022-05-20 NOTE — PLAN OF CARE
Problem: Prexisting or High Potential for Compromised Skin Integrity  Goal: Skin integrity is maintained or improved  Description: INTERVENTIONS:  - Identify patients at risk for skin breakdown  - Assess and monitor skin integrity  - Assess and monitor nutrition and hydration status  - Monitor labs   - Assess for incontinence   - Turn and reposition patient  - Assist with mobility/ambulation  - Relieve pressure over bony prominences  - Avoid friction and shearing  - Provide appropriate hygiene as needed including keeping skin clean and dry  - Evaluate need for skin moisturizer/barrier cream  - Collaborate with interdisciplinary team   - Patient/family teaching  Outcome: Progressing     Problem: PAIN - ADULT  Goal: Verbalizes/displays adequate comfort level or baseline comfort level  Description: Interventions:  - Encourage patient to monitor pain and request assistance  - Assess pain using appropriate pain scale  - Administer analgesics based on type and severity of pain and evaluate response  - Implement non-pharmacological measures as appropriate and evaluate response  - Consider cultural and social influences on pain and pain management  - Notify physician/advanced practitioner if interventions unsuccessful or patient reports new pain  Outcome: Progressing     Problem: SAFETY ADULT  Goal: Patient will remain free of falls  Description: INTERVENTIONS:  - Educate patient/family on patient safety including physical limitations  - Instruct patient to call for assistance with activity   - Consult OT/PT to assist with strengthening/mobility   - Keep Call bell within reach  - Keep bed low and locked with side rails adjusted as appropriate  - Keep care items and personal belongings within reach  - Initiate and maintain comfort rounds  - Make Fall Risk Sign visible to staff  - Offer Toileting every 2 Hours, in advance of need  - Initiate/Maintain bed/chair alarm  - Obtain necessary fall risk management equipment: bed/chair alarm, call bell, walker, bedside commode, urinal, rounds  - Apply yellow socks and bracelet for high fall risk patients  - Consider moving patient to room near nurses station  Outcome: Progressing     Problem: DISCHARGE PLANNING  Goal: Discharge to home or other facility with appropriate resources  Description: INTERVENTIONS:  - Identify barriers to discharge w/patient and caregiver  - Arrange for needed discharge resources  - Identify discharge learning needs (meds, wound care, etc )  - Refer to Case Management Department for coordinating discharge planning if the patient needs post-hospital services based on physician/advanced practitioner order   Outcome: Progressing     Problem: Knowledge Deficit  Goal: Patient/family demonstrates understanding of disease process, treatment plan, medications, and discharge instructions  Description: Complete learning assessment and assess knowledge base    Interventions:  - Provide teaching at level of understanding  - Provide teaching via preferred learning methods  Outcome: Progressing     Problem: METABOLIC, FLUID AND ELECTROLYTES - ADULT  Goal: Electrolytes maintained within normal limits  Description: INTERVENTIONS:  - Monitor labs and assess patient for signs and symptoms of electrolyte imbalances  - Administer electrolyte replacement as ordered  - Monitor response to electrolyte replacements, including repeat lab results as appropriate  - Instruct patient on fluid and nutrition as appropriate  Outcome: Progressing  Goal: Glucose maintained within target range  Description: INTERVENTIONS:  - Monitor Blood Glucose as ordered  - Assess for signs and symptoms of hyperglycemia and hypoglycemia  - Administer ordered medications to maintain glucose within target range  - Assess nutritional intake and initiate nutrition service referral as needed  Outcome: Progressing     Problem: GASTROINTESTINAL - ADULT  Goal: Minimal or absence of nausea and/or vomiting  Description: INTERVENTIONS:  - Administer IV fluids if ordered to ensure adequate hydration  - Administer ordered antiemetic medications as needed  - Provide nonpharmacologic comfort measures as appropriate  - Advance diet as tolerated, if ordered  - Consider nutrition services referral to assist patient with adequate nutrition and appropriate food choices  Outcome: Progressing  Goal: Maintains or returns to baseline bowel function  Description: INTERVENTIONS:  - Assess bowel function  - Encourage oral fluids to ensure adequate hydration  - Administer IV fluids if ordered to ensure adequate hydration  - Administer ordered medications as needed  - Encourage mobilization and activity  - Consider nutritional services referral to assist patient with adequate nutrition and appropriate food choices  Outcome: Progressing

## 2022-05-20 NOTE — ED PROVIDER NOTES
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   Fatigue   Chemotherapy induced neutropenia (HCC)   Elevated TSH     Time reflects when diagnosis was documented in both MDM as applicable and the Disposition within this note     Time User Action Codes Description Comment    5/20/2022  5:23 AM Marybelle Sportsman Add [R53 1] Weakness     5/20/2022  5:23 AM Marybelle Sportsman Add [E87 6] Hypokalemia     5/20/2022  6:23 AM Russel Life Add [C25 1] Malignant neoplasm of body of pancreas ([facility] [address] )     5/20/2022  6:23 AM Russel Life Add [C77 2] Secondary malignant neoplasm of retroperitoneal lymph nodes ([address] )     5/20/2022  6:27 AM Russel Life Add [G89 3] Cancer related pain     5/20/2022  8:18 AM Marybelle Sportsman Add [C25 9] Malignant neoplasm of pancreas, unspecified location of malignancy ([facility] [address] )     5/20/2022  8:18 AM Marybelle Sportsman Add [R53 83] Fatigue     5/20/2022  8:19 AM Ivan Thompson Add [D70 1,  T45 1X5A] Chemotherapy induced neutropenia ([facility] [address] ) 5/20/2022  8:22 AM Rudolph Yeager Add [R79 89] Elevated TSH       ED Disposition     ED Disposition   Admit    Condition   Stable    Date/Time   Fri May 20, 2022  5:24 AM    Comment   Case was discussed with KAT  and the patient's admission status was agreed to be Admission Status: inpatient status to the service of Dr Esme Sagastume             Follow-up Information    None         Current Discharge Medication List      CONTINUE these medications which have NOT CHANGED    Details   dexamethasone (DECADRON) 0 5 mg tablet Take 1 tablet (0 5 mg total) by mouth 2 (two) times a day  Qty: 60 tablet, Refills: 0    Associated Diagnoses: Cancer related pain; Poor appetite      diltiazem (CARDIZEM CD) 240 mg 24 hr capsule Take 1 capsule (240 mg total) by mouth daily  Qty: 90 capsule, Refills: 1    Associated Diagnoses: Uncontrolled hypertension      docusate sodium (COLACE) 100 mg capsule Take 100 mg by mouth 2 (two) times a day      glimepiride (AMARYL) 2 mg tablet Take 1 tablet (2 mg total) by mouth daily  Qty: 90 tablet, Refills: 1    Associated Diagnoses: Type 2 diabetes mellitus without complication, without long-term current use of insulin (HCC)      levothyroxine 112 mcg tablet Take 1 tablet (112 mcg total) by mouth daily  Qty: 90 tablet, Refills: 1    Associated Diagnoses: Hypothyroidism, unspecified type      lidocaine-prilocaine (EMLA) cream Apply topically as needed for mild pain  Qty: 30 g, Refills: 1    Associated Diagnoses: Port-A-Cath in place      loperamide (IMODIUM A-D) 2 MG tablet Take 2 mg by mouth 4 (four) times a day as needed for diarrhea      morphine (MS CONTIN) 30 mg 12 hr tablet Take 1 tablet (30 mg total) by mouth in the morning and 1 tablet (30 mg total) in the evening   Max Daily Amount: 60 mg   Qty: 30 tablet, Refills: 0    Associated Diagnoses: Cancer related pain      morphine (MSIR) 30 MG tablet Take 1 tablet (30 mg total) by mouth every 4 (four) hours as needed for severe pain Max Daily Amount: 180 mg  Qty: 60 tablet, Refills: 0    Associated Diagnoses: Cancer related pain      nadolol (CORGARD) 40 mg tablet Take 1 tablet (40 mg total) by mouth daily  Qty: 90 tablet, Refills: 3    Associated Diagnoses: Other cirrhosis of liver (HCC)      nystatin (MYCOSTATIN) 500,000 units/5 mL suspension Apply 5 mL (500,000 Units total) to the mouth or throat in the morning and 5 mL (500,000 Units total) at noon and 5 mL (500,000 Units total) in the evening and 5 mL (500,000 Units total) before bedtime  For additional 7 days only  Quenten Hedge: 473 mL, Refills: 0    Associated Diagnoses: Oral pharyngeal candidiasis      ondansetron (ZOFRAN) 4 mg tablet Take 1 tablet (4 mg total) by mouth every 8 (eight) hours as needed for nausea or vomiting  Qty: 30 tablet, Refills: 1    Comments: MD aware -  patient accidentally threw away pills  May fill  Associated Diagnoses: Nausea      senna-docusate sodium (SENOKOT-S) 8 6-50 mg per tablet Take 1 tablet by mouth daily  Qty: 60 tablet, Refills: 0    Associated Diagnoses: Drug induced constipation      albuterol (Ventolin HFA) 90 mcg/act inhaler Inhale 2 puffs every 6 (six) hours as needed for wheezing  Qty: 18 g, Refills: 5    Comments: Substitution to a formulary equivalent within the same pharmaceutical class is authorized  Associated Diagnoses: COVID-19      famotidine (PEPCID) 40 MG tablet Take 1 tablet (40 mg total) by mouth daily  Qty: 90 tablet, Refills: 0    Associated Diagnoses: Gastroesophageal reflux disease with esophagitis without hemorrhage      naloxone (NARCAN) 4 mg/0 1 mL nasal spray Administer 1 spray into a nostril  If no response after 2-3 minutes, give another dose in the other nostril using a new spray  Qty: 1 each, Refills: 1    Associated Diagnoses: Hepatocellular carcinoma ([address] );  Altered comfort in oncology patient      prochlorperazine (COMPAZINE) 10 mg tablet Take 1 tablet (10 mg total) by mouth every 6 (six) hours as needed for nausea or vomiting  Qty: 30 tablet, Refills: 0    Associated Diagnoses: Chemotherapy-induced nausea             No discharge procedures on file      PDMP Review       Value Time User    PDMP Reviewed  Yes 5/20/2022  5:22 AM Gay Adams [place]          ED Provider  Electronically Signed by           Yahir Niño PA-C  05/20/22 8134

## 2022-05-21 PROBLEM — E43 SEVERE PROTEIN-CALORIE MALNUTRITION (HCC): Status: ACTIVE | Noted: 2022-01-01

## 2022-05-21 NOTE — OCCUPATIONAL THERAPY NOTE
Occupational Therapy Note:    Patient Name: Rigoberto Guicho [place] Date: 5/21/2022    OT consult received  Chart reviewed  Attempted to see pt for OT eval this AM, however pt w/ increased nausea and vomiting  Not appropriate for therapeutic intervention  Will cx and complete OT eval at later time as medically appropriate      ANA LAURA Knight/L

## 2022-05-21 NOTE — ASSESSMENT & PLAN NOTE
BMI of 16 79 on admission  · History dysphagia, reports poor appetite, weight loss, having difficulty swallowing food, spits food and fluids back up  · Continue Decadron 0 5 mg to stimulate appetite  · Speech evaluated, recommend pureed diet  · GI consulted regarding PEG tube, patient not likely a candidate due to hx of radiation and risk of bleeding via EGD  · IR consulted to evaluate for enteral tube feeding placement

## 2022-05-21 NOTE — ASSESSMENT & PLAN NOTE
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

## 2022-05-21 NOTE — PHYSICAL THERAPY NOTE
Physical Therapy Cancellation Note           05/21/22 0917   PT Last Visit   PT Visit Date 05/21/22   Note Type   Note type Cancelled Session   Cancel Reasons Medical status   Additional Comments not appropriate for PT evaluation at this time secondary to active vomiting  will continue to follow as medically appropriate         Caryn Ordonez, PT

## 2022-05-21 NOTE — ASSESSMENT & PLAN NOTE
· Dysphagia in setting of history of laryngeal cancer and RT to the area  · Evaluating for possible PEG tube as above  · CT chest shows mild esophageal thickening, possible esophagitis  · Start protonix 40mg once daily  · ST evaluated showing mod to severe dysphagia  · Dyphagia diet, pureed with honey thick

## 2022-05-21 NOTE — ASSESSMENT & PLAN NOTE
· Followed outpatient by palliative care for cancer pain, maintained on morphine ER 30mg BID and MSIR 30mg PO q4H prn  · Verified on PDMP  · Appreciate Palliative evaluation

## 2022-05-21 NOTE — PROGRESS NOTES
[facility]  Progress Note - Carol Davis 1955, 79 y o  male MRN: [id number]  Unit/Bed#: E2 -01 Encounter: [id number]  Primary Care Provider: PRINCE Troncoso   Date and time admitted to hospital: 5/20/2022 12:59 AM    * Severe protein-calorie malnutrition ([facility] [address] )  Assessment & Plan  BMI of 16 79 on admission  · History dysphagia, reports poor appetite, weight loss, having difficulty swallowing food, spits food and fluids back up  · Continue Decadron 0 5 mg to stimulate appetite  · Speech evaluated, recommend pureed diet  · Patient considering PEG tube, to further discuss with Oncology outpatient  · GI consulted regarding PEG tube, patient not likely a candidate due to hx of radiation and risk of bleeding via EGD  · IR consulted to evaluate for enteral tube feeding placement    Dysphagia  Assessment & Plan  · Dysphagia in setting of history of laryngeal cancer and RT to the area  · Evaluating for possible PEG tube as above  · CT chest shows mild esophageal thickening, possible esophagitis  · Start protonix 40mg once daily  · ST evaluated showing mod to severe dysphagia  · Dyphagia diet, pureed with honey thick    Hypokalemia  Assessment & Plan  · Replete potassium, magnesium  · Monitor BMP    Cancer related pain  Assessment & Plan  · Followed outpatient by palliative care for cancer pain, maintained on morphine ER 30mg BID and MSIR 30mg PO q4H prn  · Verified on PDMP  · Appreciate Palliative evaluation    Malignant neoplasm of body of pancreas ([facility] [address] )  Assessment & Plan  · 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 treated by concurrent chemoradiation with high-dose cisplatin resulting in KANDACE in 2012  · Developed  bulky abdominal adenopathy in January 2022 which was confirmed to be poorly differentiated adenocarcinoma, 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  · Followed outpatient by Dr Knaika Munoz  · CT chest and MRI abd/pelvis ordered to evaluate for possible progression and staging    Hepatic cirrhosis ([facility] [address] )  Assessment & Plan  · History of hep C with alcoholic liver cirrhosis  · History of opioid induced constipation        VTE Pharmacologic Prophylaxis:   Pharmacologic: Heparin  Mechanical VTE Prophylaxis in Place: Yes    Patient Centered Rounds: I have performed bedside rounds with nursing staff today  Discussions with Specialists or Other Care Team Provider: Oncology, GI    Education and Discussions with Family / Patient: patient, left VM for fiance    Time Spent for Care: 30 minutes  More than 50% of total time spent on counseling and coordination of care as described above  Current Length of Stay: 1 day(s)    Current Patient Status: Inpatient   Certification Statement: The patient will continue to require additional inpatient hospital stay due to IV fluids, IR consult for tube feeding placement evaluation    Discharge Plan: pending    Code Status: Level 1 - Full Code      Subjective:   Patient reportedly ate well last night, but after breakfast this morning he had episode of vomiting  Pain currently controlled  Objective:     Vitals:   Temp (24hrs), Av 8 °F (36 6 °C), Min:97 °F (36 1 °C), Max:98 3 °F (36 8 °C)    Temp:  [97 °F (36 1 °C)-98 3 °F (36 8 °C)] 97 7 °F (36 5 °C)  HR:  [64-80] 80  Resp:  [18-20] 18  BP: (102-124)/(59-79) 124/79  SpO2:  [97 %-99 %] 99 %  Body mass index is 16 79 kg/m²  Input and Output Summary (last 24 hours): Intake/Output Summary (Last 24 hours) at 2022 1439  Last data filed at 2022 2981  Gross per 24 hour   Intake  33 ml   Output --   Net  33 ml       Physical Exam:     Physical Exam  Vitals and nursing note reviewed  Constitutional:       Appearance: He is ill-appearing  Comments: Thin, frail, cachectic   HENT:      Head: Normocephalic and atraumatic  Eyes:      General: No scleral icterus       Conjunctiva/sclera: Conjunctivae normal  Cardiovascular:      Rate and Rhythm: Normal rate and regular rhythm  Pulmonary:      Effort: Pulmonary effort is normal       Breath sounds: Normal breath sounds  No wheezing or rhonchi  Abdominal:      General: Bowel sounds are normal  There is no distension  Palpations: Abdomen is soft  Musculoskeletal:         General: No swelling  Right lower leg: No edema  Left lower leg: No edema  Skin:     General: Skin is warm and dry  Comments: Port Cath noted   Neurological:      General: No focal deficit present  Mental Status: He is alert  Mental status is at baseline  Additional Data:     Labs:    Results from last 7 days   Lab Units 05/21/22  0540   WBC Thousand/uL 2 68*   HEMOGLOBIN g/dL 11 4*   HEMATOCRIT % 34 5*   PLATELETS Thousands/uL 154   BANDS PCT % 26*   LYMPHO PCT % 17   MONO PCT % 12   EOS PCT % 0     Results from last 7 days   Lab Units 05/21/22  0540 05/20/22  0216   SODIUM mmol/L 137 132*   POTASSIUM mmol/L 3 3* 2 9*   CHLORIDE mmol/L 102 91*   CO2 mmol/L 28 32   BUN mg/dL 12 17   CREATININE mg/dL 0 76 0 89   ANION GAP mmol/L 7 9   CALCIUM mg/dL 7 2* 7 8*   ALBUMIN g/dL  --  2 3*   TOTAL BILIRUBIN mg/dL  --  1 37*   ALK PHOS U/L  --  201*   ALT U/L  --  33   AST U/L  --  34   GLUCOSE RANDOM mg/dL 67 123     Results from last 7 days   Lab Units 05/20/22  0216   INR  1 37*             Results from last 7 days   Lab Units 05/20/22  0216   LACTIC ACID mmol/L 1 6           * I Have Reviewed All Lab Data Listed Above  * Additional Pertinent Lab Tests Reviewed: [address] Admission Reviewed    Imaging:    CT chest wo contrast    Result Date: 5/20/2022  Impression: 1  Stable subcentimeter pulmonary nodules  The largest measures 5 mm within the lingula  No new or enlarging pulmonary nodules  2   Peripherally sclerotic lytic focus within rib 4 on the right measuring 8 mm  A questionable lucency is barely perceptible on prior exam of July 2021  Finding is indeterminate but may represent a treated metastatic lesion  There is an additional subcentimeter lucency within the posterior aspect of rib 7 on the left which is new from prior exams and suspicious for metastasis  3   Cirrhotic liver morphology with trace perihepatic ascites  There is an ill-defined hypodensity within segment 7 corresponding to patient's known liver lesion  Lesion is suboptimally evaluated on this noncontrast study  4   Partially visualized soft tissue densities along the gastrohepatic ligament corresponding to gastrohepatic lymphadenopathy seen on prior exams  Visualization is limited by streak artifact from ingested barium  5   Wall thickening involving the mid to distal esophagus which may be due to underdistention versus nonspecific esophagitis  The study was marked in EPIC for significant notification   Workstation performed: [id number]       Recent Cultures (last 7 days):     Results from last 7 days   Lab Units 05/20/22 2047   C DIFF TOXIN B BY PCR  Negative       Last 24 Hours Medication List:   Current Facility-Administered Medications   Medication Dose Route Frequency Provider Last Rate    acetaminophen  650 mg Oral Q6H PRN PRINCE Meza      albuterol  2 puff Inhalation Q4H PRN PRINCE Meza      aluminum-magnesium hydroxide-simethicone  30 mL Oral Q6H PRN PRINCE Meza      bisacodyl  10 mg Oral Daily PRINCE Meza      dexamethasone  0 5 mg Oral BID (AM & Afternoon) PRINCE Meza      diltiazem  240 mg Oral Daily PRINCE Meza      famotidine  20 mg Oral Daily PRN PRINCE Meza      heparin (porcine)  5,000 Units Subcutaneous [facility]audenc[facility] Forte, [address]      levothyroxine  112 mcg Oral Early Morning PRINCE Meza      morphine  30 mg Oral BID Kimberly M Bendas, DO      morphine  30 mg Oral Q4H PRN Kimberly M Bendas, DO      Or    morphine injection  8 mg Intravenous Q4H PRN Maria Isabel Rader Anthony, DO      nadolol  40 mg Oral Daily PRINCE Berger      ondansetron  4 mg Intravenous TID AC Kimberly BENITEZ Anthony, DO      ondansetron  4 mg Intravenous Q6H PRN Karthikeyan Fernandes MD      pantoprazole  40 mg Oral Early Morning Karthikeyan Fernandes MD      potassium chloride  20 mEq Intravenous Once Sissy Osorio PA-C Stopped (05/20/22 0514)    simethicone  80 mg Oral 4x Daily PRN PRINCE Berger      sodium chloride 0 9 % with KCl 40 mEq/L  100 mL/hr Intravenous Continuous Karthikeyan Fernandes  mL/hr (05/21/22 0529)        Today, Patient Was Seen By: Karthikeyan Fernandes MD    ** Please Note: Dictation voice to text software may have been used in the creation of this document   **

## 2022-05-21 NOTE — PLAN OF CARE
Problem: Prexisting or High Potential for Compromised Skin Integrity  Goal: Skin integrity is maintained or improved  Description: INTERVENTIONS:  - Identify patients at risk for skin breakdown  - Assess and monitor skin integrity  - Assess and monitor nutrition and hydration status  - Monitor labs   - Assess for incontinence   - Turn and reposition patient  - Assist with mobility/ambulation  - Relieve pressure over bony prominences  - Avoid friction and shearing  - Provide appropriate hygiene as needed including keeping skin clean and dry  - Evaluate need for skin moisturizer/barrier cream  - Collaborate with interdisciplinary team   - Patient/family teaching  Outcome: Progressing     Problem: PAIN - ADULT  Goal: Verbalizes/displays adequate comfort level or baseline comfort level  Description: Interventions:  - Encourage patient to monitor pain and request assistance  - Assess pain using appropriate pain scale  - Administer analgesics based on type and severity of pain and evaluate response  - Implement non-pharmacological measures as appropriate and evaluate response  - Consider cultural and social influences on pain and pain management  - Notify physician/advanced practitioner if interventions unsuccessful or patient reports new pain  Outcome: Progressing     Problem: GASTROINTESTINAL - ADULT  Goal: Minimal or absence of nausea and/or vomiting  Description: INTERVENTIONS:  - Administer IV fluids if ordered to ensure adequate hydration  - Administer ordered antiemetic medications as needed  - Provide nonpharmacologic comfort measures as appropriate  - Advance diet as tolerated, if ordered  - Consider nutrition services referral to assist patient with adequate nutrition and appropriate food choices  Outcome: Progressing     Problem: DISCHARGE PLANNING  Goal: Discharge to home or other facility with appropriate resources  Description: INTERVENTIONS:  - Identify barriers to discharge w/patient and caregiver  - Arrange for needed discharge resources  - Identify discharge learning needs (meds, wound care, etc )  - Refer to Case Management Department for coordinating discharge planning if the patient needs post-hospital services based on physician/advanced practitioner order   Outcome: Progressing     Problem: SAFETY ADULT  Goal: Patient will remain free of falls  Description: INTERVENTIONS:  - Educate patient/family on patient safety including physical limitations  - Instruct patient to call for assistance with activity   - Consult OT/PT to assist with strengthening/mobility   - Keep Call bell within reach  - Keep bed low and locked with side rails adjusted as appropriate  - Keep care items and personal belongings within reach  - Initiate and maintain comfort rounds  - Make Fall Risk Sign visible to staff  - Offer Toileting every 2 Hours, in advance of need  - Initiate/Maintain bed/chair alarm  - Obtain necessary fall risk management equipment: bed/chair alarm, call bell, walker, bedside commode, urinal, rounds  - Apply yellow socks and bracelet for high fall risk patients  - Consider moving patient to room near nurses station  Outcome: Progressing     Problem: Nutrition/Hydration-ADULT  Goal: Nutrient/Hydration intake appropriate for improving, restoring or maintaining nutritional needs  Description: Monitor and assess patient's nutrition/hydration status for malnutrition  Collaborate with interdisciplinary team and initiate plan and interventions as ordered  Monitor patient's weight and dietary intake as ordered or per policy  Utilize nutrition screening tool and intervene as necessary  Determine patient's food preferences and provide high-protein, high-caloric foods as appropriate       INTERVENTIONS:  - Monitor oral intake, urinary output, labs, and treatment plans  - Assess nutrition and hydration status and recommend course of action  - Evaluate amount of meals eaten  - Assist patient with eating if necessary   - Allow adequate time for meals  - Recommend/ encourage appropriate diets, oral nutritional supplements, and vitamin/mineral supplements  - Order, calculate, and assess calorie counts as needed  - GI consult for possible PEG tube  - Assess need for intravenous fluids  - Provide specific nutrition/hydration education as appropriate  - Include patient/family/caregiver in decisions related to nutrition  Outcome: Progressing     Problem: Potential for Falls  Goal: Patient will remain free of falls  Description: INTERVENTIONS:  - Educate patient/family on patient safety including physical limitations  - Instruct patient to call for assistance with activity   - Consult OT/PT to assist with strengthening/mobility   - Keep Call bell within reach  - Keep bed low and locked with side rails adjusted as appropriate  - Keep care items and personal belongings within reach  - Initiate and maintain comfort rounds  - Make Fall Risk Sign visible to staff  - Offer Toileting every 2 Hours, in advance of need  - Initiate/Maintain bed/chair alarm  - Obtain necessary fall risk management equipment: bed/chair alarm, call bell, walker, bedside commode, urinal, rounds  - Apply yellow socks and bracelet for high fall risk patients  - Consider moving patient to room near nurses station  Outcome: Progressing     Problem: COPING  Goal: Will report anxiety at manageable levels  Description: INTERVENTIONS:  - Administer medication as ordered  - Teach and encourage coping skills  - Provide emotional support  - Assess patient/family for anxiety and ability to cope  Outcome: Progressing

## 2022-05-21 NOTE — PROGRESS NOTES
Progress Note - Nasra Lee 79 y o  male MRN: [id number]    Unit/Bed#: E2 -01 Encounter: [id number]      ASSESSMENT/ PLAN:   55-year-old male with history of squamous cell carcinoma of the oropharynx, paddle cellular carcinoma, cirrhosis, lymphadenopathy, enlarging pancreatic cystic lesion being evaluated for malnutrition and poor p o  Intake    1  Malnutrition/poor PO intake/dysphagia:  Patient complaining of dysphagia which is most likely secondary to squamous cell of the oropharynx  He also has hepatocellular carcinoma and a CT scan 1/22 showed multiple lymph nodes positive for poorly differentiated adenoma, possibly biliary versus pancreatic with enlarging pancreatic cystic lesion  He was receiving chemo but this was recently put on hold  Further imaging for staging was recommended  He did have speech and swallow evaluation that did show moderate oral and severe pharyngeal dysphagia  He was given puree and honey thick liquids, he states that he is now able to tolerate this diet  GI was asked common and possible PEG tube placement, however given his previous imaging showed possible carcinomatosis this would be high risk  Further staging is planned the MRI per Oncology  -MRI abdomen as planned  -diet per speech  -no plan for PEG tube placement at this time    2  Diarrhea:  Continues to complain of diarrhea  He did have stool studies taken  C diff was negative  Giardia and bacterial panel are pending  -follow-up Giardia/bacterial culture    3  Hep c Cirrhosis:  Hep C status post treatment with SVR achieved  Unfortunately his cirrhosis is complicated by [address]  There is no ascites on imaging  No history of panic encephalopathy  He does have history of esophageal varices on endoscopy many years ago      Subjective:     Patient seen and examined, tolerating dysphagia diet    Objective:     Vitals: Blood pressure 124/79, pulse 80, temperature 97 7 °F (36 5 °C), temperature source Temporal, resp  rate 18, height 5' 8" (1 727 m), weight 50 1 kg (110 lb 7 2 oz), SpO2 99 %  ,Body mass index is 16 79 kg/m²  Intake/Output Summary (Last 24 hours) at 5/21/2022 1037  Last data filed at 5/21/2022 9807  Gross per 24 hour   Intake 2023 33 ml   Output --   Net 2023 33 ml       Physical Exam:     General Appearance: A&Ox3, appears stated age and cooperative  Lungs: Clear to auscultation bilaterally, no rales or rhonchi  Heart: Regular rate and rhythm, S1, S2 normal, no murmur, click, rub or gallop  Abdomen: Soft, non-tender, non-distended; bowel sounds normal; no masses or no organomegaly  Extremities: No cyanosis, clubbing, edema    Invasive Devices  Report    Central Venous Catheter Line  Duration           Port A Cath 03/25/22 Right Chest 56 days                Lab Results:    Results from last 7 days   Lab Units 05/21/22  0540   WBC Thousand/uL 2 68*   HEMOGLOBIN g/dL 11 4*   HEMATOCRIT % 34 5*   PLATELETS Thousands/uL 154   LYMPHO PCT % 17   MONO PCT % 12   EOS PCT % 0     Results from last 7 days   Lab Units 05/21/22  0540 05/20/22  0216   POTASSIUM mmol/L 3 3* 2 9*   CHLORIDE mmol/L 102 91*   CO2 mmol/L 28 32   BUN mg/dL 12 17   CREATININE mg/dL 0 76 0 89   CALCIUM mg/dL 7 2* 7 8*   ALK PHOS U/L  --  201*   ALT U/L  --  33   AST U/L  --  34     Results from last 7 days   Lab Units 05/20/22  0216   INR  1 37*     Results from last 7 days   Lab Units 05/20/22  0216   LIPASE u/L 27*       Imaging Studies: I have personally reviewed pertinent imaging studies  CT chest wo contrast    Result Date: 5/20/2022  Impression: 1  Stable subcentimeter pulmonary nodules  The largest measures 5 mm within the lingula  No new or enlarging pulmonary nodules  2   Peripherally sclerotic lytic focus within rib 4 on the right measuring 8 mm  A questionable lucency is barely perceptible on prior exam of July 2021  Finding is indeterminate but may represent a treated metastatic lesion    There is an additional subcentimeter lucency within the posterior aspect of rib 7 on the left which is new from prior exams and suspicious for metastasis  3   Cirrhotic liver morphology with trace perihepatic ascites  There is an ill-defined hypodensity within segment 7 corresponding to patient's known liver lesion  Lesion is suboptimally evaluated on this noncontrast study  4   Partially visualized soft tissue densities along the gastrohepatic ligament corresponding to gastrohepatic lymphadenopathy seen on prior exams  Visualization is limited by streak artifact from ingested barium  5   Wall thickening involving the mid to distal esophagus which may be due to underdistention versus nonspecific esophagitis

## 2022-05-21 NOTE — UTILIZATION REVIEW
Initial Clinical Review    Admission: Date/Time/Statement:   Admission Orders (From admission, onward)     Ordered        05/20/22 0525  INPATIENT ADMISSION  Once                      Orders Placed This Encounter   Procedures    INPATIENT ADMISSION     Standing Status:   Standing     Number of Occurrences:   1     Order Specific Question:   Level of Care     Answer:   Med Surg [16]     Order Specific Question:   Estimated length of stay     Answer:   More than 2 Midnights     Order Specific Question:   Certification     Answer:   I certify that inpatient services are medically necessary for this patient for a duration of greater than two midnights  See H&P and MD Progress Notes for additional information about the patient's course of treatment  ED Arrival Information     Expected   -    Arrival   5/20/2022 00:59    Acuity   Urgent            Means of arrival   Ambulance    Escorted by   [facility]    Service   Hospitalist    Admission type   Urgent            Arrival complaint   weakness           Chief Complaint   Patient presents with    Weakness - Generalized     Pt arrived via ems from home  Pt states he fell 20 min PTA  Denies headstrike  Denies LOC  Abrasion noted to R knee  N/V, weakness  Hx cancer  Initial Presentation: 79 y o  male PMH of pancreatic cancer currently on chemotherapy with most recent administration on 05/10, hepatic cirrhosis, diabetes mellitus, hypertension, hypothyroidism, chronic dysphagia   Presents to ED via EMS s/p fall  Denies head injury, reports weight loss, dysphagia, weakness  Reports difficult swallowing  He spits up after swallow food or fluids  Tonight while walking fell landing on his right knee  Pt is cachectic, lungs clear to ausculation,abdominal tenderness, upon palpation of RUQ  Right chest Port  Wbc 1 57, h/h 10 8/31 5, na 132, K 2 9, cl 91,ca 7 8, alb 2 3 , t bili 1 37, alk phos 201   INR 1 37  Admitted Inpatient increasing weight loss, symptoms of failure to thrive  He had been on FOLFOX chemotherapy for palliative purposes  Evaluated by Oncology outpatient recommending repeat CT chest and abdomen pelvis concern with progression cancer versus side effect of failure to thrive due to chemo  There have been discussions regarding feeding tube, awaiting Oncology input  Discussed with GI, recommending abdominal scan, to rule out peritoneal carcinomatosis  If present, patient may not be a candidate for PEG tube  Barium swallow ordered, speech therapy to evaluate  Will addition add CT chest as recommended by Oncology outpatient   PALLIATIVE CARE CONSULT added home regimen of opiods MSIR , IV Morphine prn q4hr   Iv Zofran consider for Creon will defer to GI Evaluation  GI CONSULT  Patient does have enlarging pancreatic cystic lesion  We were consulted for malnutrition and poor p o  Intake  Patient has had difficulty swallowing which is likely secondary to his oropharyngeal cancer  Patient does have peritoneal carcinomatosis so PEG tube would be high risk  Stool studies as outlined below for diarrhea which is likely chemotherapy related  MRI per Oncology  ONCOLOGY /HEME CONSULT  Multifocal liver lesion, well-differentiated hepatocellular carcinoma  -Clinically consistent with hepatocellular carcinoma; histologically confirmed on 3/1/2022  -FOLFIRINOX, s/p 4 cycles of treatment; did not tolerate tx very well: continued to lose weight, experiencing progressive weakness, performance status declining as well    -Next chemotherapy rescheduled from 5/24/22 to 6/8/22  -CT scan scheduled for today, 5/20/22, will need this imaging prior to seeing Dr Priscilla Saldana Barium swallow study   Per gross esophageal screen:  Patient observed with stasis and reflux   Recommendations:  Diet: highly consider alternate means of nutrition, but would recommend puree and honey thick liquids to decrease aspiration risk  Meds: crush in puree Aspiration precautions posted    Date: 5/21/22   Day 2:   Per GI  He was receiving chemo but this was recently put on hold  Further imaging for staging was recommended  He did have speech and swallow evaluation that did show moderate oral and severe pharyngeal dysphagia  He was given puree and honey thick liquids, he states that he is now able to tolerate this diet  GI was asked common and possible PEG tube placement, however given his previous imaging showed possible carcinomatosis this would be high risk  Further staging is planned the MRI per Oncology  MRI abdomen as planned  diet per speech  no plan for PEG tube placement at this time  PER ATTENDING  Severe protein calorie malnutrition BMI 16 79 continue decadron  To stimulate appetite, speech eval recommend pureed diet   Pt consider peg tube to d/w Oncology outpatient  Dysphagia continue protonix ,dyphagia diet pureed with honey thick liquid  Hypokalemia monitor bmp  Malignannt neoplasm of body of pancreas  Ct chest and mri abd pelvis ordered to evaluate possible progression and staging   Current Patient Status: Inpatient   Certification Statement: The patient will continue to require additional inpatient hospital stay due to IV fluids, IR consult for tube feeding placement evaluation  ED Triage Vitals   Temperature Pulse Respirations Blood Pressure SpO2   05/20/22 0112 05/20/22 0104 05/20/22 0104 05/20/22 0104 05/20/22 0104   98 5 °F (36 9 °C) 67 16 109/68 99 %      Temp Source Heart Rate Source Patient Position - Orthostatic VS BP Location FiO2 (%)   05/20/22 0112 05/20/22 0104 05/20/22 0104 05/20/22 0104 --   Oral Monitor Lying Left arm       Pain Score       05/20/22 0104       4          Wt Readings from Last 1 Encounters:   05/20/22 50 1 kg (110 lb 7 2 oz)     Additional Vital Signs:   05/21/22 0831 97 7 °F (36 5 °C) -- -- -- -- -- --   05/21/22 0747 -- 80 18 124/79 99 % None (Room air) Lying   05/20/22 2222 98 3 °F (36 8 °C) 72 20 111/67 98 % None (Room air) Lying   05/20/22 1900 98 1 °F (36 7 °C) 67 20 102/73 97 % None (Room air) Lying   05/20/22 1448 97 °F (36 1 °C) Abnormal  64 18 102/59 99 % None (Room air) Lying   05/20/22 1205 97 9 °F (36 6 °C) 64 18 98/59 100 % None (Room air) Lying   05/20/22 0757 -- 61 18 113/75 100 % None (Room air) Lying   05/20/22 0632 97 1 °F (36 2 °C) Abnormal  63 20 102/88 97 %         Pertinent Labs/Diagnostic Test Results:   5/21/22 MRI abdomen : In response to the clinical question, no evidence for omental carcinomatosis      Hepatic cirrhosis  Dominant tumor mass in the posterior dome of the right hepatic lobe appears slightly smaller when compared across modalities to February 23, 2022  Interval development of a small volume of upper abdominal ascites      Bulky upper retroperitoneal, gastrohepatic ligament, periportal, and periceliac bina tumor has significantly progressed when compared to February 23, 2022      Slight progression of osseous metastatic disease as described  CT chest wo contrast   Final Result by Zaira Erickson DO (05/20 2133)   1  Stable subcentimeter pulmonary nodules  The largest measures 5 mm within the lingula  No new or enlarging pulmonary nodules  2   Peripherally sclerotic lytic focus within rib 4 on the right measuring 8 mm  A questionable lucency is barely perceptible on prior exam of July 2021  Finding is indeterminate but may represent a treated metastatic lesion  There is an additional    subcentimeter lucency within the posterior aspect of rib 7 on the left which is new from prior exams and suspicious for metastasis  3   Cirrhotic liver morphology with trace perihepatic ascites  There is an ill-defined hypodensity within segment 7 corresponding to patient's known liver lesion  Lesion is suboptimally evaluated on this noncontrast study     4   Partially visualized soft tissue densities along the gastrohepatic ligament corresponding to gastrohepatic lymphadenopathy seen on prior exams  Visualization is limited by streak artifact from ingested barium  5   Wall thickening involving the mid to distal esophagus which may be due to underdistention versus nonspecific esophagitis  The study was marked in EPIC for significant notification        Workstation performed: [id number]         FL barium swallow video w speech   Final Result by SYSTEMGENERATED, DOCUMENTATION (05/20 1442)      MRI inpatient order    (Results Pending)     Results from last 7 days   Lab Units 05/20/22 0216   SARS-COV-2  Negative     Results from last 7 days   Lab Units 05/21/22  0540 05/20/22 0216   WBC Thousand/uL 2 68* 1 57*   HEMOGLOBIN g/dL 11 4* 10 8*   HEMATOCRIT % 34 5* 31 5*   PLATELETS Thousands/uL 154 151   BANDS PCT % 26*  --      Results from last 7 days   Lab Units 05/21/22  0540 05/20/22 0216   SODIUM mmol/L 137 132*   POTASSIUM mmol/L 3 3* 2 9*   CHLORIDE mmol/L 102 91*   CO2 mmol/L 28 32   ANION GAP mmol/L 7 9   BUN mg/dL 12 17   CREATININE mg/dL 0 76 0 89   EGFR ml/min/1 73sq m 94 88   CALCIUM mg/dL 7 2* 7 8*   MAGNESIUM mg/dL 1 4*  --      Results from last 7 days   Lab Units 05/21/22  0540 05/20/22 0216   AST U/L  --  34   ALT U/L  --  33   ALK PHOS U/L  --  201*   TOTAL PROTEIN g/dL  --  5 8*   ALBUMIN g/dL  --  2 3*   TOTAL BILIRUBIN mg/dL  --  1 37*   AMMONIA umol/L 29 37*     Results from last 7 days   Lab Units 05/21/22  0540 05/20/22 0216   GLUCOSE RANDOM mg/dL 67 123     Results from last 7 days   Lab Units 05/20/22  0415 05/20/22  0216   HS TNI 0HR ng/L  --  7   HS TNI 2HR ng/L 6  --    HSTNI D2 ng/L -1  --      Results from last 7 days   Lab Units 05/20/22 0216   PROTIME seconds 16 4*   INR  1 37*   PTT seconds 30     Results from last 7 days   Lab Units 05/20/22 0216   TSH 3RD GENERATON uIU/mL 42 146*     Results from last 7 days   Lab Units 05/20/22  0216   LACTIC ACID mmol/L 1 6     Results from last 7 days   Lab Units 05/20/22  0216   LIPASE u/L 27*     Results from last 7 days   Lab Units 05/20/22  0424   CLARITY UA  Clear   COLOR UA  [place]   SPEC GRAV UA  >=1 030   PH UA  5 0   GLUCOSE UA mg/dl Negative   KETONES UA mg/dl 15 (1+)*   BLOOD UA  Negative   PROTEIN UA mg/dl Trace*   NITRITE UA  Negative   BILIRUBIN UA  Interference- unable to analyze*   UROBILINOGEN UA E U /dl 1 0   LEUKOCYTES UA  Negative   WBC UA /hpf 0-1*   RBC UA /hpf None Seen   BACTERIA UA /hpf Occasional   EPITHELIAL CELLS WET PREP /hpf Occasional     Results from last 7 days   Lab Units 05/20/22  0216   INFLUENZA A PCR  Negative   INFLUENZA B PCR  Negative   RSV PCR  Negative     Results from last 7 days   Lab Units 05/20/22 2047   C DIFF TOXIN B BY PCR  Negative     ED Treatment:   Medication Administration from 05/20/2022 0059 to 05/20/2022 9411       Date/Time Order Dose Route Action Action by Comments     05/20/2022 0415 sodium chloride 0 9 % bolus 1,000 mL 0 mL Intravenous Stopped [address]      05/20/2022 0232 sodium chloride 0 9 % bolus 1,000 mL 1,000 mL Intravenous [id number] [address]      05/20/2022 0424 potassium chloride 20 mEq IVPB (premix) 20 mEq Intravenous 13748 [address]      05/20/2022 0436 potassium chloride (K-DUR,KLOR-CON) CR tablet 40 mEq   Oral Canceled Entry [address]      05/20/2022 0455 potassium chloride 40 mEq IVPB (premix)   Intravenous Canceled Entry Ana Rosa Orellana RN      05/20/2022 7825 potassium chloride 20 mEq IVPB (premix) 0 mEq Intravenous Hold Ana Rosa Orellana RN First bag not finished running        Past Medical History:   Diagnosis Date    Cardiac disorder     Chronic hepatitis C virus infection ([facility] )     Last Assessed: 7/11/2017    Diabetes mellitus (HCC)     Dysphagia     Esophageal varices (HCC)     Last Assessed: 4/27/2017    Hepatic disease     Hepatitis     History of chemotherapy     History of radiation therapy     Hypertension     Laryngeal cancer ([facility] )     Liver cancer ([facility] )     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 )     Recurrent hepatitis C ([facility] )     Last Assessed: 10/17/2016    Rheumatic fever      Present on Admission:   Severe protein-calorie malnutrition ([facility] )   [facility]   Hepatic cirrhosis (HCC)   Dysphagia   Cancer related pain   Hypokalemia   Chemotherapy induced neutropenia (HCC)      Admitting Diagnosis: Malignant neoplasm of body of pancreas (HCC) [C25 1]  Hypokalemia [E87 6]  Weakness [R53 1]  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]  Age/Sex: 79 y o  male  Admission Orders:  gmf  Pt ot speech  Stool enteric bacterial panel  Giardia antigen  plt ct  MRI   Mg bmp  Access Port a cath  Scheduled Medications:  bisacodyl, 10 mg, Oral, Daily  dexamethasone, 0 5 mg, Oral, BID (AM & Afternoon)  diltiazem, 240 mg, Oral, Daily  heparin (porcine), 5,000 Units, Subcutaneous, Q8H CHARI  levothyroxine, 112 mcg, Oral, Early Morning  LORazepam, 1 mg, Intravenous, Once  magnesium sulfate, 2 g, Intravenous, Once  morphine, 30 mg, Oral, BID  nadolol, 40 mg, Oral, Daily  ondansetron, 4 mg, Intravenous, TID AC  potassium chloride, 20 mEq, Intravenous, Once      Continuous IV Infusions:  sodium chloride 0 9 % with KCl 40 mEq/L, 100 mL/hr, Intravenous, Continuous      PRN Meds:  acetaminophen, 650 mg, Oral, Q6H PRN  albuterol, 2 puff, Inhalation, Q4H PRN  aluminum-magnesium hydroxide-simethicone, 30 mL, Oral, Q6H PRN  famotidine, 20 mg, Oral, Daily PRN  morphine, 30 mg, Oral, Q4H PRN   Or  morphine injection, 8 mg, Intravenous, Q4H PRN  ondansetron, 4 mg, Intravenous, Q6H PRN  simethicone, 80 mg, Oral, 4x Daily PRN        IP CONSULT TO PALLIATIVE CARE  IP CONSULT TO ONCOLOGY  IP CONSULT TO GASTROENTEROLOGY  IP CONSULT TO NUTRITION SERVICES    Network Utilization Review Department  ATTENTION: Please call with any questions or concerns to [phone] and carefully listen to the prompts so that you are directed to the right person   All voicemails are damian Malone all requests for admission clinical reviews, approved or denied determinations and any other requests to dedicated fax number below belonging to the campus where the patient is receiving treatment   List of dedicated fax numbers for the Facilities:  [address] [place] [address] DENIALS (Administrative/Medical Necessity) [phone]   [address] (Maternity/NICU/Pediatrics) [phone]   [address]  [address] [address] [address] [place] [address] [address] [facility] [place] [address] [phone]

## 2022-05-21 NOTE — ASSESSMENT & PLAN NOTE
BMI of 16 79 on admission  · History dysphagia, reports poor appetite, weight loss, having difficulty swallowing food, spits food and fluids back up  · Continue Decadron 0 5 mg to stimulate appetite  · Speech evaluated, recommend pureed diet  · Patient considering PEG tube, to further discuss with Oncology outpatient  · GI consulted regarding PEG tube, patient not likely a candidate due to prior CT imaging showing possible peritoneal carcinomatosis

## 2022-05-22 PROBLEM — R19.5 LOOSE STOOLS: Status: ACTIVE | Noted: 2022-01-01

## 2022-05-22 PROBLEM — E87.6 HYPOKALEMIA: Status: RESOLVED | Noted: 2022-01-01 | Resolved: 2022-01-01

## 2022-05-22 NOTE — ASSESSMENT & PLAN NOTE
· 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 treated by concurrent chemoradiation with high-dose cisplatin resulting in KANDACE in 2012  · Developed  bulky abdominal adenopathy in January 2022 which was confirmed to be poorly differentiated adenocarcinoma, 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  · Followed outpatient by Dr Genie Chen  · CT chest and MRI abd/pelvis Dominant tumor mass in the posterior dome of the right hepatic lobe appears slightly smaller when compared across modalities to February 23, 2022  Interval development of a small volume of upper abdominal ascites  Bulky upper retroperitoneal, gastrohepatic ligament, periportal, and periceliac bina tumor has significantly progressed when compared to February 23, 2022  Slight progression of osseous metastatic disease as described

## 2022-05-22 NOTE — ASSESSMENT & PLAN NOTE
· Dysphagia in setting of history of laryngeal cancer and RT to the area  · Evaluating for possible PEG tube as above  · CT chest shows mild esophageal thickening, possible esophagitis  · Start protonix 40mg once daily  · ST evaluated showing mod to severe dysphagia  · Dyphagia diet, pureed with honey thick  · IV fluids

## 2022-05-22 NOTE — PLAN OF CARE
Problem: Prexisting or High Potential for Compromised Skin Integrity  Goal: Skin integrity is maintained or improved  Description: INTERVENTIONS:  - Identify patients at risk for skin breakdown  - Assess and monitor skin integrity  - Assess and monitor nutrition and hydration status  - Monitor labs   - Assess for incontinence   - Turn and reposition patient  - Assist with mobility/ambulation  - Relieve pressure over bony prominences  - Avoid friction and shearing  - Provide appropriate hygiene as needed including keeping skin clean and dry  - Evaluate need for skin moisturizer/barrier cream  - Collaborate with interdisciplinary team   - Patient/family teaching  Outcome: Progressing     Problem: PAIN - ADULT  Goal: Verbalizes/displays adequate comfort level or baseline comfort level  Description: Interventions:  - Encourage patient to monitor pain and request assistance  - Assess pain using appropriate pain scale  - Administer analgesics based on type and severity of pain and evaluate response  - Implement non-pharmacological measures as appropriate and evaluate response  - Consider cultural and social influences on pain and pain management  - Notify physician/advanced practitioner if interventions unsuccessful or patient reports new pain  Outcome: Progressing     Problem: SAFETY ADULT  Goal: Patient will remain free of falls  Description: INTERVENTIONS:  - Educate patient/family on patient safety including physical limitations  - Instruct patient to call for assistance with activity   - Consult OT/PT to assist with strengthening/mobility   - Keep Call bell within reach  - Keep bed low and locked with side rails adjusted as appropriate  - Keep care items and personal belongings within reach  - Initiate and maintain comfort rounds  - Make Fall Risk Sign visible to staff  - Offer Toileting every 2 Hours, in advance of need  - Initiate/Maintain bed/chair alarm  - Obtain necessary fall risk management equipment: bed/chair alarm, call bell, walker, bedside commode, urinal, rounds  - Apply yellow socks and bracelet for high fall risk patients  - Consider moving patient to room near nurses station  Outcome: Progressing     Problem: DISCHARGE PLANNING  Goal: Discharge to home or other facility with appropriate resources  Description: INTERVENTIONS:  - Identify barriers to discharge w/patient and caregiver  - Arrange for needed discharge resources  - Identify discharge learning needs (meds, wound care, etc )  - Refer to Case Management Department for coordinating discharge planning if the patient needs post-hospital services based on physician/advanced practitioner order   Outcome: Progressing     Problem: Knowledge Deficit  Goal: Patient/family demonstrates understanding of disease process, treatment plan, medications, and discharge instructions  Description: Complete learning assessment and assess knowledge base    Interventions:  - Provide teaching at level of understanding  - Provide teaching via preferred learning methods  Outcome: Progressing     Problem: GASTROINTESTINAL - ADULT  Goal: Minimal or absence of nausea and/or vomiting  Description: INTERVENTIONS:  - Administer IV fluids if ordered to ensure adequate hydration  - Administer ordered antiemetic medications as needed  - Provide nonpharmacologic comfort measures as appropriate  - Advance diet as tolerated, if ordered  - Consider nutrition services referral to assist patient with adequate nutrition and appropriate food choices  Outcome: Progressing  Goal: Maintains or returns to baseline bowel function  Description: INTERVENTIONS:  - Assess bowel function  - Encourage oral fluids to ensure adequate hydration  - Administer IV fluids if ordered to ensure adequate hydration  - Administer ordered medications as needed  - Encourage mobilization and activity  - Consider nutritional services referral to assist patient with adequate nutrition and appropriate food choices  Outcome: Progressing     Problem: METABOLIC, FLUID AND ELECTROLYTES - ADULT  Goal: Electrolytes maintained within normal limits  Description: INTERVENTIONS:  - Monitor labs and assess patient for signs and symptoms of electrolyte imbalances  - Administer electrolyte replacement as ordered  - Monitor response to electrolyte replacements, including repeat lab results as appropriate  - Instruct patient on fluid and nutrition as appropriate  Outcome: Progressing  Goal: Glucose maintained within target range  Description: INTERVENTIONS:  - Monitor Blood Glucose as ordered  - Assess for signs and symptoms of hyperglycemia and hypoglycemia  - Administer ordered medications to maintain glucose within target range  - Assess nutritional intake and initiate nutrition service referral as needed  Outcome: Progressing     Problem: COPING  Goal: Will report anxiety at manageable levels  Description: INTERVENTIONS:  - Administer medication as ordered  - Teach and encourage coping skills  - Provide emotional support  - Assess patient/family for anxiety and ability to cope  Outcome: Progressing     Problem: Nutrition/Hydration-ADULT  Goal: Nutrient/Hydration intake appropriate for improving, restoring or maintaining nutritional needs  Description: Monitor and assess patient's nutrition/hydration status for malnutrition  Collaborate with interdisciplinary team and initiate plan and interventions as ordered  Monitor patient's weight and dietary intake as ordered or per policy  Utilize nutrition screening tool and intervene as necessary  Determine patient's food preferences and provide high-protein, high-caloric foods as appropriate       INTERVENTIONS:  - Monitor oral intake, urinary output, labs, and treatment plans  - Assess nutrition and hydration status and recommend course of action  - Evaluate amount of meals eaten  - Assist patient with eating if necessary   - Allow adequate time for meals  - Recommend/ encourage appropriate diets, oral nutritional supplements, and vitamin/mineral supplements  - Order, calculate, and assess calorie counts as needed  - GI consult for possible PEG tube  - Assess need for intravenous fluids  - Provide specific nutrition/hydration education as appropriate  - Include patient/family/caregiver in decisions related to nutrition  Outcome: Progressing     Problem: Potential for Falls  Goal: Patient will remain free of falls  Description: INTERVENTIONS:  - Educate patient/family on patient safety including physical limitations  - Instruct patient to call for assistance with activity   - Consult OT/PT to assist with strengthening/mobility   - Keep Call bell within reach  - Keep bed low and locked with side rails adjusted as appropriate  - Keep care items and personal belongings within reach  - Initiate and maintain comfort rounds  - Make Fall Risk Sign visible to staff  - Offer Toileting every 2 Hours, in advance of need  - Initiate/Maintain bed/chair alarm  - Obtain necessary fall risk management equipment: bed/chair alarm, call bell, walker, bedside commode, urinal, rounds  - Apply yellow socks and bracelet for high fall risk patients  - Consider moving patient to room near nurses station  Outcome: Progressing

## 2022-05-22 NOTE — PROGRESS NOTES
[address] [facility]  Progress Note - Quintella Stain 1955, 79 y o  male MRN: [id number]  Unit/Bed#: E2 -01 Encounter: [id number]  Primary Care Provider: PRINCE Rutledge   Date and time admitted to hospital: 5/20/2022 12:59 AM    * Severe protein-calorie malnutrition ([facility] [address] )  Assessment & Plan  BMI of 16 79 on admission  · History dysphagia, reports poor appetite, weight loss, having difficulty swallowing food, spits food and fluids back up  · Continue Decadron 0 5 mg to stimulate appetite  · Speech evaluated, recommend pureed diet  · GI consulted regarding PEG tube, patient not likely a candidate due to hx of radiation and risk of bleeding via EGD  · IR consulted to evaluate for enteral tube feeding placement    Loose stools  Assessment & Plan  · Stop dulcolax    Continue to monitor    Dysphagia  Assessment & Plan  · Dysphagia in setting of history of laryngeal cancer and RT to the area  · Evaluating for possible PEG tube as above  · CT chest shows mild esophageal thickening, possible esophagitis  · Start protonix 40mg once daily  · ST evaluated showing mod to severe dysphagia  · Dyphagia diet, pureed with honey thick  · IV fluids    Cancer related pain  Assessment & Plan  · Followed outpatient by palliative care for cancer pain, maintained on morphine ER 30mg BID and MSIR 30mg PO q4H prn  · Verified on PDMP  · Appreciate Palliative evaluation    Malignant neoplasm of body of pancreas ([facility] [address] )  Assessment & Plan  · 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 treated by concurrent chemoradiation with high-dose cisplatin resulting in KANDACE in 2012  · Developed  bulky abdominal adenopathy in January 2022 which was confirmed to be poorly differentiated adenocarcinoma, 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  · Followed outpatient by Dr Ashlee Mcqueen  · CT chest and MRI abd/pelvis Dominant tumor mass in the posterior dome of the right hepatic lobe appears slightly smaller when compared across modalities to 2022  Interval development of a small volume of upper abdominal ascites  Bulky upper retroperitoneal, gastrohepatic ligament, periportal, and periceliac bina tumor has significantly progressed when compared to 2022  Slight progression of osseous metastatic disease as described  Hepatic cirrhosis ([facility] [address] )  Assessment & Plan  · History of hep C with alcoholic liver cirrhosis  LFTs are within normal limits  · MRI abdomen with without contrast yesterday showed dominant tumor mass in the posterior dome of the right hepatic lobe appears slightly smaller when compared across modalities to 2022  Interval development of a small volume of upper abdominal ascites  · Check AFP rule out primary HCC versus metastatic disease        VTE Pharmacologic Prophylaxis: VTE Score: 4 Moderate Risk (Score 3-4) - Pharmacological DVT Prophylaxis Ordered: heparin  Patient Centered Rounds: I performed bedside rounds with nursing staff today  Discussions with Specialists or Other Care Team Provider:  IR consult noted    Education and Discussions with Family / Patient: Patient declined call to   Time Spent for Care: 30 minutes  More than 50% of total time spent on counseling and coordination of care as described above  Current Length of Stay: 2 day(s)  Current Patient Status: Inpatient   Certification Statement: The patient will continue to require additional inpatient hospital stay due to PEG tube placement with IR this week  Discharge Plan: Anticipate discharge in 48-72 hrs to Per PT evaluations which are pending    Code Status: Level 1 - Full Code    Subjective:   Patient seen and examined this morning  No acute events overnight  Patient with poor p o  Intake  He did try to have some breakfast though states that :it came right back up"        Objective:     Vitals:   Temp (24hrs), Av 9 °F (36 1 °C), Min:96 8 °F (36 °C), Max:96 9 °F (36 1 °C)    Temp:  [96 8 °F (36 °C)-96 9 °F (36 1 °C)] 96 8 °F (36 °C)  HR:  [65-71] 71  Resp:  [18] 18  BP: ()/(60-62) 104/62  SpO2:  [98 %-99 %] 99 %  Body mass index is 16 79 kg/m²  Input and Output Summary (last 24 hours):   No intake or output data in the 24 hours ending 05/22/22 1342    Physical Exam:   Physical Exam  Vitals reviewed  Constitutional:       General: He is not in acute distress  Appearance: He is ill-appearing  Cardiovascular:      Rate and Rhythm: Normal rate and regular rhythm  Pulmonary:      Effort: No respiratory distress  Breath sounds: No wheezing or rhonchi  Abdominal:      General: There is no distension  Tenderness: There is abdominal tenderness  Musculoskeletal:         General: No swelling  Skin:     General: Skin is warm and dry  Neurological:      Mental Status: Mental status is at baseline  Additional Data:     Labs:  Results from last 7 days   Lab Units 05/21/22  0540   WBC Thousand/uL 2 68*   HEMOGLOBIN g/dL 11 4*   HEMATOCRIT % 34 5*   PLATELETS Thousands/uL 154   BANDS PCT % 26*   LYMPHO PCT % 17   MONO PCT % 12   EOS PCT % 0     Results from last 7 days   Lab Units 05/22/22  0613 05/21/22  0540 05/20/22  0216   SODIUM mmol/L 137   < > 132*   POTASSIUM mmol/L 3 4*   < > 2 9*   CHLORIDE mmol/L 105   < > 91*   CO2 mmol/L 26   < > 32   BUN mg/dL 10   < > 17   CREATININE mg/dL 0 86   < > 0 89   ANION GAP mmol/L 6   < > 9   CALCIUM mg/dL 7 1*   < > 7 8*   ALBUMIN g/dL  --   --  2 3*   TOTAL BILIRUBIN mg/dL  --   --  1 37*   ALK PHOS U/L  --   --  201*   ALT U/L  --   --  33   AST U/L  --   --  34   GLUCOSE RANDOM mg/dL 154*   < > 123    < > = values in this interval not displayed       Results from last 7 days   Lab Units 05/20/22  0216   INR  1 37*             Results from last 7 days   Lab Units 05/20/22  0216   LACTIC ACID mmol/L 1 6       Lines/Drains:  Invasive Devices  Report    Central Venous Catheter Line  Duration           Port A Cath 03/25/22 Right Chest 57 days                Central Line:  Goal for removal: N/A - Chronic PICC             Imaging: Reviewed radiology reports from this admission including: MRI abdomen/MRCP    Recent Cultures (last 7 days):   Results from last 7 days   Lab Units 05/20/22 2047   C DIFF TOXIN B BY PCR  Negative       Last 24 Hours Medication List:   Current Facility-Administered Medications   Medication Dose Route Frequency Provider Last Rate    acetaminophen  650 mg Oral Q6H PRN PRINCE Melendez      albuterol  2 puff Inhalation Q4H PRN PRINCE Melendez      aluminum-magnesium hydroxide-simethicone  30 mL Oral Q6H PRN PRINCE Melendez      dexamethasone  0 5 mg Oral BID (AM & Afternoon) PRINCE Melendez      dextrose 5 % and sodium chloride 0 9 % with KCl 20 mEq/L  75 mL/hr Intravenous Continuous Regi Pichardo MD 75 mL/hr (05/21/22 4139)    diltiazem  240 mg Oral Daily PRINCE Melendez      famotidine  20 mg Oral Daily PRN PRINCE Melendez      heparin (porcine)  5,000 Units Subcutaneous [facility] Maria D Gutierrez MD      levothyroxine  112 mcg Oral Early Morning PRINCE Melendez      morphine  30 mg Oral BID Kimberly M Bendas, DO      morphine  30 mg Oral Q4H PRN Kimberly M Bendas, DO      Or    morphine injection  8 mg Intravenous Q4H PRN Kimberly M Bendas, DO      nadolol  40 mg Oral Daily PRINCE Melendez      ondansetron  4 mg Intravenous TID AC Kimberly M Anthony, DO      ondansetron  4 mg Intravenous Q6H PRN Regi Pichardo MD      pantoprazole  40 mg Oral Early Morning Regi Pichardo MD      potassium chloride  20 mEq Intravenous Once Automatic Data, SOWMYA Stopped (05/20/22 0514)    simethicone  80 mg Oral 4x Daily PRN PRINCE Melendez          Today, Patient Was Seen By: Roseann Hogan MD    **Please Note: This note may have been constructed using a voice recognition system  **

## 2022-05-22 NOTE — ASSESSMENT & PLAN NOTE
· History of hep C with alcoholic liver cirrhosis  LFTs are within normal limits  · MRI abdomen with without contrast yesterday showed dominant tumor mass in the posterior dome of the right hepatic lobe appears slightly smaller when compared across modalities to February 23, 2022  Interval development of a small volume of upper abdominal ascites    · Check AFP rule out primary HCC versus metastatic disease

## 2022-05-22 NOTE — CONSULTS
e-Consult (IPC)  - Interventional Radiology  Noah Cobb 79 y o  male MRN: [id number]  Unit/Bed#: E2 -01 Encounter: [id number]    Interventional Radiology has been consulted to evaluate Noah Cobb    We were consulted by Dr Juarez Alonso concerning this patient with dysphagia  IP Consult to IR  Consult performed by: Esdras Glaser MD  Consult ordered by: Candelaria Dey MD        05/22/22    Assessment/Recommendation:   66-year-old male with history of squamous cell carcinoma of the oropharynx, hepatocellular cellular carcinoma, cirrhosis, lymphadenopathy, enlarging pancreatic cystic lesion being evaluated for malnutrition and poor p o  Intake  He is not a candidate for a PEG tube due to his oropharyngeal cancer and therefore IR has been consulted for a Percutaneous Radiologic Gastrostomy (PRG)  We will hold tomorrow's sq heparin and consult anesthesia tomorrow ad place the patient on our schedule this week  Total time spent in review of data, discussion with requesting provider and rendering advice was 30 minutes  Thank you for allowing Interventional Radiology to participate in the care of Noah Cobb  Please don't hesitate to call or TigerText us with any questions       Esdras Glaser MD

## 2022-05-23 PROBLEM — C22.0 HEPATOCELLULAR CARCINOMA (HCC): Status: ACTIVE | Noted: 2022-01-01

## 2022-05-23 NOTE — ASSESSMENT & PLAN NOTE
· Patient was diagnosed with Dysphagia in setting of history of laryngeal cancer with previous radiation therapy  · CT chest shows mild esophageal thickening, possible esophagitis  · Continue protonix 40mg once daily  · Patient was evaluated by speech therapy:  Patient underwent  S:  Current recommendations for pureed diet with honey thick liquids

## 2022-05-23 NOTE — PLAN OF CARE
Problem: PHYSICAL THERAPY ADULT  Goal: Performs mobility at highest level of function for planned discharge setting  See evaluation for individualized goals  Description: Treatment/Interventions: Functional transfer training, LE strengthening/ROM, Elevations, Therapeutic exercise, Endurance training, Patient/family training, Bed mobility, Gait training, Spoke to nursing, OT  Equipment Recommended: Jose Anna (Comment) ( and [facility])       See flowsheet documentation for full assessment, interventions and recommendations  Note: Prognosis: Fair  Problem List: Decreased strength, Impaired balance, Decreased mobility, Impaired judgement, Decreased safety awareness  Assessment: Pt is a 80 y/o male admitted on 5/20/22 after a fall at home  Pt presented with N/V and failure to thrive  Pt admitted with mild protein-calorie malnutrition, dysphagia, hypokalemia, cancer related pain, chemotherapy induced neutropenia, and hepatic cirrhosis w/ mildly elevated ammonia level  Pt has hx of malignant neoplasm of body of pancreas since 1/2022 along with 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  Pt's PMH that may impact PT session includes cardiac disorder, chronic hep C, DM, dysphagia, hepatic disease, HTN, laryngeal CA, liver CA & malignant neoplasm of pharynx  Pt referred to PT for mobility assessment and discharge planning  On eval, patient demonstrated mod I with bed mobility and S w/ transfers and ambulation  Pt amb w/ RW and S and was (-) for LOB throughout session  Pt noted to be ambulating close to his baseline and felt more stable with use of the RW  The patient's AM-PAC Basic Mobility Inpatient Short Form Raw Score is 20  A Raw score of greater than 16 suggests the patient may benefit from discharge to home  From PT standpoint, pt may return home with HHPT and family support when medically cleared  Will continue PT per POC   At end of session, patient stable and supine w/ bed alarm activated and all needs within reach  Patient educated to call nsg staff to assist with OOB activities  Nsg notified  Barriers to Discharge: Inaccessible home environment        PT Discharge Recommendation: Home with home health rehabilitation          See flowsheet documentation for full assessment

## 2022-05-23 NOTE — PROGRESS NOTES
[facility]  Progress Note - Franklin Milder 1955, 79 y o  male MRN: [id number]  Unit/Bed#: E2 -01 Encounter: [id number]  Primary Care Provider: PRINCE Gudino   Date and time admitted to hospital: 5/20/2022 12:59 AM    * Severe protein-calorie malnutrition ([address] )  Assessment & Plan  BMI of 16 79 on admission, patient with poor p o  Intake, decreased energy, worsening generalized weakness  Patient with severe protein calorie malnutrition due to poor p o  Intake due to malignancy, decreased appetite  · History dysphagia, reports poor appetite, weight loss, having difficulty swallowing food, spits food and fluids back up  · Continue Decadron 0 5 mg to stimulate appetite  · Speech evaluated, recommend pureed diet with thin liquids for ease of swallowing (diet changed to allow rice pudding, at patient's request)  · GI consulted regarding PEG tube, patient not likely a candidate due to hx of radiation and risk of bleeding via EGD  · IR place G-tube 5/20    Loose stools  Assessment & Plan  · Stop dulcolax    Continue to monitor  · Improved    Dysphagia  Assessment & Plan  · Patient was diagnosed with Dysphagia in setting of history of laryngeal cancer with previous radiation therapy  · CT chest shows mild esophageal thickening, possible esophagitis  · Continue protonix 40mg once daily  · Patient was evaluated by speech therapy:  Patient underwent  S:  Current recommendations for pureed diet with honey thick liquids    Cancer related pain  Assessment & Plan  · Patient has history of chronic pain secondary to cancer with continuous opioid dependence  · Followed outpatient by palliative care, maintained on morphine ER 30mg BID and MSIR 30mg PO q4H prn  · Verified on PDMP  · Appreciate Palliative care team evaluation    Hepatocellular carcinoma [facility])  Assessment & Plan  · Patient has a history of hepatocellular carcinoma, followed by Dr Mikki Ball  · Developed  bulky abdominal adenopathy in January 2022 which was confirmed to be poorly differentiated adenocarcinoma, 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  · Pathology confirmed well-differentiated hepatocellular carcinoma  · Patient was started on chemotherapy with FOLFIRINOX, but did not tolerated well due to weight loss and poor performance status:  Chemotherapy postponed until 06/08/2022  · CT chest and MRI abd/pelvis: Dominant tumor mass in the posterior dome of the right hepatic lobe appears slightly smaller when compared across modalities to February 23, 2022  Interval development of a small volume of upper abdominal ascites  Bulky upper retroperitoneal, gastrohepatic ligament, periportal, and periceliac bina tumor has significantly progressed when compared to February 23, 2022  Slight progression of osseous metastatic disease as described  · Outpatient Oncology follow-up    Hypothyroidism  Assessment & Plan  Continue Synthroid    Hypertension  Assessment & Plan  Patient with previous history of essential hypertension  Home medications include Cardizem  mg daily and nadolol 40 mg daily  Patient with low-normal blood pressure  He notes difficulty swallowing the Cardizem CD:  Will hold    Hepatic cirrhosis ([address] )  Assessment & Plan  · History of hep C with alcoholic liver cirrhosis  LFTs are within normal limits  · MRI abdomen with without contrast showed dominant tumor mass in the posterior dome of the right hepatic lobe appears slightly smaller when compared across modalities to February 23, 2022  Interval development of a small volume of upper abdominal ascites      H/O laryngeal cancer  Assessment & Plan  · Patient has a history of squamous cell carcinoma of oropharynx, stage IV A, with ipsilateral cervical lymph node metastasis treated by concurrent chemoradiation with high-dose cisplatin  · On Sorafenib from 10/18-3/22  · Continue outpatient follow-up    Gastroesophageal reflux disease with esophagitis  Assessment & Plan  Continue Protonix            Family:  Called significant other Theador Cockayne and gave update    dw pts nurse at bedside  Dw     VTE Pharmacologic Prophylaxis: Heparin  VTE Mechanical Prophylaxis: sequential compression device        Certification Statement: The patient will continue to require additional inpatient hospital stay due to need for further acute intervention for malnutrition    Status: inpatient     =========================================================    Subjective:  Pt denies any pain anywhere at this time  Notes some discomfort from the PE tube  Denies any sob/cough  Denies any n/v/d  Notes when he eats, it refluxes back up  Denies any dizziness, lightheadedness      Physical Exam:   Temp:  [97 3 °F (36 3 °C)-98 2 °F (36 8 °C)] 97 3 °F (36 3 °C)  HR:  [62-73] 65  Resp:  [16-20] 16  BP: ()/(61-87) 103/68    Gen:  Pleasant, non-tachypnic, non-dyspnic  Conversant  Heart: regular rate and rhythm, S1S2 present, no murmur, rub or gallop  Lungs: clear to ausculatation bilaterally  No wheezing, crackles, or rhonchi  No accessory muscle use or respiratory distress  Abd: soft, non-tender, non-distended  NABS, no guarding, rebound or peritoneal signs  Extremities: no clubbing, cyanosis or edema  2+pedal pulses bilaterally  Full range of motion  Neuro: awake, alert  Fluent speech  Skin: warm and dry: no petechiae, purpura and rash      LABS:   Results from last 7 days   Lab Units 05/23/22  0932 05/22/22  1337 05/21/22  0540 05/20/22  0216   WBC Thousand/uL 7 61  --  2 68* 1 57*   HEMOGLOBIN g/dL 11 7*  --  11 4* 10 8*   HEMATOCRIT % 35 5*  --  34 5* 31 5*   PLATELETS Thousands/uL 208 206 154 151     Results from last 7 days   Lab Units 05/23/22  0542 05/22/22  0613 05/21/22  0540   POTASSIUM mmol/L 3 2* 3 4* 3 3*   CHLORIDE mmol/L 108 105 102   CO2 mmol/L 26 26 28   BUN mg/dL 9 10 12   CREATININE mg/dL 0 71 0 86 0 76   CALCIUM mg/dL 7 2* 7 1* 7 2* Hospital Data:  5/21 MRI abdomen: In response to the clinical question, no evidence for omental carcinomatosis  Hepatic cirrhosis  Dominant tumor mass in the posterior dome of the right hepatic lobe appears slightly smaller when compared across modalities to February 23, 2022  Interval development of a small volume of upper abdominal ascites  Bulky upper retroperitoneal, gastrohepatic ligament, periportal, and periceliac bina tumor has significantly progressed when compared to February 23, 2022  Slight progression of osseous metastatic disease as described    5/20: CT chest   1  Stable subcentimeter pulmonary nodules  The largest measures 5 mm within the lingula  No new or enlarging pulmonary nodules  2   Peripherally sclerotic lytic focus within rib 4 on the right measuring 8 mm  A questionable lucency is barely perceptible on prior exam of July 2021  Finding is indeterminate but may represent a treated metastatic lesion  There is an additional   subcentimeter lucency within the posterior aspect of rib 7 on the left which is new from prior exams and suspicious for metastasis  3   Cirrhotic liver morphology with trace perihepatic ascites  There is an ill-defined hypodensity within segment 7 corresponding to patient's known liver lesion  Lesion is suboptimally evaluated on this noncontrast study  4   Partially visualized soft tissue densities along the gastrohepatic ligament corresponding to gastrohepatic lymphadenopathy seen on prior exams  Visualization is limited by streak artifact from ingested barium  5   Wall thickening involving the mid to distal esophagus which may be due to underdistention versus nonspecific esophagitis  Procedure  5/23 IR: G tube placement      ---------------------------------------------------------------------------------------------------------------  This note has been constructed using a voice recognition system

## 2022-05-23 NOTE — ASSESSMENT & PLAN NOTE
· Patient has a history of hepatocellular carcinoma, followed by Dr Zeinab Ghosh  · Developed  bulky abdominal adenopathy in January 2022 which was confirmed to be poorly differentiated adenocarcinoma, 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  · Pathology confirmed well-differentiated hepatocellular carcinoma  · Patient was started on chemotherapy with FOLFIRINOX, but did not tolerated well due to weight loss and poor performance status:  Chemotherapy postponed until 06/08/2022  · CT chest and MRI abd/pelvis: Dominant tumor mass in the posterior dome of the right hepatic lobe appears slightly smaller when compared across modalities to February 23, 2022  Interval development of a small volume of upper abdominal ascites  Bulky upper retroperitoneal, gastrohepatic ligament, periportal, and periceliac bina tumor has significantly progressed when compared to February 23, 2022  Slight progression of osseous metastatic disease as described    · Outpatient Oncology follow-up

## 2022-05-23 NOTE — ASSESSMENT & PLAN NOTE
· Patient has a history of squamous cell carcinoma of oropharynx, stage IV A, with ipsilateral cervical lymph node metastasis treated by concurrent chemoradiation with high-dose cisplatin  · On Sorafenib from 10/18-3/22  · Continue outpatient follow-up

## 2022-05-23 NOTE — PROGRESS NOTES
Progress Note - Palliative & Supportive Care  Ramirezt Rom  79 y o   male  [address] [address] /E2 [place] 46-*   MRN: [id number]  Encounter: [id number]     ASSESSMENT:    Patient Active Problem List   Diagnosis    Diabetes mellitus, type II ([facility] )    Erectile dysfunction    Gastroesophageal reflux disease with esophagitis    H/O laryngeal cancer    Hepatic cirrhosis (HCC)    Herpes simplex infection    Hepatocellular carcinoma ([facility] )    Hypertension    Hypothyroidism    Secondary esophageal varices without bleeding ([facility] )    Colon cancer screening declined    Platelets decreased ([facility] )    Sciatica of left side    Low back pain    COVID-19    History of 2019 novel coronavirus disease (COVID-19)    Severe protein-calorie malnutrition ([facility] )    Malignant neoplasm of body of pancreas ([facility] )    Chemotherapy induced neutropenia ([facility] )    Cancer related pain    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)    Pain of upper abdomen    Drug induced constipation    Counseling regarding advanced care planning and goals of care    Port-A-Cath in place    Neoplastic malignant related fatigue    Cancer cachexia ([facility] )    Dysgeusia    Poor appetite    Nausea & vomiting    Dysphagia    Loose stools       Active problems addressed:  Pancreatic adenocarcinoma  Cancer associated pain  Opioid dependence, not complicated  Nausea and vomiting  Diarrhea  Cancer related fatigue  Severe protein calorie malnutrition  Palliative care patient  Goals of care    PLAN:    1  Symptom management:   MAR reviewed - no use of IV morphine o/n  Pain regimen still helpful per patient   Continue home regimen - MSER 30mg PO q8H ATC and MSIR 30mg PO q4H prn for now  Use IV morphine prn for BT pain   Bowel regimen prn    2  Goals:    Treatment-focused  Hoping to get enough energy from tube feedings to continue with chemo   He has an OP onc appt on 6/3   Support provided to significant other/Abril on the phone today   Patient already has an OP f/u with me on 6/13  We will keep this appt    Code status: Level 1 - Full Code   Decisional apparatus:  Patient does have capacity to make medical decisions on my exam today  If such capacity is lost, patient's substitute decision maker would default to sig other/Abril via 5 Wishes by [place]  Advance Directive / Living Will / POLST:  5 Wishes completed 5/5/2022  3  Prognosis: guarded    We appreciate the opportunity to participate in this patient's care  We will continue to follow  Please do not hesitate to contact our on-call provider through our clinic answering service at [phone] should you have acute symptom control concerns  INTERVAL HISTORY:  Chart reviewed  No acute events overnight  MAR reviewed  Patient seen in the am today appears comfortable, pain is well-managed with current regimen, requests no change  Frustrated with his current medical issues and is eagerly awaiting PEG placement  Annoyed that he has not gone out of bed yet and exercising more  Also annoyed that he was NPO for all this time  Review of Systems   Constitutional: Positive for activity change, appetite change, fatigue and unexpected weight change  HENT: Positive for trouble swallowing  Respiratory: Negative for shortness of breath  Cardiovascular: Negative for chest pain  Gastrointestinal: Positive for abdominal pain  Negative for constipation, diarrhea, nausea and vomiting  Neurological: Negative for weakness  Psychiatric/Behavioral: Negative for sleep disturbance  The patient is not nervous/anxious  All other systems reviewed and are negative        MEDICATIONS / ALLERGIES:  all current active meds have been reviewed    No Known Allergies    OBJECTIVE:  /68 (BP Location: Right arm)   Pulse 65   Temp (!) 97 3 °F (36 3 °C) (Oral)   Resp 16   Ht 5' 8" (1 727 m)   Wt 50 1 kg (110 lb 7 2 oz)   SpO2 94%   BMI 16 79 kg/m²   Physical Exam:    Physical Exam  Constitutional:       General: He is not in acute distress  Appearance: He is ill-appearing  He is not toxic-appearing or diaphoretic  Comments: cachectic   HENT:      Head:      Comments: Temporal periorbital and submaxillary mm wasting  Eyes:      General: No scleral icterus  Pulmonary:      Effort: Pulmonary effort is normal  No respiratory distress  Abdominal:      General: Abdomen is flat  There is no distension  Skin:     Coloration: Skin is pale  Skin is not jaundiced  Comments: ashen   Neurological:      Mental Status: He is alert and oriented to person, place, and time  Psychiatric:         Mood and Affect: Mood normal          Behavior: Behavior normal          Thought Content: Thought content normal          Judgment: Judgment normal          Lab Results:   I have personally reviewed pertinent labs  Imaging Studies: I have personally reviewed pertinent reports  EKG, Pathology, and Other Studies: I have personally reviewed pertinent reports  Counseling / Coordination of Care  Total floor / unit time spent today 25 minutes  Greater than 50% of total time was spent with the patient and / or family counseling and / or coordination of care  A description of the counseling / coordination of care: provided medical updates, discussed palliative care, determined competency, determined goals of care, determined POA, determined social/family support, discussed plans of care, discussed symptom management, provided psychosocial support      Le Beckman MD  [address] and Supportive Care  [phone]

## 2022-05-23 NOTE — ASSESSMENT & PLAN NOTE
· History of hep C with alcoholic liver cirrhosis  LFTs are within normal limits  · MRI abdomen with without contrast showed dominant tumor mass in the posterior dome of the right hepatic lobe appears slightly smaller when compared across modalities to February 23, 2022  Interval development of a small volume of upper abdominal ascites

## 2022-05-23 NOTE — ANESTHESIA POSTPROCEDURE EVALUATION
Post-Op Assessment Note    CV Status:  Stable    Pain management: adequate     Mental Status:  Alert and awake   Hydration Status:  Euvolemic   PONV Controlled:  Controlled   Airway Patency:  Patent      Post Op Vitals Reviewed: Yes            No complications documented      BP 98/61 (05/23/22 1423)    Temp      Pulse 64 (05/23/22 1423)   Resp 18 (05/23/22 1423)    SpO2 98 % (05/23/22 1423)    BP 98/61   Pulse 64   Temp 98 2 °F (36 8 °C) (Temporal)   Resp 18   Ht 5' 8" (1 727 m)   Wt 50 1 kg (110 lb 7 2 oz)   SpO2 98%   BMI 16 79 kg/m²
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
## 2022-05-23 NOTE — ANESTHESIA PREPROCEDURE EVALUATION
Procedure:  IR GASTROSTOMY TUBE PLACEMENT    Relevant Problems   CARDIO   (+) Hypertension      ENDO   (+) Diabetes mellitus, type II (HCC)   (+) Hypothyroidism      GI/HEPATIC   (+) Dysphagia   (+) Gastroesophageal reflux disease with esophagitis   (+) Hepatic cirrhosis (HCC)   (+) Hepatocellular carcinoma (HCC)   (+) Malignant neoplasm of body of pancreas (HCC)      HEMATOLOGY   (+) Platelets decreased (HCC)      MUSCULOSKELETAL   (+) Low back pain   (+) Sciatica of left side      NEURO/PSYCH   (+) H/O laryngeal cancer   (+) History of 2019 novel coronavirus disease (COVID-19)      Other   (+) Port-A-Cath in place   (+) 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)        Physical Exam    Airway    Mallampati score: II  TM Distance: >3 FB  Neck ROM: full     Dental   Comment: edentulous,     Cardiovascular  Rhythm: regular, Rate: normal, Cardiovascular exam normal    Pulmonary  Pulmonary exam normal Breath sounds clear to auscultation,     Other Findings        Anesthesia Plan  ASA Score- 4     Anesthesia Type- general with ASA Monitors  Additional Monitors:   Airway Plan:           Plan Factors-    Chart reviewed  Imaging results reviewed  Existing labs reviewed  Patient summary reviewed  Induction-     Postoperative Plan-     Informed Consent- Anesthetic plan and risks discussed with patient

## 2022-05-23 NOTE — BRIEF OP NOTE (RAD/CATH)
INTERVENTIONAL RADIOLOGY PROCEDURE NOTE    Date: 5/23/2022    Procedure: IR GASTROSTOMY TUBE PLACEMENT    Preoperative diagnosis:   1  Weakness    2  Hypokalemia    3  Malignant neoplasm of body of pancreas ([facility] )    4  Secondary malignant neoplasm of retroperitoneal lymph nodes ([facility] )    5  Cancer related pain    6  Malignant neoplasm of pancreas, unspecified location of malignancy ([facility] )    7  Fatigue    8  Chemotherapy induced neutropenia (HCC)    9  Elevated TSH    10  Oropharyngeal dysphagia         Postoperative diagnosis: Same  Surgeon: Mellisa Vergara MD     Assistant: None  No qualified resident was available  Blood loss:  Minimal    Specimens:  None     Findings:  25 Japanese percutaneous gastrostomy tube placed with image guidance  Okay to use tube immediately for tube feeds  Complications: None immediate      Anesthesia: MAC sedation

## 2022-05-23 NOTE — PROGRESS NOTES
H&P reviewed  There have been no interval changes since the time the H&P was written  /87 (BP Location: Left arm)   Pulse 73   Temp (!) 97 3 °F (36 3 °C) (Temporal)   Resp 18   Ht 5' 8" (1 727 m)   Wt 50 1 kg (110 lb 7 2 oz)   SpO2 98%   BMI 16 79 kg/m²     Prior imaging was reviewed  Admitted with failure to thrive and dysphagia with history of oral pharyngeal carcinoma  Referred for percutaneous gastrostomy tube placement  Procedure discussed and questions answered  He had a G-tube in the past though does not recall details  I am unable to find any records of placement though reviewed a CT from 2018  There was no G-tube in place at that time but a residual tract could be seen to the skin  Informed written consent was obtained      Eboni Muñoz MD

## 2022-05-23 NOTE — ASSESSMENT & PLAN NOTE
BMI of 16 79 on admission, patient with poor p o  Intake, decreased energy, worsening generalized weakness  Patient with severe protein calorie malnutrition due to poor p o   Intake due to malignancy, decreased appetite  · History dysphagia, reports poor appetite, weight loss, having difficulty swallowing food, spits food and fluids back up  · Continue Decadron 0 5 mg to stimulate appetite  · Speech evaluated, recommend pureed diet with thin liquids for ease of swallowing (diet changed to allow rice pudding, at patient's request)  · GI consulted regarding PEG tube, patient not likely a candidate due to hx of radiation and risk of bleeding via EGD  · IR place G-tube 5/20

## 2022-05-23 NOTE — ASSESSMENT & PLAN NOTE
Patient with previous history of essential hypertension  Home medications include Cardizem  mg daily and nadolol 40 mg daily  Patient with low-normal blood pressure  He notes difficulty swallowing the Cardizem CD:  Will hold

## 2022-05-23 NOTE — ASSESSMENT & PLAN NOTE
· Patient has history of chronic pain secondary to cancer with continuous opioid dependence  · Followed outpatient by palliative care, maintained on morphine ER 30mg BID and MSIR 30mg PO q4H prn  · Verified on PDMP  · Appreciate Palliative care team evaluation

## 2022-05-23 NOTE — CASE MANAGEMENT
Case Management Assessment & Discharge Planning Note    Patient name Carol Davis  Location [facility] /E2 -* MRN [id number]  : 1955 Date 2022       Current Admission Date: 2022  Current Admission Diagnosis:Severe protein-calorie malnutrition [facility])   Patient Active Problem List    Diagnosis Date Noted    Loose stools 2022    Dysphagia 2022    Nausea & vomiting 05/10/2022    Port-A-Cath in place 2022    Neoplastic malignant related fatigue 2022    Cancer cachexia ([facility] ) 2022    Dysgeusia 2022    Poor appetite 2022    Cancer related pain 2022    Secondary malignant neoplasm of retroperitoneal lymph nodes ([facility] ) 2022    Pain of upper abdomen 2022    Drug induced constipation 2022    Counseling regarding advanced care planning and goals of care 2022    Malignant neoplasm of body of pancreas ([facility] ) 2022    Chemotherapy induced neutropenia ([facility] ) 2022    Severe protein-calorie malnutrition ([facility] ) 2022    History of 2019 novel coronavirus disease (COVID-19) 2022    COVID-19 12/10/2021    Sciatica of left side 10/13/2021    Platelets decreased ([facility] ) 09/10/2021    Colon cancer screening declined 2021    Secondary esophageal varices without bleeding ([facility] ) 2018    Diabetes mellitus, type II ([facility] ) 2018    H/O laryngeal cancer 2018    Hypothyroidism 2018    Erectile dysfunction 2017    Low back pain 2016    Hepatocellular carcinoma ([facility] ) 10/31/2014    Gastroesophageal reflux disease with esophagitis 2014    Hepatic cirrhosis ([facility] ) 2014    Herpes simplex infection 2013    Hypertension 2012      LOS (days): 3  Geometric Mean LOS (GMLOS) (days): 2 60  Days to GMLOS:-0 7     OBJECTIVE:    Risk of Unplanned Readmission Score: 31 38         Current admission status: Inpatient       Preferred Pharmacy:   [facility] [address] [place], [place] - [address] ,  [address] ,  [place] [address]  Phone: [phone] Fax: [phone] [facility], [address]  [address] [facility]  [address]  [facility] [address]  Phone: [phone] Fax: [phone], [address] [facility] [address] [address]  Phone: [phone] Fax: [phone]    Primary Care Provider: PRINCE Rutledge    Primary Insurance: [facility] DUAL COMPLETE [facility] REP  Secondary Insurance: [id number] [facility]    ASSESSMENT:  [facility], [address] [place] [address] Representative - Significant Other   Primary Phone: [phone] (Mobile)               Advance Directives  Does patient have a [facility]?: Yes  Does patient have Advance Directives?: Yes  Advance Directives: Living will, Power of  for health care  Primary Contact: Daphne Sow (significant other) [phone]              Patient Information  Admitted from[de-identified] Home  Mental Status: Alert  During Assessment patient was accompanied by: Not accompanied during assessment  Assessment information provided by[de-identified] Patient  Primary Caregiver: Self  Support Systems: Spouse/significant other, [facility] of Residence: [address] [place] [address] do you live in?: [place] [address] entry access options   Select all that apply : Stairs  Number of steps to enter home : 3  Do the steps have railings?: Yes  Type of Current Residence: [address] [place] home  Upon entering residence, is there a bedroom on the main floor (no further steps)?: No  A bedroom is located on the following floor levels of residence (select all that apply):: 2nd Floor  Upon entering residence, is there a bathroom on the main floor (no further steps)?: Yes  Number of steps to 2nd floor from main floor: One Flight  In the last 12 months, was there a time when you were not able to pay the mortgage or rent on time?: No  In the last 12 months, how many places have you lived?: 1  In the last 12 months, was there a time when you did not have a steady place to sleep or slept in a shelter (including now)?: No  Homeless/housing insecurity resource given?: N/A  Living Arrangements: Lives w/ Spouse/significant other  Is patient a ?: No    Activities of Daily Living Prior to Admission  Functional Status: Independent  Completes ADLs independently?: Yes  Ambulates independently?: Yes  Does patient use assisted devices?: No  Does patient currently own DME?: No  Does patient have a history of Outpatient Therapy (PT/OT)?: No  Does the patient have a history of Short-Term Rehab?: No  Does patient have a history of HHC?: No  Does patient currently have [employer]?: No         Patient Information Continued  Income Source: SSI/SSD  Does patient have prescription coverage?: Yes  Within the past 12 months, you worried that your food would run out before you got the money to buy more : Never true  Within the past 12 months, the food you bought just didn't last and you didn't have money to get more : Never true  Food insecurity resource given?: N/A  Does patient receive dialysis treatments?: No  Does patient have a history of substance abuse?: No  Does patient have a history of Mental Health Diagnosis?: No         Means of Transportation  Means of Transport to Appts[de-identified] Drives Self  In the past 12 months, has lack of transportation kept you from medical appointments or from getting medications?: No  In the past 12 months, has lack of transportation kept you from meetings, work, or from getting things needed for daily living?: No  Was application for public transport provided?: N/A        DISCHARGE DETAILS:    Discharge planning discussed with[de-identified] patient & SO        CM contacted family/caregiver?: Yes     Did patient/caregiver verbalize understanding of patient care needs?: Yes       Contacts  Patient Contacts: Daphne Sow (SO)  Relationship to Patient[de-identified] Other (Comment) (fiance)  Contact Method: Phone  Phone Number: [phone]  Reason/Outcome: Continuity of Care, Emergency Contact, Discharge [address]         Is the patient interested in [facility] at discharge?: Yes  [address] requested[de-identified] Physical Therapy, Occupational Therapy, [address] Name[de-identified] Other  [facility] Provider[de-identified] PCP  Home Health Services Needed[de-identified] Evaluate Functional Status and Safety, Gait/ADL Training, Strengthening/Theraputic Exercises to Improve Function, Other (comment) (PEG tube care)  Homebound Criteria Met[de-identified] Requires the Assistance of Another Person for Safe Ambulation or to Leave the Home, Immunosuppressed  Supporting Clincal Findings[de-identified] Limited Endurance, Fatigues Easliy in [employer]                   Treatment Team Recommendation: Home with [employer]  Discharge Destination Plan[de-identified] Home with [employer]                                         Additional Comments: CM met w/ pt at bedside to complete assessment  Pt lives in a 2 story home w/ fiance that has 3 steps to enter w/ railings and a flight of stairs inside to the 2nd floor  Pt was completley independent before entering SLA  Pt drives on his own  Pt is retired and recieves SSI & disability  His fiance is his [address], he also has a living will & AD on file  Pt requested I call konrad to "check in"  CM called konrad to update her on pt current status, PEG tube placement and d/c planning needs  CM to continue following patient care & d/c planning
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
## 2022-05-24 NOTE — PROGRESS NOTES
[address] [facility]  Progress Note - Luis Enrique Martini 1955, 79 y o  male MRN: [id number]  Unit/Bed#: E2 -01 Encounter: [id number]  Primary Care Provider: Marc Ga   Date and time admitted to hospital: 5/20/2022 12:59 AM    * Severe protein-calorie malnutrition [facility])  Assessment & Plan  Patient is a 40-year-old male with history of hepatocellular carcinoma, recently receiving chemotherapy, who presented to the ER with with poor p o  intake, decreased energy, worsening generalized weakness, weight loss and cachexia    Patient with severe protein calorie malnutrition due to poor p o  Intake due to malignancy, decreased appetite  BMI 16  · History dysphagia, reports poor appetite, weight loss, having difficulty swallowing food, , and notes any food or medications he eats, are quickly vomited back out  · Speech therapy team evaluated patient:  Based on PBS recommend pureed diet with honey thickened liquid  · GI consulted regarding PEG tube, patient not a candidate for PEG tube due to hx of radiation and risk of bleeding via EGD  · IR placed G-tube 5/20  · PEG tube feedings were placed:  Appreciate dietitian's recommendations:  · Patient currently on Jevity 1 2 at 20 cc/hour:  If patient tolerates this will advance by 10 mL q 8 hours to a goal of 60 mL   Water flush 100 q 4 hours  · Patient wishes to be discharged on bolus feeding, which she used in the past:  Dietitian recommends 1 can 6 times a day to meet nutritional needs  · Patient with diarrhea, monitor for refeeding syndrome    Loose stools  Assessment & Plan  · Patient was noted to have loose stools after admission  · Had initially improved however worsened after initiation of tube feeding  · Monitor closely for refeeding syndrome  · Start probiotic    Hypokalemia  Assessment & Plan  · Replete potassium, magnesium and phosphorus  · Monitor for refeeding syndrome since tube feedings have been started    Dysphagia  Assessment & Plan  · Patient was diagnosed with Dysphagia in setting of history of laryngeal cancer with previous radiation therapy  · CT chest shows mild esophageal thickening, possible esophagitis  · Continue Pepcid 20 mg b i d  Via PEG 2  · Patient was evaluated by speech therapy:  Patient underwent VBS:  Current recommendations for pureed diet with honey thick liquids    Cancer related pain  Assessment & Plan  · Patient has history of chronic pain secondary to cancer with continuous opioid dependence  · Followed outpatient by palliative care, maintained on morphine ER 30mg BID and MSIR 30mg PO q4H prn  · Verified on PDMP  · Appreciate Palliative care team evaluation  · Patient vomiting up his oral pills, no reliable absorption:  Patient was changed to morphine immediate release solution 30 mg q 8 hours scheduled for basal pain control, plus morphine or oral solution 30 mg q 4 hours p r n  Breakthrough pain  · Patient not a candidate for fentanyl transdermal patch due to cachexia    Hepatocellular carcinoma [facility])  Assessment & Plan  · Patient has a history of hepatocellular carcinoma, followed by Dr Lynne Park  · Developed  bulky abdominal adenopathy in January 2022 which was confirmed to be poorly differentiated adenocarcinoma, 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  · Pathology confirmed well-differentiated hepatocellular carcinoma  · Patient was started on chemotherapy with FOLFIRINOX, but did not tolerated well due to weight loss and poor performance status:  Chemotherapy postponed until 06/08/2022  · CT chest and MRI abd/pelvis: Dominant tumor mass in the posterior dome of the right hepatic lobe appears slightly smaller when compared across modalities to February 23, 2022  Interval development of a small volume of upper abdominal ascites  Bulky upper retroperitoneal, gastrohepatic ligament, periportal, and periceliac bina tumor has significantly progressed when compared to February 23, 2022    Slight progression of osseous metastatic disease as described  · Outpatient Oncology follow-up    Hypothyroidism  Assessment & Plan  Continue Synthroid    Hypertension  Assessment & Plan  Patient with previous history of essential hypertension  Home medications include Cardizem  mg daily and nadolol 40 mg daily  Patient with low-normal blood pressure  He notes difficulty swallowing the Cardizem CD:  Will hold  Continue nadolol 40 mg daily  Continue monitor blood pressure and titrate as needed    Hepatic cirrhosis (HCC)  Assessment & Plan  · History of hep C with alcoholic liver cirrhosis  LFTs are within normal limits  · MRI abdomen with without contrast showed dominant tumor mass in the posterior dome of the right hepatic lobe appears slightly smaller when compared across modalities to February 23, 2022  Interval development of a small volume of upper abdominal ascites  H/O laryngeal cancer  Assessment & Plan  · Patient has a history of squamous cell carcinoma of oropharynx, stage IV A, with ipsilateral cervical lymph node metastasis treated by concurrent chemoradiation with high-dose cisplatin  · On Sorafenib from 10/18-3/22  · Continue outpatient follow-up    Gastroesophageal reflux disease with esophagitis  Assessment & Plan  Continue Pepcid via PEG tube b i d              Family:  Called significant other Luana Iván and gave update    dw pts nurse  latonya   latonya Truong    VTE Pharmacologic Prophylaxis: Enoxaparin (Lovenox)-will switch from heparin due to once daily dosing  VTE Mechanical Prophylaxis: sequential compression device        Certification Statement: The patient will continue to require additional inpatient hospital stay due to need for further acute intervention for starting tube feedings, evaluate refeeding syndrome,    Status: inpatient     ===================================================================    Subjective:  Patient relates anything he eats, or any pills that he takes usually immediately come right back up  He notes feeling exhausted, and tired  He had nausea earlier this morning which has resolved  He denies any chest pain  He denies any shortness of breath, chest congestion or cough  He notes tenderness around the PEG tube site  Denies any other pain or discomfort  Denies any current nausea  Notes he continues to have loose bowels  Physical Exam:   Temp:  [97 3 °F (36 3 °C)-98 2 °F (36 8 °C)] 97 8 °F (36 6 °C)  HR:  [62-76] 76  Resp:  [12-20] 20  BP: ()/(61-87) 122/77    Gen:  Pleasant, non-tachypnic, non-dyspnic  Conversant  Cachectic  Heart: regular rate and rhythm, S1S2 present, no murmur, rub or gallop  Lungs: clear to ausculatation bilaterally  No wheezing, crackles, or rhonchi  No accessory muscle use or respiratory distress  Abd: soft, non-tender with palpation, non-distended  NABS, no guarding, rebound or peritoneal signs  Extremities: no clubbing, cyanosis or edema  2+pedal pulses bilaterally  Full range of motion  Neuro: awake, alert  Fluent speech  Strength globally intact  Skin: warm and dry: no petechiae, purpura and rash  LABS:   Results from last 7 days   Lab Units 05/24/22  0641 05/23/22  0932 05/22/22  1337 05/21/22  0540   WBC Thousand/uL 9 13 7 61  --  2 68*   HEMOGLOBIN g/dL 11 0* 11 7*  --  11 4*   HEMATOCRIT % 33 6* 35 5*  --  34 5*   PLATELETS Thousands/uL 193 208 206 154     Results from last 7 days   Lab Units 05/24/22  0641 05/23/22  0542 05/22/22  0613   POTASSIUM mmol/L 3 0* 3 2* 3 4*   CHLORIDE mmol/L 110* 108 105   CO2 mmol/L 24 26 26   BUN mg/dL 11 9 10   CREATININE mg/dL 0 83 0 71 0 86   CALCIUM mg/dL 7 2* 7 2* 7 [facility]:  5/21 MRI abdomen: In response to the clinical question, no evidence for omental carcinomatosis    Hepatic cirrhosis   Dominant tumor mass in the posterior dome of the right hepatic lobe appears slightly smaller when compared across modalities to February 23, 2022  Scarlet Dimple development of a small volume of upper abdominal ascites  Bulky upper retroperitoneal, gastrohepatic ligament, periportal, and periceliac bina tumor has significantly progressed when compared to February 23, 2022  Slight progression of osseous metastatic disease as described     5/20: CT chest   1   Stable subcentimeter pulmonary nodules   The largest measures 5 mm within the lingula   No new or enlarging pulmonary nodules  2   Peripherally sclerotic lytic focus within rib 4 on the right measuring 8 mm   A questionable lucency is barely perceptible on prior exam of July 2021  Amna Purvis is indeterminate but may represent a treated metastatic lesion  Mehul Salon is an additional   subcentimeter lucency within the posterior aspect of rib 7 on the left which is new from prior exams and suspicious for metastasis  3   Cirrhotic liver morphology with trace perihepatic ascites  Mehul Salon is an ill-defined hypodensity within segment 7 corresponding to patient's known liver lesion  Constantino Luna is suboptimally evaluated on this noncontrast study  4   Partially visualized soft tissue densities along the gastrohepatic ligament corresponding to gastrohepatic lymphadenopathy seen on prior exams   Visualization is limited by streak artifact from ingested barium  5   Wall thickening involving the mid to distal esophagus which may be due to underdistention versus nonspecific esophagitis      Procedure  5/23 IR: G tube placement          ---------------------------------------------------------------------------------------------------------------  This note has been constructed using a voice recognition system

## 2022-05-24 NOTE — PLAN OF CARE
Consult: recommend tube feedings please  PT reported not eating much of anything, when he tries to eat he reported bringing it back up, today had couple bites of oatmeal, drinking sips of water  PT on pureed, HTL, TF Jevrobb [email or web address] via G-tube  Continue to replete K and P  Recommend advancing TF slowly, [email or web address] [email or web address] advance by 10mL every 8hrs to goal of 60mL/hr, water flushes 100mL every 4hrs, provides 1728cal, 79g pro, 1762mL  Can eventually transition to bolus feeds Jevity 1 2 1 can/237mL 6 x day (8am, 11am, 1pm, 3pm, 5pm, 8pm)  Will continue to monitor po intake and tolerance, if po improves can decrease TF or consider nocturnal TF feeds and po during day, for now continue with continuous TF  Problem: Nutrition/Hydration-ADULT  Goal: Nutrient/Hydration intake appropriate for improving, restoring or maintaining nutritional needs  Description: Monitor and assess patient's nutrition/hydration status for malnutrition  Collaborate with interdisciplinary team and initiate plan and interventions as ordered  Monitor patient's weight and dietary intake as ordered or per policy  Utilize nutrition screening tool and intervene as necessary  Determine patient's food preferences and provide high-protein, high-caloric foods as appropriate       INTERVENTIONS:  - Monitor oral intake, urinary output, labs, and treatment plans  - Assess nutrition and hydration status and recommend course of action  - Evaluate amount of meals eaten  - Assist patient with eating if necessary   - Allow adequate time for meals  - Recommend/ encourage appropriate diets, oral nutritional supplements, and vitamin/mineral supplements  - Order, calculate, and assess calorie counts as needed  - GI consult for possible PEG tube  - Assess need for intravenous fluids  - Provide specific nutrition/hydration education as appropriate  - Include patient/family/caregiver in decisions related to nutrition  Outcome: Not Progressing

## 2022-05-24 NOTE — ASSESSMENT & PLAN NOTE
· Replete potassium, magnesium and phosphorus  · Monitor for refeeding syndrome since tube feedings have been started

## 2022-05-24 NOTE — ASSESSMENT & PLAN NOTE
· Patient has history of chronic pain secondary to cancer with continuous opioid dependence  · Followed outpatient by palliative care, maintained on morphine ER 30mg BID and MSIR 30mg PO q4H prn  · Verified on PDMP  · Appreciate Palliative care team evaluation  · Patient vomiting up his oral pills, no reliable absorption:  Patient was changed to morphine immediate release solution 30 mg q 8 hours scheduled for basal pain control, plus morphine or oral solution 30 mg q 4 hours p r n   Breakthrough pain  · Patient not a candidate for fentanyl transdermal patch due to cachexia

## 2022-05-24 NOTE — RESTORATIVE TECHNICIAN NOTE
Restorative Technician Note      Patient Name: [facility]     Restorative Tech Visit Date: 5/24/2022  Note Type: Mobility  Patient Position Upon Consult: Supine  Activity Performed: Ambulated  Assistive Device: Roller walker  Patient Position at End of Consult: Supine;  All needs within reach

## 2022-05-24 NOTE — PLAN OF CARE
Problem: Prexisting or High Potential for Compromised Skin Integrity  Goal: Skin integrity is maintained or improved  Description: INTERVENTIONS:  - Identify patients at risk for skin breakdown  - Assess and monitor skin integrity  - Assess and monitor nutrition and hydration status  - Monitor labs   - Assess for incontinence   - Turn and reposition patient  - Assist with mobility/ambulation  - Relieve pressure over bony prominences  - Avoid friction and shearing  - Provide appropriate hygiene as needed including keeping skin clean and dry  - Evaluate need for skin moisturizer/barrier cream  - Collaborate with interdisciplinary team   - Patient/family teaching  Outcome: Progressing     Problem: PAIN - ADULT  Goal: Verbalizes/displays adequate comfort level or baseline comfort level  Description: Interventions:  - Encourage patient to monitor pain and request assistance  - Assess pain using appropriate pain scale  - Administer analgesics based on type and severity of pain and evaluate response  - Implement non-pharmacological measures as appropriate and evaluate response  - Consider cultural and social influences on pain and pain management  - Notify physician/advanced practitioner if interventions unsuccessful or patient reports new pain  Outcome: Progressing     Problem: SAFETY ADULT  Goal: Patient will remain free of falls  Description: INTERVENTIONS:  - Educate patient/family on patient safety including physical limitations  - Instruct patient to call for assistance with activity   - Consult OT/PT to assist with strengthening/mobility   - Keep Call bell within reach  - Keep bed low and locked with side rails adjusted as appropriate  - Keep care items and personal belongings within reach  - Initiate and maintain comfort rounds  - Make Fall Risk Sign visible to staff  - Offer Toileting every 2 Hours, in advance of need  - Initiate/Maintain bed/chair alarm  - Obtain necessary fall risk management equipment: bed/chair alarm, call bell, walker, bedside commode, urinal, rounds  - Apply yellow socks and bracelet for high fall risk patients  - Consider moving patient to room near nurses station  Outcome: Progressing     Problem: DISCHARGE PLANNING  Goal: Discharge to home or other facility with appropriate resources  Description: INTERVENTIONS:  - Identify barriers to discharge w/patient and caregiver  - Arrange for needed discharge resources  - Identify discharge learning needs (meds, wound care, etc )  - Refer to Case Management Department for coordinating discharge planning if the patient needs post-hospital services based on physician/advanced practitioner order   Outcome: Progressing     Problem: Knowledge Deficit  Goal: Patient/family demonstrates understanding of disease process, treatment plan, medications, and discharge instructions  Description: Complete learning assessment and assess knowledge base    Interventions:  - Provide teaching at level of understanding  - Provide teaching via preferred learning methods  Outcome: Progressing

## 2022-05-24 NOTE — ASSESSMENT & PLAN NOTE
· Patient was noted to have loose stools after admission  · Had initially improved however worsened after initiation of tube feeding  · Monitor closely for refeeding syndrome  · Start probiotic

## 2022-05-24 NOTE — ASSESSMENT & PLAN NOTE
· Patient has a history of hepatocellular carcinoma, followed by Dr Florentin Alvarenga  · Developed  bulky abdominal adenopathy in January 2022 which was confirmed to be poorly differentiated adenocarcinoma, 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  · Pathology confirmed well-differentiated hepatocellular carcinoma  · Patient was started on chemotherapy with FOLFIRINOX, but did not tolerated well due to weight loss and poor performance status:  Chemotherapy postponed until 06/08/2022  · CT chest and MRI abd/pelvis: Dominant tumor mass in the posterior dome of the right hepatic lobe appears slightly smaller when compared across modalities to February 23, 2022  Interval development of a small volume of upper abdominal ascites  Bulky upper retroperitoneal, gastrohepatic ligament, periportal, and periceliac bina tumor has significantly progressed when compared to February 23, 2022  Slight progression of osseous metastatic disease as described    · Outpatient Oncology follow-up

## 2022-05-24 NOTE — ASSESSMENT & PLAN NOTE
Patient is a 78-year-old male with history of hepatocellular carcinoma, recently receiving chemotherapy, who presented to the ER with with poor p o  intake, decreased energy, worsening generalized weakness, weight loss and cachexia    Patient with severe protein calorie malnutrition due to poor p o  Intake due to malignancy, decreased appetite  BMI 16  · History dysphagia, reports poor appetite, weight loss, having difficulty swallowing food, , and notes any food or medications he eats, are quickly vomited back out  · Speech therapy team evaluated patient:  Based on PBS recommend pureed diet with honey thickened liquid  · GI consulted regarding PEG tube, patient not a candidate for PEG tube due to hx of radiation and risk of bleeding via EGD  · IR placed G-tube 5/20  · PEG tube feedings were placed:  Appreciate dietitian's recommendations:  · Patient currently on Jevity 1 2 at 20 cc/hour:  If patient tolerates this will advance by 10 mL q 8 hours to a goal of 60 mL   Water flush 100 q 4 hours  · Patient wishes to be discharged on bolus feeding, which she used in the past:  Dietitian recommends 1 can 6 times a day to meet nutritional needs  · Patient with diarrhea, monitor for refeeding syndrome

## 2022-05-24 NOTE — PROGRESS NOTES
Progress Note - Palliative & Supportive Care  Rosa Isela Stain  79 y o   male  Pedro Dan 2 /E2 MS 46-*   MRN: [id number]  Encounter: [id number]     ASSESSMENT:    Patient Active Problem List   Diagnosis    Diabetes mellitus, type II ([facility] )    Erectile dysfunction    Gastroesophageal reflux disease with esophagitis    H/O laryngeal cancer    [facility]    Herpes simplex infection    Hypertension    Hypothyroidism    Secondary esophageal varices without bleeding ([facility] )    Colon cancer screening declined    Platelets decreased ([place] [address] )    Sciatica of left side    Low back pain    COVID-19    History of 2019 novel coronavirus disease (COVID-19)    Severe protein-calorie malnutrition ([facility] )    Hepatocellular carcinoma ([place] [address] )    Chemotherapy induced neutropenia ([place] [address] )    Cancer related pain    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)    Pain of upper abdomen    Drug induced constipation    Counseling regarding advanced care planning and goals of care    Port-A-Cath in place    Neoplastic malignant related fatigue    Cancer cachexia ([place] [address] )    Dysgeusia    Poor appetite    Nausea & vomiting    Dysphagia    Loose stools       Active problems addressed:  Pancreatic adenocarcinoma  Cancer associated pain  Opioid dependence, not complicated  Nausea and vomiting  Diarrhea  Cancer related fatigue  Severe protein calorie malnutrition  Palliative care patient  Goals of care    PLAN:    1  Symptom management:   Stop MSER and MSIR tabs due to dysphagia and throwing up   Start MSIR liquid 30mg q8H ATC with hold parameters    MSIR liquid 30mg q4H prn severe pain with hold parameters    Morphine 8mg IV q4H pnr BT pain (linked with severe pain) with hold parameters    Narcan prn   D/w Dr Pj Ward    I sent Morphine oral concentrate to [facility] to begin process of prior authorizarion (if needed) and to check availability in anticipation of dispo soon      2  Goals:  Treatment-focused  Hoping to get enough energy from tube feedings to continue with chemo  He has an OP onc appt on 6/3   Support provided to significant other/Abril on the phone today and discussion of above medication changes  Long discussion with her today over the phone and I read to her what oncology gathered from Rowena Birch on Friday 5/20 --> "He expressed that he was getting really tired, but needs validation from [address] [place] [address] Road, his fiance, to help direct his decision for future treatments "  I expressed my concern about his overall condition currently and his ability to tolerate future treatments  I also expressed to her that Rowena Birch may be looking to her for guidance and needs her blessing  I encouraged her to speak with him more and determine together what they want to do as a couple  [address] started to become tearful on the phone and admitted that this is a difficult situation  She had to decide for her father as well and she expressed her regret for accepting hospice too early for him  Regardless of their decision, we are here to support them and make sure that we do out best to get him where he wants to be - whether its on hospice or with further cancer treatments   Patient already has an OP f/u with me on 6/13  We will keep this appt    Code status: Level 1 - Full Code   Decisional apparatus:  Patient does have capacity to make medical decisions on my exam today  If such capacity is lost, patient's substitute decision maker would default to sig other/Abril via 5 Wishes by [place] 169  Advance Directive / Living Will / POLST:  5 Wishes completed 5/5/2022  3  Prognosis: guarded    We appreciate the opportunity to participate in this patient's care  We will continue to follow  Please do not hesitate to contact our on-call provider through our clinic answering service at [phone] should you have acute symptom control concerns  INTERVAL HISTORY:  Chart reviewed  No acute events overnight  MAR reviewed  Received TT from Dr Sebastien Espinoza re: Shyann Steawrt vomiting pills this am and what to do about pain meds  Saw patient who insisted he was able to get the MSER down, and 2 other pills came out (this was later on verified with RN as well)  Regardless, we cannot guarantee that next time his MSER will not come out  For this reason, we will stop the MSER and start scheduled MSIR liquid as outlined above  Due to his own admission that he is confused, I will only order q8H ATC for now and consider q6H ATC tomorrow if appropriate  Prns will remain the same dose, just different formulation (liquid instead of tab)  Reviewed last time administered and adjusted times accordingly  Review of Systems   Constitutional: Positive for activity change, appetite change, fatigue and unexpected weight change  HENT: Positive for trouble swallowing  Respiratory: Negative for shortness of breath  Cardiovascular: Negative for chest pain  Gastrointestinal: Positive for abdominal pain  Negative for constipation, diarrhea, nausea and vomiting  Neurological: Negative for weakness  Psychiatric/Behavioral: Negative for sleep disturbance  The patient is not nervous/anxious  All other systems reviewed and are negative  MEDICATIONS / ALLERGIES:  all current active meds have been reviewed    No Known Allergies    OBJECTIVE:  /77 (BP Location: Right arm)   Pulse 76   Temp 97 8 °F (36 6 °C) (Temporal)   Resp 20   Ht 5' 8" (1 727 m)   Wt 50 1 kg (110 lb 7 2 oz)   SpO2 97%   BMI 16 79 kg/m²   Physical Exam:    Physical Exam  Constitutional:       General: He is not in acute distress  Appearance: He is ill-appearing  He is not toxic-appearing or diaphoretic  Comments: cachectic   HENT:      Head:      Comments: Temporal periorbital and submaxillary mm wasting  Eyes:      General: No scleral icterus  Pulmonary:      Effort: Pulmonary effort is normal  No respiratory distress  Abdominal:      General: Abdomen is flat  There is no distension  Skin:     Coloration: Skin is pale  Skin is not jaundiced  Comments: ashen   Neurological:      Mental Status: He is alert and oriented to person, place, and time  Psychiatric:         Mood and Affect: Mood normal          Behavior: Behavior normal          Thought Content: Thought content normal          Judgment: Judgment normal          Lab Results:   I have personally reviewed pertinent labs  Imaging Studies: I have personally reviewed pertinent reports  EKG, Pathology, and Other Studies: I have personally reviewed pertinent reports  Counseling / Coordination of Care  Total floor / unit time spent today 30 minutes  Greater than 50% of total time was spent with the patient and / or family counseling and / or coordination of care  A description of the counseling / coordination of care: provided medical updates, discussed palliative care and hospice care, determined competency, determined goals of care, determined POA, determined social/family support, discussed plans of care, discussed symptom management, provided psychosocial support      Rosario Garcia MD  [address] and Supportive Care

## 2022-05-24 NOTE — CONSULTS
Consult: recommend tube feedings please  PT reported not eating much of anything, when he tries to eat he reported bringing it back up, today had couple bites of oatmeal, drinking sips of water  PT on pureed, HTL, TF [email or web address] via G-tube  Continue to replete K and P  Recommend advancing TF slowly, [email or web address] advance by 10mL every 8hrs to goal of 60mL/hr, water flushes 100mL every 4hrs, provides 1728cal, 79g pro, 1762mL  Can eventually transition to bolus feeds Jevity 1 2 1 can/237mL 6 x day (8am, 11am, 1pm, 3pm, 5pm, 8pm), water flushes 50mL before and after each bolus will provide 1706cal, 79g pro, 1747mL    Will continue to monitor po intake and tolerance, if po improves can decrease TF or consider nocturnal TF feeds and po during day, for now continue with continuous TF

## 2022-05-24 NOTE — ASSESSMENT & PLAN NOTE
Patient with previous history of essential hypertension  Home medications include Cardizem  mg daily and nadolol 40 mg daily  Patient with low-normal blood pressure  He notes difficulty swallowing the Cardizem CD:  Will hold  Continue nadolol 40 mg daily  Continue monitor blood pressure and titrate as needed

## 2022-05-24 NOTE — ASSESSMENT & PLAN NOTE
· Patient was diagnosed with Dysphagia in setting of history of laryngeal cancer with previous radiation therapy  · CT chest shows mild esophageal thickening, possible esophagitis  · Continue Pepcid 20 mg b i d  Via PEG 2  · Patient was evaluated by speech therapy:  Patient underwent VBS:  Current recommendations for pureed diet with honey thick liquids

## 2022-05-25 NOTE — CASE MANAGEMENT
Case Management Discharge Planning Note    [place] [id number] /E2 -* MRN [id number]  : 1955 Date 2022       Current Admission Date: 2022  Current Admission Diagnosis:Severe protein-calorie malnutrition [facility])   Patient Active Problem List    Diagnosis Date Noted    Loose stools 2022    Dysphagia 2022    Hypokalemia 2022    Nausea & vomiting 05/10/2022    Port-A-Cath in place 2022    Neoplastic malignant related fatigue 2022    Cancer cachexia [facility] ) 2022    Dysgeusia 2022    Poor appetite 2022    Cancer related pain 2022    Secondary malignant neoplasm of retroperitoneal lymph nodes ([facility] ) 2022    Pain of upper abdomen 2022    Drug induced constipation 2022    Counseling regarding advanced care planning and goals of care 2022    Hepatocellular carcinoma ([facility] ) 2022    Chemotherapy induced neutropenia ([facility] ) 2022    Severe protein-calorie malnutrition ([facility] ) 2022    History of 2019 novel coronavirus disease (COVID-19) 2022    COVID-19 12/10/2021    Sciatica of left side 10/13/2021    Platelets decreased ([facility] ) 09/10/2021    Colon cancer screening declined 2021    Secondary esophageal varices without bleeding ([facility] ) 2018    Diabetes mellitus, type II ([facility] ) 2018    H/O laryngeal cancer 2018    Hypothyroidism 2018    Erectile dysfunction 2017    Low back pain 2016    Gastroesophageal reflux disease with esophagitis 2014    Hepatic cirrhosis ([facility] ) 2014    Herpes simplex infection 2013    Hypertension 2012      LOS (days): 5  Geometric Mean LOS (GMLOS) (days): 4 80  Days to GMLOS:-0 7     OBJECTIVE:  Risk of Unplanned Readmission Score: 36 14         Current admission status: Inpatient   Preferred Pharmacy:   [place]  [address] , [address] [facility]  [facility] [address] [place] [address]  Phone: [phone] Fax: [place], [address]  [address] [facility]  [address]  [address] [facility] [address]  Phone: [phone] Fax: [phone] Julio [place], [address] [facility] [address]  Phone: [phone] Fax: [phone]    Primary Care Provider: PRINCE Ortiz    Primary Insurance: [facility] [place] DUAL COMPLETE [address] REP  Secondary Insurance: [facility]    DISCHARGE DETAILS:    Discharge planning discussed with[de-identified] pt & SO  Freedom of Choice: Yes  Comments - Freedom of Choice: Pt & spouse asked for STR recommendations  CM printed out list for pt & SO to look over & decide where they want to send him  CM contacted family/caregiver?: Yes  Were Treatment Team discharge recommendations reviewed with patient/caregiver?: Yes  Did patient/caregiver verbalize understanding of patient care needs?: N/A- going to facility  Were patient/caregiver advised of the risks associated with not following Treatment Team discharge recommendations?: Yes    Contacts  Patient Contacts: Nany Huynh (SO)  Relationship to Patient[de-identified] Other (Comment) (fiance)  Contact Method: In Person  Phone Number: [phone]  Reason/Outcome: Continuity of Care, Emergency Contact, Discharge [address]         Is the patient interested in [address] at discharge?: No  [address] Provider[de-identified] PCP    DME Referral Provided  Referral made for DME?: Yes  DME referral completed for the following items[de-identified] Bedside Commode, Walker  DME Supplier Name[de-identified] [employer]    Other Referral/Resources/Interventions Provided:  Interventions: Short Term Rehab  Referral Comments: Pt & SO requested a list of STR to chose from   Will let CM know where they would like referrals sent out         Treatment Team Recommendation: Short Term Rehab  Discharge Destination Plan[de-identified] Short Term Rehab                                         Additional Comments: CM printed out list of STR for pt and SO to look over per request  Pt & SO both agrees they will discuss options and let me know where they will want to go

## 2022-05-25 NOTE — ASSESSMENT & PLAN NOTE
· Patient has history of chronic pain secondary to cancer with continuous opioid dependence  · Followed outpatient by palliative care, and prior to admission was maintained on morphine ER 30mg BID and MSIR 30mg PO q4H prn  · Patient continues to vomit up his oral pills, no reliable absorption:  Patient was changed to morphine immediate release solution 30 mg q 8 hours scheduled for basal pain control, plus morphine or oral solution 30 mg q 4 hours p r n   Breakthrough pain  · Patient not a candidate for fentanyl transdermal patch due to cachexia  · Pain controlled on this regimen

## 2022-05-25 NOTE — ASSESSMENT & PLAN NOTE
Patient is a 70-year-old male with history of hepatocellular carcinoma, recently receiving chemotherapy, who presented to the ER with with poor p o  intake, decreased energy, worsening generalized weakness, weight loss and cachexia    Patient with severe protein calorie malnutrition due to poor p o  Intake due to malignancy, decreased appetite, and vomiting after eating  BMI 16  · History dysphagia, reports poor appetite, weight loss, having difficulty swallowing food, , and notes any food or medications he eats, are quickly vomited back out  · Speech therapy team evaluated patient:  Based on VBS--> recommend pureed diet with honey thickened liquid  · GI consulted regarding PEG tube, patient not a candidate for PEG tube due to hx of radiation and risk of bleeding via EGD  · IR placed G-tube 5/20  · G tube feedings were ordered  · Patient currently on Jevity 1 2 at 20 cc/hour:   · Has not been advanced as he has not yet tolerating this rate  Tube feedings had to be held for several hours yesterday due to sensation of bloating and nausea  Continue to titrate as tolerated  · Recommendations  If patient tolerates this will advance by 10 mL q 8 hours to a goal of 60 mL   Water flush 100 q 4 hours  · Patient wishes to be discharged on bolus feeding, which he used in the past:  Dietitian recommends 1 can 6 times a day to meet nutritional needs  · Patient with diarrhea, monitor for refeeding syndrome  · Continue to replete electrolytes

## 2022-05-25 NOTE — ASSESSMENT & PLAN NOTE
· Patient was diagnosed with Dysphagia in setting of history of laryngeal cancer with previous radiation therapy  · CT chest shows mild esophageal thickening, possible esophagitis  · Continue Pepcid 20 mg b i d  Via G-tube  · Patient was evaluated by speech therapy:  Patient underwent VBS:  Current recommendations for pureed diet with honey thick liquids, however patient vomits after eating or drinking so he will be maintained on G-tube feeding

## 2022-05-25 NOTE — ASSESSMENT & PLAN NOTE
· Patient was noted to have loose stools after admission  · Had initially improved however worsened after initiation of tube feeding  · Monitor closely for refeeding syndrome  · Will discontinue probiotics, as difficulty placing it through the G-tube

## 2022-05-25 NOTE — ASSESSMENT & PLAN NOTE
· Patient with hypokalemia, hypomagnesemia, hypophosphatemia  · Likely due to severe protein calorie malnutrition  · Monitor for refeeding syndrome since tube feedings have been started

## 2022-05-25 NOTE — PROGRESS NOTES
Progress Note - Palliative & Supportive Care  Noah Shade  79 y o   male  Sarai 2 /E2 MS 46-*   MRN: [id number]  Encounter: [id number]     ASSESSMENT:    Patient Active Problem List   Diagnosis    [facility] mellitus[facility] type II ([facility]    Erectile dysfunction    Gastroesophageal reflux disease with esophagitis    H/O laryngeal cancer    Hepatic cirrhosis ([facility]    Herpes simplex infection    Hypertension    Hypothyroidism    [facility]    Colon cancer screening declined    [facility] ([facility] )    Sciatica of left side    Low back pain    COVID-19    History of 2019 novel coronavirus disease (COVID-19)    Severe protein-calorie malnutrition ([facility] )    Hepatocellular carcinoma [facility]    Chemotherapy induced neutropenia ([facility]    Cancer related pain    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)    Pain of upper abdomen    Drug induced constipation    Counseling regarding advanced care planning and goals of care    Port-A-Cath in place    Neoplastic malignant related fatigue    Cancer cachexia [facility]    Dysgeusia    Poor appetite    Nausea & vomiting    Dysphagia    Hypokalemia    Loose stools       Active problems addressed:  Pancreatic adenocarcinoma  Cancer associated pain  Opioid dependence, not complicated  Nausea and vomiting  Diarrhea  Cancer related fatigue  Severe protein calorie malnutrition  Palliative care patient  Goals of care    PLAN:    1  Symptom management:  MAR: no prn use since starting ATC liquid morphine   Continue MSIR liquid 30mg q8H ATC with hold parameters    MSIR liquid 30mg q4H prn severe pain with hold parameters - none used overnight   Morphine 8mg IV q4H pnr BT pain (linked with severe pain) with hold parameters - none used overnight   Narcan prn    I sent Morphine oral concentrate to [facility] yesterday, no need for PA, ready for     2  Goals:    Treatment-focused   Hoping to get enough energy from tube feedings to continue with chemo  He has an OP onc appt on 6/3   Patient already has an OP f/u with me on 6/13  We will keep this appt    Code status: Level 1 - Full Code   Decisional apparatus:  Patient does have capacity to make medical decisions on my exam today  If such capacity is lost, patient's substitute decision maker would default to sig other/Abril via 5 Wishes by [place] 169  Advance Directive / Living Will / POLST:  5 Wishes completed 5/5/2022  3  Prognosis: guarded    We appreciate the opportunity to participate in this patient's care  We will continue to follow  Please do not hesitate to contact our on-call provider through our clinic answering service at [phone] should you have acute symptom control concerns  INTERVAL HISTORY:  Chart reviewed  No acute events overnight  MAR reviewed  Patient appears comfortable  He reports pain is well controlled with current regimen  Agreed to no changes  Seems in better mood today  Still has episode of vomiting out meds given through PEG, hopefully will get better over time  Denies nausea  He is eager to get up and start doing rehab/PT  Review of Systems   Constitutional: Positive for activity change, appetite change, fatigue and unexpected weight change  HENT: Positive for trouble swallowing  Respiratory: Negative for shortness of breath  Cardiovascular: Negative for chest pain  Gastrointestinal: Positive for abdominal pain  Negative for constipation, diarrhea, nausea and vomiting  Neurological: Negative for weakness  Psychiatric/Behavioral: Negative for sleep disturbance  The patient is not nervous/anxious  All other systems reviewed and are negative        MEDICATIONS / ALLERGIES:  all current active meds have been reviewed    No Known Allergies    OBJECTIVE:  /71 (BP Location: Left arm)   Pulse 65   Temp 98 3 °F (36 8 °C) (Oral)   Resp 18   Ht 5' 8" (1 727 m)   Wt 50 1 kg (110 lb 7 2 oz) SpO2 98%   BMI 16 79 kg/m²   Physical Exam:    Physical Exam  Constitutional:       General: He is not in acute distress  Appearance: He is ill-appearing  He is not toxic-appearing or diaphoretic  Comments: cachectic   HENT:      Head:      Comments: Temporal periorbital and submaxillary mm wasting  Eyes:      General: No scleral icterus  Pulmonary:      Effort: Pulmonary effort is normal  No respiratory distress  Abdominal:      General: Abdomen is flat  There is no distension  Skin:     Coloration: Skin is pale  Skin is not jaundiced  Comments: ashen   Neurological:      Mental Status: He is alert and oriented to person, place, and time  Psychiatric:         Mood and Affect: Mood normal          Behavior: Behavior normal          Thought Content: Thought content normal          Judgment: Judgment normal          Lab Results:   I have personally reviewed pertinent labs  Imaging Studies: I have personally reviewed pertinent reports  EKG, Pathology, and Other Studies: I have personally reviewed pertinent reports  Counseling / Coordination of Care  Total floor / unit time spent today 15 minutes  Greater than 50% of total time was spent with the patient and / or family counseling and / or coordination of care  A description of the counseling / coordination of care: provided medical updates, discussed palliative care and hospice care, determined competency, determined goals of care, determined POA, determined social/family support, discussed plans of care, discussed symptom management, provided psychosocial support      Yenny Chowdhury MD  [address] and Supportive Care

## 2022-05-25 NOTE — CASE MANAGEMENT
Case Management Progress Note    Patient name Julia Medley  Location East 2 /E2 -* MRN [id number]  : 1955 Date 2022       LOS (days): 5  Geometric Mean LOS (GMLOS) (days): 4 80  Days to GMLOS:-0 6        OBJECTIVE:        Current admission status: Inpatient  Preferred Pharmacy:   [address] [facility],  [address] ,  [facility] [address] [facility]  Phone: [phone] Fax: [facility], [address] [place] [address]  [address]  [address] [address]  Phone: [phone] Fax: [phone] [facility], [address] [facility] [address] [address]  Phone: [phone] Fax: [phone]    Primary Care Provider: PRINCE Barrientos    Primary Insurance: [facility] DUAL COMPLETE [facility]OS[facility] REP  Secondary Insurance: [facility] NOTE:    Pt is now requesting to be d/c to a STR facility  Pt & partner wanted a list of facilities to look over and make their own decision  CM gave pt & partner list of agencies in person and they will look it over and let me know where they would like me to send referrals  CM will wait for their choices

## 2022-05-25 NOTE — ASSESSMENT & PLAN NOTE
· Patient has a history of hepatocellular carcinoma, followed by Dr Anaya Miranda  · Developed  bulky abdominal adenopathy in January 2022 which was confirmed to be poorly differentiated adenocarcinoma, 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  · Pathology confirmed well-differentiated hepatocellular carcinoma  · Patient was started on chemotherapy with FOLFIRINOX, but did not tolerated well due to weight loss and poor performance status:  Chemotherapy postponed until 06/08/2022  · CT chest and MRI abd/pelvis: Dominant tumor mass in the posterior dome of the right hepatic lobe appears slightly smaller when compared across modalities to February 23, 2022  Interval development of a small volume of upper abdominal ascites  Bulky upper retroperitoneal, gastrohepatic ligament, periportal, and periceliac bina tumor has significantly progressed when compared to February 23, 2022  Slight progression of osseous metastatic disease as described    · Outpatient Oncology follow-up

## 2022-05-25 NOTE — ASSESSMENT & PLAN NOTE
· Patient with previous history of essential hypertension  · Home medications include Cardizem  mg daily and nadolol 40 mg daily  · Patient with low-normal blood pressure on admission  · Cardizem discontinued  · Continue nadolol 40 mg daily  · Blood pressure adequately controlled  · Continue monitor blood pressure and titrate as needed

## 2022-05-25 NOTE — PROGRESS NOTES
[address] [facility]  Progress Note - Ondina Marley 1955, 79 y o  male MRN: [id number]  Unit/Bed#: E2 -01 Encounter: [id number]  Primary Care Provider: Marc Virk   Date and time admitted to hospital: 5/20/2022 12:59 AM    * Severe protein-calorie malnutrition [facility])  Assessment & Plan  Patient is a 26-year-old male with history of hepatocellular carcinoma, recently receiving chemotherapy, who presented to the ER with with poor p o  intake, decreased energy, worsening generalized weakness, weight loss and cachexia    Patient with severe protein calorie malnutrition due to poor p o  Intake due to malignancy, decreased appetite, and vomiting after eating  BMI 16  · History dysphagia, reports poor appetite, weight loss, having difficulty swallowing food, , and notes any food or medications he eats, are quickly vomited back out  · Speech therapy team evaluated patient:  Based on VBS--> recommend pureed diet with honey thickened liquid  · GI consulted regarding PEG tube, patient not a candidate for PEG tube due to hx of radiation and risk of bleeding via EGD  · IR placed G-tube 5/20  · G tube feedings were ordered  · Patient currently on Jevity 1 2 at 20 cc/hour:   · Has not been advanced as he has not yet tolerating this rate  Tube feedings had to be held for several hours yesterday due to sensation of bloating and nausea  Continue to titrate as tolerated  · Recommendations  If patient tolerates this will advance by 10 mL q 8 hours to a goal of 60 mL   Water flush 100 q 4 hours  · Patient wishes to be discharged on bolus feeding, which he used in the past:  Dietitian recommends 1 can 6 times a day to meet nutritional needs  · Patient with diarrhea, monitor for refeeding syndrome  · Continue to replete electrolytes    Hepatocellular carcinoma ([facility] [address] )  Assessment & Plan  · Patient has a history of hepatocellular carcinoma, followed by Dr Anaya Miranda  · Developed  bulky abdominal adenopathy in January 2022 which was confirmed to be poorly differentiated adenocarcinoma, 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  · Pathology confirmed well-differentiated hepatocellular carcinoma  · Patient was started on chemotherapy with FOLFIRINOX, but did not tolerated well due to weight loss and poor performance status:  Chemotherapy postponed until 06/08/2022  · CT chest and MRI abd/pelvis: Dominant tumor mass in the posterior dome of the right hepatic lobe appears slightly smaller when compared across modalities to February 23, 2022  Interval development of a small volume of upper abdominal ascites  Bulky upper retroperitoneal, gastrohepatic ligament, periportal, and periceliac bina tumor has significantly progressed when compared to February 23, 2022  Slight progression of osseous metastatic disease as described    · Outpatient Oncology follow-up    Loose stools  Assessment & Plan  · Patient was noted to have loose stools after admission  · Had initially improved however worsened after initiation of tube feeding  · Monitor closely for refeeding syndrome  · Will discontinue probiotics, as difficulty placing it through the G-tube    Hypokalemia  Assessment & Plan  · Patient with hypokalemia, hypomagnesemia, hypophosphatemia  · Likely due to severe protein calorie malnutrition  · Monitor for refeeding syndrome since tube feedings have been started    Dysphagia  Assessment & Plan  · Patient was diagnosed with Dysphagia in setting of history of laryngeal cancer with previous radiation therapy  · CT chest shows mild esophageal thickening, possible esophagitis  · Continue Pepcid 20 mg b i d  Via G-tube  · Patient was evaluated by speech therapy:  Patient underwent VBS:  Current recommendations for pureed diet with honey thick liquids, however patient vomits after eating or drinking so he will be maintained on G-tube feeding    Cancer related pain  Assessment & Plan  · Patient has history of chronic pain secondary to cancer with continuous opioid dependence  · Followed outpatient by palliative care, and prior to admission was maintained on morphine ER 30mg BID and MSIR 30mg PO q4H prn  · Patient continues to vomit up his oral pills, no reliable absorption:  Patient was changed to morphine immediate release solution 30 mg q 8 hours scheduled for basal pain control, plus morphine or oral solution 30 mg q 4 hours p r n  Breakthrough pain  · Patient not a candidate for fentanyl transdermal patch due to cachexia  · Pain controlled on this regimen    Hypothyroidism  Assessment & Plan  Continue Synthroid    Hypertension  Assessment & Plan  · Patient with previous history of essential hypertension  · Home medications include Cardizem  mg daily and nadolol 40 mg daily  · Patient with low-normal blood pressure on admission  · Cardizem discontinued  · Continue nadolol 40 mg daily  · Blood pressure adequately controlled  · Continue monitor blood pressure and titrate as needed    Hepatic cirrhosis (HCC)  Assessment & Plan  · History of hep C with alcoholic liver cirrhosis  LFTs are within normal limits  · MRI abdomen with without contrast showed dominant tumor mass in the posterior dome of the right hepatic lobe appears slightly smaller when compared across modalities to February 23, 2022  Interval development of a small volume of upper abdominal ascites  H/O laryngeal cancer  Assessment & Plan  · Patient has a history of squamous cell carcinoma of oropharynx, stage IV A, with ipsilateral cervical lymph node metastasis treated by concurrent chemoradiation with high-dose cisplatin  · On Sorafenib from 10/18-3/22  · Continue outpatient follow-up    Gastroesophageal reflux disease with esophagitis  Assessment & Plan  Continue Pepcid via PEG tube b i d  Family:  Updated patient, his significant other, their daughter, and patient's brother at the bedside, with patient's permission    Reviewed all test results and treatment plan  Answered all questions    Discussed with patient's nurse  Discussed with     VTE Pharmacologic Prophylaxis: Enoxaparin (Lovenox)  VTE Mechanical Prophylaxis: sequential compression device    Discharge plan:  Patient still not tolerating tube feedings  Anticipate discharge to short-term rehab when tolerating tube feedings without vomiting    Certification Statement: The patient will continue to require additional inpatient hospital stay due to need for further acute intervention for initiating feedings, persistent vomiting, IV fluid support for dehydration    Status: inpatient     ===================================================================    Subjective:  Patient relates that he continues to vomit after drinking any liquids  He notes after he received large volume of medications and water through the G-tube P vomited up water and pill particles  He denies any current pain  He notes his pain is controlled on his current pain medication regimen  He denies any shortness of breath  Denies any current cough  Denies any current nausea  He notes generalized weakness      Physical Exam:   Temp:  [96 8 °F (36 °C)-98 3 °F (36 8 °C)] 98 3 °F (36 8 °C)  HR:  [65-69] 65  Resp:  [18] 18  BP: (112-180)/(71-79) 112/71    Gen:  Pleasant, non-tachypnic, non-dyspnic  Conversant  Family seated at the bedside, with patient's permission  Heart: regular rate and rhythm, S1S2 present, no murmur, rub or gallop  Lungs:  Coarse breath sounds bilaterally  No wheezing, crackles, or rhonchi  No accessory muscle use or respiratory distress  Abd: soft, non-tender, non-distended  NABS, no guarding, rebound or peritoneal signs  Extremities: no clubbing, cyanosis or edema  2+pedal pulses bilaterally  Full range of motion  Neuro: awake, alert  Interactive    Cooperates with exam      LABS:   Results from last 7 days   Lab Units 05/24/22  0641 05/23/22  0932 05/22/22  1337 05/21/22  0540   WBC Thousand/uL 9 13 7 61  --  2 68*   HEMOGLOBIN g/dL 11 0* 11 7*  --  11 4*   HEMATOCRIT % 33 6* 35 5*  --  34 5*   PLATELETS Thousands/uL 193 208 206 154     Results from last 7 days   Lab Units 05/25/22  0600 05/24/22  1745 05/24/22  0641 05/23/22  0542   POTASSIUM mmol/L 3 0* 4 5 3 0* 3 2*   CHLORIDE mmol/L 111*  --  110* 108   CO2 mmol/L 20*  --  24 26   BUN mg/dL 13  --  11 9   CREATININE mg/dL 0 78  --  0 83 0 71   CALCIUM mg/dL 7 2*  --  7 2* 7 [address] [place] [address] Data:    5/21 MRI abdomen: In response to the clinical question, no evidence for omental carcinomatosis  Hepatic cirrhosis   Dominant tumor mass in the posterior dome of the right hepatic lobe appears slightly smaller when compared across modalities to February 23, 2022   Interval development of a small volume of upper abdominal ascites  Bulky upper retroperitoneal, gastrohepatic ligament, periportal, and periceliac bina tumor has significantly progressed when compared to February 23, 2022  Slight progression of osseous metastatic disease as described     5/20: CT chest   1   Stable subcentimeter pulmonary nodules   The largest measures 5 mm within the lingula   No new or enlarging pulmonary nodules  2   Peripherally sclerotic lytic focus within rib 4 on the right measuring 8 mm   A questionable lucency is barely perceptible on prior exam of July 2021  Darshan Amen is indeterminate but may represent a treated metastatic lesion  Gabriel Blackburnhead is an additional   subcentimeter lucency within the posterior aspect of rib 7 on the left which is new from prior exams and suspicious for metastasis  3   Cirrhotic liver morphology with trace perihepatic ascites  Gabriel Blackburnhead is an ill-defined hypodensity within segment 7 corresponding to patient's known liver lesion  Gray Adam is suboptimally evaluated on this noncontrast study  4   Partially visualized soft tissue densities along the gastrohepatic ligament corresponding to gastrohepatic lymphadenopathy seen on prior exams   Visualization is limited by streak artifact from ingested barium  5   Wall thickening involving the mid to distal esophagus which may be due to underdistention versus nonspecific esophagitis      Procedure  5/23 IR: G tube placement        ---------------------------------------------------------------------------------------------------------------  This note has been constructed using a voice recognition system

## 2022-05-25 NOTE — PLAN OF CARE
Problem: PAIN - ADULT  Goal: Verbalizes/displays adequate comfort level or baseline comfort level  Description: Interventions:  - Encourage patient to monitor pain and request assistance  - Assess pain using appropriate pain scale  - Administer analgesics based on type and severity of pain and evaluate response  - Implement non-pharmacological measures as appropriate and evaluate response  - Consider cultural and social influences on pain and pain management  - Notify physician/advanced practitioner if interventions unsuccessful or patient reports new pain  Outcome: Progressing     Problem: GASTROINTESTINAL - ADULT  Goal: Minimal or absence of nausea and/or vomiting  Description: INTERVENTIONS:  - Administer IV fluids if ordered to ensure adequate hydration  - Administer ordered antiemetic medications as needed  - Provide nonpharmacologic comfort measures as appropriate  - Advance diet as tolerated, if ordered  - Consider nutrition services referral to assist patient with adequate nutrition and appropriate food choices  Outcome: Not Progressing     Problem: COPING  Goal: Will report anxiety at manageable levels  Description: INTERVENTIONS:  - Administer medication as ordered  - Teach and encourage coping skills  - Provide emotional support  - Assess patient/family for anxiety and ability to cope  Outcome: Progressing     Problem: Potential for Falls  Goal: Patient will remain free of falls  Description: INTERVENTIONS:  - Educate patient/family on patient safety including physical limitations  - Instruct patient to call for assistance with activity   - Consult OT/PT to assist with strengthening/mobility   - Keep Call bell within reach  - Keep bed low and locked with side rails adjusted as appropriate  - Keep care items and personal belongings within reach  - Initiate and maintain comfort rounds  - Make Fall Risk Sign visible to staff  - Offer Toileting every 2 Hours, in advance of need  - Initiate/Maintain bed/chair alarm  - Obtain necessary fall risk management equipment: bed/chair alarm, call bell, walker, bedside commode, urinal, rounds  - Apply yellow socks and bracelet for high fall risk patients  - Consider moving patient to room near nurses station  Outcome: Progressing

## 2022-05-26 NOTE — PLAN OF CARE
Problem: Prexisting or High Potential for Compromised Skin Integrity  Goal: Skin integrity is maintained or improved  Description: INTERVENTIONS:  - Identify patients at risk for skin breakdown  - Assess and monitor skin integrity  - Assess and monitor nutrition and hydration status  - Monitor labs   - Assess for incontinence   - Turn and reposition patient  - Assist with mobility/ambulation  - Relieve pressure over bony prominences  - Avoid friction and shearing  - Provide appropriate hygiene as needed including keeping skin clean and dry  - Evaluate need for skin moisturizer/barrier cream  - Collaborate with interdisciplinary team   - Patient/family teaching  Outcome: Progressing     Problem: PAIN - ADULT  Goal: Verbalizes/displays adequate comfort level or baseline comfort level  Description: Interventions:  - Encourage patient to monitor pain and request assistance  - Assess pain using appropriate pain scale  - Administer analgesics based on type and severity of pain and evaluate response  - Implement non-pharmacological measures as appropriate and evaluate response  - Consider cultural and social influences on pain and pain management  - Notify physician/advanced practitioner if interventions unsuccessful or patient reports new pain  Outcome: Progressing     Problem: SAFETY ADULT  Goal: Patient will remain free of falls  Description: INTERVENTIONS:  - Educate patient/family on patient safety including physical limitations  - Instruct patient to call for assistance with activity   - Consult OT/PT to assist with strengthening/mobility   - Keep Call bell within reach  - Keep bed low and locked with side rails adjusted as appropriate  - Keep care items and personal belongings within reach  - Initiate and maintain comfort rounds  - Make Fall Risk Sign visible to staff  - Offer Toileting every 2 Hours, in advance of need  - Initiate/Maintain bed/chair alarm  - Obtain necessary fall risk management equipment: bed/chair alarm, call bell, walker, bedside commode, urinal, rounds  - Apply yellow socks and bracelet for high fall risk patients  - Consider moving patient to room near nurses station  Outcome: Progressing     Problem: DISCHARGE PLANNING  Goal: Discharge to home or other facility with appropriate resources  Description: INTERVENTIONS:  - Identify barriers to discharge w/patient and caregiver  - Arrange for needed discharge resources  - Identify discharge learning needs (meds, wound care, etc )  - Refer to Case Management Department for coordinating discharge planning if the patient needs post-hospital services based on physician/advanced practitioner order   Outcome: Progressing     Problem: Knowledge Deficit  Goal: Patient/family demonstrates understanding of disease process, treatment plan, medications, and discharge instructions  Description: Complete learning assessment and assess knowledge base    Interventions:  - Provide teaching at level of understanding  - Provide teaching via preferred learning methods  Outcome: Progressing     Problem: GASTROINTESTINAL - ADULT  Goal: Minimal or absence of nausea and/or vomiting  Description: INTERVENTIONS:  - Administer IV fluids if ordered to ensure adequate hydration  - Administer ordered antiemetic medications as needed  - Provide nonpharmacologic comfort measures as appropriate  - Advance diet as tolerated, if ordered  - Consider nutrition services referral to assist patient with adequate nutrition and appropriate food choices  Outcome: Progressing  Goal: Maintains or returns to baseline bowel function  Description: INTERVENTIONS:  - Assess bowel function  - Encourage oral fluids to ensure adequate hydration  - Administer IV fluids if ordered to ensure adequate hydration  - Administer ordered medications as needed  - Encourage mobilization and activity  - Consider nutritional services referral to assist patient with adequate nutrition and appropriate food choices  Outcome: Progressing     Problem: METABOLIC, FLUID AND ELECTROLYTES - ADULT  Goal: Electrolytes maintained within normal limits  Description: INTERVENTIONS:  - Monitor labs and assess patient for signs and symptoms of electrolyte imbalances  - Administer electrolyte replacement as ordered  - Monitor response to electrolyte replacements, including repeat lab results as appropriate  - Instruct patient on fluid and nutrition as appropriate  Outcome: Progressing  Goal: Glucose maintained within target range  Description: INTERVENTIONS:  - Monitor Blood Glucose as ordered  - Assess for signs and symptoms of hyperglycemia and hypoglycemia  - Administer ordered medications to maintain glucose within target range  - Assess nutritional intake and initiate nutrition service referral as needed  Outcome: Progressing     Problem: COPING  Goal: Will report anxiety at manageable levels  Description: INTERVENTIONS:  - Administer medication as ordered  - Teach and encourage coping skills  - Provide emotional support  - Assess patient/family for anxiety and ability to cope  Outcome: Progressing     Problem: Nutrition/Hydration-ADULT  Goal: Nutrient/Hydration intake appropriate for improving, restoring or maintaining nutritional needs  Description: Monitor and assess patient's nutrition/hydration status for malnutrition  Collaborate with interdisciplinary team and initiate plan and interventions as ordered  Monitor patient's weight and dietary intake as ordered or per policy  Utilize nutrition screening tool and intervene as necessary  Determine patient's food preferences and provide high-protein, high-caloric foods as appropriate       INTERVENTIONS:  - Monitor oral intake, urinary output, labs, and treatment plans  - Assess nutrition and hydration status and recommend course of action  - Evaluate amount of meals eaten  - Assist patient with eating if necessary   - Allow adequate time for meals  - Recommend/ encourage appropriate diets, oral nutritional supplements, and vitamin/mineral supplements  - Order, calculate, and assess calorie counts as needed  - GI consult for possible PEG tube/ IR consult for G-tube placement  - Initiate tubefeeding, Jevity 1 2 at 20 ml/hr with 60 ml water flush every 4 hours, if tolerating, increase hourly rate by 10 mls every 8 hours until at goal rate of 60 ml/hr  - Assess need for intravenous fluids  - Provide specific nutrition/hydration education as appropriate  - Include patient/family/caregiver in decisions related to nutrition  Outcome: Progressing     Problem: Potential for Falls  Goal: Patient will remain free of falls  Description: INTERVENTIONS:  - Educate patient/family on patient safety including physical limitations  - Instruct patient to call for assistance with activity   - Consult OT/PT to assist with strengthening/mobility   - Keep Call bell within reach  - Keep bed low and locked with side rails adjusted as appropriate  - Keep care items and personal belongings within reach  - Initiate and maintain comfort rounds  - Make Fall Risk Sign visible to staff  - Offer Toileting every 2 Hours, in advance of need  - Initiate/Maintain bed/chair alarm  - Obtain necessary fall risk management equipment: bed/chair alarm, call bell, walker, bedside commode, urinal, rounds  - Apply yellow socks and bracelet for high fall risk patients  - Consider moving patient to room near nurses station  Outcome: Progressing

## 2022-05-26 NOTE — PALLIATIVE CARE CONFERENCE
Palliative MSW saw patient at the bedside today  MSW appreciates the opportunity to provider patient/family with inpatient emotional support and guidance while patient continues to receive medical attention from the medical team     Topics discussed: Dr Benjamin Womack and this writer met with pt this afternoon  He reports that he had an instance of vomiting related to his peg tube  He feels he had too much and his stomach is not used to having much nutrition  He states his pain is well managed and his goal going forward is to go to rehab and see if he is able to regain some strength, then he will continue with treatment if appropriate  He recognizes that he is deconditioned and has poor motor strength  Emotional support provided  We will continue to follow him as outpatient       Areas that need follow-up: Support as outpatient  Resources given: None  Others present:  Dr Benjamin Womack    MSW will continue to follow as requested by the medical team, patient, or family

## 2022-05-26 NOTE — PROGRESS NOTES
Hematology - Oncology Progress Note    Alena Merrill, 1955, [id number]  East 2 /E2 -*      Impression and plan:    71-year-old male with distant history of laryngeal cancer status post treatment - approximately 10 years ago with no evidence for recurrent, more recently diagnosed with hepatocellular carcinoma  Pain control is ongoing  I discussed the case with the patient's nurse and with Dr Jaovn Lee, hospitalist today  Antiemetics are being manipulated  Patient is also being followed by Dr Jasvir Toscano, palliative care - pain control manipulation is in process  Etiology for the persistent nausea and and vomiting is not clear, possibly related to the feeding tube  GI re-evaluation is pending  Potassium is being replaced  Recent CBC parameters are were okay/acceptable  Once Mr Mtz Reason is better/stable and discharged, he can rediscuss his treatment plan with Dr Phillip Chi  Performance at this time is quite poor, patient has lost a significant amount of weight  Options are becoming limited (previously treated with sorafenib with progression; patient having trouble tolerating the FOLFIRINOX)  Above discussed with patient; all questions answered  __________________________________________________________________________________________    Chief complaint:  Weight loss, decreased p o  intake, nausea/vomiting    History of present illness:  71-year-old male with 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  More recently patient was found to have multifocal hepatocellular carcinoma recently treated with FOLFIRINOX  Patient states feeling poorly, still with nausea and vomiting  Activities are virtually nil  Generalized body aches are relatively controlled      Hospital medications list:  Current Facility-Administered Medications   Medication Dose Route Frequency Provider Last Rate    acetaminophen  650 mg Per G Tube Q6H PRN MD Mark Anthony White albuterol  2 puff Inhalation Q4H PRN Stanmeek NomanPRINCE      aluminum-magnesium hydroxide-simethicone  30 mL Per G Tube Q6H PRN Kike Lopez MD      dexamethasone  0 5 mg Per G Tube BID (AM & Afternoon) Kike Lopez MD      enoxaparin  40 mg Subcutaneous Q24H Tarun Berg MD      famotidine  20 mg Per G Tube BID Kike Lopez MD      levothyroxine  112 mcg Per G Tube Early Morning Kike Lopez MD      Morphine Sulfate (Concentrate)  30 mg Per G Tube Q4H PRN Camilo Arguello MD      Or   Mohit Moose morphine injection  8 mg Intravenous Q4H PRN Camilo Arguello MD      Morphine Sulfate (Concentrate)  30 mg Per G Tube Q8H [address] Camilo Arguello MD      nadolol  40 mg Per G Tube Daily Before Venkata Miles MD      naloxone  0 04 mg Intravenous Q1MIN PRN Bill Arthur MD      ondansetron  4 mg Intravenous TID AC Kimberly Cruz DO      ondansetron  4 mg Intravenous Q6H PRN Maria Fernanda Chan MD      potassium chloride  40 mEq Per G Tube BID Kike Lopez MD      sodium chloride 0 9 % with KCl 40 mEq/L  75 mL/hr Intravenous Continuous Kike Lopez MD 75 mL/hr (05/26/22 9304)     Physical exam  /68 (BP Location: Left arm)   Pulse 70   Temp 97 9 °F (36 6 °C) (Temporal)   Resp 20   Ht 5' 8" (1 727 m)   Wt 50 1 kg (110 lb 7 2 oz)   SpO2 97%   BMI 16 79 kg/m²   Constitutional:  Cachectic frail appearing male, no respiratory distress  Eyes: PERRL, conjunctiva pale    Respiratory:  Scattered bilateral rhonchi  Cardiovascular: Normal rate, normal rhythm, no murmurs, no gallops, no rubs    GI: Soft, minimal diffuse tenderness, feeding tube in place   : No costovertebral angle tenderness, normal reproductive organs, no discharge  Musculoskeletal: No pain or tenderness with palpation of joints, muscles or bones  Integument:  Pale, warm, dry, scattered purpura  Lymphatic: No adenopathy in the neck, supra-clavicular region, axilla and groin bilaterally  Extremities: 1+ bilateral lower extremity edema, pulses are decreased  Neurologic: Alert & oriented x 3, CN 2-12 normal, normal motor function, normal sensory function, no focal deficits noted  Psychiatric:  Pleasant, responsive, appropriate  Rectal: Deferred    Laboratory test results    05/26/2022 potassium = 3 1 BUN = 16 creatinine = 0 71    05/24/2022 WBC = 9 13 hemoglobin = 11 hematocrit = 34 platelet = 415

## 2022-05-26 NOTE — PROGRESS NOTES
[facility]  Progress Note - Gissell De Leon 1955, 79 y o  male MRN: [id number]  Unit/Bed#: E2 -01 Encounter: [id number]  Primary Care Provider: Marc Ortiz   Date and time admitted to hospital: 5/20/2022 12:59 AM    * Severe protein-calorie malnutrition [facility])  Assessment & Plan  Patient is a 66-year-old male with history of hepatocellular carcinoma, recently receiving chemotherapy, who presented to the ER with with poor p o  intake, decreased energy, worsening generalized weakness, weight loss and cachexia    Patient with severe protein calorie malnutrition due to poor p o  Intake due to malignancy, decreased appetite, and vomiting after eating  BMI 16  · History dysphagia, reports poor appetite, weight loss, having difficulty swallowing food, , and notes any food or medications he eats, are quickly vomited back out  · Speech therapy team evaluated patient:  Based on VBS--> recommend pureed diet with honey thickened liquid  · GI consulted regarding PEG tube, patient not a candidate for PEG tube due to hx of radiation and risk of bleeding via EGD  · IR placed G-tube 5/20  · G tube feedings were ordered  · Patient currently on Jevity 1 2 at 20 cc/hour:   · Recommendations  If patient tolerates this will advance by 10 mL q 8 hours to a goal of 60 mL  Water flush 100 q 4 hours  · Patient wishes to be discharged on bolus feeding, which he used in the past:  Dietitian recommends 1 can 6 times a day to meet nutritional needs  · Patient with diarrhea, monitor for refeeding syndrome  · Continue to replete electrolytes  · Pt initially not tolerating tube feedings at 10cc/h and was held temporarily    Has tolerated increased rate of 20 cc/h  · Pt vomiting up medication boluses given via G tube-- >will change all medication possible to iv or topical  · Will scheduled reglan qid as his QT has stabilized    Hepatocellular carcinoma ([address] )  Assessment & Plan  · Patient has a history of hepatocellular carcinoma, followed by Dr Palmira De La Vega  · Developed  bulky abdominal adenopathy in January 2022 which was confirmed to be poorly differentiated adenocarcinoma, 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  · Pathology confirmed well-differentiated hepatocellular carcinoma  · Patient was started on chemotherapy with FOLFIRINOX, but did not tolerated well due to weight loss and poor performance status:  Chemotherapy postponed until 06/08/2022  · CT chest and MRI abd/pelvis: Dominant tumor mass in the posterior dome of the right hepatic lobe appears slightly smaller when compared across modalities to February 23, 2022  Interval development of a small volume of upper abdominal ascites  Bulky upper retroperitoneal, gastrohepatic ligament, periportal, and periceliac bina tumor has significantly progressed when compared to February 23, 2022  Slight progression of osseous metastatic disease as described    · Outpatient Oncology follow-up    Loose stools  Assessment & Plan  · Patient was noted to have loose stools after admission  · Had initially improved however worsened after initiation of tube feeding  · Monitor closely for refeeding syndrome  · Will discontinue probiotics, as difficulty placing it through the G-tube    Hypokalemia  Assessment & Plan  · Patient with hypokalemia, hypomagnesemia, hypophosphatemia  · Likely due to severe protein calorie malnutrition  · Monitor for refeeding syndrome since tube feedings have been started    Dysphagia  Assessment & Plan  · Patient was diagnosed with Dysphagia in setting of history of laryngeal cancer with previous radiation therapy  · CT chest shows mild esophageal thickening, possible esophagitis  · Continue Pepcid 20 mg b i d  Via G-tube  · Patient was evaluated by speech therapy:  Patient underwent VBS:  Current recommendations for pureed diet with honey thick liquids, however patient vomits after eating or drinking so he will be maintained on G-tube feeding    Cancer related pain  Assessment & Plan  · Patient has history of chronic pain secondary to cancer with continuous opioid dependence  · Followed outpatient by palliative care, and prior to admission was maintained on morphine ER 30mg BID and MSIR 30mg PO q4H prn  · Patient continues to vomit up his oral pills, no reliable absorption:  Patient was changed to morphine immediate release solution 30 mg q 8 hours scheduled for basal pain control, plus morphine or oral solution 30 mg q 4 hours p r n  Breakthrough pain  · Patient not a candidate for fentanyl transdermal patch due to cachexia  · Pain controlled on this regimen    Hypothyroidism  Assessment & Plan  Continue Synthroid    Hypertension  Assessment & Plan  · Patient with previous history of essential hypertension  · Home medications include Cardizem  mg daily and nadolol 40 mg daily  · Patient with low-normal blood pressure on admission so cardiazem was d/c'd  · Pt has been vomiting up G tube medication- nadalol  · Will change to iv metoprolol instead  · Continue monitor blood pressure and titrate as needed    Hepatic cirrhosis (HCC)  Assessment & Plan  · History of hep C with alcoholic liver cirrhosis  LFTs are within normal limits  · MRI abdomen with without contrast showed dominant tumor mass in the posterior dome of the right hepatic lobe appears slightly smaller when compared across modalities to February 23, 2022  Interval development of a small volume of upper abdominal ascites      H/O laryngeal cancer  Assessment & Plan  · Patient has a history of squamous cell carcinoma of oropharynx, stage IV A, with ipsilateral cervical lymph node metastasis treated by concurrent chemoradiation with high-dose cisplatin  · On Sorafenib from 10/18-3/22  · Continue outpatient follow-up    Gastroesophageal reflux disease with esophagitis  Assessment & Plan  PPI IV bid          Family:  Called significant other Kevyn Tavarez and ANDRY to call my cell phone for update    dw pts nurse  latonya   latonya GI:  They will re-eval    VTE Pharmacologic Prophylaxis: Enoxaparin (Lovenox)  VTE Mechanical Prophylaxis: sequential compression device        Certification Statement: The patient will continue to require additional inpatient hospital stay due to need for further acute intervention for vomiting    Status: inpatient     ===================================================================    Subjective:  Patient notes he was feeling well  His nurse notes he was tolerating the tube feedings at 20 cc an hour all night without any nausea bloating or vomiting  This morning when he received his medications through the G-tube bolus approximately 20 minutes later he had nausea and vomited  Pt notes his pain is controlled  Denies any sob/cough      Physical Exam:   Temp:  [97 2 °F (36 2 °C)-97 9 °F (36 6 °C)] 97 2 °F (36 2 °C)  HR:  [62-72] 62  Resp:  [20] 20  BP: (119-125)/(68-73) 125/73    Gen:  Pleasant, non-tachypnic, non-dyspnic  Conversant  Heart: regular rate and rhythm, S1S2 present, no murmur, rub or gallop  Lungs: clear to ausculatation bilaterally  No wheezing, crackles, or rhonchi  No accessory muscle use or respiratory distress  Abd: soft, non-tender, non-distended  NABS, no guarding, rebound or peritoneal signs  Neuro: awake, alert  Skin: warm and dry: no petechiae, purpura and rash      LABS:   Results from last 7 days   Lab Units 05/24/22  0641 05/23/22  0932 05/22/22  1337 05/21/22  0540   WBC Thousand/uL 9 13 7 61  --  2 68*   HEMOGLOBIN g/dL 11 0* 11 7*  --  11 4*   HEMATOCRIT % 33 6* 35 5*  --  34 5*   PLATELETS Thousands/uL 193 208 206 154     Results from last 7 days   Lab Units 05/26/22  0619 05/25/22  0600 05/24/22  1745 05/24/22  0641   POTASSIUM mmol/L 3 1* 3 0* 4 5 3 0*   CHLORIDE mmol/L 112* 111*  --  110*   CO2 mmol/L 20* 20*  --  24   BUN mg/dL 16 13  --  11   CREATININE mg/dL 0 71 0 78  --  0 83   CALCIUM mg/dL 7 1* 7 2*  --  7 2* Hospital Data:    5/21 MRI abdomen: In response to the clinical question, no evidence for omental carcinomatosis  Hepatic cirrhosis   Dominant tumor mass in the posterior dome of the right hepatic lobe appears slightly smaller when compared across modalities to February 23, 2022   Interval development of a small volume of upper abdominal ascites  Bulky upper retroperitoneal, gastrohepatic ligament, periportal, and periceliac bina tumor has significantly progressed when compared to February 23, 2022  Slight progression of osseous metastatic disease as described     5/20: CT chest   1   Stable subcentimeter pulmonary nodules   The largest measures 5 mm within the lingula   No new or enlarging pulmonary nodules  2   Peripherally sclerotic lytic focus within rib 4 on the right measuring 8 mm   A questionable lucency is barely perceptible on prior exam of July 2021  Breanna Childress is indeterminate but may represent a treated metastatic lesion  Tania Galla is an additional   subcentimeter lucency within the posterior aspect of rib 7 on the left which is new from prior exams and suspicious for metastasis  3   Cirrhotic liver morphology with trace perihepatic ascites  Tania Galla is an ill-defined hypodensity within segment 7 corresponding to patient's known liver lesion  David Pemberton is suboptimally evaluated on this noncontrast study  4   Partially visualized soft tissue densities along the gastrohepatic ligament corresponding to gastrohepatic lymphadenopathy seen on prior exams   Visualization is limited by streak artifact from ingested barium  5   Wall thickening involving the mid to distal esophagus which may be due to underdistention versus nonspecific esophagitis      Procedure  5/23 IR: G tube placement        ---------------------------------------------------------------------------------------------------------------  This note has been constructed using a voice recognition system

## 2022-05-26 NOTE — ASSESSMENT & PLAN NOTE
· Patient has a history of hepatocellular carcinoma, followed by Dr Jordan Serrano  · Developed  bulky abdominal adenopathy in January 2022 which was confirmed to be poorly differentiated adenocarcinoma, 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  · Pathology confirmed well-differentiated hepatocellular carcinoma  · Patient was started on chemotherapy with FOLFIRINOX, but did not tolerated well due to weight loss and poor performance status:  Chemotherapy postponed until 06/08/2022  · CT chest and MRI abd/pelvis: Dominant tumor mass in the posterior dome of the right hepatic lobe appears slightly smaller when compared across modalities to February 23, 2022  Interval development of a small volume of upper abdominal ascites  Bulky upper retroperitoneal, gastrohepatic ligament, periportal, and periceliac bina tumor has significantly progressed when compared to February 23, 2022  Slight progression of osseous metastatic disease as described    · Outpatient Oncology follow-up

## 2022-05-26 NOTE — CASE MANAGEMENT
Case Management Discharge Planning Note    Patient name Jojo Dobbs  Location East 2 /E2 -* MRN [id number]  : 1955 Date 2022       Current Admission Date: 2022  Current Admission Diagnosis:Severe protein-calorie malnutrition [facility])   Patient Active Problem List    Diagnosis Date Noted    Loose stools 2022    Dysphagia 2022    Hypokalemia 2022    Nausea & vomiting 05/10/2022    Port-A-Cath in place 2022    Neoplastic malignant related fatigue 2022    Cancer cachexia [facility] ) 2022    Dysgeusia 2022    Poor appetite 2022    Cancer related pain 2022    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    Pain of upper abdomen 2022    Drug induced constipation 2022    Counseling regarding advanced care planning and goals of care 2022    Hepatocellular carcinoma ([facility] ) 2022    Chemotherapy induced neutropenia ([facility] ) 2022    Severe protein-calorie malnutrition ([facility] ) 2022    History of 2019 novel coronavirus disease (COVID-19) 2022    COVID-19 12/10/2021    Sciatica of left side 10/13/2021    Platelets decreased ([facility] ) 09/10/2021    Colon cancer screening declined 2021    Secondary esophageal varices without bleeding ([facility] ) 2018    Diabetes mellitus, type II ([facility] ) 2018    H/O laryngeal cancer 2018    Hypothyroidism 2018    Erectile dysfunction 2017    Low back pain 2016    Gastroesophageal reflux disease with esophagitis 2014    Hepatic cirrhosis ([facility] ) 2014    Herpes simplex infection 2013    Hypertension 2012      LOS (days): 6  Geometric Mean LOS (GMLOS) (days): 4 80  Days to GMLOS:-1 6     OBJECTIVE:  Risk of Unplanned Readmission Score: 36 77         Current admission status: Inpatient   Preferred Pharmacy:   [place]  [address] [facility] - [address] ,  [address] [facility] [address]  Phone: [phone] Fax: [place], [address]  [address] [facility]  [address]  [address] [facility] [address]  Phone: [phone] Fax: [phone], [address] [place] [address]  Phone: [phone] Fax: [phone]    Primary Care Provider: PRINCE Alicea    Primary Insurance: [facility] [place] DUAL COMPLETE [facility] RE[facility]  Secondary Insurance: [facility]    DISCHARGE DETAILS:    Discharge planning discussed with[de-identified] pt & SO  Freedom of Choice: Yes  Comments - Freedom of Choice: Pt & spouse asked for STR recommendations  CM printed out list for pt & SO to look over & decide where they want to send him  CM contacted family/caregiver?: Yes  Were Treatment Team discharge recommendations reviewed with patient/caregiver?: Yes  Did patient/caregiver verbalize understanding of patient care needs?: N/A- going to facility  Were patient/caregiver advised of the risks associated with not following Treatment Team discharge recommendations?: Yes    Contacts  Patient Contacts: Artur Wu (SO)  Relationship to Patient[de-identified] Other (Comment) (fiance)  Contact Method:  In Person  Phone Number: [phone]  Reason/Outcome: Continuity of Care, Emergency Contact, Discharge [address]         Is the patient interested in [facility] at discharge?: No        DME Referral Provided  Referral made for DME?: Yes  DME referral completed for the following items[de-identified] Bedside Austin Espinoza  DME Supplier Name[de-identified] [employer]    Other Referral/Resources/Interventions Provided:  Interventions: Short Term Rehab         Treatment Team Recommendation: Short Term Rehab  Discharge Destination Plan[de-identified] Short Term Rehab                                         Additional Comments: Pt & SO have decided to send out referrals to [facility]  CM will send out referrals to those facilites and see which ones are willing to take him  CM to continue to follow pt care & d/c planning needs

## 2022-05-26 NOTE — ASSESSMENT & PLAN NOTE
Patient is a 42-year-old male with history of hepatocellular carcinoma, recently receiving chemotherapy, who presented to the ER with with poor p o  intake, decreased energy, worsening generalized weakness, weight loss and cachexia    Patient with severe protein calorie malnutrition due to poor p o  Intake due to malignancy, decreased appetite, and vomiting after eating  BMI 16  · History dysphagia, reports poor appetite, weight loss, having difficulty swallowing food, , and notes any food or medications he eats, are quickly vomited back out  · Speech therapy team evaluated patient:  Based on VBS--> recommend pureed diet with honey thickened liquid  · GI consulted regarding PEG tube, patient not a candidate for PEG tube due to hx of radiation and risk of bleeding via EGD  · IR placed G-tube 5/20  · G tube feedings were ordered  · Patient currently on Jevity 1 2 at 20 cc/hour:   · Recommendations  If patient tolerates this will advance by 10 mL q 8 hours to a goal of 60 mL  Water flush 100 q 4 hours  · Patient wishes to be discharged on bolus feeding, which he used in the past:  Dietitian recommends 1 can 6 times a day to meet nutritional needs  · Patient with diarrhea, monitor for refeeding syndrome  · Continue to replete electrolytes  · Pt initially not tolerating tube feedings at 10cc/h and was held temporarily    Has tolerated increased rate of 20 cc/h  · Pt vomiting up medication boluses given via G tube-- >will change all medication possible to iv or topical  · Will scheduled reglan qid as his QT has stabilized

## 2022-05-26 NOTE — PLAN OF CARE
Problem: PHYSICAL THERAPY ADULT  Goal: Performs mobility at highest level of function for planned discharge setting  See evaluation for individualized goals  Description: Treatment/Interventions: Functional transfer training, LE strengthening/ROM, Elevations, Therapeutic exercise, Endurance training, Patient/family training, Bed mobility, Gait training, Spoke to nursing, OT  Equipment Recommended: Jose Garcia (Comment) ( and [facility])       See flowsheet documentation for full assessment, interventions and recommendations  Outcome: Progressing  Note: Prognosis: Guarded  Problem List: Decreased strength, Decreased endurance, Decreased mobility  Assessment: Pt  able to perform all supine LE Cristobal with assistance for few  pt  fatigues easily with exertion  Pt  needed increased assist as reps progressed  Will continue to follow patient during the stay per PT POC to address the mobility and endurance deficits  Barriers to Discharge: Inaccessible home environment, Decreased caregiver support        PT Discharge Recommendation: Home with home health rehabilitation          See flowsheet documentation for full assessment

## 2022-05-26 NOTE — ASSESSMENT & PLAN NOTE
· Patient with previous history of essential hypertension  · Home medications include Cardizem  mg daily and nadolol 40 mg daily  · Patient with low-normal blood pressure on admission so cardiazem was d/c'd  · Pt has been vomiting up G tube medication- nadalol  · Will change to iv metoprolol instead  · Continue monitor blood pressure and titrate as needed

## 2022-05-26 NOTE — PLAN OF CARE
Problem: Prexisting or High Potential for Compromised Skin Integrity  Goal: Skin integrity is maintained or improved  Description: INTERVENTIONS:  - Identify patients at risk for skin breakdown  - Assess and monitor skin integrity  - Assess and monitor nutrition and hydration status  - Monitor labs   - Assess for incontinence   - Turn and reposition patient  - Assist with mobility/ambulation  - Relieve pressure over bony prominences  - Avoid friction and shearing  - Provide appropriate hygiene as needed including keeping skin clean and dry  - Evaluate need for skin moisturizer/barrier cream  - Collaborate with interdisciplinary team   - Patient/family teaching  Outcome: Progressing     Problem: PAIN - ADULT  Goal: Verbalizes/displays adequate comfort level or baseline comfort level  Description: Interventions:  - Encourage patient to monitor pain and request assistance  - Assess pain using appropriate pain scale  - Administer analgesics based on type and severity of pain and evaluate response  - Implement non-pharmacological measures as appropriate and evaluate response  - Consider cultural and social influences on pain and pain management  - Notify physician/advanced practitioner if interventions unsuccessful or patient reports new pain  Outcome: Progressing     Problem: SAFETY ADULT  Goal: Patient will remain free of falls  Description: INTERVENTIONS:  - Educate patient/family on patient safety including physical limitations  - Instruct patient to call for assistance with activity   - Consult OT/PT to assist with strengthening/mobility   - Keep Call bell within reach  - Keep bed low and locked with side rails adjusted as appropriate  - Keep care items and personal belongings within reach  - Initiate and maintain comfort rounds  - Make Fall Risk Sign visible to staff  - Offer Toileting every 2 Hours, in advance of need  - Initiate/Maintain bed/chair alarm  - Obtain necessary fall risk management equipment: bed/chair alarm, call bell, walker, bedside commode, urinal, rounds  - Apply yellow socks and bracelet for high fall risk patients  - Consider moving patient to room near nurses station  Outcome: Progressing     Problem: DISCHARGE PLANNING  Goal: Discharge to home or other facility with appropriate resources  Description: INTERVENTIONS:  - Identify barriers to discharge w/patient and caregiver  - Arrange for needed discharge resources  - Identify discharge learning needs (meds, wound care, etc )  - Refer to Case Management Department for coordinating discharge planning if the patient needs post-hospital services based on physician/advanced practitioner order   Outcome: Progressing     Problem: Knowledge Deficit  Goal: Patient/family demonstrates understanding of disease process, treatment plan, medications, and discharge instructions  Description: Complete learning assessment and assess knowledge base  Interventions:  - Provide teaching at level of understanding  - Provide teaching via preferred learning methods  Outcome: Progressing     Problem: COPING  Goal: Will report anxiety at manageable levels  Description: INTERVENTIONS:  - Administer medication as ordered  - Teach and encourage coping skills  - Provide emotional support  - Assess patient/family for anxiety and ability to cope  Outcome: Progressing     Problem: Nutrition/Hydration-ADULT  Goal: Nutrient/Hydration intake appropriate for improving, restoring or maintaining nutritional needs  Description: Monitor and assess patient's nutrition/hydration status for malnutrition  Collaborate with interdisciplinary team and initiate plan and interventions as ordered  Monitor patient's weight and dietary intake as ordered or per policy  Utilize nutrition screening tool and intervene as necessary  Determine patient's food preferences and provide high-protein, high-caloric foods as appropriate       INTERVENTIONS:  - Monitor oral intake, urinary output, labs, and treatment plans  - Assess nutrition and hydration status and recommend course of action  - Evaluate amount of meals eaten  - Assist patient with eating if necessary   - Allow adequate time for meals  - Recommend/ encourage appropriate diets, oral nutritional supplements, and vitamin/mineral supplements  - Order, calculate, and assess calorie counts as needed  - GI consult for possible PEG tube/ IR consult for G-tube placement  - Initiate tubefeeding, Jevity 1 2 at 20 ml/hr with 60 ml water flush every 4 hours, if tolerating, increase hourly rate by 10 mls every 8 hours until at goal rate of 60 ml/hr  - Assess need for intravenous fluids  - Provide specific nutrition/hydration education as appropriate  - Include patient/family/caregiver in decisions related to nutrition  Outcome: Progressing     Problem: Potential for Falls  Goal: Patient will remain free of falls  Description: INTERVENTIONS:  - Educate patient/family on patient safety including physical limitations  - Instruct patient to call for assistance with activity   - Consult OT/PT to assist with strengthening/mobility   - Keep Call bell within reach  - Keep bed low and locked with side rails adjusted as appropriate  - Keep care items and personal belongings within reach  - Initiate and maintain comfort rounds  - Make Fall Risk Sign visible to staff  - Offer Toileting every 2 Hours, in advance of need  - Initiate/Maintain bed/chair alarm  - Obtain necessary fall risk management equipment: bed/chair alarm, call bell, walker, bedside commode, urinal, rounds  - Apply yellow socks and bracelet for high fall risk patients  - Consider moving patient to room near nurses station  Outcome: Progressing

## 2022-05-26 NOTE — PHYSICAL THERAPY NOTE
Physical Therapy Treatment Note     05/26/22 1326   PT Last Visit   PT Visit Date 05/26/22   Note Type   Note Type Treatment   Pain Assessment   Pain Assessment Tool 0-10   Pain Score No Pain   Restrictions/Precautions   Weight Bearing Precautions Per Order No   Other Precautions Fall Risk;Multiple lines; Bed Alarm   General   Chart Reviewed Yes   Subjective   Subjective Pt  in bed upon entry  Agreeable to PT but only bedlevel  Reported he is too tired to ambulate   Exercises   Quad Sets Supine;Bilateral;AROM;20 reps   Heelslides Supine;Bilateral;AAROM  (x30)   Glute Sets Supine;Bilateral;AROM;20 reps   Hip Flexion Supine;Bilateral;AAROM  (x30)   Hip Abduction Supine;Bilateral;AAROM;AROM  (x30)   Knee AROM Short Arc Quad Supine;Bilateral;AROM  (x30)   Ankle Pumps Supine;Bilateral;AROM;20 reps   Assessment   Prognosis Guarded   Problem List Decreased strength;Decreased endurance;Decreased mobility   Assessment Pt  able to perform all supine LE Cristobal with assistance for few  pt  fatigues easily with exertion  Pt  needed increased assist as reps progressed  Will continue to follow patient during the stay per PT POC to address the mobility and endurance deficits  Barriers to Discharge Inaccessible home environment;Decreased caregiver support   Goals   Patient Goals None reported   STG Expiration Date 06/06/22   PT Treatment Day 1   Plan   Treatment/Interventions LE strengthening/ROM; Therapeutic exercise;Patient/family training;Spoke to nursing   Progress Slow progress, decreased activity tolerance   PT Frequency 3-5x/wk   Recommendation   PT Discharge Recommendation Home with home health rehabilitation   Equipment Recommended [facility] Recommended Wheeled walker   End of Consult   Patient Position at End of Consult Supine;Bed/Chair alarm activated; All needs within reach         Allan Ruiz PTA    An AM-PAC basic mobility standardized score less than 42 9 suggest the patient may benefit from discharge to post-acute rehab services

## 2022-05-27 PROBLEM — D72.829 LEUKOCYTOSIS: Status: ACTIVE | Noted: 2022-01-01

## 2022-05-27 NOTE — SPEECH THERAPY NOTE
Speech Language/Pathology  Case d/w slim  Keeping pt NPO due to nausea/vomiting  recommend keeping mouth moist w/ swabs or small amts of ice, and also to keep the pt swallowing  VBS done by another SLP 5/20  Reviewed  ? Decline towards end of study  Rec f/u St at d/c  Will check status peripherally for now

## 2022-05-27 NOTE — PROGRESS NOTES
[facility]  Progress Note - Felicitas Gonzalez 1955, 79 y o  male MRN: [id number]  Unit/Bed#: E2 -01 Encounter: [id number]  Primary Care Provider: Marc Ryan   Date and time admitted to hospital: 5/20/2022 12:59 AM    * Severe protein-calorie malnutrition [facility])  Assessment & Plan  Patient is a 80-year-old male with history of hepatocellular carcinoma, recently receiving chemotherapy, who presented to the ER with with poor p o  intake, decreased energy, worsening generalized weakness, weight loss and cachexia    Patient with severe protein calorie malnutrition due to poor p o  Intake due to malignancy, decreased appetite, and vomiting after eating  BMI 16  · History dysphagia, reports poor appetite, weight loss, having difficulty swallowing food, and notes any food or medications he eats, are quickly vomited back out  · Speech therapy team evaluated patient:  Based on VBS--> recommend pureed diet with honey thickened liquid  · GI consulted regarding PEG tube, patient not a candidate for PEG tube due to hx of radiation and risk of bleeding via EGD  · IR placed G-tube 5/20  · G tube feedings were ordered per Dietitian's Recommendations  If patient tolerates this will advance by 10 mL q 8 hours to a goal of 60 mL  Water flush 100 q 4 hours  · Patient wishes to be discharged on bolus feeding, which he used in the past:  Dietitian recommends 1 can 6 times a day to meet nutritional needs   · Patient with diarrhea, monitor for refeeding syndrome  · Continue to replete electrolytes  · Pt vomiting up medication boluses given via G tube-- >will change all medication possible to iv or topical  · He is tolerating TF at 50cc/h  He is still not tolerating any medication boluses given through G tube: Attempting to give all meds IV for now    Likely due to prolonged malnutrition and not eating, and decreased gastric functioning/capacity  · scheduled reglan qid as his QT has stabilized    Hepatocellular carcinoma ([facility] [address] )  Assessment & Plan  · Patient has a history of hepatocellular carcinoma, followed by Dr Genie Chen  · Developed  bulky abdominal adenopathy in January 2022 which was confirmed to be poorly differentiated adenocarcinoma, 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  · Pathology confirmed well-differentiated hepatocellular carcinoma  · Patient was started on chemotherapy with FOLFIRINOX, but did not tolerated well due to weight loss and poor performance status:  Chemotherapy postponed until 06/08/2022  · CT chest and MRI abd/pelvis: Dominant tumor mass in the posterior dome of the right hepatic lobe appears slightly smaller when compared across modalities to February 23, 2022  Interval development of a small volume of upper abdominal ascites  Bulky upper retroperitoneal, gastrohepatic ligament, periportal, and periceliac bina tumor has significantly progressed when compared to February 23, 2022  Slight progression of osseous metastatic disease as described    · Outpatient Oncology follow-up    Loose stools  Assessment & Plan  · Patient was noted to have loose stools after admission  · C diff, stool enteric pathogens negative  · Likely element of re-feeding syndrome  · Has since improved    Hypokalemia  Assessment & Plan  · Patient with hypokalemia, hypomagnesemia, hypophosphatemia  · Likely due to severe protein calorie malnutrition  · Monitor for refeeding syndrome since tube feedings have been started    Dysphagia  Assessment & Plan  · Patient was diagnosed with Dysphagia in setting of history of laryngeal cancer with previous radiation therapy  · CT chest shows mild esophageal thickening, possible esophagitis  · Continue Pepcid 20 mg b i d  Via G-tube  · Patient was evaluated by speech therapy:  Patient underwent VBS:  Current recommendations for pureed diet with honey thick liquids, however patient vomits after eating or drinking so he will be maintained on G-tube feeding    Cancer related pain  Assessment & Plan  · Patient has history of chronic pain secondary to cancer with continuous opioid dependence  · Followed outpatient by palliative care, and prior to admission was maintained on morphine ER 30mg BID and MSIR 30mg PO q4H prn  · Patient continues to vomit up his oral pills, no reliable absorption:  Patient was changed to morphine immediate release solution 30 mg q 8 hours scheduled for basal pain control, plus morphine or oral solution 30 mg q 4 hours p r n  Breakthrough pain  · Patient not a candidate for fentanyl transdermal patch due to cachexia  · Pain controlled on this regimen:  Patient relates he is not having significant pain so he requests his pain medication be changed to only p r n  and not scheduled  · Appreciate palliative care team management    Hypothyroidism  Assessment & Plan  Continue Synthroid    Hypertension  Assessment & Plan  · Patient with previous history of essential hypertension  · Home medications include Cardizem  mg daily and nadolol 40 mg daily  · Patient with low-normal blood pressure on admission so cardiazem was d/c'd  · Pt has been vomiting up G tube medication- nadalol  · Changed to iv metoprolol instead  · Continue monitor blood pressure and titrate as needed    Hepatic cirrhosis (HCC)  Assessment & Plan  · History of hep C with alcoholic liver cirrhosis  LFTs are within normal limits  · MRI abdomen with without contrast showed dominant tumor mass in the posterior dome of the right hepatic lobe appears slightly smaller when compared across modalities to February 23, 2022  Interval development of a small volume of upper abdominal ascites      H/O laryngeal cancer  Assessment & Plan  · Patient has a history of squamous cell carcinoma of oropharynx, stage IV A, with ipsilateral cervical lymph node metastasis treated by concurrent chemoradiation with high-dose cisplatin  · On Sorafenib from 10/18-3/22  · Continue outpatient follow-up    Gastroesophageal reflux disease with esophagitis  Assessment & Plan  PPI IV bid      Leukocytosis  Due to steroids  No evidence infection    Family:  Called pts significant other Alfonzo Hunt and gave update    VTE Pharmacologic Prophylaxis: Enoxaparin (Lovenox)  VTE Mechanical Prophylaxis: sequential compression device        Certification Statement: The patient will continue to require additional inpatient hospital stay due to need for further acute intervention for nausea/vomiting, intolerance to G-tube medications    Status: inpatient     ===================================================================    Subjective:  Patient notes he felt better today  He did not have any nausea with the tube feedings  He notes when he gets medication boluses through the G-tube he develops nausea  He notes some mild bloating, however he states he does not have significant discomfort and does not need the tube feedings paused  He denies any current pain anywhere  He notes his pain is controlled on the current regimen  He denies any shortness of breath or cough  He notes generalized weakness and fatigue      Physical Exam:   Temp:  [97 7 °F (36 5 °C)-97 8 °F (36 6 °C)] 97 8 °F (36 6 °C)  HR:  [68-79] 79  Resp:  [18-20] 18  BP: (113-140)/(56-81) 140/81    Gen:  Pleasant, non-tachypnic, non-dyspnic  Conversant  Heart: regular rate and rhythm, S1S2 present, no murmur, rub or gallop  Lungs: clear to ausculatation bilaterally  No wheezing, crackles, or rhonchi  No accessory muscle use or respiratory distress  Abd: soft, non-tender, non-distended  NABS, no guarding, rebound or peritoneal signs  Extremities: no clubbing, cyanosis or edema  2+pedal pulses bilaterally  Full range of motion  Neuro: awake, alert  Fluent speech    Interactive      LABS:   Results from last 7 days   Lab Units 05/27/22  0702 05/24/22  0641 05/23/22  0932   WBC Thousand/uL 17 96* 9 13 7 61   HEMOGLOBIN g/dL 12 1 11 0* 11 7* HEMATOCRIT % 37 6 33 6* 35 5*   PLATELETS Thousands/uL 252 193 208     Results from last 7 days   Lab Units 05/27/22  0702 05/26/22  0619 05/25/22  0600   POTASSIUM mmol/L 4 2 3 1* 3 0*   CHLORIDE mmol/L 117* 112* 111*   CO2 mmol/L 18* 20* 20*   BUN mg/dL 17 16 13   CREATININE mg/dL 0 90 0 71 0 78   CALCIUM mg/dL 7 3* 7 1* [facility]:    5/21 MRI abdomen: In response to the clinical question, no evidence for omental carcinomatosis  Hepatic cirrhosis   Dominant tumor mass in the posterior dome of the right hepatic lobe appears slightly smaller when compared across modalities to February 23, 2022   Interval development of a small volume of upper abdominal ascites  Bulky upper retroperitoneal, gastrohepatic ligament, periportal, and periceliac bina tumor has significantly progressed when compared to February 23, 2022  Slight progression of osseous metastatic disease as described     5/20: CT chest   1   Stable subcentimeter pulmonary nodules   The largest measures 5 mm within the lingula   No new or enlarging pulmonary nodules  2   Peripherally sclerotic lytic focus within rib 4 on the right measuring 8 mm   A questionable lucency is barely perceptible on prior exam of July 2021  Edel Ty is indeterminate but may represent a treated metastatic lesion  Tan Gavel is an additional   subcentimeter lucency within the posterior aspect of rib 7 on the left which is new from prior exams and suspicious for metastasis  3   Cirrhotic liver morphology with trace perihepatic ascites  Tan Gavel is an ill-defined hypodensity within segment 7 corresponding to patient's known liver lesion  Vicki Pitch is suboptimally evaluated on this noncontrast study  4   Partially visualized soft tissue densities along the gastrohepatic ligament corresponding to gastrohepatic lymphadenopathy seen on prior exams   Visualization is limited by streak artifact from ingested barium    5   Wall thickening involving the mid to distal esophagus which may be due to underdistention versus nonspecific esophagitis      Procedure  5/23 IR: PRUDENCE tube placement        ---------------------------------------------------------------------------------------------------------------  This note has been constructed using a voice recognition system

## 2022-05-27 NOTE — CASE MANAGEMENT
Case Management Progress Note    Patient name Gissell De Leon  Location [facility] /E2 -* MRN [id number]  : 1955 Date 2022       LOS (days): 7  Geometric Mean LOS (GMLOS) (days): 4 80  Days to GMLOS:-2 6        OBJECTIVE:        Current admission status: Inpatient  Preferred Pharmacy:   [address] [facility],  [address] ,  [facility] [address]   Phone: [phone] Fax: [facility], [address]  [address] [facility]  [address]  [address] [address]  Phone: [phone] Fax: [phone], [address] [facility] [address] [address]  Phone: [phone] Fax: [phone]    Primary Care Provider: PRINCE Ortiz    Primary Insurance: [facility] DUAL COMPLETE General acute hospital HOSPITAL REP  Secondary Insurance: [address] [facility]    PROGRESS NOTE:    CM discussed STR placement would not be recommended by PT/OT, as he is walking fully w/ walker  Pt would be more appropriate w/ home health PT/OT  Pt understood, wanted CM to contact konrad to let her know  CM contacted pt konrad to state the same, konrad agreeable and understood better need for home health  Pt & eusebioance both stated they wanted to take the weekend to discuss together, will want to [place] back next week

## 2022-05-27 NOTE — PLAN OF CARE
PT tolerating TF, not consuming po, will increase Jevity 1 2 to 60mL/hr, water flushes 100mL every 4hrs, provides 1728cal, 79g pro, 1762mL  Can eventually transition to bolus feeds Jevity 1 2 1 can/237mL 6 x day (8am, 11am, 1pm, 3pm, 5pm, 8pm), water flushes 50mL before and after each bolus will provide 1706cal, 79g pro, 1747mL  Problem: Nutrition/Hydration-ADULT  Goal: Nutrient/Hydration intake appropriate for improving, restoring or maintaining nutritional needs  Description: Monitor and assess patient's nutrition/hydration status for malnutrition  Collaborate with interdisciplinary team and initiate plan and interventions as ordered  Monitor patient's weight and dietary intake as ordered or per policy  Utilize nutrition screening tool and intervene as necessary  Determine patient's food preferences and provide high-protein, high-caloric foods as appropriate       INTERVENTIONS:  - Monitor oral intake, urinary output, labs, and treatment plans  - Assess nutrition and hydration status and recommend course of action  - Evaluate amount of meals eaten  - Assist patient with eating if necessary   - Allow adequate time for meals  - Recommend/ encourage appropriate diets, oral nutritional supplements, and vitamin/mineral supplements  - Order, calculate, and assess calorie counts as needed  - GI consult for possible PEG tube/ IR consult for G-tube placement  - Initiate tubefeeding, Jevity 1 2 at 20 ml/hr with 60 ml water flush every 4 hours, if tolerating, increase hourly rate by 10 mls every 8 hours until at goal rate of 60 ml/hr  - Assess need for intravenous fluids  - Provide specific nutrition/hydration education as appropriate  - Include patient/family/caregiver in decisions related to nutrition  Outcome: Progressing

## 2022-05-27 NOTE — ASSESSMENT & PLAN NOTE
· Patient has a history of hepatocellular carcinoma, followed by Dr Ariadna Husain  · Developed  bulky abdominal adenopathy in January 2022 which was confirmed to be poorly differentiated adenocarcinoma, 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  · Pathology confirmed well-differentiated hepatocellular carcinoma  · Patient was started on chemotherapy with FOLFIRINOX, but did not tolerated well due to weight loss and poor performance status:  Chemotherapy postponed until 06/08/2022  · CT chest and MRI abd/pelvis: Dominant tumor mass in the posterior dome of the right hepatic lobe appears slightly smaller when compared across modalities to February 23, 2022  Interval development of a small volume of upper abdominal ascites  Bulky upper retroperitoneal, gastrohepatic ligament, periportal, and periceliac bina tumor has significantly progressed when compared to February 23, 2022  Slight progression of osseous metastatic disease as described    · Outpatient Oncology follow-up

## 2022-05-27 NOTE — PHYSICAL THERAPY NOTE
Physical Therapy Treatment Note     05/27/22 1047   PT Last Visit   PT Visit Date 05/27/22   Note Type   Note Type Treatment   Pain Assessment   Pain Assessment Tool 0-10   Pain Score 3   Pain Location/Orientation Location: Generalized   Restrictions/Precautions   Weight Bearing Precautions Per Order No   Other Precautions Fall Risk;Pain;Multiple lines; Bed Alarm   General   Chart Reviewed Yes   Subjective   Subjective pt  agreeable to PT  Reported no nausea or vomitting this day   Bed Mobility   Supine to Sit 6  Modified independent   Additional items HOB elevated; Bedrails; Increased time required   Sit to Supine 6  Modified independent   Additional items HOB elevated; Bedrails; Increased time required   Transfers   Sit to Stand 5  Supervision   Additional items Increased time required   Stand to Sit 5  Supervision   Additional items Increased time required   Stand pivot 5  Supervision   Additional items Increased time required   Ambulation/Elevation   Gait pattern Excessively slow; Short stride   Gait Assistance 5  Supervision   Additional items Assist x 1   Assistive Device Rolling walker   Distance 360ft   Stair Management Assistance 5  Supervision   Additional items Assist x 1; Increased time required   Stair Management Technique Two rails; Alternating pattern; Step to pattern;Reciprocal;Nonreciprocal;Foreward   Number of Stairs 10  (on training stairs)   Balance   Static Sitting Good   Dynamic Sitting Fair +   Static Standing Fair   Dynamic Standing Fair -   Ambulatory Fair -   Endurance Deficit   Endurance Deficit Yes   Endurance Deficit Description Fatigue   Activity Tolerance   Activity Tolerance Patient tolerated treatment well   Nurse Made Aware yes   Assessment   Prognosis Guarded   Problem List Decreased endurance;Decreased mobility;Pain;Decreased strength   Assessment pt  continues with decreased strength in LLE and cues given to take one step at a time and to lead with RLE while ascending and to lead with LLE while descending the steps  Pt  noted to have increased difficulty negotiating higher step though no physical assist was needed  pt  reported fatigue at the end of session  Pt  needed no hands on A for mobility however all tasks completed at a very slow pace  Noted safe technqiues t/o session  Will continue and progress patient per current PT POC  Barriers to Discharge None   Goals   Patient Goals None reported   STG Expiration Date 06/06/22   PT Treatment Day 2   Plan   Treatment/Interventions Functional transfer training; Therapeutic exercise;Patient/family training;Equipment eval/education; Bed mobility;Gait training;Spoke to nursing;Spoke to case management   Progress Progressing toward goals   PT Frequency 3-5x/wk   Recommendation   PT Discharge Recommendation Home with home health rehabilitation   Equipment Recommended [address]   Turning in Bed Without Bedrails 4   Lying on Back to Sitting on Edge of Flat Bed 4   Moving Bed to Chair 4   Standing Up From Chair 4   Walk in Room 4   Climb 3-5 Stairs 4   Basic Mobility Inpatient Raw Score 24   Basic Mobility Standardized Score 57 68   Highest Level Of Mobility   JH-HLM Goal 8: Walk 250 feet or more   JH-HLM Achieved 8: Walk 250 feet ot more   End of Consult   Patient Position at End of Consult Supine;Bed/Chair alarm activated; All needs within reach         Blake Tamayo PTA    An AM-PAC basic mobility standardized score less than 42 9 suggest the patient may benefit from discharge to post-acute rehab services

## 2022-05-27 NOTE — ASSESSMENT & PLAN NOTE
· Patient was noted to have loose stools after admission  · C diff, stool enteric pathogens negative  · Likely element of re-feeding syndrome  · Has since improved

## 2022-05-27 NOTE — ASSESSMENT & PLAN NOTE
· Patient with previous history of essential hypertension  · Home medications include Cardizem  mg daily and nadolol 40 mg daily  · Patient with low-normal blood pressure on admission so cardiazem was d/c'd  · Pt has been vomiting up G tube medication- nadalol  · Changed to iv metoprolol instead  · Continue monitor blood pressure and titrate as needed

## 2022-05-27 NOTE — ASSESSMENT & PLAN NOTE
Patient is a 40-year-old male with history of hepatocellular carcinoma, recently receiving chemotherapy, who presented to the ER with with poor p o  intake, decreased energy, worsening generalized weakness, weight loss and cachexia    Patient with severe protein calorie malnutrition due to poor p o  Intake due to malignancy, decreased appetite, and vomiting after eating  BMI 16  · History dysphagia, reports poor appetite, weight loss, having difficulty swallowing food, and notes any food or medications he eats, are quickly vomited back out  · Speech therapy team evaluated patient:  Based on VBS--> recommend pureed diet with honey thickened liquid  · GI consulted regarding PEG tube, patient not a candidate for PEG tube due to hx of radiation and risk of bleeding via EGD  · IR placed G-tube 5/20  · G tube feedings were ordered per Dietitian's Recommendations  If patient tolerates this will advance by 10 mL q 8 hours to a goal of 60 mL  Water flush 100 q 4 hours  · Patient wishes to be discharged on bolus feeding, which he used in the past:  Dietitian recommends 1 can 6 times a day to meet nutritional needs   · Patient with diarrhea, monitor for refeeding syndrome  · Continue to replete electrolytes  · Pt vomiting up medication boluses given via G tube-- >will change all medication possible to iv or topical  · He is tolerating TF at 50cc/h  He is still not tolerating any medication boluses given through G tube: Attempting to give all meds IV for now    Likely due to prolonged malnutrition and not eating, and decreased gastric functioning/capacity  · scheduled reglan qid as his QT has stabilized

## 2022-05-27 NOTE — PROGRESS NOTES
Progress Note- Noah Cobb 79 y o  male MRN: [id number]    Unit/Bed#: E2 -01 Encounter: [id number]      Assessment and Plan:    Nausea and vomiting:  He reports having nausea and vomiting whenever he receives his medications ground up and pushed through his G-tube  This could be due to an intolerance of his medications themselves or partial small-bowel obstruction or ileus due to his probable peritoneal carcinomatosis  Please try to change any of his solid ground up medications into liquid formulations  Please also stop giving potassium through the G-tube and given intravenously instead  If his symptoms persist, let us obtain a tube study to assess for the passage of contrast from his stomach through his small bowel  If there is evidence of partial small-bowel obstruction or significant ileus, he may require placement of a jejunal tube instead     ______________________________________________________________________    Subjective:     He has been tolerating his tube feeds but not the ground up medications when they are pushed into his G tube  He has nausea and vomiting soon after receiving his crushed up medications  He denies bleeding, change in bowel habits, and weight loss      Medication Administration - last 24 hours from 05/25/2022 2233 to 05/26/2022 2233       Date/Time Order Dose Route Action Action by     05/26/2022 1723 ondansetron (ZOFRAN) injection 4 mg 4 mg Intravenous Given Jerica Webb RN     05/26/2022 1243 ondansetron (ZOFRAN) injection 4 mg 4 mg Intravenous Given Jerica Webb RN     05/26/2022 0620 ondansetron (ZOFRAN) injection 4 mg 4 mg Intravenous Given Jose E Patten RN     05/26/2022 1431 dexamethasone (DECADRON) tablet 0 5 mg 0 5 mg Per G Tube Given Jerica Webb RN     05/26/2022 0949 dexamethasone (DECADRON) tablet 0 5 mg 0 5 mg Per G Tube Given Jerica Webb RN     05/26/2022 7059 levothyroxine tablet 112 mcg 112 mcg Per G Tube Given Jose E Patten RN 05/26/2022 0941 enoxaparin (LOVENOX) subcutaneous injection 40 mg 40 mg Subcutaneous Given Jodie Doty, [place]     05/26/2022 1724 famotidine (PEPCID) oral suspension 20 mg 20 mg Per G Tube Given Jodie Doty RN     05/26/2022 7331 famotidine (PEPCID) oral suspension 20 mg 20 mg Per G Tube Given Jodie Doty RN     05/26/2022 2136 Morphine Sulfate (Concentrate) oral concentrated solution 30 mg 30 mg Per G Tube Given Celine Forrester, [place]     05/26/2022 1431 Morphine Sulfate (Concentrate) oral concentrated solution 30 mg 30 mg Per G Tube Given Jodie Doty RN     05/26/2022 7408 Morphine Sulfate (Concentrate) oral concentrated solution 30 mg 30 mg Per G Tube Given Celine Forrester, RN     05/26/2022 2149 sodium chloride 0 9 % with KCl 40 mEq/L infusion (premix) 75 mL/hr Intravenous [facility] GEORGINA French     05/26/2022 1572 sodium chloride 0 9 % with KCl 40 mEq/L infusion (premix) 75 mL/hr Intravenous [address] Jodie Doty RN     05/26/2022 1723 potassium chloride oral solution 40 mEq 40 mEq Per G Tube Given Jodei Doty RN     05/26/2022 0941 potassium chloride oral solution 40 mEq 40 mEq Per G Tube Given Jodie Doty RN     05/26/2022 1244 nadolol (CORGARD) tablet 40 mg 40 mg Per G Tube Given Jodie Doty RN     05/26/2022 2137 pantoprazole (PROTONIX) injection 40 mg 40 mg Intravenous Given Celine Forrester RN     05/26/2022 1852 metoclopramide (REGLAN) injection 10 mg 10 mg Intravenous Given Jodie Doty RN          Objective:     Vitals: Blood pressure 125/73, pulse 62, temperature (!) 97 2 °F (36 2 °C), temperature source Temporal, resp  rate 20, height 5' 8" (1 727 m), weight 50 1 kg (110 lb 7 2 oz), SpO2 96 %  ,Body mass index is 16 79 kg/m²        Intake/Output Summary (Last 24 hours) at 5/26/2022 2233  Last data filed at 5/26/2022 1801  Gross per 24 hour   Intake 1080 ml   Output --   Net 1080 ml       Physical Exam:   General Appearance: Awake and alert, in no acute distress  Abdomen: Soft, G tube site c/d/i, mildly distended, nontender; bowel sounds normal; no masses or no organomegaly    Invasive Devices  Report    Central Venous Catheter Line  Duration           Port A Cath 03/25/22 Right Chest 62 days          Drain  Duration           Gastrostomy/Enterostomy Percutaneous Interventional Gastrostomy 18 Fr  LUQ 3 days                Lab Results:  No results displayed because visit has over 200 results  Imaging Studies: I have personally reviewed pertinent imaging studies

## 2022-05-27 NOTE — PLAN OF CARE
Problem: PHYSICAL THERAPY ADULT  Goal: Performs mobility at highest level of function for planned discharge setting  See evaluation for individualized goals  Description: Treatment/Interventions: Functional transfer training, LE strengthening/ROM, Elevations, Therapeutic exercise, Endurance training, Patient/family training, Bed mobility, Gait training, Spoke to nursing, OT  Equipment Recommended: Salomon Garcia Other (Comment) ( and [facility])       See flowsheet documentation for full assessment, interventions and recommendations  Outcome: Progressing  Note: Prognosis: Guarded  Problem List: Decreased endurance, Decreased mobility, Pain, Decreased strength  Assessment: pt  continues with decreased strength in LLE and cues given to take one step at a time and to lead with RLE while ascending and to lead with LLE while descending the steps  Pt  noted to have increased difficulty negotiating higher step though no physical assist was needed  pt  reported fatigue at the end of session  Pt  needed no hands on A for mobility however all tasks completed at a very slow pace  Noted safe technqiues t/o session  Will continue and progress patient per current PT POC  Barriers to Discharge: None        PT Discharge Recommendation: Home with home health rehabilitation          See flowsheet documentation for full assessment

## 2022-05-27 NOTE — ASSESSMENT & PLAN NOTE
· Patient has history of chronic pain secondary to cancer with continuous opioid dependence  · Followed outpatient by palliative care, and prior to admission was maintained on morphine ER 30mg BID and MSIR 30mg PO q4H prn  · Patient continues to vomit up his oral pills, no reliable absorption:  Patient was changed to morphine immediate release solution 30 mg q 8 hours scheduled for basal pain control, plus morphine or oral solution 30 mg q 4 hours p r n  Breakthrough pain  · Patient not a candidate for fentanyl transdermal patch due to cachexia  · Pain controlled on this regimen:  Patient relates he is not having significant pain so he requests his pain medication be changed to only p r n  and not scheduled    · Appreciate palliative care team management

## 2022-05-27 NOTE — PROGRESS NOTES
Progress Note - Palliative & Supportive Care  Kalvin Koyanagi  79 y o   male  Isabela 2 /E2 MS 46-*   MRN: [id number]  Encounter: [id number]     ASSESSMENT:    Patient Active Problem List   Diagnosis    Diabetes mellitus, type II ([facility] [facility]    Erectile dysfunction    Gastroesophageal reflux disease with esophagitis    H/O laryngeal cancer    Hepatic cirrhosis ([facility] [facility]    Herpes simplex infection    Hypertension    Hypothyroidism    [facility]    Colon cancer screening declined    Platelets decreased [facility]    Sciatica of left side    Low back pain    COVID-19    History of 2019 novel coronavirus disease (COVID-19)    Severe protein-calorie malnutrition ([facility]    Hepatocellular carcinoma [facility]    Chemotherapy induced neutropenia ([facility]    Cancer related pain    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)    Pain of upper abdomen    Drug induced constipation    Counseling regarding advanced care planning and goals of care    Port-A-Cath in place    Neoplastic malignant related fatigue    Cancer cachexia ([facility]    Dysgeusia    Poor appetite    Nausea & vomiting    Dysphagia    Hypokalemia    Loose stools       Active problems addressed:  Pancreatic adenocarcinoma  Cancer associated pain  Opioid dependence, not complicated  Nausea and vomiting  Diarrhea  Cancer related fatigue  Severe protein calorie malnutrition  S/p IR G tube 5/20  Palliative care patient  Goals of care    PLAN:    1  Symptom management:  MAR: no prn use since starting ATC liquid morphine  Also refused a dose overnight due to bearable pain  Patient now wants to switched from scheduled to prn      D/c MSIR liquid 30mg q8H ATC with hold parameters    Continue MSIR liquid 30mg q4H prn severe pain with hold parameters - none used overnight   Morphine 8mg IV q4H pnr BT pain (linked with severe pain) with hold parameters - none used overnight   Narcan prn   Can trial scop patch if with continued nausea, agree with steroids   D/w Dr Jeannie Montelongo will resume services on Tuesday, 5/31  Please contact on-call provider via TT if needing assistance  Morphine oral concentrate already sent and available for  at [facility]    2  Goals:    Treatment-focused  Hoping to get enough energy from tube feedings to continue with chemo  He has an OP onc appt on 6/3   Patient already has an OP f/u with me on 6/13  We will keep this appt    Code status: Level 1 - Full Code   Decisional apparatus:  Patient does have capacity to make medical decisions on my exam today  If such capacity is lost, patient's substitute decision maker would default to sig other/Arbil via 5 Wishes by [place]  Advance Directive / Living Will / POLST:  5 Wishes completed 5/5/2022  3  Prognosis: guarded    We appreciate the opportunity to participate in this patient's care  We will continue to follow  Please do not hesitate to contact our on-call provider through our clinic answering service at [phone] should you have acute symptom control concerns  INTERVAL HISTORY:  Chart reviewed  No acute events overnight  MAR reviewed  Patient was seen on the bedside commode  He asked me to stay while he was on the commode  Partner/Abril was also at bedside  He appears comfortable  He reports pain is well controlled with current regimen  He requests to stop the scheduled morphine and just do prn  He reports no vomiting today since his meds were switched to IV/liquid  Review of Systems   Constitutional: Positive for activity change, appetite change, fatigue and unexpected weight change  HENT: Positive for trouble swallowing  Respiratory: Negative for shortness of breath  Cardiovascular: Negative for chest pain  Gastrointestinal: Positive for abdominal pain  Negative for constipation, diarrhea, nausea and vomiting  Neurological: Negative for weakness     Psychiatric/Behavioral: Negative for sleep disturbance  The patient is not nervous/anxious  All other systems reviewed and are negative  MEDICATIONS / ALLERGIES:  all current active meds have been reviewed    No Known Allergies    OBJECTIVE:  /56   Pulse 71   Temp 97 7 °F (36 5 °C) (Temporal)   Resp 20   Ht 5' 8" (1 727 m)   Wt 50 1 kg (110 lb 7 2 oz)   SpO2 97%   BMI 16 79 kg/m²   Physical Exam:    Physical Exam  Constitutional:       General: He is not in acute distress  Appearance: He is ill-appearing  He is not toxic-appearing or diaphoretic  Comments: cachectic   HENT:      Head:      Comments: Temporal periorbital and submaxillary mm wasting  Eyes:      General: No scleral icterus  Pulmonary:      Effort: Pulmonary effort is normal  No respiratory distress  Abdominal:      General: Abdomen is flat  There is no distension  Skin:     Coloration: Skin is pale  Skin is not jaundiced  Comments: ashen   Neurological:      Mental Status: He is alert and oriented to person, place, and time  Psychiatric:         Mood and Affect: Mood normal          Behavior: Behavior normal          Thought Content: Thought content normal          Judgment: Judgment normal          Lab Results:   I have personally reviewed pertinent labs  Imaging Studies: I have personally reviewed pertinent reports  EKG, Pathology, and Other Studies: I have personally reviewed pertinent reports  Counseling / Coordination of Care  Total floor / unit time spent today 25 minutes  Greater than 50% of total time was spent with the patient and / or family counseling and / or coordination of care  A description of the counseling / coordination of care: provided medical updates, discussed palliative care and hospice care, determined competency, determined goals of care, determined POA, determined social/family support, discussed plans of care, discussed symptom management, provided psychosocial support      MD Bia Rosario [facility] Palliative and Supportive Care

## 2022-05-28 NOTE — PROGRESS NOTES
Progress Note- Harle Side 79 y o  male MRN: [id number]    Unit/Bed#: E2 -01 Encounter: [id number]      Assessment and Plan:    Nausea and vomiting:  He reports having nausea and vomiting whenever he receives his medications ground up and pushed through his G-tube  This could be due to an intolerance of his medications themselves or partial small-bowel obstruction or ileus due to his probable peritoneal carcinomatosis  Fortunately his symptoms appear to have resolved after changing his medications to intravenous formulations or liquid formulations and in particular changing his potassium chloride to an IV infusion instead of he had his PEG tube  If his symptoms persist, we will obtain a tube study to assess for the passage of contrast from his stomach through his small bowel  If there is evidence of partial small-bowel obstruction or significant ileus, he may require placement of a jejunal tube instead     ______________________________________________________________________    Subjective: We switched his potassium to IV as that may have been contributing to his nausea and vomiting after receiving medications through his PEG tube  We also changed his many other medications to liquid formulations or intravenous formulations and he feels his nausea and vomiting has resolved since then      Medication Administration - last 24 hours from 05/26/2022 2155 to 05/27/2022 2155       Date/Time Order Dose Route Action Action by     05/27/2022 0641 levothyroxine tablet 112 mcg 112 mcg Per G Tube Given Clair Velásquez RN     05/27/2022 8426 enoxaparin (LOVENOX) subcutaneous injection 40 mg 40 mg Subcutaneous Given Henrietta Churchill RN     05/27/2022 9730 Morphine Sulfate (Concentrate) oral concentrated solution 30 mg 30 mg Per G Tube Given Clair Velásquez RN     05/27/2022 1847 Morphine Sulfate (Concentrate) oral concentrated solution 30 mg   Per G Tube See Alternative Henrietta Churchill RN     05/27/2022 4997 Morphine Sulfate (Concentrate) oral concentrated solution 30 mg   Per G Tube See Alternative April Keri, GE[place]GINA     05/27/2022 1847 morphine (PF) 4 mg/mL injection 8 mg   Intravenous Canceled Entry April Saavedra, GE[place]A     05/27/2022 1522 morphine (PF) 4 mg/mL injection 8 mg 8 mg Intravenous Given April Saavedra, GEORGINA     05/27/2022 1118 sodium chloride 0 9 % with KCl 40 mEq/L infusion (premix) 75 mL/hr Intravenous [address] April Saavedra, RN     05/27/2022 2022 dexamethasone (DECADRON) injection 2 mg 2 mg Intravenous Given Clarissa , GE[place]A     05/27/2022 6977 dexamethasone (DECADRON) injection 2 mg 2 mg Intravenous Given April Saavedra, RN     05/27/2022 2022 pantoprazole (PROTONIX) injection 40 mg 40 mg [facility]     05/27/2022 0832 pantoprazole (PROTONIX) injection 40 mg 40 mg Intravenous Given April Saavedra, GEORGINA     05/27/2022 1849 metoprolol (LOPRESSOR) injection 2 5 mg 2 5 mg Intravenous Given April Saavedra, RN     05/27/2022 1125 metoprolol (LOPRESSOR) injection 2 5 mg 2 5 mg Intravenous Given April Saavedra, RN     05/27/2022 0640 metoprolol (LOPRESSOR) injection 2 5 mg 2 5 mg Intravenous Given Colettemeagan Ryland, RN     05/27/2022 0009 metoprolol (LOPRESSOR) injection 2 5 mg 2 5 mg Intravenous Given Dat Marcelino, RN     05/27/2022 1849 metoclopramide (REGLAN) injection 10 mg 10 mg Intravenous Given April Keri, RN     05/27/2022 1125 metoclopramide (REGLAN) injection 10 mg 10 mg Intravenous Given April Saavedra, RN     05/27/2022 0640 metoclopramide (REGLAN) injection 10 mg 10 mg Intravenous Given Dat Marcelino, RN     05/27/2022 0009 metoclopramide (REGLAN) injection 10 mg 10 mg Intravenous Given Colette Ryland, RN     05/27/2022 2022 scopolamine (TRANSDERM-SCOP) 1 mg/3 days TD 72 hr patch 1 patch 1 patch Transdermal Medication Applied Clarissa Davila RN          Objective:     Vitals: Blood pressure 140/81, pulse 79, temperature 97 8 °F (36 6 °C), temperature source Temporal, resp  rate 18, height 5' 8" (1 727 m), weight 50 1 kg (110 lb 7 2 oz), SpO2 100 %  ,Body mass index is 16 79 kg/m²  Intake/Output Summary (Last 24 hours) at 5/27/2022 2155  Last data filed at 5/27/2022 1118  Gross per 24 hour   Intake 1896 25 ml   Output --   Net 1896 25 ml       Physical Exam:   General Appearance: Awake and alert, in no acute distress  Abdomen: Soft, G tube site c/d/i, mildly distended; bowel sounds normal; no masses or no organomegaly    Invasive Devices  Report    Central Venous Catheter Line  Duration           Port A Cath 03/25/22 Right Chest 63 days          Drain  Duration           Gastrostomy/Enterostomy Percutaneous Interventional Gastrostomy 18 Fr  LUQ 4 days                Lab Results:  No results displayed because visit has over 200 results  Imaging Studies: I have personally reviewed pertinent imaging studies

## 2022-05-28 NOTE — PROGRESS NOTES
Progress Note - Fran Lincoln 79 y o  male MRN: [id number]    Unit/Bed#: E2 -01 Encounter: [id number]    Assessment/Plan:    Dysphagia    G-tube placed, continue residuals and nausea vomiting, if persists may need placement of a jejunal tube, continue Reglan decreased tube feeds to 50 mL/hour, monitor residuals with GI    Hepatocellular carcinoma  treatment per oncology    Severe protein calorie malnutrition working with tube feedings and nutrition monitor caloric intake    Hypokalemia    corrected potassium 4 7 today    Hypothyroid    continue Synthroid    Hepatic cirrhosis   history of hepatitis C and alcohol use, MRI reveals dominant tumor mass slightly smaller with ascites    GERD     continue PPI    Subjective:   Nausea today, denies chest pain or shortness of breath no fevers chills no energy/weak      Objective:     Vitals: Blood pressure 110/51, pulse 82, temperature 98 1 °F (36 7 °C), temperature source Temporal, resp  rate 20, height 5' 8" (1 727 m), weight 50 1 kg (110 lb 7 2 oz), SpO2 97 %  ,Body mass index is 16 79 kg/m²        Results from last 7 days   Lab Units 05/27/22  0702   WBC Thousand/uL 17 96*   HEMOGLOBIN g/dL 12 1   HEMATOCRIT % 37 6   PLATELETS Thousands/uL 252     Results from last 7 days   Lab Units 05/28/22  0621   POTASSIUM mmol/L 4 7   CHLORIDE mmol/L 118*   CO2 mmol/L 15*   BUN mg/dL 22   CREATININE mg/dL 1 28   CALCIUM mg/dL 7 4*       Scheduled Meds:  Current Facility-Administered Medications   Medication Dose Route Frequency Provider Last Rate    acetaminophen  650 mg Per G Tube Q6H PRN Urban Walker MD      albuterol  2 puff Inhalation Q4H PRN PRINCE Berger      aluminum-magnesium hydroxide-simethicone  30 mL Per G Tube Q6H PRN Urban Walker MD      dexamethasone  2 mg Intravenous Q12H Lily Arnold MD      enoxaparin  40 mg Subcutaneous Q24H Lily Arnold MD      levothyroxine  112 mcg Per G Tube Early Morning MD Leslie Murcia metoclopramide  10 mg Intravenous Q6H Abhishek Winkler MD      metoprolol  2 5 mg Intravenous Q6H Joseph Bauer MD      Morphine Sulfate (Concentrate)  30 mg Per G Tube Q4H PRN Dante Díaz MD      Or   Deandre Dolan morphine injection  8 mg Intravenous Q4H PRN Dante Díaz MD      naloxone  0 04 mg Intravenous Q1MIN PRN Dawn Jaramillo MD      ondansetron  4 mg Intravenous Q6H PRN Mariela Ellison MD      pantoprazole  40 mg Intravenous Q12H Abhishek Winkler MD      scopolamine  1 patch Transdermal Q72H Joseph Bauer MD      sodium chloride 0 9 % with KCl 40 mEq/L  75 mL/hr Intravenous Continuous Joseph Bauer MD 75 mL/hr (05/28/22 0947)       Continuous Infusions:  sodium chloride 0 9 % with KCl 40 mEq/L, 75 mL/hr, Last Rate: 75 mL/hr (05/28/22 0947)          Physical exam:  General appearance:  Alert frail cachectic no distress interaction appropriate  Head/Eyes:  Nonicteric sluggish  Neck:  Supple  Lungs:  Decreased BS bilateral no wheezing rhonchi or rales  Heart: normal S1 S2 regular  Abdomen: Soft nontender with bowel sounds  Extremities:  2 to 3+ edema  Skin: no rash    Invasive Devices  Report    Central Venous Catheter Line  Duration           Port A Cath 03/25/22 Right Chest 63 days          Drain  Duration           Gastrostomy/Enterostomy Percutaneous Interventional Gastrostomy 18 Fr  LUQ 4 days                  VTE Pharmacologic Prophylaxis:  Lovenox      Counseling / Coordination of Care  Total floor / unit time spent today 30  minutes  Greater than 50% of total time was spent with the patient and / or family counseling and / or coordination of care    A description of the counseling / coordination of care:

## 2022-05-29 NOTE — DISCHARGE SUMMARY
Discharge Summary - Medical Sushma Carmichael 79 y o  male MRN: [id number]    [address] [address] MED SURG Room / Bed: [address] [address] /E2 -* Encounter: [id number]    BRIEF OVERVIEW    Admitting Provider: No admitting provider for patient encounter  Discharge Provider: DO Bo  Admission Date: 5/20/2022     Discharge Date: No discharge date for patient encounter    Primary Care Physician at Discharge: Rossana Garcia, [address]    Primary Discharge Diagnosis    Metastatic hepatocellular carcinoma  Hepatic cirrhosis    Other Problems Addressed  Patient Active Problem List    Diagnosis Date Noted    Leukocytosis 05/27/2022    Loose stools 05/22/2022    Dysphagia 05/20/2022    Hypokalemia 05/20/2022    Nausea & vomiting 05/10/2022    Port-A-Cath in place 04/07/2022    Neoplastic malignant related fatigue 04/07/2022    Cancer cachexia ([facility] [address] ) 04/07/2022    Dysgeusia 04/07/2022    Poor appetite 04/07/2022    Cancer related pain 03/24/2022    Secondary malignant neoplasm of retroperitoneal lymph nodes ([address] ) 03/24/2022    Pain of upper abdomen 03/24/2022    Drug induced constipation 03/24/2022    Counseling regarding advanced care planning and goals of care 03/24/2022    Hepatocellular carcinoma ([address] ) 03/17/2022    Chemotherapy induced neutropenia ([facility] [address] ) 03/17/2022    Severe protein-calorie malnutrition ([address] ) 02/24/2022    History of 2019 novel coronavirus disease (COVID-19) 01/03/2022    COVID-19 12/10/2021    Sciatica of left side 10/13/2021    Platelets decreased ([address] ) 09/10/2021    Colon cancer screening declined 06/08/2021    Secondary esophageal varices without bleeding ([address] ) 11/14/2018    Diabetes mellitus, type II ([address] ) 08/14/2018    H/O laryngeal cancer 08/14/2018    Hypothyroidism 08/14/2018    Erectile dysfunction 05/23/2017    Low back pain 04/12/2016    Gastroesophageal reflux disease with esophagitis 09/29/2014    Hepatic cirrhosis ([facility] [address] ) 09/03/2014    Herpes simplex infection 11/14/2013    Hypertension 09/05/2012       Consulting Providers   Interventional radiology  Oncology  Gastroenterology  Palliative care    Code Status: Level 1 - Full Code  Advance Directive and Living Will: <no information>  Power of :    POLST:          [address] [facility]    Presenting Problem/History of Present Illness  Severe protein-calorie malnutrition [facility])  Hospital Course    77-year-old presented with difficulty swallowing weakness and weight loss    Dysphagia    patient's past medical history included 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, metastatic hepatocellular carcinoma  He was gastroenterology and of percutaneous radiographic gastrostomy was completed, G-tube  He was initiated on tube feeds with goal of 60 mL/hour but continued to have residuals  Discussion was with Gastroenterology possible placement of a jejunal tube  Unresponsive    patient was discovered unresponsive on the toilet on the morning of May 29th 2002  Code blue was initiated, death was pronounced at 6:20 a m  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 completed chemotherapy in 2012 and was on Sorafenib per oncology    Metastatic hepatocellular carcinoma Folfirnox 4 cycles of treatment completed, patient tolerated poorly losing weight progressive weakness decreased performance status  MRI repeated revealed hepatic cirrhosis dominant tumor in the posterior dome of the right hepatic lobe bulky upper retroperitoneal, gastrohepatic ligament periportal and periceliac nodule term lower significantly progress since February, and slight progression of osseous metastatic disease  Plan to improved nutrition and if patient improved continue palliative chemotherapy    However despite placement of G-tube patient had poor intake, continue residuals, with nausea vomiting, and was found unresponsive and with code blue initiate for 20 minutes co was terminated and death was pronounced

## 2022-05-29 NOTE — DEATH NOTE
INPATIENT DEATH NOTE  Rosa Isela Allen 79 y o  male MRN: [id number]  Unit/Bed#: E2 -01 Encounter: [id number]             PHYSICAL EXAM:  Unresponsive to noxious stimuli, Spontaneous respirations absent, Breath sounds absent, Carotid pulse absent, Heart sounds absent, Pupillary light reflex absent and Corneal blink reflex absent    Medical Examiner notification criteria:  NONE APPLICABLE   Medical Examiner's office notified?:  No, does not meet ME notification criteria   Medical Examiner accepted case?:  N/A    Time of Death: 3708  Date of Death: 22         Autopsy Options:  Declined     Home: [address]     Primary Service Attending Physician notified?:  yes - Attending:  Dr Enio Perdomo with [facility] aware       Physician/Resident responsible for completing Discharge Summary:  Dr Moshe Lozano

## 2022-05-29 NOTE — QUICK NOTE
Code Blue called at 0600  Patient underwent ACLS protocol for 20 minutes without return of pulse or spontaneous breathing  Patient remained in asystole for the entire duration of the code  I called patient's significant other, Abril Fatima, 4 times and it went to voicemail each time  Given that we could not get in contact with Katherine Roberson and the acuity of the situation, I then reached out to next closest of kin the system who is patient's brother Kin Arzate to inform him  Mirian Givens tried to call patient's significant other but he could not get in contact with her either  At this point it had been 20 minutes and I did inform patient's brother that I did not feel we would be able to get his brother back  Mirian Givens gave permission to stop the code and that he would inform Katherine Givens reported that he was going to go to [facility] to let her know that patient had passed and that they would then come to the hospital  Mirian Givens was unsure of whether they would want an autopsy or what  home the family would like to use and that he would need to speak with Katherine Roberson and then let physicians know  Spoke with patient's significant other Katherine Roberson at [phone] to inform her of the events and send condolensces  Katherine Robesron declined wanting an autopsy and reports that the family would like to use [facility]

## 2022-05-29 NOTE — PROCEDURES
Intubation    Date/Time: 5/29/2022 6:11 AM  Performed by: PRINCE Rowland  Authorized by: PRINCE Rowland     Patient location:  Bedside  Consent:     Consent obtained:  Emergent situation  Universal protocol:     Patient identity confirmed: Anonymous protocol, patient vented/unresponsive  Pre-procedure details:     Patient status:  Unresponsive    Mallampati score:  2    Pretreatment medications:  None    Paralytics:  None  Indications:     Indications for intubation: respiratory failure and other (comment)      Indications for intubation comment:  CPR in progress  Procedure details:     Preoxygenation:  Bag valve mask    CPR in progress: yes      Intubation method:  Oral    Oral intubation technique:  Direct    Laryngoscope blade: Mac 3    Tube size (mm):  7 5    Tube type:  Cuffed    Number of attempts:  1    Cricoid pressure: no      Tube visualized through cords: yes    Placement assessment:     ETT to lip:  23    Tube secured with:  ETT ribeiro    Breath sounds:  Reduced on left    Placement verification: condensation, direct visualization, ETCO2 detector and tube exhalation    Comments:      CODE BLUE/ACLS protocol  TOD [id number]  No placement CXR ordered

## 2022-07-05 ENCOUNTER — HOSPITAL ENCOUNTER (OUTPATIENT)
Dept: INFUSION CENTER | Facility: CLINIC | Age: 67
End: 2022-07-05

## 2022-12-08 NOTE — TELEPHONE ENCOUNTER
Called patient and left a voice message stating that his 5/27 appointment with Camila Flannery needed to be rescheduled as SUZY  [place] and going to be there longer than expected  I was able to put patient on Dr Reyez's schedule for 6/3 the same day as his infusion appointment  Stated that patient should just come up after treatment although patient is on the schedule for 12:40  Stated that if patient had any questions and or concerns that patient should call the office  none

## 2024-03-12 NOTE — TELEMEDICINE
e-Consult (IP[facility])  - Interventional Radiology  Onofre Moran 77 y o  male MRN: [id number]  Unit/Bed#: [address]-01 Encounter: [id number]    IR has been consulted to evaluate the patient, determine the appropriate procedure, and whether or not a procedure can and should be performed regarding the care of Onofre Moran  IP Consult to IR  Consult performed by: Zeinab Moulton MD  Consult ordered by: Anthony Engel MD        02/24/22      Assessment/Recommendation:   43-year-old man history of pharyngeal cancer and 8 cc admitted now with abdominal pain, vomiting, weight loss, weakness  Found to have retroperitoneal lymphadenopathy and referred for inpatient percutaneous biopsy  CT reviewed  Retroperitoneal nodes appear accessible for percutaneous biopsy  Will schedule for early next week when pending available of IR and CT  If patient qualifies for discharge in the meantime, we could arrange for outpatient biopsy which would be preferred  Total time spent in review of data, discussion with requesting provider and rendering advice was 20 minutes  Thank you for allowing Interventional Radiology to participate in the care of Onofre Moran  Please don't hesitate to call or TigerText us with any questions       Gwyn Richmond MD
3 = A little assistance

## 2024-05-16 NOTE — PLAN OF CARE
Caller: Ynes    Doctor/Office: Neuro    Call regarding :  Neuro scheduling     Call was transferred to: Neurology     Problem: Nutrition/Hydration-ADULT  Goal: Nutrient/Hydration intake appropriate for improving, restoring or maintaining nutritional needs  Description: Monitor and assess patient's nutrition/hydration status for malnutrition  Collaborate with interdisciplinary team and initiate plan and interventions as ordered  Monitor patient's weight and dietary intake as ordered or per policy  Utilize nutrition screening tool and intervene as necessary  Determine patient's food preferences and provide high-protein, high-caloric foods as appropriate       INTERVENTIONS:  - Monitor oral intake, urinary output, labs, and treatment plans  - Assess nutrition and hydration status and recommend course of action  - Evaluate amount of meals eaten  - Assist patient with eating if necessary   - Allow adequate time for meals  - Recommend/ encourage appropriate diets, oral nutritional supplements, and vitamin/mineral supplements  - Order, calculate, and assess calorie counts as needed  - Recommend, monitor, and adjust tube feedings and TPN/PPN based on assessed needs  - Assess need for intravenous fluids  - Provide specific nutrition/hydration education as appropriate  - Include patient/family/caregiver in decisions related to nutrition  Outcome: Progressing     Problem: PAIN - ADULT  Goal: Verbalizes/displays adequate comfort level or baseline comfort level  Description: Interventions:  - Encourage patient to monitor pain and request assistance  - Assess pain using appropriate pain scale  - Administer analgesics based on type and severity of pain and evaluate response  - Implement non-pharmacological measures as appropriate and evaluate response  - Consider cultural and social influences on pain and pain management  - Notify physician/advanced practitioner if interventions unsuccessful or patient reports new pain  Outcome: Progressing     Problem: SAFETY ADULT  Goal: Patient will remain free of falls  Description: INTERVENTIONS:  - Educate patient/family on patient safety including physical limitations  - Instruct patient to call for assistance with activity   - Consult OT/PT to assist with strengthening/mobility   - Keep Call bell within reach  - Keep bed low and locked with side rails adjusted as appropriate  - Keep care items and personal belongings within reach  - Initiate and maintain comfort rounds  - Make Fall Risk Sign visible to staff  - Offer Toileting every  Hours, in advance of need  - Initiate/Maintain alarm  - Obtain necessary fall risk management equipment:   - Apply yellow socks and bracelet for high fall risk patients  - Consider moving patient to room near nurses station  Outcome: Progressing  Goal: Maintain or return to baseline ADL function  Description: INTERVENTIONS:  -  Assess patient's ability to carry out ADLs; assess patient's baseline for ADL function and identify physical deficits which impact ability to perform ADLs (bathing, care of mouth/teeth, toileting, grooming, dressing, etc )  - Assess/evaluate cause of self-care deficits   - Assess range of motion  - Assess patient's mobility; develop plan if impaired  - Assess patient's need for assistive devices and provide as appropriate  - Encourage maximum independence but intervene and supervise when necessary  - Involve family in performance of ADLs  - Assess for home care needs following discharge   - Consider OT consult to assist with ADL evaluation and planning for discharge  - Provide patient education as appropriate  Outcome: Progressing  Goal: Maintains/Returns to pre admission functional level  Description: INTERVENTIONS:  - Perform BMAT or MOVE assessment daily    - Set and communicate daily mobility goal to care team and patient/family/caregiver  - Collaborate with rehabilitation services on mobility goals if consulted  - Reposition patient every  hours    - Dangle patient  times a day  - Stand patient  times a day  - Ambulate patient  times a day  - Out of bed to chair  times a day   - Out of bed for meals  times a day  - Out of bed for toileting  - Record patient progress and toleration of activity level   Outcome: Progressing     Problem: DISCHARGE PLANNING  Goal: Discharge to home or other facility with appropriate resources  Description: INTERVENTIONS:  - Identify barriers to discharge w/patient and caregiver  - Arrange for needed discharge resources and transportation as appropriate  - Identify discharge learning needs (meds, wound care, etc )  - Arrange for interpretive services to assist at discharge as needed  - Refer to Case Management Department for coordinating discharge planning if the patient needs post-hospital services based on physician/advanced practitioner order or complex needs related to functional status, cognitive ability, or social support system  Outcome: Progressing     Problem: Potential for Falls  Goal: Patient will remain free of falls  Description: INTERVENTIONS:  - Educate patient/family on patient safety including physical limitations  - Instruct patient to call for assistance with activity   - Consult OT/PT to assist with strengthening/mobility   - Keep Call bell within reach  - Keep bed low and locked with side rails adjusted as appropriate  - Keep care items and personal belongings within reach  - Initiate and maintain comfort rounds  - Make Fall Risk Sign visible to staff  - Offer Toileting every  Hours, in advance of need  - Initiate/Maintain alarm  - Obtain necessary fall risk management equipment:   - Apply yellow socks and bracelet for high fall risk patients  - Consider moving patient to room near nurses station  Outcome: Progressing

## 2024-08-02 NOTE — PLAN OF CARE
Problem: Potential for Falls  Goal: Patient will remain free of falls  Description: INTERVENTIONS:  - Educate patient/family on patient safety including physical limitations  - Instruct patient to call for assistance with activity   - Consult OT/PT to assist with strengthening/mobility   - Keep Call bell within reach  - Keep bed low and locked with side rails adjusted as appropriate  - Keep care items and personal belongings within reach  - Initiate and maintain comfort rounds  - Make Fall Risk Sign visible to staff  - Apply yellow socks and bracelet for high fall risk patients  - Consider moving patient to room near nurses station  Outcome: Progressing none